# Patient Record
Sex: FEMALE | Race: OTHER | HISPANIC OR LATINO | Employment: UNEMPLOYED | URBAN - METROPOLITAN AREA
[De-identification: names, ages, dates, MRNs, and addresses within clinical notes are randomized per-mention and may not be internally consistent; named-entity substitution may affect disease eponyms.]

---

## 2019-01-02 ENCOUNTER — HOSPITAL ENCOUNTER (OUTPATIENT)
Dept: NEUROLOGY | Facility: AMBULATORY SURGERY CENTER | Age: 29
Discharge: HOME | End: 2019-01-02

## 2019-01-02 ENCOUNTER — TRANSCRIBE ORDERS (OUTPATIENT)
Dept: ADMINISTRATIVE | Facility: HOSPITAL | Age: 29
End: 2019-01-02

## 2019-01-02 DIAGNOSIS — G40.109 SPECIAL SENSORY ATTACKS (HCC): ICD-10-CM

## 2019-01-02 DIAGNOSIS — G40.109 SPECIAL SENSORY ATTACKS (HCC): Primary | ICD-10-CM

## 2019-01-03 ENCOUNTER — HOSPITAL ENCOUNTER (OUTPATIENT)
Dept: NEUROLOGY | Facility: AMBULATORY SURGERY CENTER | Age: 29
Discharge: HOME | End: 2019-01-03
Payer: COMMERCIAL

## 2019-01-03 DIAGNOSIS — G40.109 SPECIAL SENSORY ATTACKS (HCC): ICD-10-CM

## 2019-01-03 PROCEDURE — 95953 HB EEG MONITORING/COMPUTER: CPT

## 2019-01-03 PROCEDURE — 95953 PR EEG MONITORING/COMPUTER, EA 24 HOURS, UNATTENDED: CPT | Performed by: PSYCHIATRY & NEUROLOGY

## 2019-01-04 ENCOUNTER — HOSPITAL ENCOUNTER (OUTPATIENT)
Dept: NEUROLOGY | Facility: AMBULATORY SURGERY CENTER | Age: 29
Discharge: HOME | End: 2019-01-04
Payer: COMMERCIAL

## 2019-01-04 DIAGNOSIS — G40.109 SPECIAL SENSORY ATTACKS (HCC): ICD-10-CM

## 2019-01-04 PROCEDURE — 95953 PR EEG MONITORING/COMPUTER, EA 24 HOURS, UNATTENDED: CPT | Performed by: PSYCHIATRY & NEUROLOGY

## 2019-01-04 PROCEDURE — 95953 HB EEG MONITORING/COMPUTER: CPT

## 2020-01-13 LAB — EXTERNAL GROUP B STREP ANTIGEN: NEGATIVE

## 2020-02-09 ENCOUNTER — HOSPITAL ENCOUNTER (INPATIENT)
Facility: HOSPITAL | Age: 30
LOS: 2 days | Discharge: HOME | DRG: 560 | End: 2020-02-11
Attending: OBSTETRICS & GYNECOLOGY | Admitting: OBSTETRICS & GYNECOLOGY
Payer: COMMERCIAL

## 2020-02-09 ENCOUNTER — ANESTHESIA EVENT (INPATIENT)
Dept: ANESTHESIOLOGY | Facility: HOSPITAL | Age: 30
DRG: 560 | End: 2020-02-09
Payer: COMMERCIAL

## 2020-02-09 ENCOUNTER — ANESTHESIA (INPATIENT)
Dept: ANESTHESIOLOGY | Facility: HOSPITAL | Age: 30
DRG: 560 | End: 2020-02-09
Payer: COMMERCIAL

## 2020-02-09 DIAGNOSIS — Z3A.40 40 WEEKS GESTATION OF PREGNANCY: Primary | ICD-10-CM

## 2020-02-09 DIAGNOSIS — K59.00 CONSTIPATION: ICD-10-CM

## 2020-02-09 LAB
ABO GROUP BLD: NORMAL
BACTERIA UR QL AUTO: ABNORMAL /HPF
BASE EXCESS BLDCOA CALC-SCNC: -4.6 MMOL/L (ref 3–11)
BASE EXCESS BLDCOV CALC-SCNC: -3.9 MMOL/L (ref 1–9)
BASOPHILS # BLD AUTO: 0.01 THOUSANDS/ΜL (ref 0–0.1)
BASOPHILS NFR BLD AUTO: 0 % (ref 0–1)
BILIRUB UR QL STRIP: NEGATIVE
BLD GP AB SCN SERPL QL: NEGATIVE
CLARITY UR: CLEAR
COLOR UR: YELLOW
EOSINOPHIL # BLD AUTO: 0.05 THOUSAND/ΜL (ref 0–0.61)
EOSINOPHIL NFR BLD AUTO: 1 % (ref 0–6)
ERYTHROCYTE [DISTWIDTH] IN BLOOD BY AUTOMATED COUNT: 15 % (ref 11.6–15.1)
EXTERNAL HEPATITIS B SURFACE ANTIGEN: NEGATIVE
EXTERNAL HEPATITIS B SURFACE ANTIGEN: NORMAL
EXTERNAL RUBELLA IGG QUANTITATION: NORMAL
EXTERNAL RUBELLA IGG QUANTITATION: NORMAL
GLUCOSE UR STRIP-MCNC: NEGATIVE MG/DL
HBV SURFACE AG SER QL: NORMAL
HCO3 BLDCOA-SCNC: 23.2 MMOL/L (ref 17.3–27.3)
HCO3 BLDCOV-SCNC: 19.6 MMOL/L (ref 12.2–28.6)
HCT VFR BLD AUTO: 38.5 % (ref 34.8–46.1)
HGB BLD-MCNC: 12.5 G/DL (ref 11.5–15.4)
HGB UR QL STRIP.AUTO: NEGATIVE
HIV 1+2 AB+HIV1 P24 AG SERPL QL IA: NORMAL
HIV1 P24 AG SER QL: NORMAL
IMM GRANULOCYTES # BLD AUTO: 0.04 THOUSAND/UL (ref 0–0.2)
IMM GRANULOCYTES NFR BLD AUTO: 1 % (ref 0–2)
KETONES UR STRIP-MCNC: NEGATIVE MG/DL
LEUKOCYTE ESTERASE UR QL STRIP: NEGATIVE
LYMPHOCYTES # BLD AUTO: 1.94 THOUSANDS/ΜL (ref 0.6–4.47)
LYMPHOCYTES NFR BLD AUTO: 23 % (ref 14–44)
MCH RBC QN AUTO: 27.7 PG (ref 26.8–34.3)
MCHC RBC AUTO-ENTMCNC: 32.5 G/DL (ref 31.4–37.4)
MCV RBC AUTO: 85 FL (ref 82–98)
MONOCYTES # BLD AUTO: 0.53 THOUSAND/ΜL (ref 0.17–1.22)
MONOCYTES NFR BLD AUTO: 6 % (ref 4–12)
MUCOUS THREADS UR QL AUTO: ABNORMAL
NEUTROPHILS # BLD AUTO: 5.72 THOUSANDS/ΜL (ref 1.85–7.62)
NEUTS SEG NFR BLD AUTO: 69 % (ref 43–75)
NITRITE UR QL STRIP: NEGATIVE
NON-SQ EPI CELLS URNS QL MICRO: ABNORMAL /HPF
NRBC BLD AUTO-RTO: 0 /100 WBCS
O2 CT VFR BLDCOA CALC: 5 ML/DL
OXYHGB MFR BLDCOA: 21.4 %
OXYHGB MFR BLDCOV: 87 %
PCO2 BLDCOA: 53.1 MM[HG] (ref 30–60)
PCO2 BLDCOV: 32.4 MM HG (ref 27–43)
PH BLDCOA: 7.26 [PH] (ref 7.23–7.43)
PH BLDCOV: 7.4 [PH] (ref 7.19–7.49)
PH UR STRIP.AUTO: 6 [PH]
PLATELET # BLD AUTO: 217 THOUSANDS/UL (ref 149–390)
PMV BLD AUTO: 11 FL (ref 8.9–12.7)
PO2 BLDCOA: 12.8 MM HG (ref 5–25)
PO2 BLDCOV: 39.6 MM HG (ref 15–45)
PROT UR STRIP-MCNC: ABNORMAL MG/DL
RBC # BLD AUTO: 4.51 MILLION/UL (ref 3.81–5.12)
RBC #/AREA URNS AUTO: ABNORMAL /HPF
RH BLD: POSITIVE
RUBV IGG SERPL IA-ACNC: >175 IU/ML
SAO2 % BLDCOV: 22.6 ML/DL
SP GR UR STRIP.AUTO: 1.02 (ref 1–1.03)
SPECIMEN EXPIRATION DATE: NORMAL
UROBILINOGEN UR QL STRIP.AUTO: 0.2 E.U./DL
WBC # BLD AUTO: 8.29 THOUSAND/UL (ref 4.31–10.16)
WBC #/AREA URNS AUTO: ABNORMAL /HPF

## 2020-02-09 PROCEDURE — 87086 URINE CULTURE/COLONY COUNT: CPT | Performed by: STUDENT IN AN ORGANIZED HEALTH CARE EDUCATION/TRAINING PROGRAM

## 2020-02-09 PROCEDURE — 87806 HIV AG W/HIV1&2 ANTB W/OPTIC: CPT | Performed by: OBSTETRICS & GYNECOLOGY

## 2020-02-09 PROCEDURE — 82805 BLOOD GASES W/O2 SATURATION: CPT | Performed by: OBSTETRICS & GYNECOLOGY

## 2020-02-09 PROCEDURE — 80081 OBSTETRIC PANEL INC HIV TSTG: CPT | Performed by: STUDENT IN AN ORGANIZED HEALTH CARE EDUCATION/TRAINING PROGRAM

## 2020-02-09 PROCEDURE — 81001 URINALYSIS AUTO W/SCOPE: CPT | Performed by: STUDENT IN AN ORGANIZED HEALTH CARE EDUCATION/TRAINING PROGRAM

## 2020-02-09 PROCEDURE — 99204 OFFICE O/P NEW MOD 45 MIN: CPT

## 2020-02-09 RX ORDER — ONDANSETRON 2 MG/ML
4 INJECTION INTRAMUSCULAR; INTRAVENOUS EVERY 6 HOURS PRN
Status: DISCONTINUED | OUTPATIENT
Start: 2020-02-09 | End: 2020-02-09

## 2020-02-09 RX ORDER — ACETAMINOPHEN 325 MG/1
650 TABLET ORAL EVERY 6 HOURS SCHEDULED
Status: DISCONTINUED | OUTPATIENT
Start: 2020-02-09 | End: 2020-02-11 | Stop reason: HOSPADM

## 2020-02-09 RX ORDER — SODIUM CHLORIDE, SODIUM LACTATE, POTASSIUM CHLORIDE, CALCIUM CHLORIDE 600; 310; 30; 20 MG/100ML; MG/100ML; MG/100ML; MG/100ML
125 INJECTION, SOLUTION INTRAVENOUS CONTINUOUS
Status: DISCONTINUED | OUTPATIENT
Start: 2020-02-09 | End: 2020-02-09

## 2020-02-09 RX ORDER — CALCIUM CARBONATE 200(500)MG
1000 TABLET,CHEWABLE ORAL DAILY PRN
Status: DISCONTINUED | OUTPATIENT
Start: 2020-02-09 | End: 2020-02-10

## 2020-02-09 RX ORDER — IBUPROFEN 600 MG/1
600 TABLET ORAL EVERY 6 HOURS PRN
Status: DISCONTINUED | OUTPATIENT
Start: 2020-02-09 | End: 2020-02-11 | Stop reason: HOSPADM

## 2020-02-09 RX ORDER — FENTANYL CITRATE 50 UG/ML
INJECTION, SOLUTION INTRAMUSCULAR; INTRAVENOUS
Status: COMPLETED
Start: 2020-02-09 | End: 2020-02-09

## 2020-02-09 RX ORDER — FENTANYL CITRATE 50 UG/ML
INJECTION, SOLUTION INTRAMUSCULAR; INTRAVENOUS AS NEEDED
Status: DISCONTINUED | OUTPATIENT
Start: 2020-02-09 | End: 2020-02-09 | Stop reason: SURG

## 2020-02-09 RX ORDER — BUPIVACAINE HYDROCHLORIDE 2.5 MG/ML
INJECTION, SOLUTION EPIDURAL; INFILTRATION; INTRACAUDAL AS NEEDED
Status: DISCONTINUED | OUTPATIENT
Start: 2020-02-09 | End: 2020-02-09 | Stop reason: SURG

## 2020-02-09 RX ORDER — OXYTOCIN/RINGER'S LACTATE 30/500 ML
1-30 PLASTIC BAG, INJECTION (ML) INTRAVENOUS ONCE
Status: DISCONTINUED | OUTPATIENT
Start: 2020-02-09 | End: 2020-02-11 | Stop reason: HOSPADM

## 2020-02-09 RX ORDER — DOCUSATE SODIUM 100 MG/1
100 CAPSULE, LIQUID FILLED ORAL 2 TIMES DAILY
Status: DISCONTINUED | OUTPATIENT
Start: 2020-02-09 | End: 2020-02-11 | Stop reason: HOSPADM

## 2020-02-09 RX ORDER — OXYCODONE HYDROCHLORIDE 5 MG/1
5 TABLET ORAL EVERY 4 HOURS PRN
Status: DISCONTINUED | OUTPATIENT
Start: 2020-02-09 | End: 2020-02-11 | Stop reason: HOSPADM

## 2020-02-09 RX ORDER — TRISODIUM CITRATE DIHYDRATE AND CITRIC ACID MONOHYDRATE 500; 334 MG/5ML; MG/5ML
30 SOLUTION ORAL 4 TIMES DAILY PRN
Status: DISCONTINUED | OUTPATIENT
Start: 2020-02-09 | End: 2020-02-11 | Stop reason: HOSPADM

## 2020-02-09 RX ORDER — OXYTOCIN/RINGER'S LACTATE 30/500 ML
PLASTIC BAG, INJECTION (ML) INTRAVENOUS
Status: COMPLETED
Start: 2020-02-09 | End: 2020-02-10

## 2020-02-09 RX ORDER — FAMOTIDINE 10 MG
10 TABLET ORAL 2 TIMES DAILY PRN
COMMUNITY
End: 2020-12-11 | Stop reason: ALTCHOICE

## 2020-02-09 RX ADMIN — ROPIVACAINE HYDROCHLORIDE: 2 INJECTION, SOLUTION EPIDURAL; INFILTRATION at 12:15

## 2020-02-09 RX ADMIN — IBUPROFEN 600 MG: 600 TABLET ORAL at 21:51

## 2020-02-09 RX ADMIN — BUPIVACAINE HYDROCHLORIDE 1 ML: 2.5 INJECTION, SOLUTION EPIDURAL; INFILTRATION; INTRACAUDAL at 11:59

## 2020-02-09 RX ADMIN — SODIUM CITRATE AND CITRIC ACID MONOHYDRATE 30 ML: 500; 334 SOLUTION ORAL at 20:03

## 2020-02-09 RX ADMIN — Medication: at 17:16

## 2020-02-09 RX ADMIN — ACETAMINOPHEN 650 MG: 325 TABLET, FILM COATED ORAL at 23:25

## 2020-02-09 RX ADMIN — DOCUSATE SODIUM 100 MG: 100 CAPSULE, LIQUID FILLED ORAL at 17:09

## 2020-02-09 RX ADMIN — FENTANYL CITRATE 10 MCG: 50 INJECTION INTRAMUSCULAR; INTRAVENOUS at 11:59

## 2020-02-09 RX ADMIN — Medication 250 UNITS: at 14:41

## 2020-02-09 RX ADMIN — ACETAMINOPHEN 650 MG: 325 TABLET, FILM COATED ORAL at 17:09

## 2020-02-09 RX ADMIN — SODIUM CHLORIDE, SODIUM LACTATE, POTASSIUM CHLORIDE, AND CALCIUM CHLORIDE 999 ML/HR: .6; .31; .03; .02 INJECTION, SOLUTION INTRAVENOUS at 11:25

## 2020-02-09 RX ADMIN — SODIUM CHLORIDE, SODIUM LACTATE, POTASSIUM CHLORIDE, AND CALCIUM CHLORIDE 125 ML/HR: .6; .31; .03; .02 INJECTION, SOLUTION INTRAVENOUS at 12:27

## 2020-02-09 RX ADMIN — WITCH HAZEL 1 PAD: 500 SOLUTION RECTAL; TOPICAL at 17:16

## 2020-02-09 NOTE — ANESTHESIA POSTPROCEDURE EVALUATION
Post-Op Assessment Note    CV Status:  Stable  Pain Score: 0    Pain management: adequate     Mental Status:  Alert and awake   Hydration Status:  Stable and euvolemic   PONV Controlled:  Controlled   Airway Patency:  Patent and adequate   Post Op Vitals Reviewed: Yes      Staff: CRNA     Post-op block assessment: catheter intact and no complications        BP      Temp      Pulse     Resp      SpO2

## 2020-02-09 NOTE — DISCHARGE SUMMARY
Discharge Summary - OB/GYN   Sherryle Games 30 y o  female MRN: [id number]  Unit/Bed#: -01 Encounter: [id number]      Admission Date: 2020     Discharge Date: 2020    Admitting Diagnosis:   1  Pregnancy at 40w6d  2  H/o preeclampsia in previous pregnancy  3  H/o IUGR in previous pregnancy    Discharge Diagnosis:   Same, delivered    Procedures: spontaneous vaginal delivery    Admitting Attending: Dr Koffi Vyas  Delivery Attending: Dr Koffi Vyas  Discharge Attending: Dr Sal Barnes Course:     Sherryle Games is a 30 y o  [id number] at 40w6d wks who was initially admitted for labor  She delivered a viable female  on 2020 at 21   Weight 7lbs 10 1oz via spontaneous vaginal delivery  Apgars were 9 (1 min) and 9 (5 min)   was transferred to  nursery  Patient tolerated the procedure well and was transferred to recovery in stable condition  Her post-partum course was uncomplicated  Her post-partum pain was well controlled with oral analgesics  On day of discharge, she was ambulating and able to reasonably perform all ADLs  She was voiding and had appropriate bowel function  Pain was well controlled  She was discharged home on post-partum day #2 without complications  Patient was instructed to follow up with her OB as an outpatient and was given appropriate warnings to call provider if she develops signs of infection or uncontrolled pain  Complications: none apparent    Condition at discharge: good     Discharge instructions/Information to patient and family:   See after visit summary for information provided to patient and family  Provisions for Follow-Up Care:  See after visit summary for information related to follow-up care and any pertinent home health orders  Disposition: Home    Planned Readmission: No    Discharge Medications:   For a complete list of the patient's medications, please refer to her med rec

## 2020-02-09 NOTE — ANESTHESIA PROCEDURE NOTES
CSE Block    Patient location during procedure: OB  Start time: 2/9/2020 11:59 AM  Reason for block: procedure for pain and at surgeon's request  Staffing  Anesthesiologist: Jessica Dao MD  Resident/CRNA: Lionel Merchant CRNA  Performed: resident/CRNA   Preanesthetic Checklist  Completed: patient identified, surgical consent, pre-op evaluation, timeout performed, IV checked, risks and benefits discussed and monitors and equipment checked  CSE  Patient position: sitting  Prep: Betadine and site prepped and draped  Patient monitoring: heart rate, continuous pulse ox and frequent blood pressure checks  Approach: midline  Spinal Needle  Needle type: pencil-tip   Needle gauge: 27 G  Needle length: 10 cm  Epidural Needle  Injection technique: KRISTIN saline  Needle type: Tuohy   Epidural needle gauge: 17 g  Location: lumbar (1-5) (L4/5)  Catheter  Catheter type: side hole  Catheter size: 19 g springwound  Catheter at skin depth: 10 5 cm  Test dose: negative  Assessment  Injection Assessment:  negative aspiration for heme, no pain on injection, no paresthesia on injection and positive aspiration for clear CSF  Additional Notes  Pt positioned sitting, timeout, sterile prep and drape  Placement 1 attempt at L 4/5 with KRISTIN to NS  Needle through needle CSE with + CSF, easy injection, + relief  Cath threaded easily, negative aspiration  Patient laid supine with GRIFFIN, PCEA initiated

## 2020-02-09 NOTE — ANESTHESIA PREPROCEDURE EVALUATION
31 yo  in labor, requesting analgesia  Review of Systems/Medical History  Patient summary reviewed  Chart reviewed  No history of anesthetic complications (prior epidural x2, no problems)     Cardiovascular  Negative cardio ROS    Pulmonary  Negative pulmonary ROS        GI/Hepatic  Negative GI/hepatic ROS          Negative  ROS        Endo/Other  Negative endo/other ROS      GYN  Currently pregnant , Prior pregnancy/OB history : 3 Parity: 2,          Hematology  Negative hematology ROS      Musculoskeletal  Negative musculoskeletal ROS        Neurology  Seizures ,     Psychology   Negative psychology ROS              Physical Exam    Airway    Mallampati score: I  TM Distance: >3 FB  Neck ROM: full     Dental   No notable dental hx     Cardiovascular  Comment: Negative ROS, Cardiovascular exam normal    Pulmonary  Pulmonary exam normal     Other Findings      Lab Results   Component Value Date    HGB 12 5 2020     2020         Anesthesia Plan  ASA Score- 2     Anesthesia Type- epidural and spinal with ASA Monitors  Additional Monitors:   Airway Plan:         Plan Factors-    Induction-     Postoperative Plan-     Informed Consent- Anesthetic plan and risks discussed with patient and spouse  I personally reviewed this patient with the CRNA  Discussed and agreed on the Anesthesia Plan with the CRNA  Norma Wilson

## 2020-02-09 NOTE — L&D DELIVERY NOTE
Vaginal Delivery Summary - OB/GYN   Leticia Quijano 30 y o  female MRN: [id number]  Unit/Bed#: -01 Encounter: [id number]          Predelivery Diagnosis:  1  Pregnancy at 40w6d  2  History of preeclampsia in previous pregnancy  3  History of IUGR in previous pregnancy  4  GERD     Postdelivery Diagnosis:  1  Same as above  2  Delivery of term     Procedure: Spontaneous Vaginal Delivery    Attending: Luis Daniel Og MD    Assistant: Prasanna Oseguera MD    Anesthesia: Epidural    EBL: 48ZY    Complications: none apparent    Specimens: cord blood, arterial and venous cord blood gasses, placenta to storage    Findings:   1  Viable female at 1441, with APGARS of 9 and 9 at 1 and 5 minutes respectively,  2  Spontaneous delivery of intact placenta at 1453  4  Blood gases:   Arterial pH: 7 259   Arterial base excess: -4 6   Venous pH: 7 399   Venous base excess: -3 9    Disposition:  Patient tolerated the procedure well and was recovering in labor and delivery room     Brief history and labor course:  Ms Leticia Quijano is a 30 y o  Ludie Shelling at [facility]  She presented to labor and delivery for labor  Her pregnancy was uncomplicated, although she had history of previous pregnancies complicated by preeclampsia and IUGR respectively  On exam in triage she was noted to be 4/90/-2  She was admitted for labor  She progressed well on her own and her membranes were ruptured for clear amniotic fluid  She is found to be complete warm compresses were applied  Description of procedure    After pushing for 13 minutes, at 1441 patient delivered a viable female , wt pending, apgars of 9 (1 min) and 9 (5 min)  The fetal vertex delivered spontaneously in a AMANDA presentation  Baby was checked for nuchal cord and found to have one loop around the neck that could not be reduced over the head and was delivered through to be reduced around the fetal body   The left anterior shoulder delivered atraumatically with maternal expulsive forces and the assistance of downward traction  The posterior shoulder delivered with maternal expulsive forces and the assistance of upward traction  The remainder of the fetus delivered spontaneously  Upon delivery, the infant was placed on the mothers abdomen and the cord was clamped and cut  Delayed cord clamping was performed  The infant was noted to cry spontaneously and was moving all extremities appropriately  There was no evidence for injury  Awaiting nurse resuscitators evaluated the   Arterial and venous cord blood gases and cord blood was collected for analysis  These were promptly sent to the lab  In the immediate post-partum, 30 units of IV pitocin was administered, and the uterus was noted to contract down well with massage and pitocin  The placenta delivered spontaneously at [phone] and was noted to have a centrally inserted 3 vessel cord  The vagina, cervix, perineum, and rectum were inspected and there was noted to be abrasions and no lacerations requiring repair  At the conclusion of the procedure, all needle, sponge, and instrument counts were noted to be correct  Patient tolerated the procedure well and was allowed to recover in labor and delivery room with family and  before being transferred to the post-partum floor  The attending, Dr Danay Wong, was present and participated in all key portions of the case          Mary Gill MD  2020  3:18 PM

## 2020-02-09 NOTE — OB LABOR/OXYTOCIN SAFETY PROGRESS
Labor Progress Note - Elliot Moctezuma 30 y o  female MRN: [id number]    Unit/Bed#: -01 Encounter: [id number]       Contraction Frequency (minutes): 2-3  Contraction Quality: Moderate  Tachysystole: No   Dilation: 6        Effacement (%): 100  Station: -1  Baseline Rate: 135 bpm  Fetal Heart Rate: 125 BPM  FHR Category: Category I             Notes/comments:   Exam by Dr Giovanna Martines, ruptured for clear fluid  FHT category 1  Geovanna every 3 min

## 2020-02-09 NOTE — PLAN OF CARE
Problem: Knowledge Deficit  Goal: Verbalizes understanding of labor plan  Description  Assess patient/family/caregiver's baseline knowledge level and ability to understand information  Provide education via patient/family/caregiver's preferred learning method at appropriate level of understanding  1  Provide teaching at level of understanding  2  Provide teaching via preferred learning method(s)  2/9/2020 1533 by Duke Elise RN  Outcome: Completed  2/9/2020 1357 by Duke Elise RN  Outcome: Progressing     Problem: Labor & Delivery  Goal: Manages discomfort  Description  Assess and monitor for signs and symptoms of discomfort  Assess patient's pain level regularly and per hospital policy  Administer medications as ordered  Support use of nonpharmacological methods to help control pain such as distraction, imagery, relaxation, and application of heat and cold  Collaborate with interdisciplinary team and patient to determine appropriate pain management plan  1  Include patient in decisions related to comfort  2  Offer non-pharmacological pain management interventions  3  Report ineffective pain management to physician  2/9/2020 1533 by Duke Elise RN  Outcome: Completed  2/9/2020 1357 by Duke Elise RN  Outcome: Progressing  Goal: Patient vital signs are stable  Description  1  Assess vital signs - vaginal delivery    2/9/2020 1533 by Duke Elise RN  Outcome: Completed  2/9/2020 1357 by Duke Elise RN  Outcome: Progressing

## 2020-02-09 NOTE — LACTATION NOTE
This note was copied from a baby's chart  Mom states infant is feeding well so far  Reviewed expected  infant feeding patterns in the first few days and encouraged feeding on cue  Given admission breastfeeding pkat and same reviewed  Encouraged to call for additional assistance as needed

## 2020-02-09 NOTE — OB LABOR/OXYTOCIN SAFETY PROGRESS
Labor Progress Note - Ervin Pedroza 30 y o  female MRN: [id number]    Unit/Bed#: -01 Encounter: [id number]       Contraction Frequency (minutes): 2-3  Contraction Quality: Moderate  Tachysystole: No   Dilation: 9        Effacement (%): 100  Station: 0  Baseline Rate: 135 bpm  Fetal Heart Rate: 125 BPM  FHR Category: Category I             Notes/comments:   Patient feeling constant pressure  Asynclitism noted with a more presenting anterior fontanelle  Fetal presentation JULIAN  FHT category 1  Geovanna every 2 min    Plan: reassess in 1 hour or less  Exam conducted with Dr Roberta Ortiz MD 2/9/2020 1:50 PM

## 2020-02-09 NOTE — DISCHARGE INSTRUCTIONS
Vaginal Delivery   WHAT YOU NEED TO KNOW:   A vaginal delivery occurs when your baby is born through your vagina (birth canal)  DISCHARGE INSTRUCTIONS:   Seek care immediately if:   · Your leg feels warm, tender, and painful  It may look swollen and red  · You have a fever  · You are urinating very little, or not at all  · You have heavy vaginal bleeding that fills 1 or more sanitary pads in 1 hour  · You feel weak, dizzy, or faint  Contact your healthcare provider if:   · Your abdominal or perineal pain does not go away, or gets worse  · You feel depressed  · You have questions or concerns about your condition or care  Medicines:  · NSAIDs , such as ibuprofen, help decrease swelling, pain, and fever  This medicine is available with or without a doctor's order  NSAIDs can cause stomach bleeding or kidney problems in certain people  If you take blood thinner medicine, always ask your healthcare provider if NSAIDs are safe for you  Always read the medicine label and follow directions  · Stool softeners  make it easier for you to have a bowel movement  You may need this medicine to treat or prevent constipation  · Take your medicine as directed  Contact your healthcare provider if you think your medicine is not helping or if you have side effects  Tell him or her if you are allergic to any medicine  Keep a list of the medicines, vitamins, and herbs you take  Include the amounts, and when and why you take them  Bring the list or the pill bottles to follow-up visits  Carry your medicine list with you in case of an emergency  Follow up with your healthcare provider:  Most women need to return 6 weeks after a vaginal delivery  Ask your healthcare provider how to care for your wounds or stitches, if you have them  Write down your questions so you remember to ask them during your visits  Activity:  Rest as much as possible  Try to keep all activities short   You may be able to do some exercise soon after you have your baby  Talk with your healthcare provider before you start exercising  If you work outside the home, ask when you can return to your job  Kegel exercises:  Kegel exercises may help your vaginal and rectal muscles heal faster  You can do Kegel exercises by tightening and relaxing the muscles around your vagina  Kegel exercises help make the muscles stronger  Breast care:  When your milk comes in, your breasts may feel full and hard  Ask how to care for your breasts, even if you are not breastfeeding  Constipation:  You may have constipation for a period of time after you have your baby  Do not try to push the bowel movement out if it is too hard  High-fiber foods and extra liquids can help you prevent constipation  Examples of high-fiber foods are fruit and bran  Prune juice and water are good liquids to drink  You may also be told to take over-the-counter fiber and stool softener medicines  Take these items as directed  Ask how to prevent or treat hemorrhoids  Perineum care: Your perineum is the area between your vagina and anus  Keep the area clean and dry  This will help it heal and prevent infection  Wash the area gently with soap and water when you bathe or shower  Rinse your perineum with warm water after you urinate or have a bowel movement  Your healthcare provider may suggest you use a warm sitz bath to help decrease pain  To take a sitz bath, fill a bathtub with 4 to 6 inches of warm water  You may also use a sitz bath pan that fits inside the toilet  Sit in the sitz bath for 20 minutes  Do this 2 to 3 times a day, or as directed  The warm water can help decrease pain and swelling  Vaginal discharge: You will have vaginal discharge, called lochia, after your delivery  The lochia is red or dark brown with clots for 1 to 3 days after the birth  The amount will decrease and turn pale pink or brown for 3 to 10 days  It will turn white or yellow on the 10th or 14th day  Lochia is usually gone within 3 weeks  Use a sanitary pad rather than a tampon to prevent a vaginal infection  You will have lochia for up to 3 weeks after your baby is born  Monthly periods: Your period may start again within 7 to 9 weeks after your baby is born  If you are breastfeeding, it may take longer for your period to start again  You can still get pregnant again even though you do not have your monthly period  Talk with your healthcare provider about a birth control method if you do not want to get pregnant  Mood changes: Many new mothers have some kind of mood changes after delivery  Some of these changes occur because of lack of sleep, hormone changes, and caring for a new baby  Some mood changes can be more serious, such as postpartum depression  Talk with your healthcare provider if you feel unable to care for yourself or your baby  Sexual activity:  Do not have sex until your healthcare provider says it is okay  You may notice you have a decreased desire for sex, or sex may be painful  You may need to use a vaginal lubricant (gel) to help make sex more comfortable  © 2017 [facility] Information is for End User's use only and may not be sold, redistributed or otherwise used for commercial purposes  All illustrations and images included in CareNotes® are the copyrighted property of A D A M , Inc  or Tucker Raman  The above information is an  only  It is not intended as medical advice for individual conditions or treatments  Talk to your doctor, nurse or pharmacist before following any medical regimen to see if it is safe and effective for you

## 2020-02-10 LAB
BACTERIA UR CULT: NORMAL
BASOPHILS # BLD AUTO: 0.02 THOUSANDS/ΜL (ref 0–0.1)
BASOPHILS NFR BLD AUTO: 0 % (ref 0–1)
EOSINOPHIL # BLD AUTO: 0.04 THOUSAND/ΜL (ref 0–0.61)
EOSINOPHIL NFR BLD AUTO: 0 % (ref 0–6)
ERYTHROCYTE [DISTWIDTH] IN BLOOD BY AUTOMATED COUNT: 15.4 % (ref 11.6–15.1)
HCT VFR BLD AUTO: 39.5 % (ref 34.8–46.1)
HGB BLD-MCNC: 12.6 G/DL (ref 11.5–15.4)
IMM GRANULOCYTES # BLD AUTO: 0.03 THOUSAND/UL (ref 0–0.2)
IMM GRANULOCYTES NFR BLD AUTO: 0 % (ref 0–2)
LYMPHOCYTES # BLD AUTO: 1.92 THOUSANDS/ΜL (ref 0.6–4.47)
LYMPHOCYTES NFR BLD AUTO: 19 % (ref 14–44)
MCH RBC QN AUTO: 27.6 PG (ref 26.8–34.3)
MCHC RBC AUTO-ENTMCNC: 31.9 G/DL (ref 31.4–37.4)
MCV RBC AUTO: 87 FL (ref 82–98)
MONOCYTES # BLD AUTO: 0.48 THOUSAND/ΜL (ref 0.17–1.22)
MONOCYTES NFR BLD AUTO: 5 % (ref 4–12)
NEUTROPHILS # BLD AUTO: 7.43 THOUSANDS/ΜL (ref 1.85–7.62)
NEUTS SEG NFR BLD AUTO: 76 % (ref 43–75)
NRBC BLD AUTO-RTO: 0 /100 WBCS
PLATELET # BLD AUTO: 208 THOUSANDS/UL (ref 149–390)
PMV BLD AUTO: 10.9 FL (ref 8.9–12.7)
RBC # BLD AUTO: 4.56 MILLION/UL (ref 3.81–5.12)
RPR SER QL: NORMAL
WBC # BLD AUTO: 9.92 THOUSAND/UL (ref 4.31–10.16)

## 2020-02-10 PROCEDURE — 90715 TDAP VACCINE 7 YRS/> IM: CPT | Performed by: OBSTETRICS & GYNECOLOGY

## 2020-02-10 PROCEDURE — 85025 COMPLETE CBC W/AUTO DIFF WBC: CPT | Performed by: OBSTETRICS & GYNECOLOGY

## 2020-02-10 RX ORDER — CALCIUM CARBONATE 200(500)MG
500 TABLET,CHEWABLE ORAL DAILY PRN
Status: DISCONTINUED | OUTPATIENT
Start: 2020-02-10 | End: 2020-02-11 | Stop reason: HOSPADM

## 2020-02-10 RX ADMIN — ACETAMINOPHEN 650 MG: 325 TABLET, FILM COATED ORAL at 06:02

## 2020-02-10 RX ADMIN — CALCIUM CARBONATE (ANTACID) CHEW TAB 500 MG 500 MG: 500 CHEW TAB at 14:27

## 2020-02-10 RX ADMIN — DOCUSATE SODIUM 100 MG: 100 CAPSULE, LIQUID FILLED ORAL at 17:57

## 2020-02-10 RX ADMIN — IBUPROFEN 600 MG: 600 TABLET ORAL at 15:46

## 2020-02-10 RX ADMIN — DOCUSATE SODIUM 100 MG: 100 CAPSULE, LIQUID FILLED ORAL at 08:01

## 2020-02-10 RX ADMIN — ACETAMINOPHEN 650 MG: 325 TABLET, FILM COATED ORAL at 17:57

## 2020-02-10 RX ADMIN — ACETAMINOPHEN 650 MG: 325 TABLET, FILM COATED ORAL at 12:04

## 2020-02-10 RX ADMIN — CALCIUM CARBONATE (ANTACID) CHEW TAB 500 MG 1000 MG: 500 CHEW TAB at 01:52

## 2020-02-10 RX ADMIN — TETANUS TOXOID, REDUCED DIPHTHERIA TOXOID AND ACELLULAR PERTUSSIS VACCINE, ADSORBED 0.5 ML: 5; 2.5; 8; 8; 2.5 SUSPENSION INTRAMUSCULAR at 20:53

## 2020-02-10 NOTE — PLAN OF CARE
Problem: ALTERATION IN THE BREAST  Goal: Optimize infant feeding at the breast  Description  INTERVENTIONS:  - Latch, breast and nipple assessment  - Assess prior breast feeding history  - Hand expression of breast milk  - For cracked, bleeding and or sore nipples reassess latch, treat damaged nipple  -Educate mother on feeding cues  -Positioning/latch techniques  Outcome: Progressing     Problem: INADEQUATE LATCH, SUCK OR SWALLOW  Goal: Demonstrate ability to latch and sustain latch, audible swallowing and satiety  Description  INTERVENTIONS:  - Assess oral anatomy, notify Physician/AP for abnormal findings  - Establish milk expression  - Maximize feeding opportunity (skin to skin, behavioral state)  - Position/latch techniques  - Discourage use of pacifier-artificial nipple  - Mechanical pumping  - Nipple Shield  - Supplemental formula feeding (Physician/AP order)  - Alternative feeding method  Outcome: Progressing     Problem: PAIN - ADULT  Goal: Verbalizes/displays adequate comfort level or baseline comfort level  Description  Interventions:  - Encourage patient to monitor pain and request assistance  - Assess pain using appropriate pain scale  - Administer analgesics based on type and severity of pain and evaluate response  - Implement non-pharmacological measures as appropriate and evaluate response  - Consider cultural and social influences on pain and pain management  - Notify physician/advanced practitioner if interventions unsuccessful or patient reports new pain  Outcome: Progressing     Problem: INFECTION - ADULT  Goal: Absence or prevention of progression during hospitalization  Description  INTERVENTIONS:  - Assess and monitor for signs and symptoms of infection  - Monitor lab/diagnostic results  - Monitor all insertion sites, i e  indwelling lines, tubes, and drains  - Monitor endotracheal if appropriate and nasal secretions for changes in amount and color  - [place] appropriate cooling/warming therapies per order  - Administer medications as ordered  - Instruct and encourage patient and family to use good hand hygiene technique  - Identify and instruct in appropriate isolation precautions for identified infection/condition  Outcome: Progressing  Goal: Absence of fever/infection during neutropenic period  Description  INTERVENTIONS:  - Monitor WBC    Outcome: Progressing     Problem: SAFETY ADULT  Goal: Patient will remain free of falls  Description  INTERVENTIONS:  - Assess patient frequently for physical needs  -  Identify cognitive and physical deficits and behaviors that affect risk of falls    -  [place] fall precautions as indicated by assessment   - Educate patient/family on patient safety including physical limitations  - Instruct patient to call for assistance with activity based on assessment  - Modify environment to reduce risk of injury  - Consider OT/PT consult to assist with strengthening/mobility  Outcome: Progressing  Goal: Maintain or return to baseline ADL function  Description  INTERVENTIONS:  -  Assess patient's ability to carry out ADLs; assess patient's baseline for ADL function and identify physical deficits which impact ability to perform ADLs (bathing, care of mouth/teeth, toileting, grooming, dressing, etc )  - Assess/evaluate cause of self-care deficits   - Assess range of motion  - Assess patient's mobility; develop plan if impaired  - Assess patient's need for assistive devices and provide as appropriate  - Encourage maximum independence but intervene and supervise when necessary  - Involve family in performance of ADLs  - Assess for home care needs following discharge   - Consider OT consult to assist with ADL evaluation and planning for discharge  - Provide patient education as appropriate  Outcome: Progressing  Goal: Maintain or return mobility status to optimal level  Description  INTERVENTIONS:  - Assess patient's baseline mobility status (ambulation, transfers, stairs, etc )    - Identify cognitive and physical deficits and behaviors that affect mobility  - Identify mobility aids required to assist with transfers and/or ambulation (gait belt, sit-to-stand, lift, walker, cane, etc )  - [place] fall precautions as indicated by assessment  - Record patient progress and toleration of activity level on Mobility SBAR; progress patient to next Phase/Stage  - Instruct patient to call for assistance with activity based on assessment  - Consider rehabilitation consult to assist with strengthening/weightbearing, etc   Outcome: Progressing     Problem: Knowledge Deficit  Goal: Patient/family/caregiver demonstrates understanding of disease process, treatment plan, medications, and discharge instructions  Description  Complete learning assessment and assess knowledge base    Interventions:  - Provide teaching at level of understanding  - Provide teaching via preferred learning methods  Outcome: Progressing     Problem: DISCHARGE PLANNING  Goal: Discharge to home or other facility with appropriate resources  Description  INTERVENTIONS:  - Identify barriers to discharge w/patient and caregiver  - Arrange for needed discharge resources and transportation as appropriate  - Identify discharge learning needs (meds, wound care, etc )  - Arrange for interpretive services to assist at discharge as needed  - Refer to Case Management Department for coordinating discharge planning if the patient needs post-hospital services based on physician/advanced practitioner order or complex needs related to functional status, cognitive ability, or social support system  Outcome: Progressing     Problem: POSTPARTUM  Goal: Experiences normal postpartum course  Description  INTERVENTIONS:  - Monitor maternal vital signs  - Assess uterine involution and lochia  Outcome: Progressing  Goal: Appropriate maternal -  bonding  Description  INTERVENTIONS:  - Identify family support  - Assess for appropriate maternal/infant bonding   -Encourage maternal/infant bonding opportunities  - Referral to  or  as needed  Outcome: Progressing  Goal: Establishment of infant feeding pattern  Description  INTERVENTIONS:  - Assess breast/bottle feeding  - Refer to lactation as needed  Outcome: Progressing  Goal: Incision(s), wounds(s) or drain site(s) healing without S/S of infection  Description  INTERVENTIONS  - Assess and document risk factors for skin impairment   - Assess and document dressing, incision, wound bed, drain sites and surrounding tissue  - Consider nutrition services referral as needed  - Oral mucous membranes remain intact  - Provide patient/ family education  Outcome: Progressing

## 2020-02-10 NOTE — LACTATION NOTE
This note was copied from a baby's chart  CONSULT - LACTATION  Baby Girl Cande Whipple) Lata Jeffersonr 1 days female MRN: [id number]    [facility] NURSERY Room / Bed: (N)/ 303(N) Encounter: [id number]    Maternal Information     MOTHER:  Porsha Lal  Maternal Age: 30 y o    OB History: #: 1, Date: , Sex: None, Weight: None, GA: None, Delivery: Vaginal, Spontaneous, Apgar1: None, Apgar5: None, Living: Living, Birth Comments: None    #: 2, Date: , Sex: None, Weight: None, GA: None, Delivery: Vaginal, Spontaneous, Apgar1: None, Apgar5: None, Living: Living, Birth Comments: None    #: 3, Date: 20, Sex: Female, Weight: 3460 g (7 lb 10 1 oz), GA: 40w6d, Delivery: Vaginal, Spontaneous, Apgar1: 9, Apgar5: 9, Living: Living, Birth Comments: None   Previouse breast reduction surgery?  No    Lactation history:   Has patient previously breast fed: Yes   How long had patient previously breast fed: X 1 month, X 9 mos   Previous breast feeding complications: (teeth)     Past Surgical History:   Procedure Laterality Date    HERNIA REPAIR      2 years ago    WISDOM TOOTH EXTRACTION         Birth information:  YOB: 2022   Time of birth: 2:41 PM   Sex: female   Delivery type: Vaginal, Spontaneous   Birth Weight: 3460 g (7 lb 10 1 oz)   Percent of Weight Change: -1%     Gestational Age: 38w9d   [unfilled]    Assessment     Breast and nipple assessment: normal assessment     Assessment: normal assessment    Feeding assessment: feeding well  LATCH:  Latch: Grasps breast, tongue down, lips flanged, rhythmic sucking   Audible Swallowing: Spontaneous and intermittent (24 hours old)   Type of Nipple: Everted (After stimulation)   Comfort (Breast/Nipple): Soft/non-tender   Hold (Positioning): Partial assist, teach one side, mother does other, staff holds   LATCH Score: 9          Feeding recommendations:  breast feed on demand     Worked on positioning infant up at chest level and starting to feed infant with nose arriving at the nipple  Then, using areolar compression to achieve a deep latch that is comfortable and exchanges optimum amounts of milk  Discussed risks for early supplementation: over feeding, longer digestion times, engorgement for mom, lower milk supply for mom, and nipple confusion  Benefits of breast feeding for infant's intestinal tract, less engorgement for mom, protection from multiple disease processes as infant develops, avoidance of over feeding for infant, less nipple confusion, and increased health benefits for mom  Encouraged parents to call for assistance, questions, and concerns about breastfeeding  Extension provided      Neeta Moyer RN 2/10/2020 4:40 PM

## 2020-02-10 NOTE — PROGRESS NOTES
Progress Note - OB/GYN   Norva Jarrell 30 y o  female MRN: [id number]  Unit/Bed#: -01 Encounter: [id number]    Assessment:  Postpartum day1  Doing well     Plan:  Cbc if labs are reassruing d/c hep lock  ll d/c tomorrow if stable  encourage breastfeeding    Subjective/Objective   Chief Complaint: postpartum doing well    Seen evaluated sitting in chair ambulating well denies any dysuria, chest pain or SOB  , mild lochia    Vitals: Blood pressure 107/62, pulse 70, temperature 98 3 °F (36 8 °C), temperature source Temporal, resp  rate 18, height 5' 4" (1 626 m), weight 69 4 kg (153 lb), SpO2 98 %, currently breastfeeding  ,Body mass index is 26 26 kg/m²  Intake/Output Summary (Last 24 hours) at 2/10/2020 0957  Last data filed at 2/9/2020 2150  Gross per 24 hour   Intake --   Output 1055 ml   Net -1055 ml       Invasive Devices     Peripheral Intravenous Line            Peripheral IV 02/09/20 Left;Ventral (anterior) Forearm less than 1 day                Physical Exam:VS REVIEW  Lungs cta b/l  abd soft fundus frim 1 cm below umbilicus  Ext no edema no calf tenderness      Lab, Imaging and other studies: I have personally reviewed pertinent reports     PENDING

## 2020-02-10 NOTE — PLAN OF CARE
Problem: ALTERATION IN THE BREAST  Goal: Optimize infant feeding at the breast  Description  INTERVENTIONS:  - Latch, breast and nipple assessment  - Assess prior breast feeding history  - Hand expression of breast milk  - For cracked, bleeding and or sore nipples reassess latch, treat damaged nipple  -Educate mother on feeding cues  -Positioning/latch techniques  Outcome: Progressing     Problem: INADEQUATE LATCH, SUCK OR SWALLOW  Goal: Demonstrate ability to latch and sustain latch, audible swallowing and satiety  Description  INTERVENTIONS:  - Assess oral anatomy, notify Physician/AP for abnormal findings  - Establish milk expression  - Maximize feeding opportunity (skin to skin, behavioral state)  - Position/latch techniques  - Discourage use of pacifier-artificial nipple  - Mechanical pumping  - Nipple Shield  - Supplemental formula feeding (Physician/AP order)  - Alternative feeding method  Outcome: Progressing     Problem: PAIN - ADULT  Goal: Verbalizes/displays adequate comfort level or baseline comfort level  Description  Interventions:  - Encourage patient to monitor pain and request assistance  - Assess pain using appropriate pain scale  - Administer analgesics based on type and severity of pain and evaluate response  - Implement non-pharmacological measures as appropriate and evaluate response  - Consider cultural and social influences on pain and pain management  - Notify physician/advanced practitioner if interventions unsuccessful or patient reports new pain  Outcome: Progressing     Problem: INFECTION - ADULT  Goal: Absence or prevention of progression during hospitalization  Description  INTERVENTIONS:  - Assess and monitor for signs and symptoms of infection  - Monitor lab/diagnostic results  - Monitor all insertion sites, i e  indwelling lines, tubes, and drains  - Monitor endotracheal if appropriate and nasal secretions for changes in amount and color  - [place] appropriate cooling/warming therapies per order  - Administer medications as ordered  - Instruct and encourage patient and family to use good hand hygiene technique  - Identify and instruct in appropriate isolation precautions for identified infection/condition  Outcome: Progressing  Goal: Absence of fever/infection during neutropenic period  Description  INTERVENTIONS:  - Monitor WBC    Outcome: Progressing     Problem: SAFETY ADULT  Goal: Patient will remain free of falls  Description  INTERVENTIONS:  - Assess patient frequently for physical needs  -  Identify cognitive and physical deficits and behaviors that affect risk of falls    -  [place] fall precautions as indicated by assessment   - Educate patient/family on patient safety including physical limitations  - Instruct patient to call for assistance with activity based on assessment  - Modify environment to reduce risk of injury  - Consider OT/PT consult to assist with strengthening/mobility  Outcome: Progressing  Goal: Maintain or return to baseline ADL function  Description  INTERVENTIONS:  -  Assess patient's ability to carry out ADLs; assess patient's baseline for ADL function and identify physical deficits which impact ability to perform ADLs (bathing, care of mouth/teeth, toileting, grooming, dressing, etc )  - Assess/evaluate cause of self-care deficits   - Assess range of motion  - Assess patient's mobility; develop plan if impaired  - Assess patient's need for assistive devices and provide as appropriate  - Encourage maximum independence but intervene and supervise when necessary  - Involve family in performance of ADLs  - Assess for home care needs following discharge   - Consider OT consult to assist with ADL evaluation and planning for discharge  - Provide patient education as appropriate  Outcome: Progressing  Goal: Maintain or return mobility status to optimal level  Description  INTERVENTIONS:  - Assess patient's baseline mobility status (ambulation, transfers, stairs, etc )    - Identify cognitive and physical deficits and behaviors that affect mobility  - Identify mobility aids required to assist with transfers and/or ambulation (gait belt, sit-to-stand, lift, walker, cane, etc )  - [place] fall precautions as indicated by assessment  - Record patient progress and toleration of activity level on Mobility SBAR; progress patient to next Phase/Stage  - Instruct patient to call for assistance with activity based on assessment  - Consider rehabilitation consult to assist with strengthening/weightbearing, etc   Outcome: Progressing     Problem: Knowledge Deficit  Goal: Patient/family/caregiver demonstrates understanding of disease process, treatment plan, medications, and discharge instructions  Description  Complete learning assessment and assess knowledge base    Interventions:  - Provide teaching at level of understanding  - Provide teaching via preferred learning methods  Outcome: Progressing     Problem: DISCHARGE PLANNING  Goal: Discharge to home or other facility with appropriate resources  Description  INTERVENTIONS:  - Identify barriers to discharge w/patient and caregiver  - Arrange for needed discharge resources and transportation as appropriate  - Identify discharge learning needs (meds, wound care, etc )  - Arrange for interpretive services to assist at discharge as needed  - Refer to Case Management Department for coordinating discharge planning if the patient needs post-hospital services based on physician/advanced practitioner order or complex needs related to functional status, cognitive ability, or social support system  Outcome: Progressing     Problem: POSTPARTUM  Goal: Experiences normal postpartum course  Description  INTERVENTIONS:  - Monitor maternal vital signs  - Assess uterine involution and lochia  Outcome: Progressing  Goal: Appropriate maternal -  bonding  Description  INTERVENTIONS:  - Identify family support  - Assess for appropriate maternal/infant bonding   -Encourage maternal/infant bonding opportunities  - Referral to  or  as needed  Outcome: Progressing  Goal: Establishment of infant feeding pattern  Description  INTERVENTIONS:  - Assess breast/bottle feeding  - Refer to lactation as needed  Outcome: Progressing  Goal: Incision(s), wounds(s) or drain site(s) healing without S/S of infection  Description  INTERVENTIONS  - Assess and document risk factors for skin impairment   - Assess and document dressing, incision, wound bed, drain sites and surrounding tissue  - Consider nutrition services referral as needed  - Oral mucous membranes remain intact  - Provide patient/ family education  Outcome: Progressing

## 2020-02-10 NOTE — PROGRESS NOTES
Progress Note - OB/GYN   Norva Jarrell 30 y o  female MRN: [id number]  Unit/Bed#:  303-01 Encounter: [id number]    Assessment:  Post partum Day #1 s/p , stable, baby in the room    Plan:  1) H/O depression, not on medications  - Mood stable    2) Continue routine post partum care  - Encourage ambulation  - Encourage breastfeeding  - Anticipate discharge tomorrow     Subjective/Objective   Chief Complaint:     Post delivery  Patient is doing well  Lochia WNL  Pain well controlled  Subjective:     Pain: yes, cramping, improved with meds  Tolerating PO: yes  Voiding: yes  Ambulating: yes  Chest pain: no  Shortness of breath: no  Leg pain: no  Lochia: minimal    Objective:     Vitals: /57 (BP Location: Right arm)   Pulse 59   Temp 97 7 °F (36 5 °C) (Oral)   Resp 18   Ht 5' 4" (1 626 m)   Wt 69 4 kg (153 lb)   SpO2 98%   Breastfeeding Yes   BMI 26 26 kg/m²       Intake/Output Summary (Last 24 hours) at 2/10/2020 0647  Last data filed at 2020 2150  Gross per 24 hour   Intake --   Output 1055 ml   Net -1055 ml       Lab Results   Component Value Date    WBC 8 29 2020    HGB 12 5 2020    HCT 38 5 2020    MCV 85 2020     2020       Physical Exam:     Gen: AAOx3, NAD  Abd: Soft, non-tender, non-distended, no rebound or guarding  Uterine fundus firm and non-tender, 2 cm below the umbilicus     Ext: Non tender    Luis Atkins MD  2/10/2020  6:47 AM

## 2020-02-11 VITALS
DIASTOLIC BLOOD PRESSURE: 63 MMHG | RESPIRATION RATE: 18 BRPM | HEIGHT: 64 IN | SYSTOLIC BLOOD PRESSURE: 112 MMHG | OXYGEN SATURATION: 98 % | BODY MASS INDEX: 26.12 KG/M2 | HEART RATE: 72 BPM | WEIGHT: 153 LBS | TEMPERATURE: 97.8 F

## 2020-02-11 LAB — HIV 1+2 AB+HIV1 P24 AG SERPL QL IA: NORMAL

## 2020-02-11 RX ORDER — IBUPROFEN 600 MG/1
600 TABLET ORAL EVERY 6 HOURS PRN
Qty: 30 TABLET | Refills: 0
Start: 2020-02-11 | End: 2022-03-04

## 2020-02-11 RX ORDER — DOCUSATE SODIUM 100 MG/1
100 CAPSULE, LIQUID FILLED ORAL 2 TIMES DAILY
Qty: 10 CAPSULE | Refills: 0 | Status: SHIPPED | OUTPATIENT
Start: 2020-02-11 | End: 2020-12-11 | Stop reason: ALTCHOICE

## 2020-02-11 RX ORDER — ACETAMINOPHEN 325 MG/1
650 TABLET ORAL EVERY 6 HOURS SCHEDULED
Qty: 30 TABLET | Refills: 0
Start: 2020-02-11 | End: 2022-06-29

## 2020-02-11 RX ADMIN — DOCUSATE SODIUM 100 MG: 100 CAPSULE, LIQUID FILLED ORAL at 08:42

## 2020-02-11 RX ADMIN — IBUPROFEN 600 MG: 600 TABLET ORAL at 08:42

## 2020-02-11 NOTE — LACTATION NOTE
This note was copied from a baby's chart  CONSULT - LACTATION  Baby Girl Haroon Biggs 2 days female MRN: [id number]    [facility] NURSERY Room / Bed: (N)/(N) Encounter: [id number]    Maternal Information     MOTHER:  Kellee San  Maternal Age: 30 y o    OB History: #: 1, Date: , Sex: None, Weight: None, GA: None, Delivery: Vaginal, Spontaneous, Apgar1: None, Apgar5: None, Living: Living, Birth Comments: None    #: 2, Date: , Sex: None, Weight: None, GA: None, Delivery: Vaginal, Spontaneous, Apgar1: None, Apgar5: None, Living: Living, Birth Comments: None    #: 3, Date: 20, Sex: Female, Weight: 3460 g (7 lb 10 1 oz), GA: 40w6d, Delivery: Vaginal, Spontaneous, Apgar1: 9, Apgar5: 9, Living: Living, Birth Comments: None   Previouse breast reduction surgery? No    Lactation history:   Has patient previously breast fed: Yes   How long had patient previously breast fed: X 1 month, X 9 mos   Previous breast feeding complications: (teeth)     Past Surgical History:   Procedure Laterality Date    HERNIA REPAIR      2 years ago    WISDOM TOOTH EXTRACTION         Birth information:  YOB: 2022   Time of birth: 2:41 PM   Sex: female   Delivery type: Vaginal, Spontaneous   Birth Weight: 3460 g (7 lb 10 1 oz)   Percent of Weight Change: -2%     Gestational Age: 38w9d   [unfilled]    Assessment     Breast and nipple assessment: sore nipple    [place] Assessment: normal assessment    Feeding assessment: feeding well    Feeding recommendations:  breast feed on demand     Doc Linda has been breast an bottle feeding per preference  Her nipples are sore despite education on optimum positioning  She is now using lanolin and breast shells  She says she will switch over to all breast feeding now that her milk is coming in    She delayed breast feeding this morning so she could "have enough food in her to make milk for her baby" education provided  Declined assistance with scheduling at [address] at this time  History of breastfeeding for 9 months with previous child  Met with mother to go over discharge breastfeeding booklet including the feeding log  Emphasized 8 or more (12) feedings in a 24 hour period, what to expect for the number of diapers per day of life and the progression of properties of the  stooling pattern  Reviewed breastfeeding and your lifestyle, storage and preparation of breast milk, how to keep you breast pump clean, the employed breastfeeding mother and paced bottle feeding handouts  Booklet included Breastfeeding Resources for after discharge including access to the number for the [address]  Discussed s/s engorgement, blocked milk ducts, and mastitis  Discussed how to remedy at home and when to contact physician  Encouraged mom to continue taking prenatal vitamins for the whole length of time she is breastfeeding and continue for another 6 weeks once infant is weaned  Encouraged parents to call for assistance, questions, and concerns about breastfeeding  Extension provided      Joseph Scherer RN 2020 9:06 AM

## 2020-02-11 NOTE — PROGRESS NOTES
Progress Note - OB/GYN   Phong Given 30 y o  female MRN: [id number]  Unit/Bed#:  303-01 Encounter: [id number]    Assessment:  Post partum Day #2 s/p , stable, baby in the nursery     Plan:  1) History of depression -  Not on medication   Mood stable  2) Continue routine post partum care   Encourage ambulation   Encourage breastfeeding   Encourage use of stool softener   Anticipate discharge today     Subjective/Objective   Chief Complaint:     Post delivery  Patient is doing well  Lochia WNL  Pain well controlled  Subjective:     Pain: yes, cramping, improved with meds  Tolerating PO: yes  Voiding: yes  Flatus: Yes   BM: No  Ambulating: yes  Chest pain: no  Shortness of breath: no  Leg pain: no  Lochia: minimal    Objective:     Vitals: /57 (BP Location: Right arm)   Pulse 70   Temp 97 8 °F (36 6 °C) (Oral)   Resp 18   Ht 5' 4" (1 626 m)   Wt 69 4 kg (153 lb)   SpO2 98%   Breastfeeding Yes   BMI 26 26 kg/m²     No intake or output data in the 24 hours ending 20 0630    Lab Results   Component Value Date    WBC 9 92 02/10/2020    HGB 12 6 02/10/2020    HCT 39 5 02/10/2020    MCV 87 02/10/2020     02/10/2020       Physical Exam:     Gen: AAOx3, NAD  CV: RRR  Lungs: CTA b/l  Abd: Soft, non-tender, non-distended, no rebound or guarding  Uterine fundus firm and non-tender, 2 cm below the umbilicus     Ext: Non tender    Joe Olvera MD  2020  6:30 AM

## 2020-02-11 NOTE — PLAN OF CARE
Problem: ALTERATION IN THE BREAST  Goal: Optimize infant feeding at the breast  Description  INTERVENTIONS:  - Latch, breast and nipple assessment  - Assess prior breast feeding history  - Hand expression of breast milk  - For cracked, bleeding and or sore nipples reassess latch, treat damaged nipple  -Educate mother on feeding cues  -Positioning/latch techniques  Outcome: Progressing     Problem: INADEQUATE LATCH, SUCK OR SWALLOW  Goal: Demonstrate ability to latch and sustain latch, audible swallowing and satiety  Description  INTERVENTIONS:  - Assess oral anatomy, notify Physician/AP for abnormal findings  - Establish milk expression  - Maximize feeding opportunity (skin to skin, behavioral state)  - Position/latch techniques  - Discourage use of pacifier-artificial nipple  - Mechanical pumping  - Nipple Shield  - Supplemental formula feeding (Physician/AP order)  - Alternative feeding method  Outcome: Progressing     Problem: PAIN - ADULT  Goal: Verbalizes/displays adequate comfort level or baseline comfort level  Description  Interventions:  - Encourage patient to monitor pain and request assistance  - Assess pain using appropriate pain scale  - Administer analgesics based on type and severity of pain and evaluate response  - Implement non-pharmacological measures as appropriate and evaluate response  - Consider cultural and social influences on pain and pain management  - Notify physician/advanced practitioner if interventions unsuccessful or patient reports new pain  Outcome: Progressing     Problem: INFECTION - ADULT  Goal: Absence or prevention of progression during hospitalization  Description  INTERVENTIONS:  - Assess and monitor for signs and symptoms of infection  - Monitor lab/diagnostic results  - Monitor all insertion sites, i e  indwelling lines, tubes, and drains  - Monitor endotracheal if appropriate and nasal secretions for changes in amount and color  - [place] appropriate cooling/warming therapies per order  - Administer medications as ordered  - Instruct and encourage patient and family to use good hand hygiene technique  - Identify and instruct in appropriate isolation precautions for identified infection/condition  Outcome: Progressing  Goal: Absence of fever/infection during neutropenic period  Description  INTERVENTIONS:  - Monitor WBC    Outcome: Progressing     Problem: SAFETY ADULT  Goal: Patient will remain free of falls  Description  INTERVENTIONS:  - Assess patient frequently for physical needs  -  Identify cognitive and physical deficits and behaviors that affect risk of falls    -  [place] fall precautions as indicated by assessment   - Educate patient/family on patient safety including physical limitations  - Instruct patient to call for assistance with activity based on assessment  - Modify environment to reduce risk of injury  - Consider OT/PT consult to assist with strengthening/mobility  Outcome: Progressing  Goal: Maintain or return to baseline ADL function  Description  INTERVENTIONS:  -  Assess patient's ability to carry out ADLs; assess patient's baseline for ADL function and identify physical deficits which impact ability to perform ADLs (bathing, care of mouth/teeth, toileting, grooming, dressing, etc )  - Assess/evaluate cause of self-care deficits   - Assess range of motion  - Assess patient's mobility; develop plan if impaired  - Assess patient's need for assistive devices and provide as appropriate  - Encourage maximum independence but intervene and supervise when necessary  - Involve family in performance of ADLs  - Assess for home care needs following discharge   - Consider OT consult to assist with ADL evaluation and planning for discharge  - Provide patient education as appropriate  Outcome: Progressing  Goal: Maintain or return mobility status to optimal level  Description  INTERVENTIONS:  - Assess patient's baseline mobility status (ambulation, transfers, stairs, etc )    - Identify cognitive and physical deficits and behaviors that affect mobility  - Identify mobility aids required to assist with transfers and/or ambulation (gait belt, sit-to-stand, lift, walker, cane, etc )  - [place] fall precautions as indicated by assessment  - Record patient progress and toleration of activity level on Mobility SBAR; progress patient to next Phase/Stage  - Instruct patient to call for assistance with activity based on assessment  - Consider rehabilitation consult to assist with strengthening/weightbearing, etc   Outcome: Progressing     Problem: Knowledge Deficit  Goal: Patient/family/caregiver demonstrates understanding of disease process, treatment plan, medications, and discharge instructions  Description  Complete learning assessment and assess knowledge base    Interventions:  - Provide teaching at level of understanding  - Provide teaching via preferred learning methods  Outcome: Progressing     Problem: DISCHARGE PLANNING  Goal: Discharge to home or other facility with appropriate resources  Description  INTERVENTIONS:  - Identify barriers to discharge w/patient and caregiver  - Arrange for needed discharge resources and transportation as appropriate  - Identify discharge learning needs (meds, wound care, etc )  - Arrange for interpretive services to assist at discharge as needed  - Refer to Case Management Department for coordinating discharge planning if the patient needs post-hospital services based on physician/advanced practitioner order or complex needs related to functional status, cognitive ability, or social support system  Outcome: Progressing     Problem: POSTPARTUM  Goal: Experiences normal postpartum course  Description  INTERVENTIONS:  - Monitor maternal vital signs  - Assess uterine involution and lochia  Outcome: Progressing  Goal: Appropriate maternal -  bonding  Description  INTERVENTIONS:  - Identify family support  - Assess for appropriate maternal/infant bonding   -Encourage maternal/infant bonding opportunities  - Referral to  or  as needed  Outcome: Progressing  Goal: Establishment of infant feeding pattern  Description  INTERVENTIONS:  - Assess breast/bottle feeding  - Refer to lactation as needed  Outcome: Progressing  Goal: Incision(s), wounds(s) or drain site(s) healing without S/S of infection  Description  INTERVENTIONS  - Assess and document risk factors for skin impairment   - Assess and document dressing, incision, wound bed, drain sites and surrounding tissue  - Consider nutrition services referral as needed  - Oral mucous membranes remain intact  - Provide patient/ family education  Outcome: Progressing

## 2020-02-12 NOTE — UTILIZATION REVIEW
Notification of Maternity/Delivery & [place] Birth Information for Admission   Notification of Maternity/Delivery for Admission to our facility [address]  Be advised that this patient was admitted to our facility under Inpatient Status  Contact Abad Riggs at [phone] for additional admission information  [facility] DEPT DEDICATED Avelina Cortez [phone]  Mother & [place] Information   Patient Name: Liang Dunlap   YOB: 1990   Delivering clinician: Abhay Wong   OB History        3    Para   3    Term   3            AB        Living   3       SAB        TAB        Ectopic        Multiple   0    Live Births   3                Name & MRN:   Information for the patient's :  Antoni Spina Girl  Skates) [[id number]]      Delivery Information:  Sex: female  Delivered 2020 2:41 PM by Vaginal, Spontaneous; Gestational Age: 38w9d    [place] Measurements:  Weight: 7 lb 10 1 oz (3460 g); Height: 20 5"    APGAR 1 minute 5 minutes 10 minutes   Totals: 9 9      [place] Birth Information: 30 y o  female MRN: [id number] Unit/Bed#: -01 Estimated Date of Delivery: 2/3/20  Birthweight: No birth weight on file  Gestational Age: <None> Delivery Type: Vaginal, Spontaneous          APGARS  One minute Five minutes Ten minutes   Totals:                 [address]   [address]:   Symone Jimenez [address]  Tax ID: [id number]  NPI: [id number] Attending Provider/NPI: Abhay Wong Md [[id number]] [place]    National Practioner ID- [id number]  Primary Office:  [address] [place] [address], [address] [place] [address]  Phone 1: [phone]  Fax: [phone]   Place of Service Code:  24   Place of Service Name:  [address] [place] [address]   Start Date: 20 IPADMITTIME@     Discharge Date & Time: 2020  1:41 PM    Type of Admission: Inpatient Status Discharge Disposition (if discharged): Home/Self Care   Patient Diagnoses:   40 weeks gestation of pregnancy [Z3A 40]  The primary encounter diagnosis was 40 weeks gestation of pregnancy  Diagnoses of Constipation and  (spontaneous vaginal delivery) were also pertinent to this visit  1  40 weeks gestation of pregnancy    2  Constipation    3   (spontaneous vaginal delivery)       Orders: Admission Orders (From admission, onward)     Ordered        20 1050  Inpatient Admission  Once                    Assigned Utilization Review Contact: Nitza Bobby  Utilization   Network Utilization Review Department  Phone: [phone]; Fax [phone]  Email: [email or web address]

## 2020-02-16 LAB — PLACENTA IN STORAGE: NORMAL

## 2020-03-19 ENCOUNTER — OFFICE VISIT (OUTPATIENT)
Dept: OBGYN CLINIC | Facility: CLINIC | Age: 30
End: 2020-03-19
Payer: COMMERCIAL

## 2020-03-19 VITALS
SYSTOLIC BLOOD PRESSURE: 110 MMHG | BODY MASS INDEX: 23.05 KG/M2 | DIASTOLIC BLOOD PRESSURE: 80 MMHG | WEIGHT: 135 LBS | HEIGHT: 64 IN

## 2020-03-19 PROBLEM — Z3A.40 40 WEEKS GESTATION OF PREGNANCY: Status: RESOLVED | Noted: 2020-02-09 | Resolved: 2020-03-19

## 2020-03-19 PROCEDURE — 99214 OFFICE O/P EST MOD 30 MIN: CPT | Performed by: OBSTETRICS & GYNECOLOGY

## 2020-03-19 NOTE — PROGRESS NOTES
Assessment/Plan:     Diagnoses and all orders for this visit:    Normal postpartum course          Subjective:      Patient ID: Nevaeh Dominguez is a 30 y o  female  Subjective    Abiola Guzmán is a 30 y o  female who presents for a postpartum visit  She is 4 weeks postpartum following a spontaneous vaginal delivery  I have fully reviewed the prenatal and intrapartum course  The delivery was at 44 w gestational weeks  Outcome: spontaneous vaginal delivery  Anesthesia: epidural  Postpartum course has been uncomplicated  Baby's course has been stable  Baby is feeding by bottle -   Bleeding staining only  Bowel function is normal  Bladder function is normal  Patient is not  sexually active now  Contraception method is none desires Reggie IUD   Postpartum depression screening: negative  The following portions of the patient's history were reviewed and updated as appropriate: allergies, current medications, past family history, past medical history, past social history, past surgical history and problem list     Review of Systems  Pertinent items are noted in HPI       Objective    /80 (BP Location: Right arm, Patient Position: Sitting, Cuff Size: Adult)   Ht 5' 4" (1 626 m)   Wt 61 2 kg (135 lb)   BMI 23 17 kg/m²    General:  alert and oriented, in no acute distress  Lungs: clear to auscultation bilaterally  Heart:  regular rate and rhythm, S1, S2 normal, no murmur, click, rub or gallop  Abdomen: soft, non-tender; bowel sounds normal; no masses,  no organomegaly   Vulva:  normal  Vagina: normal vagina  Cervix:  no cervical motion tenderness and no lesions  Corpus: normal and normal size, contour, position, consistency, mobility, non-tender  Adnexa:  normal adnexa  Rectal Exam: Not performed  Assessment/Plan     postpartum exam   Normal PP vsist    Desires IUD slyla    1  Contraception: none  2  Desires IUD reggie  3   Follow up in: 2 weeks for IUD insertion      The following portions of the patient's history were reviewed and updated as appropriate: allergies, current medications, past family history, past medical history, past social history, past surgical history and problem list         Objective:      /80 (BP Location: Right arm, Patient Position: Sitting, Cuff Size: Adult)   Ht 5' 4" (1 626 m)   Wt 61 2 kg (135 lb)   BMI 23 17 kg/m²

## 2020-03-23 ENCOUNTER — PROCEDURE VISIT (OUTPATIENT)
Dept: OBGYN CLINIC | Facility: CLINIC | Age: 30
End: 2020-03-23
Payer: COMMERCIAL

## 2020-03-23 VITALS
BODY MASS INDEX: 23.46 KG/M2 | SYSTOLIC BLOOD PRESSURE: 108 MMHG | WEIGHT: 137.4 LBS | HEIGHT: 64 IN | DIASTOLIC BLOOD PRESSURE: 62 MMHG

## 2020-03-23 DIAGNOSIS — Z97.5 IUD CONTRACEPTION: Primary | ICD-10-CM

## 2020-03-23 LAB — SL AMB POCT URINE HCG: NEGATIVE

## 2020-03-23 PROCEDURE — 58300 INSERT INTRAUTERINE DEVICE: CPT

## 2020-03-23 PROCEDURE — 58300 INSERT INTRAUTERINE DEVICE: CPT | Performed by: OBSTETRICS & GYNECOLOGY

## 2020-03-23 PROCEDURE — 81025 URINE PREGNANCY TEST: CPT | Performed by: OBSTETRICS & GYNECOLOGY

## 2020-03-23 NOTE — PROGRESS NOTES
29 yo female here today for IUD Xiao Insertion   Prior   Iud insertions  Date/Time: 3/23/2020 3:44 PM  Performed by: Dionna Frank MD  Authorized by: Dionna Frank MD     Consent:     Consent obtained:  Written    Consent given by:  Patient    Procedure risks and benefits discussed: yes      Patient questions answered: yes      Patient agrees, verbalizes understanding, and wants to proceed: yes      Educational handouts given: yes      Instructions and paperwork completed: yes    Procedure:     Pelvic exam performed: yes      Negative GC/chlamydia test: yes      Negative urine pregnancy test: yes      Cervix cleaned and prepped: yes      Speculum placed in vagina: yes      Tenaculum applied to cervix: yes      Uterus sounded: yes      Uterus sound depth (cm):  6    IUD type:  Xiao    Strings trimmed: yes    Post-procedure:     Patient tolerated procedure well: yes      Patient will follow up after next period: yes

## 2020-04-13 ENCOUNTER — TELEMEDICINE (OUTPATIENT)
Dept: FAMILY MEDICINE CLINIC | Facility: CLINIC | Age: 30
End: 2020-04-13
Payer: COMMERCIAL

## 2020-04-13 DIAGNOSIS — Z20.822 EXPOSURE TO COVID-19 VIRUS: ICD-10-CM

## 2020-04-13 DIAGNOSIS — J01.00 ACUTE NON-RECURRENT MAXILLARY SINUSITIS: Primary | ICD-10-CM

## 2020-04-13 PROCEDURE — 99213 OFFICE O/P EST LOW 20 MIN: CPT | Performed by: FAMILY MEDICINE

## 2020-04-13 RX ORDER — AMOXICILLIN 500 MG/1
500 CAPSULE ORAL EVERY 8 HOURS SCHEDULED
Qty: 30 CAPSULE | Refills: 0 | Status: SHIPPED | OUTPATIENT
Start: 2020-04-13 | End: 2020-04-23

## 2020-04-16 DIAGNOSIS — Z20.828 EXPOSURE TO SARS-ASSOCIATED CORONAVIRUS: ICD-10-CM

## 2020-04-16 DIAGNOSIS — Z20.822 EXPOSURE TO COVID-19 VIRUS: Primary | ICD-10-CM

## 2020-04-16 PROCEDURE — 99212 OFFICE O/P EST SF 10 MIN: CPT | Performed by: FAMILY MEDICINE

## 2020-04-16 PROCEDURE — 87635 SARS-COV-2 COVID-19 AMP PRB: CPT

## 2020-04-17 ENCOUNTER — TELEPHONE (OUTPATIENT)
Dept: OTHER | Facility: OTHER | Age: 30
End: 2020-04-17

## 2020-04-17 LAB — SARS-COV-2 RNA SPEC QL NAA+PROBE: DETECTED

## 2020-04-25 ENCOUNTER — HOSPITAL ENCOUNTER (EMERGENCY)
Facility: HOSPITAL | Age: 30
Discharge: HOME | End: 2020-04-25
Attending: EMERGENCY MEDICINE | Admitting: EMERGENCY MEDICINE
Payer: COMMERCIAL

## 2020-04-25 ENCOUNTER — APPOINTMENT (EMERGENCY)
Dept: CT IMAGING | Facility: HOSPITAL | Age: 30
End: 2020-04-25
Payer: COMMERCIAL

## 2020-04-25 ENCOUNTER — OFFICE VISIT (OUTPATIENT)
Dept: URGENT CARE | Facility: CLINIC | Age: 30
End: 2020-04-25
Payer: COMMERCIAL

## 2020-04-25 ENCOUNTER — TELEPHONE (OUTPATIENT)
Dept: FAMILY MEDICINE CLINIC | Facility: CLINIC | Age: 30
End: 2020-04-25

## 2020-04-25 VITALS
RESPIRATION RATE: 20 BRPM | DIASTOLIC BLOOD PRESSURE: 62 MMHG | BODY MASS INDEX: 23.65 KG/M2 | OXYGEN SATURATION: 97 % | WEIGHT: 137.79 LBS | HEART RATE: 58 BPM | SYSTOLIC BLOOD PRESSURE: 102 MMHG | TEMPERATURE: 98.3 F

## 2020-04-25 VITALS
RESPIRATION RATE: 16 BRPM | HEART RATE: 68 BPM | BODY MASS INDEX: 23.56 KG/M2 | WEIGHT: 138 LBS | HEIGHT: 64 IN | SYSTOLIC BLOOD PRESSURE: 95 MMHG | TEMPERATURE: 99.1 F | DIASTOLIC BLOOD PRESSURE: 60 MMHG

## 2020-04-25 DIAGNOSIS — N39.498 OTHER URINARY INCONTINENCE: ICD-10-CM

## 2020-04-25 DIAGNOSIS — M79.606 PAIN OF LOWER EXTREMITY, UNSPECIFIED LATERALITY: Primary | ICD-10-CM

## 2020-04-25 DIAGNOSIS — M54.32 SCIATICA OF LEFT SIDE: Primary | ICD-10-CM

## 2020-04-25 DIAGNOSIS — M54.30 SCIATICA: Primary | ICD-10-CM

## 2020-04-25 LAB
EXT PREG TEST URINE: NEGATIVE
EXT. CONTROL ED NAV: NORMAL

## 2020-04-25 PROCEDURE — 72131 CT LUMBAR SPINE W/O DYE: CPT

## 2020-04-25 PROCEDURE — 99284 EMERGENCY DEPT VISIT MOD MDM: CPT | Performed by: EMERGENCY MEDICINE

## 2020-04-25 PROCEDURE — 99212 OFFICE O/P EST SF 10 MIN: CPT | Performed by: PHYSICIAN ASSISTANT

## 2020-04-25 PROCEDURE — 99284 EMERGENCY DEPT VISIT MOD MDM: CPT

## 2020-04-25 PROCEDURE — 81025 URINE PREGNANCY TEST: CPT | Performed by: EMERGENCY MEDICINE

## 2020-04-25 RX ORDER — NAPROXEN 375 MG/1
375 TABLET ORAL 2 TIMES DAILY WITH MEALS
Qty: 20 TABLET | Refills: 0 | Status: SHIPPED | OUTPATIENT
Start: 2020-04-25 | End: 2020-12-11 | Stop reason: DRUGHIGH

## 2020-04-25 RX ORDER — ACETAMINOPHEN 500 MG
500 TABLET ORAL EVERY 6 HOURS PRN
Qty: 30 TABLET | Refills: 0 | Status: SHIPPED | OUTPATIENT
Start: 2020-04-25 | End: 2020-06-03 | Stop reason: SDUPTHER

## 2020-04-25 RX ORDER — NAPROXEN 250 MG/1
500 TABLET ORAL ONCE
Status: COMPLETED | OUTPATIENT
Start: 2020-04-25 | End: 2020-04-25

## 2020-04-25 RX ORDER — TIZANIDINE 4 MG/1
4 TABLET ORAL EVERY 8 HOURS PRN
Qty: 20 TABLET | Refills: 0 | Status: SHIPPED | OUTPATIENT
Start: 2020-04-25 | End: 2020-06-03 | Stop reason: SDUPTHER

## 2020-04-25 RX ORDER — LIDOCAINE 50 MG/G
1 PATCH TOPICAL ONCE
Status: DISCONTINUED | OUTPATIENT
Start: 2020-04-25 | End: 2020-04-25 | Stop reason: HOSPADM

## 2020-04-25 RX ADMIN — NAPROXEN 500 MG: 250 TABLET ORAL at 18:02

## 2020-04-27 ENCOUNTER — NURSE TRIAGE (OUTPATIENT)
Dept: PHYSICAL THERAPY | Facility: OTHER | Age: 30
End: 2020-04-27

## 2020-04-27 ENCOUNTER — TELEMEDICINE (OUTPATIENT)
Dept: FAMILY MEDICINE CLINIC | Facility: CLINIC | Age: 30
End: 2020-04-27
Payer: COMMERCIAL

## 2020-04-27 DIAGNOSIS — M54.50 ACUTE EXACERBATION OF CHRONIC LOW BACK PAIN: Primary | ICD-10-CM

## 2020-04-27 DIAGNOSIS — M79.605 PAIN OF LEFT LOWER EXTREMITY: Primary | ICD-10-CM

## 2020-04-27 DIAGNOSIS — G89.29 ACUTE EXACERBATION OF CHRONIC LOW BACK PAIN: Primary | ICD-10-CM

## 2020-04-27 PROCEDURE — 99213 OFFICE O/P EST LOW 20 MIN: CPT | Performed by: FAMILY MEDICINE

## 2020-04-27 RX ORDER — NAPROXEN 500 MG/1
500 TABLET ORAL 2 TIMES DAILY WITH MEALS
Qty: 60 TABLET | Refills: 0 | Status: SHIPPED | OUTPATIENT
Start: 2020-04-27 | End: 2021-01-16 | Stop reason: SDUPTHER

## 2020-04-27 RX ORDER — CYCLOBENZAPRINE HCL 5 MG
5 TABLET ORAL 2 TIMES DAILY
Qty: 20 TABLET | Refills: 0 | Status: SHIPPED | OUTPATIENT
Start: 2020-04-27 | End: 2020-12-11 | Stop reason: ALTCHOICE

## 2020-04-29 ENCOUNTER — EVALUATION (OUTPATIENT)
Dept: PHYSICAL THERAPY | Facility: CLINIC | Age: 30
End: 2020-04-29
Payer: COMMERCIAL

## 2020-04-29 ENCOUNTER — TRANSCRIBE ORDERS (OUTPATIENT)
Dept: PHYSICAL THERAPY | Facility: CLINIC | Age: 30
End: 2020-04-29

## 2020-04-29 VITALS — HEART RATE: 78 BPM | DIASTOLIC BLOOD PRESSURE: 64 MMHG | TEMPERATURE: 98.9 F | SYSTOLIC BLOOD PRESSURE: 108 MMHG

## 2020-04-29 DIAGNOSIS — K62.89 CHRONIC IDIOPATHIC ANAL PAIN: Primary | ICD-10-CM

## 2020-04-29 DIAGNOSIS — G89.29 CHRONIC IDIOPATHIC ANAL PAIN: Primary | ICD-10-CM

## 2020-04-29 DIAGNOSIS — M54.50 ACUTE EXACERBATION OF CHRONIC LOW BACK PAIN: ICD-10-CM

## 2020-04-29 DIAGNOSIS — G89.29 ACUTE EXACERBATION OF CHRONIC LOW BACK PAIN: ICD-10-CM

## 2020-04-29 PROCEDURE — 97162 PT EVAL MOD COMPLEX 30 MIN: CPT | Performed by: PHYSICAL THERAPIST

## 2020-05-04 ENCOUNTER — OFFICE VISIT (OUTPATIENT)
Dept: PHYSICAL THERAPY | Facility: CLINIC | Age: 30
End: 2020-05-04
Payer: COMMERCIAL

## 2020-05-04 DIAGNOSIS — M54.50 ACUTE EXACERBATION OF CHRONIC LOW BACK PAIN: Primary | ICD-10-CM

## 2020-05-04 DIAGNOSIS — G89.29 ACUTE EXACERBATION OF CHRONIC LOW BACK PAIN: Primary | ICD-10-CM

## 2020-05-04 PROCEDURE — 97140 MANUAL THERAPY 1/> REGIONS: CPT | Performed by: PHYSICAL THERAPIST

## 2020-05-04 PROCEDURE — 97112 NEUROMUSCULAR REEDUCATION: CPT | Performed by: PHYSICAL THERAPIST

## 2020-05-04 PROCEDURE — 97110 THERAPEUTIC EXERCISES: CPT | Performed by: PHYSICAL THERAPIST

## 2020-05-06 ENCOUNTER — OFFICE VISIT (OUTPATIENT)
Dept: PHYSICAL THERAPY | Facility: CLINIC | Age: 30
End: 2020-05-06
Payer: COMMERCIAL

## 2020-05-06 DIAGNOSIS — G89.29 ACUTE EXACERBATION OF CHRONIC LOW BACK PAIN: Primary | ICD-10-CM

## 2020-05-06 DIAGNOSIS — M54.50 ACUTE EXACERBATION OF CHRONIC LOW BACK PAIN: Primary | ICD-10-CM

## 2020-05-06 PROCEDURE — 97140 MANUAL THERAPY 1/> REGIONS: CPT | Performed by: PHYSICAL THERAPIST

## 2020-05-06 PROCEDURE — 97112 NEUROMUSCULAR REEDUCATION: CPT | Performed by: PHYSICAL THERAPIST

## 2020-05-06 PROCEDURE — 97110 THERAPEUTIC EXERCISES: CPT | Performed by: PHYSICAL THERAPIST

## 2020-05-11 ENCOUNTER — OFFICE VISIT (OUTPATIENT)
Dept: PHYSICAL THERAPY | Facility: CLINIC | Age: 30
End: 2020-05-11
Payer: COMMERCIAL

## 2020-05-11 DIAGNOSIS — G89.29 ACUTE EXACERBATION OF CHRONIC LOW BACK PAIN: Primary | ICD-10-CM

## 2020-05-11 DIAGNOSIS — M54.50 ACUTE EXACERBATION OF CHRONIC LOW BACK PAIN: Primary | ICD-10-CM

## 2020-05-11 PROCEDURE — 97140 MANUAL THERAPY 1/> REGIONS: CPT | Performed by: PHYSICAL THERAPIST

## 2020-05-11 PROCEDURE — 97112 NEUROMUSCULAR REEDUCATION: CPT | Performed by: PHYSICAL THERAPIST

## 2020-05-11 PROCEDURE — 97110 THERAPEUTIC EXERCISES: CPT | Performed by: PHYSICAL THERAPIST

## 2020-05-12 ENCOUNTER — TELEMEDICINE (OUTPATIENT)
Dept: FAMILY MEDICINE CLINIC | Facility: CLINIC | Age: 30
End: 2020-05-12
Payer: COMMERCIAL

## 2020-05-12 VITALS — BODY MASS INDEX: 23.65 KG/M2 | HEIGHT: 64 IN

## 2020-05-12 DIAGNOSIS — M54.40 MIDLINE LOW BACK PAIN WITH SCIATICA, SCIATICA LATERALITY UNSPECIFIED, UNSPECIFIED CHRONICITY: ICD-10-CM

## 2020-05-12 DIAGNOSIS — M79.605 PAIN IN BOTH LOWER EXTREMITIES: ICD-10-CM

## 2020-05-12 DIAGNOSIS — B35.4 TINEA OF THE BODY: Primary | ICD-10-CM

## 2020-05-12 DIAGNOSIS — M79.604 PAIN IN BOTH LOWER EXTREMITIES: ICD-10-CM

## 2020-05-12 PROBLEM — M54.50 MIDLINE LOW BACK PAIN: Status: ACTIVE | Noted: 2020-05-12

## 2020-05-12 PROCEDURE — 99214 OFFICE O/P EST MOD 30 MIN: CPT | Performed by: FAMILY MEDICINE

## 2020-05-12 RX ORDER — CLOTRIMAZOLE 1 %
CREAM (GRAM) TOPICAL 2 TIMES DAILY
Qty: 30 G | Refills: 0 | Status: SHIPPED | OUTPATIENT
Start: 2020-05-12 | End: 2020-12-11 | Stop reason: ALTCHOICE

## 2020-05-13 ENCOUNTER — OFFICE VISIT (OUTPATIENT)
Dept: PHYSICAL THERAPY | Facility: CLINIC | Age: 30
End: 2020-05-13
Payer: COMMERCIAL

## 2020-05-13 DIAGNOSIS — M54.50 ACUTE EXACERBATION OF CHRONIC LOW BACK PAIN: Primary | ICD-10-CM

## 2020-05-13 DIAGNOSIS — G89.29 ACUTE EXACERBATION OF CHRONIC LOW BACK PAIN: Primary | ICD-10-CM

## 2020-05-13 PROCEDURE — 97140 MANUAL THERAPY 1/> REGIONS: CPT | Performed by: PHYSICAL THERAPIST

## 2020-05-13 PROCEDURE — 97110 THERAPEUTIC EXERCISES: CPT | Performed by: PHYSICAL THERAPIST

## 2020-05-13 PROCEDURE — 97112 NEUROMUSCULAR REEDUCATION: CPT | Performed by: PHYSICAL THERAPIST

## 2020-05-15 PROBLEM — M54.16 LEFT LUMBAR RADICULOPATHY: Status: ACTIVE | Noted: 2020-05-15

## 2020-05-18 ENCOUNTER — APPOINTMENT (OUTPATIENT)
Dept: PHYSICAL THERAPY | Facility: CLINIC | Age: 30
End: 2020-05-18
Payer: COMMERCIAL

## 2020-05-20 ENCOUNTER — OFFICE VISIT (OUTPATIENT)
Dept: PHYSICAL THERAPY | Facility: CLINIC | Age: 30
End: 2020-05-20
Payer: COMMERCIAL

## 2020-05-20 ENCOUNTER — TELEMEDICINE (OUTPATIENT)
Dept: PAIN MEDICINE | Facility: CLINIC | Age: 30
End: 2020-05-20
Payer: COMMERCIAL

## 2020-05-20 DIAGNOSIS — G89.29 ACUTE EXACERBATION OF CHRONIC LOW BACK PAIN: Primary | ICD-10-CM

## 2020-05-20 DIAGNOSIS — M62.838 MUSCLE SPASM: ICD-10-CM

## 2020-05-20 DIAGNOSIS — M54.50 ACUTE EXACERBATION OF CHRONIC LOW BACK PAIN: Primary | ICD-10-CM

## 2020-05-20 DIAGNOSIS — M54.16 LEFT LUMBAR RADICULOPATHY: ICD-10-CM

## 2020-05-20 DIAGNOSIS — M54.42 CHRONIC LEFT-SIDED LOW BACK PAIN WITH LEFT-SIDED SCIATICA: ICD-10-CM

## 2020-05-20 DIAGNOSIS — G89.29 CHRONIC LEFT-SIDED LOW BACK PAIN WITH LEFT-SIDED SCIATICA: ICD-10-CM

## 2020-05-20 PROCEDURE — 99214 OFFICE O/P EST MOD 30 MIN: CPT | Performed by: PHYSICIAN ASSISTANT

## 2020-05-20 PROCEDURE — 97112 NEUROMUSCULAR REEDUCATION: CPT | Performed by: PHYSICAL THERAPIST

## 2020-05-20 PROCEDURE — 97140 MANUAL THERAPY 1/> REGIONS: CPT | Performed by: PHYSICAL THERAPIST

## 2020-05-20 PROCEDURE — 97110 THERAPEUTIC EXERCISES: CPT | Performed by: PHYSICAL THERAPIST

## 2020-05-21 ENCOUNTER — OFFICE VISIT (OUTPATIENT)
Dept: PHYSICAL THERAPY | Facility: CLINIC | Age: 30
End: 2020-05-21
Payer: COMMERCIAL

## 2020-05-21 DIAGNOSIS — M54.50 ACUTE EXACERBATION OF CHRONIC LOW BACK PAIN: Primary | ICD-10-CM

## 2020-05-21 DIAGNOSIS — G89.29 ACUTE EXACERBATION OF CHRONIC LOW BACK PAIN: Primary | ICD-10-CM

## 2020-05-21 PROCEDURE — 97140 MANUAL THERAPY 1/> REGIONS: CPT | Performed by: PHYSICAL THERAPIST

## 2020-05-21 PROCEDURE — 97110 THERAPEUTIC EXERCISES: CPT | Performed by: PHYSICAL THERAPIST

## 2020-05-21 PROCEDURE — 97112 NEUROMUSCULAR REEDUCATION: CPT | Performed by: PHYSICAL THERAPIST

## 2020-05-26 ENCOUNTER — OFFICE VISIT (OUTPATIENT)
Dept: PHYSICAL THERAPY | Facility: CLINIC | Age: 30
End: 2020-05-26
Payer: COMMERCIAL

## 2020-05-26 DIAGNOSIS — G89.29 ACUTE EXACERBATION OF CHRONIC LOW BACK PAIN: Primary | ICD-10-CM

## 2020-05-26 DIAGNOSIS — M54.50 ACUTE EXACERBATION OF CHRONIC LOW BACK PAIN: Primary | ICD-10-CM

## 2020-05-26 PROCEDURE — 97112 NEUROMUSCULAR REEDUCATION: CPT | Performed by: PHYSICAL THERAPIST

## 2020-05-26 PROCEDURE — 97140 MANUAL THERAPY 1/> REGIONS: CPT | Performed by: PHYSICAL THERAPIST

## 2020-05-26 PROCEDURE — 97110 THERAPEUTIC EXERCISES: CPT | Performed by: PHYSICAL THERAPIST

## 2020-05-28 ENCOUNTER — OFFICE VISIT (OUTPATIENT)
Dept: PHYSICAL THERAPY | Facility: CLINIC | Age: 30
End: 2020-05-28
Payer: COMMERCIAL

## 2020-05-28 DIAGNOSIS — G89.29 ACUTE EXACERBATION OF CHRONIC LOW BACK PAIN: Primary | ICD-10-CM

## 2020-05-28 DIAGNOSIS — M54.50 ACUTE EXACERBATION OF CHRONIC LOW BACK PAIN: Primary | ICD-10-CM

## 2020-05-28 PROCEDURE — 97112 NEUROMUSCULAR REEDUCATION: CPT | Performed by: PHYSICAL THERAPIST

## 2020-05-28 PROCEDURE — 97140 MANUAL THERAPY 1/> REGIONS: CPT | Performed by: PHYSICAL THERAPIST

## 2020-05-29 ENCOUNTER — TRANSCRIBE ORDERS (OUTPATIENT)
Dept: PHYSICAL THERAPY | Facility: CLINIC | Age: 30
End: 2020-05-29

## 2020-05-29 DIAGNOSIS — G89.29 ACUTE EXACERBATION OF CHRONIC LOW BACK PAIN: Primary | ICD-10-CM

## 2020-05-29 DIAGNOSIS — M54.50 ACUTE EXACERBATION OF CHRONIC LOW BACK PAIN: Primary | ICD-10-CM

## 2020-06-01 ENCOUNTER — OFFICE VISIT (OUTPATIENT)
Dept: PHYSICAL THERAPY | Facility: CLINIC | Age: 30
End: 2020-06-01
Payer: COMMERCIAL

## 2020-06-01 DIAGNOSIS — G89.29 ACUTE EXACERBATION OF CHRONIC LOW BACK PAIN: Primary | ICD-10-CM

## 2020-06-01 DIAGNOSIS — M54.50 ACUTE EXACERBATION OF CHRONIC LOW BACK PAIN: Primary | ICD-10-CM

## 2020-06-01 PROCEDURE — 97112 NEUROMUSCULAR REEDUCATION: CPT | Performed by: PHYSICAL THERAPIST

## 2020-06-01 PROCEDURE — 97110 THERAPEUTIC EXERCISES: CPT | Performed by: PHYSICAL THERAPIST

## 2020-06-01 PROCEDURE — 97140 MANUAL THERAPY 1/> REGIONS: CPT | Performed by: PHYSICAL THERAPIST

## 2020-06-03 ENCOUNTER — OFFICE VISIT (OUTPATIENT)
Dept: PHYSICAL THERAPY | Facility: CLINIC | Age: 30
End: 2020-06-03
Payer: COMMERCIAL

## 2020-06-03 ENCOUNTER — TELEMEDICINE (OUTPATIENT)
Dept: FAMILY MEDICINE CLINIC | Facility: CLINIC | Age: 30
End: 2020-06-03
Payer: COMMERCIAL

## 2020-06-03 VITALS — BODY MASS INDEX: 23.56 KG/M2 | WEIGHT: 138 LBS | HEIGHT: 64 IN

## 2020-06-03 DIAGNOSIS — M79.604 PAIN IN BOTH LOWER EXTREMITIES: ICD-10-CM

## 2020-06-03 DIAGNOSIS — D64.9 ANEMIA, UNSPECIFIED TYPE: ICD-10-CM

## 2020-06-03 DIAGNOSIS — M79.605 PAIN OF LEFT LOWER EXTREMITY: ICD-10-CM

## 2020-06-03 DIAGNOSIS — G89.29 ACUTE EXACERBATION OF CHRONIC LOW BACK PAIN: Primary | ICD-10-CM

## 2020-06-03 DIAGNOSIS — M54.16 LEFT LUMBAR RADICULOPATHY: Primary | ICD-10-CM

## 2020-06-03 DIAGNOSIS — R53.83 OTHER FATIGUE: ICD-10-CM

## 2020-06-03 DIAGNOSIS — E03.8 TSH (THYROID-STIMULATING HORMONE DEFICIENCY): ICD-10-CM

## 2020-06-03 DIAGNOSIS — M79.605 PAIN IN BOTH LOWER EXTREMITIES: ICD-10-CM

## 2020-06-03 DIAGNOSIS — L65.9 FALLING HAIR: ICD-10-CM

## 2020-06-03 DIAGNOSIS — M54.50 ACUTE EXACERBATION OF CHRONIC LOW BACK PAIN: Primary | ICD-10-CM

## 2020-06-03 PROCEDURE — 97110 THERAPEUTIC EXERCISES: CPT | Performed by: PHYSICAL THERAPIST

## 2020-06-03 PROCEDURE — 97112 NEUROMUSCULAR REEDUCATION: CPT | Performed by: PHYSICAL THERAPIST

## 2020-06-03 PROCEDURE — 99214 OFFICE O/P EST MOD 30 MIN: CPT | Performed by: FAMILY MEDICINE

## 2020-06-03 RX ORDER — CYCLOBENZAPRINE HCL 5 MG
5 TABLET ORAL 3 TIMES DAILY PRN
Qty: 60 TABLET | Refills: 0 | Status: SHIPPED | OUTPATIENT
Start: 2020-06-03 | End: 2021-01-16 | Stop reason: SDUPTHER

## 2020-06-08 ENCOUNTER — OFFICE VISIT (OUTPATIENT)
Dept: PHYSICAL THERAPY | Facility: CLINIC | Age: 30
End: 2020-06-08
Payer: COMMERCIAL

## 2020-06-08 DIAGNOSIS — G89.29 ACUTE EXACERBATION OF CHRONIC LOW BACK PAIN: Primary | ICD-10-CM

## 2020-06-08 DIAGNOSIS — M54.50 ACUTE EXACERBATION OF CHRONIC LOW BACK PAIN: Primary | ICD-10-CM

## 2020-06-08 PROCEDURE — 97140 MANUAL THERAPY 1/> REGIONS: CPT | Performed by: PHYSICAL THERAPIST

## 2020-06-08 PROCEDURE — 97112 NEUROMUSCULAR REEDUCATION: CPT | Performed by: PHYSICAL THERAPIST

## 2020-06-08 PROCEDURE — 97110 THERAPEUTIC EXERCISES: CPT | Performed by: PHYSICAL THERAPIST

## 2020-06-10 ENCOUNTER — OFFICE VISIT (OUTPATIENT)
Dept: OBGYN CLINIC | Facility: CLINIC | Age: 30
End: 2020-06-10
Payer: COMMERCIAL

## 2020-06-10 ENCOUNTER — APPOINTMENT (OUTPATIENT)
Dept: PHYSICAL THERAPY | Facility: CLINIC | Age: 30
End: 2020-06-10
Payer: COMMERCIAL

## 2020-06-10 VITALS
HEIGHT: 64 IN | DIASTOLIC BLOOD PRESSURE: 66 MMHG | WEIGHT: 134.8 LBS | SYSTOLIC BLOOD PRESSURE: 100 MMHG | BODY MASS INDEX: 23.01 KG/M2

## 2020-06-10 DIAGNOSIS — Z30.431 IUD CHECK UP: ICD-10-CM

## 2020-06-10 DIAGNOSIS — T83.32XA INTRAUTERINE CONTRACEPTIVE DEVICE THREADS LOST, INITIAL ENCOUNTER: Primary | ICD-10-CM

## 2020-06-10 LAB
BASOPHILS # BLD AUTO: 10 CELLS/UL (ref 0–200)
BASOPHILS NFR BLD AUTO: 0.2 %
EOSINOPHIL # BLD AUTO: 162 CELLS/UL (ref 15–500)
EOSINOPHIL NFR BLD AUTO: 3.3 %
ERYTHROCYTE [DISTWIDTH] IN BLOOD BY AUTOMATED COUNT: 13.8 % (ref 11–15)
HCT VFR BLD AUTO: 34.9 % (ref 35–45)
HGB BLD-MCNC: 11.5 G/DL (ref 11.7–15.5)
LYMPHOCYTES # BLD AUTO: 1906 CELLS/UL (ref 850–3900)
LYMPHOCYTES NFR BLD AUTO: 38.9 %
MCH RBC QN AUTO: 29.3 PG (ref 27–33)
MCHC RBC AUTO-ENTMCNC: 33 G/DL (ref 32–36)
MCV RBC AUTO: 88.8 FL (ref 80–100)
MONOCYTES # BLD AUTO: 343 CELLS/UL (ref 200–950)
MONOCYTES NFR BLD AUTO: 7 %
NEUTROPHILS # BLD AUTO: 2479 CELLS/UL (ref 1500–7800)
NEUTROPHILS NFR BLD AUTO: 50.6 %
PLATELET # BLD AUTO: 237 THOUSAND/UL (ref 140–400)
PMV BLD REES-ECKER: 11.4 FL (ref 7.5–12.5)
RBC # BLD AUTO: 3.93 MILLION/UL (ref 3.8–5.1)
TSH SERPL-ACNC: 0.76 MIU/L
WBC # BLD AUTO: 4.9 THOUSAND/UL (ref 3.8–10.8)

## 2020-06-10 PROCEDURE — 99213 OFFICE O/P EST LOW 20 MIN: CPT | Performed by: OBSTETRICS & GYNECOLOGY

## 2020-06-10 PROCEDURE — 1036F TOBACCO NON-USER: CPT | Performed by: OBSTETRICS & GYNECOLOGY

## 2020-06-14 LAB
ALBUMIN SERPL-MCNC: 4.3 G/DL (ref 3.6–5.1)
ALBUMIN/GLOB SERPL: 1.5 (CALC) (ref 1–2.5)
ALP SERPL-CCNC: 75 U/L (ref 31–125)
ALT SERPL-CCNC: 22 U/L (ref 6–29)
AST SERPL-CCNC: 19 U/L (ref 10–30)
BILIRUB SERPL-MCNC: 0.4 MG/DL (ref 0.2–1.2)
BUN SERPL-MCNC: 14 MG/DL (ref 7–25)
BUN/CREAT SERPL: NORMAL (CALC) (ref 6–22)
CALCIUM SERPL-MCNC: 9.6 MG/DL (ref 8.6–10.2)
CHLORIDE SERPL-SCNC: 106 MMOL/L (ref 98–110)
CO2 SERPL-SCNC: 26 MMOL/L (ref 20–32)
CREAT SERPL-MCNC: 0.59 MG/DL (ref 0.5–1.1)
GLOBULIN SER CALC-MCNC: 2.8 G/DL (CALC) (ref 1.9–3.7)
GLUCOSE SERPL-MCNC: 82 MG/DL (ref 65–99)
POTASSIUM SERPL-SCNC: 4.3 MMOL/L (ref 3.5–5.3)
PROT SERPL-MCNC: 7.1 G/DL (ref 6.1–8.1)
SL AMB EGFR AFRICAN AMERICAN: 142 ML/MIN/1.73M2
SL AMB EGFR NON AFRICAN AMERICAN: 122 ML/MIN/1.73M2
SODIUM SERPL-SCNC: 140 MMOL/L (ref 135–146)

## 2020-06-15 ENCOUNTER — OFFICE VISIT (OUTPATIENT)
Dept: PHYSICAL THERAPY | Facility: CLINIC | Age: 30
End: 2020-06-15
Payer: COMMERCIAL

## 2020-06-15 DIAGNOSIS — G89.29 ACUTE EXACERBATION OF CHRONIC LOW BACK PAIN: Primary | ICD-10-CM

## 2020-06-15 DIAGNOSIS — M54.50 ACUTE EXACERBATION OF CHRONIC LOW BACK PAIN: Primary | ICD-10-CM

## 2020-06-15 PROCEDURE — 97110 THERAPEUTIC EXERCISES: CPT | Performed by: PHYSICAL THERAPIST

## 2020-06-15 PROCEDURE — 97140 MANUAL THERAPY 1/> REGIONS: CPT | Performed by: PHYSICAL THERAPIST

## 2020-06-15 PROCEDURE — 97112 NEUROMUSCULAR REEDUCATION: CPT | Performed by: PHYSICAL THERAPIST

## 2020-06-17 ENCOUNTER — HOSPITAL ENCOUNTER (OUTPATIENT)
Dept: ULTRASOUND IMAGING | Facility: HOSPITAL | Age: 30
Discharge: HOME | End: 2020-06-17
Attending: OBSTETRICS & GYNECOLOGY
Payer: COMMERCIAL

## 2020-06-17 ENCOUNTER — APPOINTMENT (OUTPATIENT)
Dept: PHYSICAL THERAPY | Facility: CLINIC | Age: 30
End: 2020-06-17
Payer: COMMERCIAL

## 2020-06-17 DIAGNOSIS — T83.32XA INTRAUTERINE CONTRACEPTIVE DEVICE THREADS LOST, INITIAL ENCOUNTER: ICD-10-CM

## 2020-06-17 PROCEDURE — 76830 TRANSVAGINAL US NON-OB: CPT

## 2020-06-17 PROCEDURE — 76856 US EXAM PELVIC COMPLETE: CPT

## 2020-06-22 ENCOUNTER — APPOINTMENT (OUTPATIENT)
Dept: PHYSICAL THERAPY | Facility: CLINIC | Age: 30
End: 2020-06-22
Payer: COMMERCIAL

## 2020-06-23 ENCOUNTER — OFFICE VISIT (OUTPATIENT)
Dept: FAMILY MEDICINE CLINIC | Facility: CLINIC | Age: 30
End: 2020-06-23
Payer: COMMERCIAL

## 2020-06-23 VITALS
TEMPERATURE: 97.9 F | RESPIRATION RATE: 16 BRPM | DIASTOLIC BLOOD PRESSURE: 68 MMHG | SYSTOLIC BLOOD PRESSURE: 116 MMHG | OXYGEN SATURATION: 98 % | HEIGHT: 64 IN | HEART RATE: 80 BPM | WEIGHT: 135 LBS | BODY MASS INDEX: 23.05 KG/M2

## 2020-06-23 DIAGNOSIS — D50.8 IRON DEFICIENCY ANEMIA SECONDARY TO INADEQUATE DIETARY IRON INTAKE: ICD-10-CM

## 2020-06-23 DIAGNOSIS — M54.16 LEFT LUMBAR RADICULOPATHY: Primary | ICD-10-CM

## 2020-06-23 DIAGNOSIS — L98.9 BACK SKIN LESION: ICD-10-CM

## 2020-06-23 DIAGNOSIS — R53.83 OTHER FATIGUE: ICD-10-CM

## 2020-06-23 DIAGNOSIS — L65.9 FALLING HAIR: ICD-10-CM

## 2020-06-23 PROCEDURE — 99214 OFFICE O/P EST MOD 30 MIN: CPT | Performed by: FAMILY MEDICINE

## 2020-06-23 PROCEDURE — 1036F TOBACCO NON-USER: CPT | Performed by: FAMILY MEDICINE

## 2020-06-23 RX ORDER — FERROUS SULFATE TAB EC 324 MG (65 MG FE EQUIVALENT) 324 (65 FE) MG
324 TABLET DELAYED RESPONSE ORAL
Qty: 180 TABLET | Refills: 0 | Status: SHIPPED | OUTPATIENT
Start: 2020-06-23 | End: 2020-12-11 | Stop reason: ALTCHOICE

## 2020-06-24 ENCOUNTER — OFFICE VISIT (OUTPATIENT)
Dept: PHYSICAL THERAPY | Facility: CLINIC | Age: 30
End: 2020-06-24
Payer: COMMERCIAL

## 2020-06-24 DIAGNOSIS — M54.50 ACUTE EXACERBATION OF CHRONIC LOW BACK PAIN: Primary | ICD-10-CM

## 2020-06-24 DIAGNOSIS — G89.29 ACUTE EXACERBATION OF CHRONIC LOW BACK PAIN: Primary | ICD-10-CM

## 2020-06-24 PROCEDURE — 97110 THERAPEUTIC EXERCISES: CPT | Performed by: PHYSICAL THERAPIST

## 2020-06-24 PROCEDURE — 97140 MANUAL THERAPY 1/> REGIONS: CPT | Performed by: PHYSICAL THERAPIST

## 2020-08-07 ENCOUNTER — TELEPHONE (OUTPATIENT)
Dept: DERMATOLOGY | Facility: CLINIC | Age: 30
End: 2020-08-07

## 2020-08-07 NOTE — TELEPHONE ENCOUNTER
Called pt to reschedule her appt on Monday with Dr Kathi Vital due to her no longer being in the office  Left voicemail for her to call the office back

## 2020-08-18 ENCOUNTER — TELEPHONE (OUTPATIENT)
Dept: DERMATOLOGY | Facility: CLINIC | Age: 30
End: 2020-08-18

## 2020-08-20 ENCOUNTER — OFFICE VISIT (OUTPATIENT)
Dept: DERMATOLOGY | Facility: CLINIC | Age: 30
End: 2020-08-20
Payer: COMMERCIAL

## 2020-08-20 VITALS — TEMPERATURE: 97.6 F | HEIGHT: 64 IN | BODY MASS INDEX: 23.39 KG/M2 | WEIGHT: 137 LBS

## 2020-08-20 DIAGNOSIS — L65.0 TELOGEN EFFLUVIUM: ICD-10-CM

## 2020-08-20 DIAGNOSIS — D22.9 MULTIPLE MELANOCYTIC NEVI: Primary | ICD-10-CM

## 2020-08-20 DIAGNOSIS — L91.0 KELOID SCAR: ICD-10-CM

## 2020-08-20 DIAGNOSIS — D23.9 DERMATOFIBROMA: ICD-10-CM

## 2020-08-20 PROCEDURE — 3008F BODY MASS INDEX DOCD: CPT | Performed by: STUDENT IN AN ORGANIZED HEALTH CARE EDUCATION/TRAINING PROGRAM

## 2020-08-20 PROCEDURE — 1036F TOBACCO NON-USER: CPT | Performed by: STUDENT IN AN ORGANIZED HEALTH CARE EDUCATION/TRAINING PROGRAM

## 2020-08-20 PROCEDURE — 99204 OFFICE O/P NEW MOD 45 MIN: CPT | Performed by: STUDENT IN AN ORGANIZED HEALTH CARE EDUCATION/TRAINING PROGRAM

## 2020-08-20 NOTE — PATIENT INSTRUCTIONS
Assessment and Plan:  Based on a thorough discussion of this condition and the management approach to it (including a comprehensive discussion of the known risks, side effects and potential benefits of treatment), the patient (family) agrees to implement the following specific plan:   Reassured of benign appearance, discussed ABCDEs of melanoma   Advised no need for annual exam with us but would recommend her check over her skin regularly and to see us for any concerning, changing lesions   Start Neutrogena Daily Defense SPF 50+ at least 3 times a day       Melanocytic Nevi  Melanocytic nevi ("moles") are tan or brown, raised or flat areas of the skin which have an increased number of melanocytes  Melanocytes are the cells in our body which make pigment and account for skin color  Some moles are present at birth (I e , "congenital nevi"), while others come up later in life (i e , "acquired nevi")  The sun can stimulate the body to make more moles  Sunburns are not the only thing that triggers more moles  Chronic sun exposure can do it too  Clinically distinguishing a healthy mole from melanoma may be difficult, even for experienced dermatologists  The "ABCDE's" of moles have been suggested as a means of helping to alert a person to a suspicious mole and the possible increased risk of melanoma  The suggestions for raising alert are as follows:    Asymmetry: Healthy moles tend to be symmetric, while melanomas are often asymmetric  Asymmetry means if you draw a line through the mole, the two halves do not match in color, size, shape, or surface texture  Asymmetry can be a result of rapid enlargement of a mole, the development of a raised area on a previously flat lesion, scaling, ulceration, bleeding or scabbing within the mole  Any mole that starts to demonstrate "asymmetry" should be examined promptly by a board certified dermatologist      Border: Healthy moles tend to have discrete, even borders  The border of a melanoma often blends into the normal skin and does not sharply delineate the mole from normal skin  Any mole that starts to demonstrate "uneven borders" should be examined promptly by a board certified dermatologist      Color: Healthy moles tend to be one color throughout  Melanomas tend to be made up of different colors ranging from dark black, blue, white, or red  Any mole that demonstrates a color change should be examined promptly by a board certified dermatologist      Diameter: Healthy moles tend to be smaller than 0 6 cm in size; an exception are "congenital nevi" that can be larger  Melanomas tend to grow and can often be greater than 0 6 cm (1/4 of an inch, or the size of a pencil eraser)  This is only a guideline, and many normal moles may be larger than 0 6 cm without being unhealthy  Any mole that starts to change in size (small to bigger or bigger to smaller) should be examined promptly by a board certified dermatologist      Evolving: Healthy moles tend to "stay the same "  Melanomas may often show signs of change or evolution such as a change in size, shape, color, or elevation  Any mole that starts to itch, bleed, crust, burn, hurt, or ulcerate or demonstrate a change or evolution should be examined promptly by a board certified dermatologist       Dysplastic Nevi  Dysplastic moles are moles that fit the ABCDE rules of melanoma but are not identified as melanomas when examined under the microscope  They may indicate an increased risk of melanoma in that person  If there is a family history of melanoma, most experts agree that the person may be at an increased risk for developing a melanoma  Experts still do not agree on what dysplastic moles mean in patients without a personal or family history of melanoma  Dysplastic moles are usually larger than common moles and have different colors within it with irregular borders  The appearance can be very similar to a melanoma  Biopsies of dysplastic moles may show abnormalities which are different from a regular mole  Melanoma  Malignant melanoma is a type of skin cancer that can be deadly if it spreads throughout the body  The incidence of melanoma in the [place] is growing faster than any other cancer  Melanoma usually grows near the surface of the skin for a period of time, and then begins to grow deeper into the skin  Once it grows deeper into the skin, the risk of spread to other organs greatly increases  Therefore, early detection and removal of a malignant melanoma may result in a better chance at a complete cure; removal after the tumor has spread may not be as effective, leading to worse clinical outcomes such as death  The true rate of nevus transformation into a melanoma is unknown  It has been estimated that the lifetime risk for any acquired melanocytic nevus on any 21year-old individual transforming into melanoma by age [de-identified] is 0 03% (1 in 3,164) for men and 0 009% (1 in 10,800) for women  The appearance of a "new mole" remains one of the most reliable methods for identifying a malignant melanoma  Occasionally, melanomas appear as rapidly growing, blue-black, dome-shaped bumps within a previous mole or previous area of normal skin  Other times, melanomas are suspected when a mole suddenly appears or changes  Itching, burning, or pain in a pigmented lesion should increase suspicion, but most patients with early melanoma have no skin discomfort whatsoever  Melanoma can occur anywhere on the skin, including areas that are difficult for self-examination  Many melanomas are first noticed by other family members  Suspicious-looking moles may be removed for microscopic examination  You may be able to prevent death from melanoma by doing two simple things:    1  Try to avoid unnecessary sun exposure and protect your skin when it is exposed to the sun    People who live near the equator, people who have intermittent exposures to large amounts of sun, and people who have had sunburns in childhood or adolescence have an increased risk for melanoma  Sun sense and vigilant sun protection may be keys to helping to prevent melanoma  We recommend wearing UPF-rated sun protective clothing and sunglasses whenever possible and applying a moisturizer-sunscreen combination product (SPF 50+) such as Neutrogena Daily Defense to sun exposed areas of skin at least three times a day  2  Have your moles regularly examined by a board certified dermatologist AND by yourself or a family member/friend at home  We recommend that you have your moles examined at least once a year by a board certified dermatologist   Use your birthday as an annual reminder to have your "Birthday Suit" (I e , your skin) examined; it is a nice birthday gift to yourself to know that your skin is healthy appearing! Additionally, at-home self examinations may be helpful for detecting a possible melanoma  Use the ABCDEs we discussed and check your moles once a month at home  Assessment and Plan:  Based on a thorough discussion of this condition and the management approach to it (including a comprehensive discussion of the known risks, side effects and potential benefits of treatment), the patient (family) agrees to implement the following specific plan:   Benign, no treatment necessary unless bothersome     Assessment and Plan:  A dermatofibroma is a common benign fibrous nodule that most often arises on the skin of the lower legs  A dermatofibroma is also called a "cutaneous fibrous histiocytoma "  Dermatofibromas occur at all ages and in people of every ethnicity  They are more common in women than in men  It is not clear if dermatofibroma is a reactive process or if it is a neoplasm  The lesions are made up of proliferating fibroblasts  Histiocytes may also be involved    They are sometimes attributed to an insect bite or ingrownhair or local trauma, but not consistently  They may be more numerous in patients with altered immunity  Dermatofibromas most often occur on the legs and arms, but may also arise on the trunk or any site of the body  Typical clinical features include the following:   People may have 1 or up to 15 lesions   Size varies from 0 5-1 5 cm diameter; most lesions are 7-10 mm diameter   They are firm nodules tethered to the skin surface and mobile over subcutaneous tissue   The skin "dimples" on pinching the lesion   Color may be pink to light brown in white skin, and dark brown to black in dark skin; some appear paler in the center   They do not usually cause symptoms, but they are sometimes painful or itchy   Because they are often raised lesions, they may be traumatized, for example by a razor   Occasionally dozens may erupt within a few months, usually in the setting of immunosuppression (for example autoimmune disease, cancer or certain medications)   Dermatofibroma does not give rise to cancer  However, occasionally, it may be mistaken for dermatofibrosarcoma or desmoplastic melanoma  A dermatofibroma is harmless and seldom causes any symptoms  Usually, only reassurance is needed  If it is nuisance or causing concern, the lesion can be removed surgically, resulting in a scar that is, by definition, usually longer in diameter than the widest portion of the dermatofibroma  Cryotherapy, shave biopsy and laser surgery are rarely completely successful  Skin punch biopsy or incisional biopsy may be undertaken if there is an atypical feature such as recent enlargement, ulceration, or asymmetrical structures and colours on dermatoscopy      Assessment and Plan:  Based on a thorough discussion of this condition and the management approach to it (including a comprehensive discussion of the known risks, side effects and potential benefits of treatment), the patient (family) agrees to implement the following specific plan:   Benign, no treatment necessary     A keloid scar is a firm, smooth, hard growth due to spontaneous scar formation  It can arise soon after an injury, or develop months later  Keloids may be uncomfortable or itchy and extend well beyond the original wound  They may form on any part of the body, although the upper chest and shoulders are especially prone to them  The precise reason that wound healing sometimes leads to keloid formation is under investigation but is not yet clear  While most people never form keloids, others develop them after minor injuries, burns, insect bites and acne spots  Dark skinned people form keloids more easily than Caucasians  A keloid is harmless to general health and does not change into skin cancer  The following measures are helpful in at least some patients   Emollients (creams and oils)   Polyurethane or silicone scar reduction patches   Silicone gel   Oral or topical tranilast (an inhibitor of collagen synthesis)   Pressure dressings   Surgical excision (but in keloids, excision may result in a new keloid even larger than the original one)   Intralesional corticosteroid injection, repeated every few weeks   Intralesional 5-fluorouracil   Cryotherapy   Superficial X-ray treatment soon after surgery   Pulsed dye laser    Skin needling   Subcision    Scar dressings should be worn for 12-24 hours per day, for at least 8 to 12 weeks, and perhaps for much longer  TELOGEN EFFLUVIUM     Reassured and discussed typical timeline of telogen effluvium after pregnancy (6-12 months)  Recommendation of Rogaine Foam if desired and restarting prenatal vitamins  What is telogen effluvium? Telogen effluvium is the name for temporary hair loss due to the shedding of resting or telogen hair after some shock to the system  New hair continues to grow  Telogen hair has a bulb or club-shaped tip      It should be distinguished from anagen effluvium, in which the hair shedding is due to interruption of active or anagen hair growth by drugs, toxins or inflammation (eg, alopecia areata)  Anagen hair has a pointed or tapered tip  What is the cause of telogen effluvium? In a normal healthy person's scalp about 90% of the hair follicles are actively growing hair (anagen hair) and 15% are resting hair (telogen hair)  A hair follicle usually grows anagen hair for 4 years or so, then rests for about 4 months  The resting or telogen hair has a club or bulb at the tip  A new anagen hair begins to grow under the resting telogen hair and pushes it out  Thus, it is normal to lose up to about 100 hairs a day on one's comb, brush, in the basin or on the pillow, as a result of the normal scalp hair cycle  If there is some shock to the system, as many as 70% of the anagen hairs can be precipitated into telogen, thus reversing the usual ratio  Typical "shock" triggers include:   Illness, especially if there is fever    Surgical operation    Accident    Childbirth    Nervous shock    Weight loss or unusual diet    Certain medications    Discontinuing the contraceptive pill    Overseas travel resulting in jetlag    Excessive sun exposure    At first, the resting scalp hairs, now in the form of club hairs, remain firmly attached to the hair follicles  It is only about 2-4 months after the shock that the new hairs coming up through the scalp push out the 'dead' club hairs and increased hair fall are noticed  Thus, paradoxically, with this type of hair loss, hair fall is a sign of hair regrowth  As the new hair first comes up through the scalp and pushes out the dead hair a fine fringe of new hair is often evident along the forehead hairline  At first, the fall of club hairs is profuse and a general thinning of the scalp hair may become evident but after several months a peak is reached and hair fall begins to lessen, gradually tapering back to normal over 6-9 months   As the hair fall tapers off the scalp thickens back up to normal, but recovery may be incomplete in some cases  Because nail and hair growth are under the same influences, an arrest in hair growth is often mirrored in the nails by a groove across them coinciding with the time of the shock to the system  This is called Beau's line  The time of the shock can be estimated from the fact that a fingernail takes 5 months to grow from the posterior nail fold to the free edge  So if the groove in the nail is half way down the nail then the shock must have been 2 1/2 months ago  Chronic telogen effluvium  In some patients, hair shedding continues to be intermittently or continuously greater than normal for long periods of time, sometimes for years  The hair cycle appears to be reset so that the anagen period is shortened  Chronic telogen effluvium often presents in women that actually continue to have quite thick and moderately long hair - this is because they notice the shed hair more than those with finer or shorter hair  Telogen effluvium does not cause complete baldness, although it may unmask a genetic tendency to genetic balding i e  female pattern hair loss, or in men, male pattern hair loss  What is the treatment for telogen effluvium? Telogen effluvium is self-correcting  It is really not influenced by any treatment that can be given  However, gentle handling of the hair, avoiding over-vigorous combing, brushing and any type of scalp massage are important  You should also ensure a nutritious diet, with plenty of protein, fruit and vegetables  The doctor may check your thyroid function, and levels of iron, vitamin D31 and folic acid, as any deficiency in these can slow hair growth  The psychological effects of hair loss should not be ignored

## 2020-08-20 NOTE — PROGRESS NOTES
[facility] Note     Patient Name: Anali Queen  Encounter Date: 08/20/2020     Have you been cared for by a [facility] in the last 3 years and, if so, which one? No    · Have you traveled outside of the [address] [place] [address] in the past 3 months or outside of the [facility] area in the last 2 weeks? No     May we call your Preferred Phone number to discuss your specific medical information? Yes     May we leave a detailed message that includes your specific medical information? Yes      Today's Chief Concerns:   Concern #1:  Full Body Exam     Concern #2:  Hairloss     Past Medical History:  Have you personally ever had or currently have any of the following? · Skin cancer (such as Melanoma, Basal Cell Carcinoma, Squamous Cell Carcinoma? (If Yes, please provide more detail)- No  · Eczema: No  · Psoriasis: No  · HIV/AIDS: No  · Hepatitis B or C: No  · Tuberculosis: No  · Systemic Immunosuppression such as Diabetes, Biologic or Immunotherapy, Chemotherapy, Organ Transplantation, Bone Marrow Transplantation (If YES, please provide more detail): No  · Radiation Treatment (If YES, please provide more detail): No  · Any other major medical conditions/concerns? 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 N/A     Family History:  Have any of your "first degree relatives" (parent, brother, sister, or child) had any of the following       · Skin cancer such as Melanoma or Merkel Cell Carcinoma or Pancreatic Cancer? No  · Eczema, Asthma, Hay Fever or Seasonal Allergies: No  · Psoriasis or Psoriatic Arthritis: No  · Do any other medical conditions seem to run in your family? If Yes, what condition and which relatives?   No    Current Medications:   (please update all dermatological medications before printing patient's AVS!)      Current Outpatient Medications:     acetaminophen (TYLENOL) 325 mg tablet, Take 2 tablets (650 mg total) by mouth every 6 (six) hours, Disp: 30 tablet, Rfl: 0    famotidine (PEPCID) 10 mg tablet, Take 10 mg by mouth 2 (two) times a day, Disp: , Rfl:     ferrous sulfate 324 (65 Fe) mg, Take 1 tablet (324 mg total) by mouth 2 (two) times a day before meals, Disp: 180 tablet, Rfl: 0    LORATADINE ALLERGY RELIEF PO, Take 1 tablet by mouth, Disp: , Rfl:     naproxen (NAPROSYN) 375 mg tablet, Take 1 tablet (375 mg total) by mouth 2 (two) times a day with meals, Disp: 20 tablet, Rfl: 0    clotrimazole (LOTRIMIN) 1 % cream, Apply topically 2 (two) times a day for 15 days, Disp: 30 g, Rfl: 0    cyclobenzaprine (FLEXERIL) 5 mg tablet, Take 1 tablet (5 mg total) by mouth 2 (two) times a day for 10 days, Disp: 20 tablet, Rfl: 0    cyclobenzaprine (FLEXERIL) 5 mg tablet, Take 1 tablet (5 mg total) by mouth 3 (three) times a day as needed for muscle spasms for up to 20 days, Disp: 60 tablet, Rfl: 0    docusate sodium (COLACE) 100 mg capsule, Take 1 capsule (100 mg total) by mouth 2 (two) times a day (Patient not taking: Reported on 3/23/2020), Disp: 10 capsule, Rfl: 0    ibuprofen (MOTRIN) 600 mg tablet, Take 1 tablet (600 mg total) by mouth every 6 (six) hours as needed (cramping) (Patient not taking: Reported on 8/20/2020), Disp: 30 tablet, Rfl: 0    naproxen (NAPROSYN) 500 mg tablet, Take 1 tablet (500 mg total) by mouth 2 (two) times a day with meals, Disp: 60 tablet, Rfl: 0    Nerve Stimulator (STANDARD TENS) OMER, by Does not apply route 4 (four) times a day (Patient not taking: Reported on 6/10/2020), Disp: 1 Device, Rfl: 0    Prenatal MV-Min-Fe Fum-FA-DHA (PRENATAL 1 PO), Take 1 tablet by mouth daily, Disp: , Rfl:       Review of Systems:  Have you recently had or currently have any of the following? If YES, what are you doing for the problem?     · Fever, chills or unintended weight loss: No  · Sudden loss or change in your vision: No  · Nausea, vomiting or blood in your stool: No  · Painful or swollen joints: No  · Wheezing or cough: No  · Changing mole or non-healing wound: No  · Nosebleeds: No  · Excessive sweating: No  · Easy or prolonged bleeding? No  · Over the last 2 weeks, how often have you been bothered by the following problems? · Taking little interest or pleasure in doing things: 1 - Not at All  · Feeling down, depressed, or hopeless: 1 - Not at All  · Rapid heartbeat with epinephrine:  No    · FEMALES ONLY:    · Are you pregnant or planning to become pregnant? No  · Are you currently or planning to be nursing or breast feeding? No    · Any known allergies? Allergies   Allergen Reactions    Phenazopyridine Anaphylaxis and Hives    Other      Dove Deodorant      Lavender Oil Hives   ·       Physical Exam:     Was a chaperone (Derm Clinical Assistant) present throughout the entire Physical Exam? Yes     Did the Dermatology Team specifically  the patient on the importance of a Full Skin Exam to be sure that nothing is missed clinically?  Yes}  o Did the patient ultimately request or accept a Full Skin Exam?  Yes  o Did the patient specifically refuse to have the areas "under-the-bra" examined by the Dermatologist? No  o Did the patient specifically refuse to have the areas "under-the-underwear" examined by the Dermatologist? No    CONSTITUTIONAL:   Vitals:    08/20/20 1550   Temp: 97 6 °F (36 4 °C)   TempSrc: Tympanic   Weight: 62 1 kg (137 lb)   Height: 5' 4" (1 626 m)     PSYCH: Normal mood and affect  EYES: Normal conjunctiva  ENT: Normal lips and oral mucosa  CARDIOVASCULAR: No edema  RESPIRATORY: Normal respirations  HEME/LYMPH/IMMUNO:  No regional lymphadenopathy except as noted below in "ASSESSMENT AND PLAN BY DIAGNOSIS"    SKIN:  FULL ORGAN SYSTEM EXAM  Hair, Scalp, Ears, Face Normal except as noted below in Assessment   Neck, Cervical Chain Nodes Normal except as noted below in Assessment   Right Arm/Hand/Fingers Normal except as noted below in Assessment   Left Arm/Hand/Fingers Normal except as noted below in Assessment   Chest/Breasts/Axillae Viewed areas Normal except as noted below in Assessment   Abdomen, Umbilicus Normal except as noted below in Assessment   Back/Spine Normal except as noted below in Assessment   Groin/Genitalia/Buttocks Normal except as noted below in Assessment   Right Leg, Foot, Toes Normal except as noted below in Assessment   Left Leg, Foot, Toes Normal except as noted below in Assessment        Assessment and Plan by Diagnosis:    History of Present Condition: (Hairloss)     Duration:  How long has this been an issue for you?    o  2 months    Location Affected:  Where on the body is this affecting you?    o  Scalp   Quality:  Is there any bleeding, pain, itch, burning/irritation, or redness associated with the skin lesion?    o  Denies   Severity:  Describe any bleeding, pain, itch, burning/irritation, or redness on a scale of 1 to 10 (with 10 being the worst)  o  Denies    Timing:  Does this condition seem to be there pretty constantly or do you notice it more at specific times throughout the day?    o  Constantly   Context:  Have you ever noticed that this condition seems to be associated with specific activities you do?    o  Denies   Modifying Factors:    o Anything that seems to make the condition worse?    -  Denies  o What have you tried to do to make the condition better? -  Denies   Associated Signs and Symptoms:  Does this skin lesion seem to be associated with any of the following:  o Denies     1   MELANOCYTIC NEVI ("Moles")    Physical Exam:   Anatomic Location Affected:   Mostly on sun-exposed areas of the Trunk and bilateral feet    Morphological Description:  Scattered, 1-4mm round to ovoid, symmetrical-appearing, even bordered, skin colored to dark brown macules/papules   Left lateral heel with a 0 3 cm  dark brown macule    Right mid first toe with a 2 5 mm dark brown macule, well circumscribed   Right lateral foot 0 2 cm dark brown macule well circumscribed   Pertinent Positives:   Pertinent Negatives: n/a    Assessment and Plan:  Based on a thorough discussion of this condition and the management approach to it (including a comprehensive discussion of the known risks, side effects and potential benefits of treatment), the patient (family) agrees to implement the following specific plan:   Reassured of benign appearance, discussed ABCDEs of melanoma   Advised no need for annual exam with us but would recommend her check over her skin regularly and to see us for any concerning, changing lesions   Start Neutrogena Daily Defense SPF 50+ at least 3 times a day      Melanocytic Nevi  Melanocytic nevi ("moles") are tan or brown, raised or flat areas of the skin which have an increased number of melanocytes  Melanocytes are the cells in our body which make pigment and account for skin color  Some moles are present at birth (I e , "congenital nevi"), while others come up later in life (i e , "acquired nevi")  The sun can stimulate the body to make more moles  Sunburns are not the only thing that triggers more moles  Chronic sun exposure can do it too  Clinically distinguishing a healthy mole from melanoma may be difficult, even for experienced dermatologists  The "ABCDE's" of moles have been suggested as a means of helping to alert a person to a suspicious mole and the possible increased risk of melanoma  The suggestions for raising alert are as follows:    Asymmetry: Healthy moles tend to be symmetric, while melanomas are often asymmetric  Asymmetry means if you draw a line through the mole, the two halves do not match in color, size, shape, or surface texture  Asymmetry can be a result of rapid enlargement of a mole, the development of a raised area on a previously flat lesion, scaling, ulceration, bleeding or scabbing within the mole    Any mole that starts to demonstrate "asymmetry" should be examined promptly by a board certified dermatologist      Border: Healthy moles tend to have discrete, even borders  The border of a melanoma often blends into the normal skin and does not sharply delineate the mole from normal skin  Any mole that starts to demonstrate "uneven borders" should be examined promptly by a board certified dermatologist      Color: Healthy moles tend to be one color throughout  Melanomas tend to be made up of different colors ranging from dark black, blue, white, or red  Any mole that demonstrates a color change should be examined promptly by a board certified dermatologist      Diameter: Healthy moles tend to be smaller than 0 6 cm in size; an exception are "congenital nevi" that can be larger  Melanomas tend to grow and can often be greater than 0 6 cm (1/4 of an inch, or the size of a pencil eraser)  This is only a guideline, and many normal moles may be larger than 0 6 cm without being unhealthy  Any mole that starts to change in size (small to bigger or bigger to smaller) should be examined promptly by a board certified dermatologist      Evolving: Healthy moles tend to "stay the same "  Melanomas may often show signs of change or evolution such as a change in size, shape, color, or elevation  Any mole that starts to itch, bleed, crust, burn, hurt, or ulcerate or demonstrate a change or evolution should be examined promptly by a board certified dermatologist       Dysplastic Nevi  Dysplastic moles are moles that fit the ABCDE rules of melanoma but are not identified as melanomas when examined under the microscope  They may indicate an increased risk of melanoma in that person  If there is a family history of melanoma, most experts agree that the person may be at an increased risk for developing a melanoma    Experts still do not agree on what dysplastic moles mean in patients without a personal or family history of melanoma  Dysplastic moles are usually larger than common moles and have different colors within it with irregular borders  The appearance can be very similar to a melanoma  Biopsies of dysplastic moles may show abnormalities which are different from a regular mole  Melanoma  Malignant melanoma is a type of skin cancer that can be deadly if it spreads throughout the body  The incidence of melanoma in the [place] is growing faster than any other cancer  Melanoma usually grows near the surface of the skin for a period of time, and then begins to grow deeper into the skin  Once it grows deeper into the skin, the risk of spread to other organs greatly increases  Therefore, early detection and removal of a malignant melanoma may result in a better chance at a complete cure; removal after the tumor has spread may not be as effective, leading to worse clinical outcomes such as death  The true rate of nevus transformation into a melanoma is unknown  It has been estimated that the lifetime risk for any acquired melanocytic nevus on any 21year-old individual transforming into melanoma by age [de-identified] is 0 03% (1 in 3,164) for men and 0 009% (1 in 10,800) for women  The appearance of a "new mole" remains one of the most reliable methods for identifying a malignant melanoma  Occasionally, melanomas appear as rapidly growing, blue-black, dome-shaped bumps within a previous mole or previous area of normal skin  Other times, melanomas are suspected when a mole suddenly appears or changes  Itching, burning, or pain in a pigmented lesion should increase suspicion, but most patients with early melanoma have no skin discomfort whatsoever  Melanoma can occur anywhere on the skin, including areas that are difficult for self-examination  Many melanomas are first noticed by other family members  Suspicious-looking moles may be removed for microscopic examination         You may be able to prevent death from melanoma by doing two simple things:    1  Try to avoid unnecessary sun exposure and protect your skin when it is exposed to the sun  People who live near the equator, people who have intermittent exposures to large amounts of sun, and people who have had sunburns in childhood or adolescence have an increased risk for melanoma  Sun sense and vigilant sun protection may be keys to helping to prevent melanoma  We recommend wearing UPF-rated sun protective clothing and sunglasses whenever possible and applying a moisturizer-sunscreen combination product (SPF 50+) such as Neutrogena Daily Defense to sun exposed areas of skin at least three times a day  2  Have your moles regularly examined by a board certified dermatologist AND by yourself or a family member/friend at home  We recommend that you have your moles examined at least once a year by a board certified dermatologist   Use your birthday as an annual reminder to have your "Birthday Suit" (I e , your skin) examined; it is a nice birthday gift to yourself to know that your skin is healthy appearing! Additionally, at-home self examinations may be helpful for detecting a possible melanoma  Use the ABCDEs we discussed and check your moles once a month at home  2  DERMATOFIBROMA    Physical Exam:   Anatomic Location Affected:  Left lateral thigh   Morphological Description:  Firm pinkish brown dermal nodule, invaginates with lateral pressure    Assessment and Plan:  Based on a thorough discussion of this condition and the management approach to it (including a comprehensive discussion of the known risks, side effects and potential benefits of treatment), the patient (family) agrees to implement the following specific plan:   Benign, no treatment necessary unless bothersome     Assessment and Plan:  A dermatofibroma is a common benign fibrous nodule that most often arises on the skin of the lower legs    A dermatofibroma is also called a "cutaneous fibrous histiocytoma "  Dermatofibromas occur at all ages and in people of every ethnicity  They are more common in women than in men  It is not clear if dermatofibroma is a reactive process or if it is a neoplasm  The lesions are made up of proliferating fibroblasts  Histiocytes may also be involved  They are sometimes attributed to an insect bite or ingrownhair or local trauma, but not consistently  They may be more numerous in patients with altered immunity  Dermatofibromas most often occur on the legs and arms, but may also arise on the trunk or any site of the body  Typical clinical features include the following:   People may have 1 or up to 15 lesions   Size varies from 0 5-1 5 cm diameter; most lesions are 7-10 mm diameter   They are firm nodules tethered to the skin surface and mobile over subcutaneous tissue   The skin "dimples" on pinching the lesion   Color may be pink to light brown in white skin, and dark brown to black in dark skin; some appear paler in the center   They do not usually cause symptoms, but they are sometimes painful or itchy   Because they are often raised lesions, they may be traumatized, for example by a razor   Occasionally dozens may erupt within a few months, usually in the setting of immunosuppression (for example autoimmune disease, cancer or certain medications)   Dermatofibroma does not give rise to cancer  However, occasionally, it may be mistaken for dermatofibrosarcoma or desmoplastic melanoma  A dermatofibroma is harmless and seldom causes any symptoms  Usually, only reassurance is needed  If it is nuisance or causing concern, the lesion can be removed surgically, resulting in a scar that is, by definition, usually longer in diameter than the widest portion of the dermatofibroma  Cryotherapy, shave biopsy and laser surgery are rarely completely successful    Skin punch biopsy or incisional biopsy may be undertaken if there is an atypical feature such as recent enlargement, ulceration, or asymmetrical structures and colours on dermatoscopy  3  KELOID SCAR    Physical Exam:   Anatomic Location Affected:  Right lobule of the ear   Morphological Description:  3 mm skin colored to tan firm dermal papule in location of piercing  Assessment and Plan:  Based on a thorough discussion of this condition and the management approach to it (including a comprehensive discussion of the known risks, side effects and potential benefits of treatment), the patient (family) agrees to implement the following specific plan:   Benign, no treatment necessary     A keloid scar is a firm, smooth, hard growth due to spontaneous scar formation  It can arise soon after an injury, or develop months later  Keloids may be uncomfortable or itchy and extend well beyond the original wound  They may form on any part of the body, although the upper chest and shoulders are especially prone to them  The precise reason that wound healing sometimes leads to keloid formation is under investigation but is not yet clear  While most people never form keloids, others develop them after minor injuries, burns, insect bites and acne spots  Dark skinned people form keloids more easily than Caucasians  A keloid is harmless to general health and does not change into skin cancer  The following measures are helpful in at least some patients   Emollients (creams and oils)   Polyurethane or silicone scar reduction patches   Silicone gel   Oral or topical tranilast (an inhibitor of collagen synthesis)   Pressure dressings   Surgical excision (but in keloids, excision may result in a new keloid even larger than the original one)   Intralesional corticosteroid injection, repeated every few weeks   Intralesional 5-fluorouracil   Cryotherapy   Superficial X-ray treatment soon after surgery     Pulsed dye laser    Skin needling   Subcision    Scar dressings should be worn for 12-24 hours per day, for at least 8 to 12 weeks, and perhaps for much longer  4  TELOGEN EFFLUVIUM    Physical Exam:   Anatomic Location Affected:  Scalp    Morphological Description:  Normal hair density, scalp clear   Pertinent Positives:   Pertinent Negatives: no visible alopecia patches, no scaling of scalp    Assessment and Plan:  Based on a thorough discussion of this condition and the management approach to it (including a comprehensive discussion of the known risks, side effects and potential benefits of treatment), the patient (family) agrees to implement the following specific plan:   Reassured and discussed typical timeline of telogen effluvium after pregnancy (6-12 months)  Recommendation of Rogaine Foam if desired and restarting prenatal vitamins  What is telogen effluvium? Telogen effluvium is the name for temporary hair loss due to the shedding of resting or telogen hair after some shock to the system  New hair continues to grow  Telogen hair has a bulb or club-shaped tip  It should be distinguished from anagen effluvium, in which the hair shedding is due to interruption of active or anagen hair growth by drugs, toxins or inflammation (eg, alopecia areata)  Anagen hair has a pointed or tapered tip  What is the cause of telogen effluvium? In a normal healthy person's scalp about 64% of the hair follicles are actively growing hair (anagen hair) and 15% are resting hair (telogen hair)  A hair follicle usually grows anagen hair for 4 years or so, then rests for about 4 months  The resting or telogen hair has a club or bulb at the tip  A new anagen hair begins to grow under the resting telogen hair and pushes it out  Thus, it is normal to lose up to about 100 hairs a day on one's comb, brush, in the basin or on the pillow, as a result of the normal scalp hair cycle  If there is some shock to the system, as many as 70% of the anagen hairs can be precipitated into telogen, thus reversing the usual ratio  Typical "shock" triggers include:   Illness, especially if there is fever    Surgical operation    Accident    Childbirth    Nervous shock    Weight loss or unusual diet    Certain medications    Discontinuing the contraceptive pill    Overseas travel resulting in jetlag    Excessive sun exposure    At first, the resting scalp hairs, now in the form of club hairs, remain firmly attached to the hair follicles  It is only about 2-4 months after the shock that the new hairs coming up through the scalp push out the 'dead' club hairs and increased hair fall are noticed  Thus, paradoxically, with this type of hair loss, hair fall is a sign of hair regrowth  As the new hair first comes up through the scalp and pushes out the dead hair a fine fringe of new hair is often evident along the forehead hairline  At first, the fall of club hairs is profuse and a general thinning of the scalp hair may become evident but after several months a peak is reached and hair fall begins to lessen, gradually tapering back to normal over 6-9 months  As the hair fall tapers off the scalp thickens back up to normal, but recovery may be incomplete in some cases  Because nail and hair growth are under the same influences, an arrest in hair growth is often mirrored in the nails by a groove across them coinciding with the time of the shock to the system  This is called Beau's line  The time of the shock can be estimated from the fact that a fingernail takes 5 months to grow from the posterior nail fold to the free edge  So if the groove in the nail is half way down the nail then the shock must have been 2 1/2 months ago  Chronic telogen effluvium  In some patients, hair shedding continues to be intermittently or continuously greater than normal for long periods of time, sometimes for years  The hair cycle appears to be reset so that the anagen period is shortened      Chronic telogen effluvium often presents in women that actually continue to have quite thick and moderately long hair - this is because they notice the shed hair more than those with finer or shorter hair  Telogen effluvium does not cause complete baldness, although it may unmask a genetic tendency to genetic balding i e  female pattern hair loss, or in men, male pattern hair loss  What is the treatment for telogen effluvium? Telogen effluvium is self-correcting  It is really not influenced by any treatment that can be given  However, gentle handling of the hair, avoiding over-vigorous combing, brushing and any type of scalp massage are important  You should also ensure a nutritious diet, with plenty of protein, fruit and vegetables  The doctor may check your thyroid function, and levels of iron, vitamin K42 and folic acid, as any deficiency in these can slow hair growth  The psychological effects of hair loss should not be ignored        Scribe Attestation    I,:   Leanne Wills MA am acting as a scribe while in the presence of the attending physician :        I,:   Cheryl Porras MD personally performed the services described in this documentation    as scribed in my presence :

## 2020-09-08 ENCOUNTER — TELEPHONE (OUTPATIENT)
Dept: FAMILY MEDICINE CLINIC | Facility: CLINIC | Age: 30
End: 2020-09-08

## 2020-09-08 NOTE — TELEPHONE ENCOUNTER
Please  Schedule  A  Video  Visit  To  Discuss  The  Details  She  Could  Certainly  Use  The  Inhaler not   Necessarily nebulizer  I  Will  be  Happy to talk  To  her today  To determine  If  She  Needs a  covid test

## 2020-09-08 NOTE — TELEPHONE ENCOUNTER
Patient called with compaint that is getting a bit short of [id number]  Shares does not believe it to be covid as has suffered from this in the past however will go get covid test if you feel they should, recalls discussing a nebulizer pump for this last time it occurred and is wondering if that is still a viable option

## 2020-09-14 ENCOUNTER — OFFICE VISIT (OUTPATIENT)
Dept: FAMILY MEDICINE CLINIC | Facility: CLINIC | Age: 30
End: 2020-09-14
Payer: COMMERCIAL

## 2020-09-14 VITALS
DIASTOLIC BLOOD PRESSURE: 70 MMHG | OXYGEN SATURATION: 98 % | WEIGHT: 135 LBS | RESPIRATION RATE: 16 BRPM | BODY MASS INDEX: 23.05 KG/M2 | TEMPERATURE: 97.7 F | HEART RATE: 75 BPM | HEIGHT: 64 IN | SYSTOLIC BLOOD PRESSURE: 108 MMHG

## 2020-09-14 DIAGNOSIS — H66.90 ACUTE OTITIS MEDIA, UNSPECIFIED OTITIS MEDIA TYPE: ICD-10-CM

## 2020-09-14 DIAGNOSIS — Z86.16 HISTORY OF 2019 NOVEL CORONAVIRUS DISEASE (COVID-19): ICD-10-CM

## 2020-09-14 DIAGNOSIS — J45.20 MILD INTERMITTENT ASTHMA WITHOUT COMPLICATION: Primary | ICD-10-CM

## 2020-09-14 DIAGNOSIS — J30.2 SEASONAL ALLERGIES: ICD-10-CM

## 2020-09-14 DIAGNOSIS — R06.02 SOB (SHORTNESS OF BREATH): ICD-10-CM

## 2020-09-14 DIAGNOSIS — H65.91 RIGHT NON-SUPPURATIVE OTITIS MEDIA: ICD-10-CM

## 2020-09-14 PROCEDURE — 99214 OFFICE O/P EST MOD 30 MIN: CPT | Performed by: FAMILY MEDICINE

## 2020-09-14 RX ORDER — AZITHROMYCIN 250 MG/1
TABLET, FILM COATED ORAL
Qty: 6 TABLET | Refills: 0 | Status: SHIPPED | OUTPATIENT
Start: 2020-09-14 | End: 2020-09-19

## 2020-09-14 RX ORDER — ALBUTEROL SULFATE 90 UG/1
2 AEROSOL, METERED RESPIRATORY (INHALATION) EVERY 6 HOURS PRN
Qty: 1 INHALER | Refills: 3 | Status: SHIPPED | OUTPATIENT
Start: 2020-09-14 | End: 2020-10-14

## 2020-09-14 RX ORDER — MONTELUKAST SODIUM 10 MG/1
10 TABLET ORAL
Qty: 30 TABLET | Refills: 2 | Status: SHIPPED | OUTPATIENT
Start: 2020-09-14 | End: 2020-09-18

## 2020-09-14 NOTE — PROGRESS NOTES
Assessment/Plan:  1  Sob - no  Sob  Currently   No  Cp   She  Says   She  Has been  Using  Her  Son's  inheler  Which  Helped  Her  A great  Deal   Her  Symptoms  Started   On  Friday with  Wheezing   No  Fever  Cough  Or  Any other  Symptoms   Currently   Started  On  Albuterol  inhaler     Will  F/u  With  PFT  She  Has  H/o  COVID    2  ? Asthma - will f/u  With  The  Work up       3  Rt  Earache -  On  Exam   Otitis  Media - started  z  Pack       4  Fatigue  -  Improved    5  Back pain -  Much  Stable now       Problem List Items Addressed This Visit        Respiratory    Mild intermittent asthma without complication - Primary    Relevant Medications    albuterol (Ventolin HFA) 90 mcg/act inhaler    montelukast (SINGULAIR) 10 mg tablet    Other Relevant Orders    Complete PFT with Methacholine Challenge    XR chest pa & lateral       Nervous and Auditory    Right non-suppurative otitis media       Other    SOB (shortness of breath)    Relevant Medications    albuterol (Ventolin HFA) 90 mcg/act inhaler    Other Relevant Orders    XR chest pa & lateral    Ambulatory referral to Pulmonology    Ambulatory referral to Pulmonology      Other Visit Diagnoses     History of 2019 novel coronavirus disease (COVID-19)        Relevant Orders    Ambulatory referral to Pulmonology    Ambulatory referral to Pulmonology    Seasonal allergies        Relevant Medications    montelukast (SINGULAIR) 10 mg tablet    Acute otitis media, unspecified otitis media type        Relevant Medications    azithromycin (Zithromax) 250 mg tablet            Subjective: - patient  Is  Here  To get  Checked   For  Her  Multiple   complaints     Patient ID: Mandy Zimmerman is a 26 y o  female      HPI-  She  Says  She  Has  Been  experiencing    Wheezing  And  Rt  Ear  Ache   She  Has h/o  COVID a  Few  Months  Ago      She  Says   She  Started  wheezing  3  Days  Ago  And  experienced   Sob  She   Started  Taking  Her  Son's  inhaler Which  Helped  Her  A great  Deal   She  Also  Has seasonal  Allergies   And  Sneezing  Off and on      She  Feels  Better  In  Terms of  Her  Fatigue  And  Back pains    The following portions of the patient's history were reviewed and updated as appropriate:   She has a past medical history of Abnormal Pap smear of cervix, Anxiety, Carrier of group B Streptococcus, GERD (gastroesophageal reflux disease), History of UTI, absence seizures, seasonal allergies, Papanicolaou smear for cervical cancer screening (06/2017), Postpartum depression, Psychiatric disorder, Umbilical hernia (17/6942), and Varicose veins of lower extremity  ,  does not have any pertinent problems on file  ,   has a past surgical history that includes Hernia repair; [place] tooth extraction; and Sterilization  ,  family history includes Arthritis in her mother; Asthma in her maternal aunt, mother, and sister; Bipolar disorder in her maternal aunt; Cancer in her cousin; Diabetes in her cousin, maternal grandmother, mother, and other; Heart attack in her maternal grandfather; Hypertension in her mother; Hyperthyroidism in her maternal aunt; Lymphoma in her maternal grandmother; Other in her maternal grandmother; Thyroid disease in her maternal aunt; Varicose Veins in her paternal aunt and paternal grandmother  ,   reports that she quit smoking about 5 years ago  Her smoking use included cigarettes  She quit after 15 00 years of use  She has never used smokeless tobacco  She reports previous alcohol use  She reports that she does not use drugs  ,  is allergic to phenazopyridine; other; and lavender oil     Current Outpatient Medications   Medication Sig Dispense Refill    acetaminophen (TYLENOL) 325 mg tablet Take 2 tablets (650 mg total) by mouth every 6 (six) hours 30 tablet 0    famotidine (PEPCID) 10 mg tablet Take 10 mg by mouth 2 (two) times a day as needed       ferrous sulfate 324 (65 Fe) mg Take 1 tablet (324 mg total) by mouth 2 (two) times a day before meals 180 tablet 0    LORATADINE ALLERGY RELIEF PO Take 1 tablet by mouth      naproxen (NAPROSYN) 375 mg tablet Take 1 tablet (375 mg total) by mouth 2 (two) times a day with meals 20 tablet 0    albuterol (Ventolin HFA) 90 mcg/act inhaler Inhale 2 puffs every 6 (six) hours as needed for wheezing 1 Inhaler 3    azithromycin (Zithromax) 250 mg tablet Take 2 tablets (500 mg total) by mouth daily for 1 day, THEN 1 tablet (250 mg total) daily for 4 days  6 tablet 0    clotrimazole (LOTRIMIN) 1 % cream Apply topically 2 (two) times a day for 15 days 30 g 0    cyclobenzaprine (FLEXERIL) 5 mg tablet Take 1 tablet (5 mg total) by mouth 2 (two) times a day for 10 days 20 tablet 0    cyclobenzaprine (FLEXERIL) 5 mg tablet Take 1 tablet (5 mg total) by mouth 3 (three) times a day as needed for muscle spasms for up to 20 days 60 tablet 0    docusate sodium (COLACE) 100 mg capsule Take 1 capsule (100 mg total) by mouth 2 (two) times a day (Patient not taking: Reported on 3/23/2020) 10 capsule 0    ibuprofen (MOTRIN) 600 mg tablet Take 1 tablet (600 mg total) by mouth every 6 (six) hours as needed (cramping) (Patient not taking: Reported on 8/20/2020) 30 tablet 0    montelukast (SINGULAIR) 10 mg tablet Take 1 tablet (10 mg total) by mouth daily at bedtime 30 tablet 2    naproxen (NAPROSYN) 500 mg tablet Take 1 tablet (500 mg total) by mouth 2 (two) times a day with meals 60 tablet 0    Nerve Stimulator (STANDARD TENS) OMER by Does not apply route 4 (four) times a day (Patient not taking: Reported on 6/10/2020) 1 Device 0    Prenatal MV-Min-Fe Fum-FA-DHA (PRENATAL 1 PO) Take 1 tablet by mouth daily       No current facility-administered medications for this visit  Review of Systems   Constitutional: Negative for appetite change, fatigue and fever  HENT: Positive for ear pain  Negative for congestion, ear discharge, facial swelling, hearing loss, sinus pressure, sinus pain and sore throat      Eyes: Negative for discharge and redness  Respiratory: Positive for shortness of breath and wheezing  Negative for cough and chest tightness  Cardiovascular: Negative for chest pain, palpitations and leg swelling  Gastrointestinal: Negative for abdominal pain, constipation, diarrhea, nausea and vomiting  Endocrine: Negative for heat intolerance, polydipsia and polyuria  Genitourinary: Negative for dysuria, flank pain, pelvic pain and urgency  Musculoskeletal: Negative for back pain and myalgias  Skin: Negative for rash  Positive  Skin  Lesion  / mole  On  Abdominal  wall   Neurological: Negative for dizziness, light-headedness and headaches  Hematological: Negative for adenopathy  Does not bruise/bleed easily  Psychiatric/Behavioral: Negative for behavioral problems  Objective:  Vitals:    09/14/20 1756   BP: 108/70   BP Location: Left arm   Patient Position: Sitting   Cuff Size: Standard   Pulse: 75   Resp: 16   Temp: 97 7 °F (36 5 °C)   TempSrc: Temporal   SpO2: 98%   Weight: 61 2 kg (135 lb)   Height: 5' 4" (1 626 m)     Body mass index is 23 17 kg/m²  Physical Exam  Vitals signs and nursing note reviewed  Constitutional:       General: She is not in acute distress  Appearance: She is well-developed  She is not diaphoretic  HENT:      Head: Normocephalic and atraumatic  Mouth/Throat:      Mouth: Mucous membranes are moist       Pharynx: Oropharynx is clear  No pharyngeal swelling or oropharyngeal exudate  Comments: Positive  Congestion in rt  Ear  tmmm  congested  Eyes:      General:         Right eye: No discharge  Left eye: No discharge  Pupils: Pupils are equal, round, and reactive to light  Neck:      Musculoskeletal: Normal range of motion and neck supple  Thyroid: No thyromegaly  Vascular: No JVD  Trachea: No tracheal deviation  Cardiovascular:      Rate and Rhythm: Normal rate and regular rhythm  Pulses: Normal pulses  Heart sounds: Normal heart sounds  No murmur  No friction rub  No gallop  Pulmonary:      Effort: Pulmonary effort is normal  No respiratory distress  Breath sounds: Normal breath sounds  No wheezing, rhonchi or rales  Chest:      Chest wall: No mass, deformity, tenderness, crepitus or edema  Abdominal:      General: There is no distension  Palpations: Abdomen is soft  Tenderness: There is no abdominal tenderness  There is no guarding  Musculoskeletal: Normal range of motion  Right lower leg: She exhibits no tenderness  No edema  Left lower leg: She exhibits no tenderness  No edema  Skin:     Coloration: Skin is not cyanotic  Findings: No rash  Neurological:      General: No focal deficit present  Mental Status: She is alert and oriented to person, place, and time  Cranial Nerves: No cranial nerve deficit     Psychiatric:         Behavior: Behavior normal

## 2020-09-17 ENCOUNTER — TELEPHONE (OUTPATIENT)
Dept: FAMILY MEDICINE CLINIC | Facility: CLINIC | Age: 30
End: 2020-09-17

## 2020-09-17 DIAGNOSIS — J45.21 MILD INTERMITTENT ASTHMA WITH ACUTE EXACERBATION: Primary | ICD-10-CM

## 2020-09-17 NOTE — TELEPHONE ENCOUNTER
Patient called as went to schedule pulmonary function test but you had put in 2 referalls to pulmonary instead of a ref to pulm and huber order for a PFT asking if you can put in order for PFT

## 2020-09-18 ENCOUNTER — TELEMEDICINE (OUTPATIENT)
Dept: FAMILY MEDICINE CLINIC | Facility: CLINIC | Age: 30
End: 2020-09-18
Payer: COMMERCIAL

## 2020-09-18 VITALS — WEIGHT: 135 LBS | RESPIRATION RATE: 16 BRPM | BODY MASS INDEX: 23.17 KG/M2

## 2020-09-18 DIAGNOSIS — R09.81 NASAL CONGESTION: Primary | ICD-10-CM

## 2020-09-18 PROCEDURE — 99214 OFFICE O/P EST MOD 30 MIN: CPT | Performed by: NURSE PRACTITIONER

## 2020-09-18 PROCEDURE — 1036F TOBACCO NON-USER: CPT | Performed by: NURSE PRACTITIONER

## 2020-09-18 RX ORDER — FLUTICASONE PROPIONATE 50 MCG
1 SPRAY, SUSPENSION (ML) NASAL DAILY
Qty: 18 ML | Refills: 1 | Status: SHIPPED | OUTPATIENT
Start: 2020-09-18 | End: 2022-05-11 | Stop reason: SDUPTHER

## 2020-09-18 RX ORDER — MONTELUKAST SODIUM 10 MG/1
10 TABLET ORAL
Qty: 30 TABLET | Refills: 1 | Status: SHIPPED | OUTPATIENT
Start: 2020-09-18 | End: 2020-12-16 | Stop reason: SDUPTHER

## 2020-09-18 NOTE — PROGRESS NOTES
Virtual Brief Visit    Presents via telemed for c/o congestion , sinus pressure and headaches  Negative for fevers and states feeling better but unable to shake off the congestion  Allergies are bothering her   Denies any fevers   No nausea or vomiting and no cough     Assessment/Plan:    Problem List Items Addressed This Visit     None      Visit Diagnoses     Nasal congestion    -  Primary    Relevant Medications    fluticasone (FLONASE) 50 mcg/act nasal spray    montelukast (SINGULAIR) 10 mg tablet                Reason for visit is   Chief Complaint   Patient presents with    Nasal Congestion    Earache    Virtual Brief Visit        Encounter provider Shraddha Bliss    Provider located at [address] [place] [address] [address] [facility] [address] [phone]    Recent Visits  Date Type Provider Dept   09/18/20 Telemedicine KOURTNEY Bliss [facility]   09/17/20 Telephone Latrell Beard MD [facility]   09/14/20 Office Visit Latrell Beard MD [facility]   Showing recent visits within past 7 days and meeting all other requirements     Future Appointments  No visits were found meeting these conditions  Showing future appointments within next 150 days and meeting all other requirements        After connecting through telephone, the patient was identified by name and date of birth  Linda Velasquez was informed that this is a telemedicine visit and that the visit is being conducted through telephone  My office door was closed  No one else was in the room  She acknowledged consent and understanding of privacy and security of the platform  The patient has agreed to participate and understands she can discontinue the visit at any time  Patient is aware this is a billable service       Karie Huff Ana Maria Mosquera is a 26 y o  female   Presents via telemed for c/o congestion , sinus pressure and headaches  Negative for fevers and states feeling better but unable to shake off the congestion   Allergies are bothering her   Denies any fevers   No nausea or vomiting and no cough          Past Medical History:   Diagnosis Date    Abnormal Pap smear of cervix     had cryosurgery 2015    Anxiety     Carrier of group B Streptococcus     GERD (gastroesophageal reflux disease)     History of UTI     Hx of absence seizures     once(per sanjiv)    Hx of seasonal allergies     Papanicolaou smear for cervical cancer screening 06/2017    Pap neg 5/2016, pap- neg/GC/CT NEG    Postpartum depression     Psychiatric disorder     Umbilical hernia 94/3230    Varicose veins of lower extremity        Past Surgical History:   Procedure Laterality Date    HERNIA REPAIR      2 years ago    STERILIZATION      WISDOM TOOTH EXTRACTION         Current Outpatient Medications   Medication Sig Dispense Refill    acetaminophen (TYLENOL) 325 mg tablet Take 2 tablets (650 mg total) by mouth every 6 (six) hours 30 tablet 0    albuterol (Ventolin HFA) 90 mcg/act inhaler Inhale 2 puffs every 6 (six) hours as needed for wheezing 1 Inhaler 3    clotrimazole (LOTRIMIN) 1 % cream Apply topically 2 (two) times a day for 15 days 30 g 0    cyclobenzaprine (FLEXERIL) 5 mg tablet Take 1 tablet (5 mg total) by mouth 2 (two) times a day for 10 days 20 tablet 0    cyclobenzaprine (FLEXERIL) 5 mg tablet Take 1 tablet (5 mg total) by mouth 3 (three) times a day as needed for muscle spasms for up to 20 days 60 tablet 0    docusate sodium (COLACE) 100 mg capsule Take 1 capsule (100 mg total) by mouth 2 (two) times a day (Patient not taking: Reported on 3/23/2020) 10 capsule 0    famotidine (PEPCID) 10 mg tablet Take 10 mg by mouth 2 (two) times a day as needed       ferrous sulfate 324 (65 Fe) mg Take 1 tablet (324 mg total) by mouth 2 (two) times a day before meals 180 tablet 0    fluticasone (FLONASE) 50 mcg/act nasal spray 1 spray into each nostril daily for 10 days 18 mL 1    ibuprofen (MOTRIN) 600 mg tablet Take 1 tablet (600 mg total) by mouth every 6 (six) hours as needed (cramping) (Patient not taking: Reported on 8/20/2020) 30 tablet 0    LORATADINE ALLERGY RELIEF PO Take 1 tablet by mouth      montelukast (SINGULAIR) 10 mg tablet Take 1 tablet (10 mg total) by mouth daily at bedtime 30 tablet 1    naproxen (NAPROSYN) 375 mg tablet Take 1 tablet (375 mg total) by mouth 2 (two) times a day with meals 20 tablet 0    naproxen (NAPROSYN) 500 mg tablet Take 1 tablet (500 mg total) by mouth 2 (two) times a day with meals 60 tablet 0    Nerve Stimulator (STANDARD TENS) OMER by Does not apply route 4 (four) times a day (Patient not taking: Reported on 6/10/2020) 1 Device 0    Prenatal MV-Min-Fe Fum-FA-DHA (PRENATAL 1 PO) Take 1 tablet by mouth daily       No current facility-administered medications for this visit  Allergies   Allergen Reactions    Phenazopyridine Anaphylaxis and Hives    Other      Dove Deodorant      Lavender Oil Hives       Review of Systems   Constitutional: Negative for chills, fatigue and fever  HENT: Positive for congestion, postnasal drip and sinus pressure  Negative for sinus pain and sore throat  Eyes: Negative  Respiratory: Negative for cough and shortness of breath  Cardiovascular: Negative for chest pain, palpitations and leg swelling  Gastrointestinal: Negative for abdominal distention, abdominal pain and nausea  Genitourinary: Negative for difficulty urinating, dysuria and flank pain  Skin: Negative  Allergic/Immunologic: Positive for environmental allergies  Neurological: Negative for dizziness and headaches  Hematological: Negative for adenopathy         Vitals:    09/18/20 1551   Resp: 16   Weight: 61 2 kg (135 lb)         I spent 15 minutes directly with the patient during this visit    [address] [place] [address] Margaret Garciany acknowledges that she has consented to an online visit or consultation  She understands that the online visit is based solely on information provided by her, and that, in the absence of a face-to-face physical evaluation by the physician, the diagnosis she receives is both limited and provisional in terms of accuracy and completeness  This is not intended to replace a full medical face-to-face evaluation by the physician  Linda Velasquez understands and accepts these terms

## 2020-09-21 NOTE — PATIENT INSTRUCTIONS
Cold Symptoms   WHAT YOU NEED TO KNOW:   A cold is an infection caused by a virus  The infection causes your upper respiratory system to become inflamed  Common symptoms of a cold include sneezing, dry throat, a stuffy nose, headache, watery eyes, and a cough  Your cough may be dry, or you may cough up mucus  You may also have muscle aches, joint pain, and tiredness  Rarely, you may have a fever  Most colds go away without treatment  DISCHARGE INSTRUCTIONS:   Return to the emergency department if:   · You have increased tiredness and weakness  · You are unable to eat  · Your heart is beating much faster than usual for you  · You see white spots in the back of your throat and your neck is swollen and sore to the touch  · You see pinpoint or larger reddish-purple dots on your skin  Contact your healthcare provider if:   · You have a fever higher than 102°F (38 9°C)  · You have new or worsening shortness of breath  · You have thick nasal drainage for more than 2 days  · Your symptoms do not improve or get worse within 5 days  · You have questions or concerns about your condition or care  Medicines: The following medicines may be suggested by your healthcare provider to decrease your cold symptoms  These medicines are available without a doctor's order  Ask which medicines to take and when to take them  Follow directions  · NSAIDs or acetaminophen  help to bring down a fever or decrease pain  · Decongestants  help decrease nasal stuffiness  · Antihistamines  help decrease sneezing and a runny nose  · Cough suppressants  help decrease how much you cough  · Expectorants  help loosen mucus so you can cough it up  · Take your medicine as directed  Contact your healthcare provider if you think your medicine is not helping or if you have side effects  Tell him of her if you are allergic to any medicine  Keep a list of the medicines, vitamins, and herbs you take   Include the amounts, and when and why you take them  Bring the list or the pill bottles to follow-up visits  Carry your medicine list with you in case of an emergency  Symptom relief: The following may help relieve cold symptoms, such as a dry throat and congestion:  · Gargle with mouthwash or warm salt water as directed  · Suck on throat lozenges or hard candy  · Use a cold or warm vaporizer or humidifier to ease your breathing  · Rest for at least 2 days and then as needed to decrease tiredness and weakness  · Use petroleum based jelly around your nostrils to decrease irritation from blowing your nose  Drink liquids:  Liquids will help thin and loosen thick mucus so you can cough it up  Liquids will also keep you hydrated  Ask your healthcare provider which liquids are best for you and how much to drink each day  Prevent the spread of germs: You can spread your cold germs to others for at least 3 days after your symptoms start  Wash your hands often  Do not share items, such as eating utensils  Cover your nose and mouth when you cough or sneeze using the crook of your elbow instead of your hands  Throw used tissues in the garbage  Do not smoke:  Smoking may worsen your symptoms and increase the length of time you feel sick  Talk with your healthcare provider if you need help to stop smoking  Follow up with your healthcare provider as directed:  Write down your questions so you remember to ask them during your visits  © 2017 [employer] Information is for End User's use only and may not be sold, redistributed or otherwise used for commercial purposes  All illustrations and images included in CareNotes® are the copyrighted property of A D [employer] M [employer] Inc  or [employer]  The above information is an  only  It is not intended as medical advice for individual conditions or treatments   Talk to your doctor, nurse or pharmacist before following any medical regimen to see if it is safe and effective for you

## 2020-10-09 ENCOUNTER — HOSPITAL ENCOUNTER (OUTPATIENT)
Dept: RADIOLOGY | Facility: HOSPITAL | Age: 30
Discharge: HOME | End: 2020-10-09
Attending: FAMILY MEDICINE
Payer: COMMERCIAL

## 2020-10-09 ENCOUNTER — TRANSCRIBE ORDERS (OUTPATIENT)
Dept: RADIOLOGY | Facility: HOSPITAL | Age: 30
End: 2020-10-09

## 2020-10-09 ENCOUNTER — HOSPITAL ENCOUNTER (OUTPATIENT)
Dept: PULMONOLOGY | Facility: HOSPITAL | Age: 30
Discharge: HOME | End: 2020-10-09
Payer: COMMERCIAL

## 2020-10-09 DIAGNOSIS — J45.20 MILD INTERMITTENT ASTHMA WITHOUT COMPLICATION: ICD-10-CM

## 2020-10-09 DIAGNOSIS — R06.02 SOB (SHORTNESS OF BREATH): ICD-10-CM

## 2020-10-09 DIAGNOSIS — J45.21 MILD INTERMITTENT ASTHMA WITH ACUTE EXACERBATION: ICD-10-CM

## 2020-10-09 PROCEDURE — 94010 BREATHING CAPACITY TEST: CPT | Performed by: INTERNAL MEDICINE

## 2020-10-09 PROCEDURE — 94729 DIFFUSING CAPACITY: CPT

## 2020-10-09 PROCEDURE — 71046 X-RAY EXAM CHEST 2 VIEWS: CPT

## 2020-10-09 PROCEDURE — 94726 PLETHYSMOGRAPHY LUNG VOLUMES: CPT | Performed by: INTERNAL MEDICINE

## 2020-10-09 PROCEDURE — 94010 BREATHING CAPACITY TEST: CPT

## 2020-10-09 PROCEDURE — 94729 DIFFUSING CAPACITY: CPT | Performed by: INTERNAL MEDICINE

## 2020-10-09 PROCEDURE — 94760 N-INVAS EAR/PLS OXIMETRY 1: CPT

## 2020-10-09 PROCEDURE — 94726 PLETHYSMOGRAPHY LUNG VOLUMES: CPT

## 2020-10-21 DIAGNOSIS — M54.16 LEFT LUMBAR RADICULOPATHY: ICD-10-CM

## 2020-10-23 RX ORDER — CYCLOBENZAPRINE HCL 5 MG
5 TABLET ORAL 3 TIMES DAILY PRN
Qty: 60 TABLET | Refills: 0 | OUTPATIENT
Start: 2020-10-23 | End: 2020-11-12

## 2020-11-23 ENCOUNTER — TELEPHONE (OUTPATIENT)
Dept: PULMONOLOGY | Facility: CLINIC | Age: 30
End: 2020-11-23

## 2020-12-11 ENCOUNTER — CONSULT (OUTPATIENT)
Dept: PULMONOLOGY | Facility: CLINIC | Age: 30
End: 2020-12-11
Payer: COMMERCIAL

## 2020-12-11 VITALS
SYSTOLIC BLOOD PRESSURE: 114 MMHG | BODY MASS INDEX: 23.43 KG/M2 | HEART RATE: 84 BPM | DIASTOLIC BLOOD PRESSURE: 74 MMHG | HEIGHT: 64 IN | WEIGHT: 137.25 LBS | TEMPERATURE: 98.6 F | OXYGEN SATURATION: 99 %

## 2020-12-11 DIAGNOSIS — Z23 INFLUENZA VACCINE NEEDED: ICD-10-CM

## 2020-12-11 DIAGNOSIS — J30.89 NON-SEASONAL ALLERGIC RHINITIS DUE TO FUNGAL SPORES: ICD-10-CM

## 2020-12-11 DIAGNOSIS — J45.40 MODERATE PERSISTENT ASTHMA WITHOUT COMPLICATION: Primary | ICD-10-CM

## 2020-12-11 PROBLEM — R06.02 SOB (SHORTNESS OF BREATH): Status: RESOLVED | Noted: 2020-09-14 | Resolved: 2020-12-11

## 2020-12-11 PROCEDURE — 99244 OFF/OP CNSLTJ NEW/EST MOD 40: CPT | Performed by: INTERNAL MEDICINE

## 2020-12-11 PROCEDURE — 1036F TOBACCO NON-USER: CPT | Performed by: INTERNAL MEDICINE

## 2020-12-11 PROCEDURE — 90686 IIV4 VACC NO PRSV 0.5 ML IM: CPT

## 2020-12-11 PROCEDURE — 90471 IMMUNIZATION ADMIN: CPT

## 2020-12-11 PROCEDURE — 3008F BODY MASS INDEX DOCD: CPT | Performed by: INTERNAL MEDICINE

## 2020-12-11 RX ORDER — FLUTICASONE FUROATE AND VILANTEROL 200; 25 UG/1; UG/1
1 POWDER RESPIRATORY (INHALATION) DAILY
Qty: 1 INHALER | Refills: 1 | Status: SHIPPED | OUTPATIENT
Start: 2020-12-11 | End: 2020-12-14

## 2020-12-11 RX ORDER — ALBUTEROL SULFATE 90 UG/1
AEROSOL, METERED RESPIRATORY (INHALATION)
COMMUNITY
Start: 2020-11-22 | End: 2021-11-10 | Stop reason: SDUPTHER

## 2020-12-12 ENCOUNTER — TELEPHONE (OUTPATIENT)
Dept: OTHER | Facility: OTHER | Age: 30
End: 2020-12-12

## 2020-12-12 DIAGNOSIS — J45.40 MODERATE PERSISTENT ASTHMA WITHOUT COMPLICATION: Primary | ICD-10-CM

## 2020-12-14 ENCOUNTER — TELEPHONE (OUTPATIENT)
Dept: PULMONOLOGY | Facility: CLINIC | Age: 30
End: 2020-12-14

## 2020-12-14 DIAGNOSIS — J45.40 MODERATE PERSISTENT ASTHMA WITHOUT COMPLICATION: ICD-10-CM

## 2020-12-16 DIAGNOSIS — R09.81 NASAL CONGESTION: ICD-10-CM

## 2020-12-16 RX ORDER — MONTELUKAST SODIUM 10 MG/1
10 TABLET ORAL
Qty: 30 TABLET | Refills: 1 | Status: SHIPPED | OUTPATIENT
Start: 2020-12-16 | End: 2021-07-14 | Stop reason: SDUPTHER

## 2021-01-16 DIAGNOSIS — M79.605 PAIN OF LEFT LOWER EXTREMITY: ICD-10-CM

## 2021-01-16 DIAGNOSIS — M54.16 LEFT LUMBAR RADICULOPATHY: ICD-10-CM

## 2021-01-17 ENCOUNTER — NURSE TRIAGE (OUTPATIENT)
Dept: OTHER | Facility: OTHER | Age: 31
End: 2021-01-17

## 2021-01-17 NOTE — TELEPHONE ENCOUNTER
Regarding: Sciatic Nerve Inflammation  ----- Message from Maura Palmer sent at 1/17/2021  8:36 AM EST -----  "My sciatica is inflamed, and I feel like it's getting worse  I have medications that Dr Esteban Dakin prescribed, but I only have 2 tablets left and I'm afraid the medication may interfere w/ my asthma medications   So, I don't know what to take for the pain "

## 2021-01-17 NOTE — TELEPHONE ENCOUNTER
TC to on-call provider, "Pt calling in with recurrent Sciatica pain in R hip radiating down leg to toes  Pt is taking Flexeril 5mg with minimal relief and has 2 pills remaining and is out the the Naproxen 500mg which were prescribed by Dr Bryson for the Sciatica pain during its previous occurence  She also took 1000mg of Tylenol and 600mg of Ibuprofen without relief  She also tried excercises previously taught by PT  Patient is asking what she can safely take because she is in severe pain  She also noted her Pulmonolgist stated that due to her asthma and medications she should not be taking Ibuprofen but she states shes "desperate for relief the pain is so severe and I have 3 small children I need to be able to move" How should I advise?"     Per on-call provider, okay to take 2 Aleve every 12 hours for pain  Pt will follow up with Dr Bryson tomorrow  Reason for Disposition   [1] SEVERE pain (e g , excruciating, unable to do any normal activities) AND [2] not improved after 2 hours of pain medicine    Answer Assessment - Initial Assessment Questions  1  LOCATION and RADIATION: "Where is the pain located?"       Hip radiating down to leg  2  QUALITY: "What does the pain feel like?"  (e g , sharp, dull, aching, burning)      Sharp  3  SEVERITY: "How bad is the pain?" "What does it keep you from doing?"   (Scale 1-10; or mild, moderate, severe)    -  MILD (1-3): doesn't interfere with normal activities     -  MODERATE (4-7): interferes with normal activities (e g , work or school) or awakens from sleep, limping     -  SEVERE (8-10): excruciating pain, unable to do any normal activities, unable to walk      Severe  4  ONSET: "When did the pain start?" "Does it come and go, or is it there all the time?"      2 days ago  5  WORK OR EXERCISE: "Has there been any recent work or exercise that involved this part of the body?"       Denies  6  CAUSE: "What do you think is causing the hip pain?"       Sciatica pain  7  AGGRAVATING FACTORS: "What makes the hip pain worse?" (e g , walking, climbing stairs, running)      Any moving/walking  8   OTHER SYMPTOMS: "Do you have any other symptoms?" (e g , back pain, pain shooting down leg,  fever, rash)      Pain shooting down leg    Protocols used: HIP PAIN-ADULT-AH

## 2021-01-18 ENCOUNTER — TELEMEDICINE (OUTPATIENT)
Dept: FAMILY MEDICINE CLINIC | Facility: CLINIC | Age: 31
End: 2021-01-18
Payer: COMMERCIAL

## 2021-01-18 VITALS — BODY MASS INDEX: 23.39 KG/M2 | WEIGHT: 137 LBS | HEIGHT: 64 IN

## 2021-01-18 DIAGNOSIS — M79.10 MYALGIA: ICD-10-CM

## 2021-01-18 DIAGNOSIS — M54.31 SCIATICA OF RIGHT SIDE: Primary | ICD-10-CM

## 2021-01-18 PROCEDURE — 99213 OFFICE O/P EST LOW 20 MIN: CPT | Performed by: FAMILY MEDICINE

## 2021-01-18 PROCEDURE — 3008F BODY MASS INDEX DOCD: CPT | Performed by: FAMILY MEDICINE

## 2021-01-18 RX ORDER — PREDNISONE 20 MG/1
20 TABLET ORAL DAILY
Qty: 5 TABLET | Refills: 0 | Status: SHIPPED | OUTPATIENT
Start: 2021-01-18 | End: 2021-01-23

## 2021-01-18 RX ORDER — NAPROXEN 500 MG/1
500 TABLET ORAL 2 TIMES DAILY WITH MEALS
Qty: 60 TABLET | Refills: 0 | Status: SHIPPED | OUTPATIENT
Start: 2021-01-18 | End: 2021-09-17

## 2021-01-18 RX ORDER — CYCLOBENZAPRINE HCL 5 MG
5 TABLET ORAL 3 TIMES DAILY PRN
Qty: 60 TABLET | Refills: 0 | Status: SHIPPED | OUTPATIENT
Start: 2021-01-18 | End: 2021-09-17 | Stop reason: SDUPTHER

## 2021-01-18 NOTE — PROGRESS NOTES
Virtual Regular Visit      Assessment/Plan:1   Sciatica  Rt  Side -   Discussed  Precautions  And  Care      Started  naproxen    Flexeril  And   Prednisone   Return  Parameters   Discussed   Will  Follow up  In  1  Week    2  Low back pain  Myalgia -   Discussed   Cont   PT  Exercise    Will follow up    Problem List Items Addressed This Visit        Nervous and Auditory    Sciatica of right side - Primary       Other    Myalgia               Reason for visit is   Chief Complaint   Patient presents with    Sciatica    Medication Refill     Cyclobenzaprine    Virtual Regular Visit        Encounter provider Kiley Puentes MD    Provider located at [address] [place] [id number]  [phone]      Recent Visits  No visits were found meeting these conditions  Showing recent visits within past 7 days and meeting all other requirements     Today's Visits  Date Type Provider Dept   01/18/21 Telemedicine Kiley Puentes MD  [facility] [place]   Showing today's visits and meeting all other requirements     Future Appointments  No visits were found meeting these conditions  Showing future appointments within next 150 days and meeting all other requirements        The patient was identified by name and date of birth  Jerome Contreras was informed that this is a telemedicine visit and that the visit is being conducted through [address] [place] [address] and patient was informed that this is not a secure, HIPAA-compliant platform  She agrees to proceed     My office door was closed  No one else was in the room  She acknowledged consent and understanding of privacy and security of the video platform  The patient has agreed to participate and understands they can discontinue the visit at any time  Patient is aware this is a billable service       Subjective  Katherine Albarran Chacha iBggs is a 26 y o  female I had  The  Video  Visit  With  The  Patient today  For  Her  Acute  Complaint of  sciatica   HPI she  States  That  She  Is  Been  Getting  Sciatica  Of  Rt side  Pain  Starting  From  Rt hip  To  Her  Knee  As  Before  She  Is  practicing  PT  Exercise as  taught to  Her   During  Her  PT  Last time   She  Las  Muscle  Pain in low  Back  No  Bladder or  Bowel  symptoms      Past Medical History:   Diagnosis Date    Abnormal Pap smear of cervix     had cryosurgery 2015    Anxiety     Carrier of group B Streptococcus     GERD (gastroesophageal reflux disease)     High blood pressure     during pregnancy     History of UTI     Hx of absence seizures     once(per sanjiv)    Hx of seasonal allergies     Migraine headache     Muscle spasm of back     Papanicolaou smear for cervical cancer screening 06/2017    Pap neg 5/2016, pap- neg/GC/CT NEG    Postpartum depression     Preeclampsia     Psychiatric disorder     Umbilical hernia 16/9956    Varicose veins of lower extremity        Past Surgical History:   Procedure Laterality Date    HERNIA REPAIR      2 years ago    OTHER SURGICAL HISTORY  11/18/2015    cryosurgery     STERILIZATION      WISDOM TOOTH EXTRACTION         Current Outpatient Medications   Medication Sig Dispense Refill    acetaminophen (TYLENOL) 325 mg tablet Take 2 tablets (650 mg total) by mouth every 6 (six) hours (Patient taking differently: Take 650 mg by mouth every 6 (six) hours As needed ) 30 tablet 0    albuterol (PROVENTIL HFA,VENTOLIN HFA) 90 mcg/act inhaler       fluticasone (FLONASE) 50 mcg/act nasal spray 1 spray into each nostril daily for 10 days 18 mL 1    fluticasone-salmeterol (Wixela Inhub) 250-50 mcg/dose inhaler Inhale 1 puff 2 (two) times a day Rinse mouth after use   Please supplement if needed to Advair or Symbicort 1 Inhaler 1    montelukast (SINGULAIR) 10 mg tablet Take 1 tablet (10 mg total) by mouth daily at bedtime 30 tablet 1    cyclobenzaprine (FLEXERIL) 5 mg tablet Take 1 tablet (5 mg total) by mouth 3 (three) times a day as needed for muscle spasms for up to 20 days 60 tablet 0    ibuprofen (MOTRIN) 600 mg tablet Take 1 tablet (600 mg total) by mouth every 6 (six) hours as needed (cramping) (Patient not taking: Reported on 1/18/2021) 30 tablet 0    LORATADINE ALLERGY RELIEF PO Take 1 tablet by mouth      naproxen (NAPROSYN) 500 mg tablet Take 1 tablet (500 mg total) by mouth 2 (two) times a day with meals 60 tablet 0    Nerve Stimulator (STANDARD TENS) OMER by Does not apply route 4 (four) times a day (Patient not taking: Reported on 6/10/2020) 1 Device 0     No current facility-administered medications for this visit  Allergies   Allergen Reactions    Phenazopyridine Anaphylaxis and Hives    Other      Dove Deodorant      Lavender Oil Hives       Review of Systems   Constitutional: Negative for appetite change, fatigue and fever  HENT: Negative for congestion and sore throat  Eyes: Negative for pain, discharge, redness and itching  Cardiovascular: Negative for chest pain, palpitations and leg swelling  Gastrointestinal: Negative for abdominal distention and abdominal pain  Endocrine: Negative for cold intolerance, heat intolerance and polydipsia  Genitourinary: Negative for dysuria and pelvic pain  Musculoskeletal: Positive for back pain  Negative for arthralgias  Positive  Sciatica    Skin: Negative for rash  Neurological: Negative for numbness and headaches  Psychiatric/Behavioral: The patient is not nervous/anxious  Video Exam    Vitals:    01/18/21 1813   Weight: 62 1 kg (137 lb)   Height: 5' 4" (1 626 m)       Physical Exam  Constitutional:       General: She is not in acute distress  Appearance: Normal appearance  She is not ill-appearing, toxic-appearing or diaphoretic  HENT:      Head: Normocephalic  Nose: Nose normal  No congestion  Neck:      Musculoskeletal: Normal range of motion     Pulmonary:      Effort: Pulmonary effort is normal    Musculoskeletal: Normal range of motion  General: No swelling  Right lower leg: No edema  Left lower leg: No edema  Skin:     Findings: No erythema or rash  Neurological:      General: No focal deficit present  Mental Status: She is alert and oriented to person, place, and time  Psychiatric:         Mood and Affect: Mood normal          Behavior: Behavior normal           I spent 25 minutes directly with the patient during this visit      [address] [place] [address] acknowledges that she has consented to an online visit or consultation  She understands that the online visit is based solely on information provided by her, and that, in the absence of a face-to-face physical evaluation by the physician, the diagnosis she receives is both limited and provisional in terms of accuracy and completeness  This is not intended to replace a full medical face-to-face evaluation by the physician  Ronni Dandy understands and accepts these terms

## 2021-01-27 ENCOUNTER — TELEMEDICINE (OUTPATIENT)
Dept: FAMILY MEDICINE CLINIC | Facility: CLINIC | Age: 31
End: 2021-01-27
Payer: COMMERCIAL

## 2021-01-27 DIAGNOSIS — M54.16 LEFT LUMBAR RADICULOPATHY: Primary | ICD-10-CM

## 2021-01-27 DIAGNOSIS — J45.40 MODERATE PERSISTENT ASTHMA WITHOUT COMPLICATION: ICD-10-CM

## 2021-01-27 DIAGNOSIS — M54.31 SCIATICA OF RIGHT SIDE: ICD-10-CM

## 2021-01-27 DIAGNOSIS — J30.89 NON-SEASONAL ALLERGIC RHINITIS DUE TO FUNGAL SPORES: ICD-10-CM

## 2021-01-27 PROCEDURE — 1036F TOBACCO NON-USER: CPT | Performed by: FAMILY MEDICINE

## 2021-01-27 PROCEDURE — 99214 OFFICE O/P EST MOD 30 MIN: CPT | Performed by: FAMILY MEDICINE

## 2021-01-27 NOTE — PROGRESS NOTES
Virtual Regular Visit      Assessment/Plan:1  Sciatica  And low  Back pain-  Much  Stable  Now  She  Says   She  Feels   So  Much  Better now     No  Bladder  Symptoms   discussed  Precautions  And  Care     2  Asthma -  Doing  Much  Better  Asked  Her to  Discuss  About  The steroid  Taper  With  Her  Pulmonologist   She  has  Appointment  In  Couple of  Weeks     Discussed   About med   Compliance  3  Allergic  Rhinitis -stable on  Loratadine  4   Fatigue -  Much  Improved    Discussed  Life  Style  modifications         Problem List Items Addressed This Visit        Respiratory    Moderate persistent asthma without complication    Non-seasonal allergic rhinitis due to fungal spores       Nervous and Auditory    Left lumbar radiculopathy - Primary    Sciatica of right side               Reason for visit is   Chief Complaint   Patient presents with    Virtual Regular Visit        Encounter provider Dario Tapia MD    Provider located at [address] [place] [address] [address] [facility] [address] [phone]      Recent Visits  No visits were found meeting these conditions  Showing recent visits within past 7 days and meeting all other requirements     Future Appointments  No visits were found meeting these conditions  Showing future appointments within next 150 days and meeting all other requirements        The patient was identified by name and date of birth  Meri Borges was informed that this is a telemedicine visit and that the visit is being conducted through [employer] and patient was informed that this is not a secure, HIPAA-compliant platform  She agrees to proceed     My office door was closed  No one else was in the room  She acknowledged consent and understanding of privacy and security of the video platform  The patient has agreed to participate and understands they can discontinue the visit at any time      Patient is aware this is a billable service  Subjective  Romeo Hairston is a 26 y o  female I  HAD THE  VIDEO  VISIT  WITH  THE  PATIENT  Willem Phoenixher       HPI -  Patient  Had  The visit  With  Me  Today  To   Follow up  On  Her  Sciatica  And  Low  Back pains she  States  That her  Sciatica  Is  Much  Better  Now     she  States  That  Her  asthma  Is  Much  Stable  And  Is  Stable  On  Steroids   Now    proscribed  By  Her  pulmonologist     Her seasonal  Allergies   Much  Stable      Past Medical History:   Diagnosis Date    Abnormal Pap smear of cervix     had cryosurgery 2015    Anxiety     Carrier of group B Streptococcus     GERD (gastroesophageal reflux disease)     High blood pressure     during pregnancy     History of UTI     Hx of absence seizures     once(per sanjiv)    Hx of seasonal allergies     Migraine headache     Muscle spasm of back     Papanicolaou smear for cervical cancer screening 06/2017    Pap neg 5/2016, pap- neg/GC/CT NEG    Postpartum depression     Preeclampsia     Psychiatric disorder     Umbilical hernia 99/7333    Varicose veins of lower extremity        Past Surgical History:   Procedure Laterality Date    HERNIA REPAIR      2 years ago    OTHER SURGICAL HISTORY  11/18/2015    cryosurgery     STERILIZATION      WISDOM TOOTH EXTRACTION         Current Outpatient Medications   Medication Sig Dispense Refill    acetaminophen (TYLENOL) 325 mg tablet Take 2 tablets (650 mg total) by mouth every 6 (six) hours (Patient taking differently: Take 650 mg by mouth every 6 (six) hours As needed ) 30 tablet 0    albuterol (PROVENTIL HFA,VENTOLIN HFA) 90 mcg/act inhaler       cyclobenzaprine (FLEXERIL) 5 mg tablet Take 1 tablet (5 mg total) by mouth 3 (three) times a day as needed for muscle spasms for up to 20 days 60 tablet 0    fluticasone (FLONASE) 50 mcg/act nasal spray 1 spray into each nostril daily for 10 days 18 mL 1    fluticasone-salmeterol (Wixela Inhub) 250-50 mcg/dose inhaler Inhale 1 puff 2 (two) times a day Rinse mouth after use  Please supplement if needed to Advair or Symbicort 1 Inhaler 1    ibuprofen (MOTRIN) 600 mg tablet Take 1 tablet (600 mg total) by mouth every 6 (six) hours as needed (cramping) (Patient not taking: Reported on 1/18/2021) 30 tablet 0    LORATADINE ALLERGY RELIEF PO Take 1 tablet by mouth      montelukast (SINGULAIR) 10 mg tablet Take 1 tablet (10 mg total) by mouth daily at bedtime 30 tablet 1    naproxen (NAPROSYN) 500 mg tablet Take 1 tablet (500 mg total) by mouth 2 (two) times a day with meals 60 tablet 0    Nerve Stimulator (STANDARD TENS) OMER by Does not apply route 4 (four) times a day (Patient not taking: Reported on 6/10/2020) 1 Device 0     No current facility-administered medications for this visit  Allergies   Allergen Reactions    Phenazopyridine Anaphylaxis and Hives    Other      Dove Deodorant      Lavender Oil Hives       Review of Systems   Constitutional: Positive for fatigue  Negative for chills  HENT: Negative for congestion and sore throat  Eyes: Negative for pain, discharge, redness and itching  Respiratory: Negative for cough, chest tightness, shortness of breath and wheezing  Positive  Asthma  And  seasonal  Allergies    Cardiovascular: Negative for chest pain, palpitations and leg swelling  Gastrointestinal: Negative for abdominal distention, constipation and diarrhea  Endocrine: Negative for heat intolerance, polydipsia and polyphagia  Genitourinary: Negative for dysuria, flank pain and frequency  Musculoskeletal: Negative for arthralgias, back pain and myalgias  Allergic/Immunologic: Positive for environmental allergies  Neurological: Negative for numbness and headaches  Psychiatric/Behavioral: The patient is not nervous/anxious  Video Exam    There were no vitals filed for this visit  Physical Exam  Constitutional:       Appearance: Normal appearance     HENT: Head: Normocephalic and atraumatic  Nose: Nose normal  No congestion or rhinorrhea  Neck:      Musculoskeletal: Normal range of motion  Pulmonary:      Effort: Pulmonary effort is normal    Musculoskeletal: Normal range of motion  Skin:     Findings: No erythema or rash  Neurological:      General: No focal deficit present  Mental Status: She is alert and oriented to person, place, and time  Psychiatric:         Mood and Affect: Mood normal          Behavior: Behavior normal           I spent 30 minutes directly with the patient during this visit      [address] [facility] Sarah Davila acknowledges that she has consented to an online visit or consultation  She understands that the online visit is based solely on information provided by her, and that, in the absence of a face-to-face physical evaluation by the physician, the diagnosis she receives is both limited and provisional in terms of accuracy and completeness  This is not intended to replace a full medical face-to-face evaluation by the physician  Demetris Truong understands and accepts these terms

## 2021-02-19 ENCOUNTER — TELEMEDICINE (OUTPATIENT)
Dept: PULMONOLOGY | Facility: CLINIC | Age: 31
End: 2021-02-19
Payer: COMMERCIAL

## 2021-02-19 DIAGNOSIS — J30.89 NON-SEASONAL ALLERGIC RHINITIS DUE TO FUNGAL SPORES: ICD-10-CM

## 2021-02-19 DIAGNOSIS — J45.40 MODERATE PERSISTENT ASTHMA WITHOUT COMPLICATION: Primary | ICD-10-CM

## 2021-02-19 PROCEDURE — 99214 OFFICE O/P EST MOD 30 MIN: CPT | Performed by: INTERNAL MEDICINE

## 2021-02-19 NOTE — PROGRESS NOTES
Breathing the theAssessment/Plan: Moderate persistent asthma without complication  Mrs Arsh Hidalgo has moderate persistent asthma likely worsened by her COVID infection  Her recent PFT was unremarkable except for borderline FEV1  The flow volume loop showed a curvature to the expiratory loop  Her chest x-ray showed hyperinflation but otherwise unremarkable  She did not have any eosinophilia on her previous CBC  Currently she has recurrence of shortness of breath on exertion and wheezing after she discontinued Wixella  I have advised her to restart Jacob Binning and I have sent a prescription  I have advised her to continue to use albuterol as needed  I had a long discussion with her and answered all her questions        Non-seasonal allergic rhinitis due to fungal spores  He has history of allergic rhinitis  He has been on treatment with Singulair  She takes Flonase and loratadine as needed  Currently her symptoms are not bothersome   Diagnoses and all orders for this visit:    Moderate persistent asthma without complication  -     fluticasone-salmeterol (Wixela Inhub) 250-50 mcg/dose inhaler; Inhale 1 puff 2 (two) times a day Rinse mouth after use  Please supplement if needed to Advair or Symbicort    Non-seasonal allergic rhinitis due to fungal spores          Subjective:      Patient ID: Judson Camarillo is a 26 y o  female  Ms Porsha Ramirez has moderate persistent bronchial asthma and has been on treatment with Wixella 250  She also has Haldol if needed which she is restless  She ran out of her Tesaris and is currently having symptoms  He has shortness of breath on exertion and her exercise tolerance is a flight of stairs  She has cough occasionally which is not significant  She has occasional wheezing  She has noted occasional chest pain  Denies any swelling of feet  No headache or dizziness  She has no hoarseness of voice or dysphagia    She has history of allergies and uses Flonase and loratadine as needed  She has got family history of asthma in her siblings as well as her mother  She denies any abdominal pain nausea or vomiting  She has chronic back pain and has and nerve stimulator  I conducted a telehealth video visit for follow-up of for asthma and allergic rhinitis  The patient was at her home  The visit was conducted from my office  Asthma  She complains of shortness of breath  There is no cough or wheezing  Associated symptoms include chest pain  Pertinent negatives include no fever, headaches, postnasal drip, rhinorrhea, sneezing, sore throat or trouble swallowing  Her past medical history is significant for asthma  The following portions of the patient's history were reviewed and updated as appropriate: allergies, current medications, past family history, past medical history, past social history, past surgical history and problem list     Review of Systems   Constitutional: Negative for chills and fever  HENT: Positive for nosebleeds  Negative for hearing loss, postnasal drip, rhinorrhea, sneezing, sore throat, trouble swallowing and voice change  Eyes: Negative for visual disturbance  Respiratory: Positive for shortness of breath  Negative for cough, chest tightness and wheezing  Cardiovascular: Positive for chest pain  Gastrointestinal: Negative for abdominal pain, constipation, diarrhea, nausea and vomiting  Endocrine: Negative for polyuria  Genitourinary: Negative for dysuria, frequency and urgency  Musculoskeletal: Positive for back pain  Negative for arthralgias and joint swelling  Skin: Positive for rash (eczema)  Allergic/Immunologic: Positive for environmental allergies  Neurological: Negative for dizziness, light-headedness and headaches  Psychiatric/Behavioral: Negative for sleep disturbance  The patient is not nervous/anxious  Objective: There were no vitals taken for this visit           Physical Exam  Vitals reviewed: Video visit  Limited clinical examination  Constitutional:       General: She is not in acute distress  Appearance: Normal appearance  She is not ill-appearing, toxic-appearing or diaphoretic  HENT:      Head: Normocephalic  Pulmonary:      Effort: No respiratory distress  Skin:     Coloration: Skin is not pale  Neurological:      Mental Status: She is alert and oriented to person, place, and time  Psychiatric:         Mood and Affect: Mood normal          Behavior: Behavior normal          Thought Content:  Thought content normal          Judgment: Judgment normal

## 2021-02-19 NOTE — ASSESSMENT & PLAN NOTE
Mrs Raz Calvo has moderate persistent asthma likely worsened by her COVID infection  Her recent PFT was unremarkable except for borderline FEV1  The flow volume loop showed a curvature to the expiratory loop  Her chest x-ray showed hyperinflation but otherwise unremarkable  She did not have any eosinophilia on her previous CBC  Currently she has recurrence of shortness of breath on exertion and wheezing after she discontinued Wixella  I have advised her to restart Janna Owen and I have sent a prescription  I have advised her to continue to use albuterol as needed     I had a long discussion with her and answered all her questions

## 2021-02-19 NOTE — ASSESSMENT & PLAN NOTE
He has history of allergic rhinitis  He has been on treatment with Singulair  She takes Flonase and loratadine as needed  Currently her symptoms are not bothersome

## 2021-02-19 NOTE — LETTER
February 24, 2021     Ariadna Butler MD  [address]  [facility] [address]    Patient: Sharyn Jhaveri   YOB: 1990   Date of Visit: 2/19/2021       Dear Dr Katie Rosas: Thank you for referring Sharyn Jhaveri to me for evaluation  Below are my notes for this consultation  If you have questions, please do not hesitate to call me  I look forward to following your patient along with you  Sincerely,        Omer Paz MD        CC: No Recipients  Omer Paz MD  2/23/2021  1:50 PM  Signed  Breathing the theAssessment/Plan: Moderate persistent asthma without complication  Mrs Florentino Mustafa has moderate persistent asthma likely worsened by her COVID infection  Her recent PFT was unremarkable except for borderline FEV1  The flow volume loop showed a curvature to the expiratory loop  Her chest x-ray showed hyperinflation but otherwise unremarkable  She did not have any eosinophilia on her previous CBC  Currently she has recurrence of shortness of breath on exertion and wheezing after she discontinued Wixella  I have advised her to restart Latasha Camacho and I have sent a prescription  I have advised her to continue to use albuterol as needed  I had a long discussion with her and answered all her questions        Non-seasonal allergic rhinitis due to fungal spores  He has history of allergic rhinitis  He has been on treatment with Singulair  She takes Flonase and loratadine as needed  Currently her symptoms are not bothersome   Diagnoses and all orders for this visit:    Moderate persistent asthma without complication  -     fluticasone-salmeterol (Wixela Inhub) 250-50 mcg/dose inhaler; Inhale 1 puff 2 (two) times a day Rinse mouth after use  Please supplement if needed to Advair or Symbicort    Non-seasonal allergic rhinitis due to fungal spores          Subjective:      Patient ID: Sharyn Jhaveri is a 26 y o  female      Ms Story Betina Johnson has moderate persistent bronchial asthma and has been on treatment with Wixella 250  She also has Haldol if needed which she is restless  She ran out of her [employer] and is currently having symptoms  He has shortness of breath on exertion and her exercise tolerance is a flight of stairs  She has cough occasionally which is not significant  She has occasional wheezing  She has noted occasional chest pain  Denies any swelling of feet  No headache or dizziness  She has no hoarseness of voice or dysphagia  She has history of allergies and uses Flonase and loratadine as needed  She has got family history of asthma in her siblings as well as her mother  She denies any abdominal pain nausea or vomiting  She has chronic back pain and has and nerve stimulator  Asthma  She complains of shortness of breath  There is no cough or wheezing  Associated symptoms include chest pain  Pertinent negatives include no fever, headaches, postnasal drip, rhinorrhea, sneezing, sore throat or trouble swallowing  Her past medical history is significant for asthma  The following portions of the patient's history were reviewed and updated as appropriate: allergies, current medications, past family history, past medical history, past social history, past surgical history and problem list     Review of Systems   Constitutional: Negative for chills and fever  HENT: Positive for nosebleeds  Negative for hearing loss, postnasal drip, rhinorrhea, sneezing, sore throat, trouble swallowing and voice change  Eyes: Negative for visual disturbance  Respiratory: Positive for shortness of breath  Negative for cough, chest tightness and wheezing  Cardiovascular: Positive for chest pain  Gastrointestinal: Negative for abdominal pain, constipation, diarrhea, nausea and vomiting  Endocrine: Negative for polyuria  Genitourinary: Negative for dysuria, frequency and urgency  Musculoskeletal: Positive for back pain  Negative for arthralgias and joint swelling  Skin: Positive for rash (eczema)  Allergic/Immunologic: Positive for environmental allergies  Neurological: Negative for dizziness, light-headedness and headaches  Psychiatric/Behavioral: Negative for sleep disturbance  The patient is not nervous/anxious  Objective: There were no vitals taken for this visit  Physical Exam  Vitals reviewed: Video visit  Limited clinical examination  Constitutional:       General: She is not in acute distress  Appearance: Normal appearance  She is not ill-appearing, toxic-appearing or diaphoretic  HENT:      Head: Normocephalic  Pulmonary:      Effort: No respiratory distress  Skin:     Coloration: Skin is not pale  Neurological:      Mental Status: She is alert and oriented to person, place, and time  Psychiatric:         Mood and Affect: Mood normal          Behavior: Behavior normal          Thought Content:  Thought content normal          Judgment: Judgment normal        no has is smoker of often do get the chest pain

## 2021-02-24 NOTE — PROGRESS NOTES
Virtual Regular Visit      Assessment/Plan:    Problem List Items Addressed This Visit        Respiratory    Moderate persistent asthma without complication - Primary     Mrs Jenna Sanches has moderate persistent asthma likely worsened by her COVID infection  Her recent PFT was unremarkable except for borderline FEV1  The flow volume loop showed a curvature to the expiratory loop  Her chest x-ray showed hyperinflation but otherwise unremarkable  She did not have any eosinophilia on her previous CBC  Currently she has recurrence of shortness of breath on exertion and wheezing after she discontinued Wixella  I have advised her to restart Dallie Cheikh and I have sent a prescription  I have advised her to continue to use albuterol as needed  I had a long discussion with her and answered all her questions             Relevant Medications    fluticasone-salmeterol (Nilay Ran) 250-50 mcg/dose inhaler    Non-seasonal allergic rhinitis due to fungal spores     He has history of allergic rhinitis  He has been on treatment with Singulair  She takes Flonase and loratadine as needed  Currently her symptoms are not bothersome   Reason for visit is   Chief Complaint   Patient presents with    Follow-up    Asthma     still gets chest pain and shortness of breath    Virtual Regular Visit    Virtual Regular Visit     asthma        Encounter provider Zeke Pemberton MD    Provider located at [address] [place] [id number] [phone]      Recent Visits  Date Type Provider Dept   02/19/21 [facility] MD [facility] Brayan Chino recent visits within past 7 days and meeting all other requirements     Future Appointments  No visits were found meeting these conditions     Showing future appointments within next 150 days and meeting all other requirements The patient was identified by name and date of birth  Tony Reyes was informed that this is a telemedicine visit and that the visit is being conducted through   She acknowledged consent and understanding of privacy and security of the video platform  The patient has agreed to participate and understands they can discontinue the visit at any time  Patient is aware this is a billable service  Subjective  Tyrone Prakash is a 26 y o  female          HPI     Past Medical History:   Diagnosis Date    Abnormal Pap smear of cervix     had cryosurgery 2015    Anxiety     Carrier of group B Streptococcus     GERD (gastroesophageal reflux disease)     High blood pressure     during pregnancy     History of UTI     Hx of absence seizures     once(per sanjiv)    Hx of seasonal allergies     Migraine headache     Muscle spasm of back     Papanicolaou smear for cervical cancer screening 06/2017    Pap neg 5/2016, pap- neg/GC/CT NEG    Postpartum depression     Preeclampsia     Psychiatric disorder     Umbilical hernia [id number]    Varicose veins of lower extremity        Past Surgical History:   Procedure Laterality Date    HERNIA REPAIR      2 years ago    OTHER SURGICAL HISTORY  11/18/2015    cryosurgery     STERILIZATION      WISDOM TOOTH EXTRACTION         Current Outpatient Medications   Medication Sig Dispense Refill    acetaminophen (TYLENOL) 325 mg tablet Take 2 tablets (650 mg total) by mouth every 6 (six) hours (Patient taking differently: Take 650 mg by mouth every 6 (six) hours As needed ) 30 tablet 0    albuterol (PROVENTIL HFA,VENTOLIN HFA) 90 mcg/act inhaler       ibuprofen (MOTRIN) 600 mg tablet Take 1 tablet (600 mg total) by mouth every 6 (six) hours as needed (cramping) 30 tablet 0    LORATADINE ALLERGY RELIEF PO Take 1 tablet by mouth      montelukast (SINGULAIR) 10 mg tablet Take 1 tablet (10 mg total) by mouth daily at bedtime 30 tablet 1    cyclobenzaprine (FLEXERIL) 5 mg tablet Take 1 tablet (5 mg total) by mouth 3 (three) times a day as needed for muscle spasms for up to 20 days 60 tablet 0    fluticasone (FLONASE) 50 mcg/act nasal spray 1 spray into each nostril daily for 10 days 18 mL 1    fluticasone-salmeterol (Wixela Inhub) 250-50 mcg/dose inhaler Inhale 1 puff 2 (two) times a day Rinse mouth after use  Please supplement if needed to Advair or Symbicort 1 Inhaler 3    naproxen (NAPROSYN) 500 mg tablet Take 1 tablet (500 mg total) by mouth 2 (two) times a day with meals 60 tablet 0    Nerve Stimulator (STANDARD TENS) OMER by Does not apply route 4 (four) times a day (Patient not taking: Reported on 6/10/2020) 1 Device 0     No current facility-administered medications for this visit  Allergies   Allergen Reactions    Phenazopyridine Anaphylaxis and Hives    Other      Dove Deodorant      Lavender Oil Hives       Review of Systems    Video Exam    There were no vitals filed for this visit  Physical Exam           VIRTUAL VISIT DISCLAIMER    Maximiliannirali Phyllis Tafoya acknowledges that she has consented to an online visit or consultation  She understands that the online visit is based solely on information provided by her, and that, in the absence of a face-to-face physical evaluation by the physician, the diagnosis she receives is both limited and provisional in terms of accuracy and completeness  This is not intended to replace a full medical face-to-face evaluation by the physician  Alysa Fernandez understands and accepts these terms

## 2021-03-05 ENCOUNTER — OFFICE VISIT (OUTPATIENT)
Dept: FAMILY MEDICINE CLINIC | Facility: CLINIC | Age: 31
End: 2021-03-05
Payer: COMMERCIAL

## 2021-03-05 ENCOUNTER — TELEPHONE (OUTPATIENT)
Dept: FAMILY MEDICINE CLINIC | Facility: CLINIC | Age: 31
End: 2021-03-05

## 2021-03-05 VITALS
HEART RATE: 85 BPM | HEIGHT: 64 IN | DIASTOLIC BLOOD PRESSURE: 68 MMHG | WEIGHT: 142.2 LBS | RESPIRATION RATE: 16 BRPM | TEMPERATURE: 97.3 F | SYSTOLIC BLOOD PRESSURE: 110 MMHG | OXYGEN SATURATION: 98 % | BODY MASS INDEX: 24.28 KG/M2

## 2021-03-05 DIAGNOSIS — M54.50 SPINE PAIN, LUMBAR: Primary | ICD-10-CM

## 2021-03-05 DIAGNOSIS — N39.3 STRESS INCONTINENCE: ICD-10-CM

## 2021-03-05 DIAGNOSIS — M54.32 BILATERAL SCIATICA: ICD-10-CM

## 2021-03-05 DIAGNOSIS — M54.31 BILATERAL SCIATICA: ICD-10-CM

## 2021-03-05 DIAGNOSIS — R29.898 WEAKNESS OF BOTH LOWER EXTREMITIES: ICD-10-CM

## 2021-03-05 LAB
SL AMB  POCT GLUCOSE, UA: NEGATIVE
SL AMB LEUKOCYTE ESTERASE,UA: NORMAL
SL AMB POCT BILIRUBIN,UA: POSITIVE
SL AMB POCT BLOOD,UA: NEGATIVE
SL AMB POCT CLARITY,UA: CLEAR
SL AMB POCT COLOR,UA: NORMAL
SL AMB POCT KETONES,UA: NORMAL
SL AMB POCT NITRITE,UA: NEGATIVE
SL AMB POCT PH,UA: 5
SL AMB POCT SPECIFIC GRAVITY,UA: 1.02
SL AMB POCT URINE PROTEIN: NORMAL
SL AMB POCT UROBILINOGEN: 0.02

## 2021-03-05 PROCEDURE — 81002 URINALYSIS NONAUTO W/O SCOPE: CPT | Performed by: NURSE PRACTITIONER

## 2021-03-05 PROCEDURE — 87086 URINE CULTURE/COLONY COUNT: CPT | Performed by: NURSE PRACTITIONER

## 2021-03-05 PROCEDURE — 3008F BODY MASS INDEX DOCD: CPT | Performed by: FAMILY MEDICINE

## 2021-03-05 PROCEDURE — 87077 CULTURE AEROBIC IDENTIFY: CPT | Performed by: NURSE PRACTITIONER

## 2021-03-05 PROCEDURE — 87186 SC STD MICRODIL/AGAR DIL: CPT | Performed by: NURSE PRACTITIONER

## 2021-03-05 PROCEDURE — 99214 OFFICE O/P EST MOD 30 MIN: CPT | Performed by: NURSE PRACTITIONER

## 2021-03-05 RX ORDER — METHYLPREDNISOLONE 4 MG/1
TABLET ORAL
Qty: 21 EACH | Refills: 0 | Status: SHIPPED | OUTPATIENT
Start: 2021-03-05 | End: 2021-07-14

## 2021-03-05 RX ORDER — NITROFURANTOIN 25; 75 MG/1; MG/1
100 CAPSULE ORAL 2 TIMES DAILY
Qty: 14 CAPSULE | Refills: 0 | Status: SHIPPED | OUTPATIENT
Start: 2021-03-05 | End: 2021-03-12

## 2021-03-05 NOTE — TELEPHONE ENCOUNTER
Pt called - she is having a severe sciatic flare up and the medications she is taking are not helping at all - she was wondering if she could get a Zpak to help with the swelling?

## 2021-03-05 NOTE — PROGRESS NOTES
Normal BMI       Depression Screening and Follow-up Plan: Clincally patient does not have depression  No treatment is required  Assessment/Plan:  Presents in office for c/o low back pain   Sciatica flair has radiating pain down both thighs  She has had recurrent issues with her lower back   States that at times she has stress incontinence  Denies any bowel incontinence  Has a hard time ambulating  She has had issues in the past - has done PT and was seen by spine specialist but no further follow up     Urinalysis was done in office and showed UTI --> we will treat UTI also and follow up in few weeks     Discussed that steroidal treatments will not be a long term option and she will need to follow up with [facility]TH[facility] spine  Follow up to pain management - provided     Discussed supportive measures and when to seek urgent care  Problem List Items Addressed This Visit     None      Visit Diagnoses     Spine pain, lumbar    -  Primary    Relevant Medications    methylPREDNISolone 4 MG tablet therapy pack    Other Relevant Orders    POCT urine dip    Ambulatory referral to Pain Management    Bilateral sciatica        Relevant Medications    methylPREDNISolone 4 MG tablet therapy pack    Other Relevant Orders    POCT urine dip    Ambulatory referral to Pain Management    Weakness of both lower extremities        Relevant Medications    methylPREDNISolone 4 MG tablet therapy pack    Other Relevant Orders    POCT urine dip    Ambulatory referral to Pain Management    Stress incontinence        Relevant Medications    methylPREDNISolone 4 MG tablet therapy pack    Other Relevant Orders    POCT urine dip    Ambulatory referral to Pain Management            Subjective:      Patient ID: Darrel Pickett is a 26 y o  female  Presents in office for c/o low back pain   Sciatica flair has radiating pain down both thighs     She has had recurrent issues with her lower back   States that at times she has stress incontinence  Denies any bowel incontinence  Has a hard time ambulating  She has had issues in the past - has done PT and was seen by spine specialist but no further follow up         The following portions of the patient's history were reviewed and updated as appropriate:   Past Medical History:  She has a past medical history of Abnormal Pap smear of cervix, Anxiety, Carrier of group B Streptococcus, GERD (gastroesophageal reflux disease), High blood pressure, History of UTI, absence seizures, seasonal allergies, Migraine headache, Muscle spasm of back, Papanicolaou smear for cervical cancer screening (06/2017), Postpartum depression, Preeclampsia, Psychiatric disorder, Umbilical hernia (42/1953), and Varicose veins of lower extremity  ,  _______________________________________________________________________  Medical Problems:  does not have any pertinent problems on file ,  _______________________________________________________________________  Past Surgical History:   has a past surgical history that includes Hernia repair; [place] tooth extraction; Sterilization; and Other surgical history (11/18/2015)  ,  _______________________________________________________________________  Family History:  family history includes Arthritis in her family and mother; Asthma in her maternal aunt, mother, sister, and son; Bipolar disorder in her maternal aunt; COPD in her maternal grandmother; Cancer in her cousin; Diabetes in her cousin, maternal grandmother, mother, and other; Fibromyalgia in her family; GI problems in her family; Heart attack in her maternal grandfather; Hypertension in her mother; Hyperthyroidism in her maternal aunt; Lymphoma in her maternal grandmother; Other in her maternal grandmother; Sleep apnea in her mother;  Thyroid disease in her maternal aunt; Varicose Veins in her paternal aunt and paternal grandmother ,  _______________________________________________________________________  Social History:   reports that she quit smoking about 6 years ago  Her smoking use included cigarettes  She has a 15 00 pack-year smoking history  She has never used smokeless tobacco  She reports previous alcohol use  She reports that she does not use drugs  ,  _______________________________________________________________________  Allergies:  is allergic to phenazopyridine; other; and lavender oil     _______________________________________________________________________  Current Outpatient Medications   Medication Sig Dispense Refill    acetaminophen (TYLENOL) 325 mg tablet Take 2 tablets (650 mg total) by mouth every 6 (six) hours (Patient taking differently: Take 650 mg by mouth every 6 (six) hours As needed ) 30 tablet 0    albuterol (PROVENTIL HFA,VENTOLIN HFA) 90 mcg/act inhaler       cyclobenzaprine (FLEXERIL) 5 mg tablet Take 1 tablet (5 mg total) by mouth 3 (three) times a day as needed for muscle spasms for up to 20 days 60 tablet 0    fluticasone (FLONASE) 50 mcg/act nasal spray 1 spray into each nostril daily for 10 days 18 mL 1    fluticasone-salmeterol (Wixela Inhub) 250-50 mcg/dose inhaler Inhale 1 puff 2 (two) times a day Rinse mouth after use  Please supplement if needed to Advair or Symbicort 1 Inhaler 3    ibuprofen (MOTRIN) 600 mg tablet Take 1 tablet (600 mg total) by mouth every 6 (six) hours as needed (cramping) 30 tablet 0    montelukast (SINGULAIR) 10 mg tablet Take 1 tablet (10 mg total) by mouth daily at bedtime 30 tablet 1    naproxen (NAPROSYN) 500 mg tablet Take 1 tablet (500 mg total) by mouth 2 (two) times a day with meals 60 tablet 0    Nerve Stimulator (STANDARD TENS) OMER by Does not apply route 4 (four) times a day 1 Device 0    LORATADINE ALLERGY RELIEF PO Take 1 tablet by mouth      methylPREDNISolone 4 MG tablet therapy pack Use as directed on package 21 each 0     No current facility-administered medications for this visit  _______________________________________________________________________  Review of Systems   Constitutional: Negative for chills and fatigue  HENT: Negative for congestion, nosebleeds, rhinorrhea and sinus pressure  Eyes: Negative  Respiratory: Negative for cough and shortness of breath  Cardiovascular: Negative for chest pain and palpitations  Gastrointestinal: Negative for abdominal distention and abdominal pain  Musculoskeletal: Positive for back pain and gait problem  Sciatica    Skin: Negative  Psychiatric/Behavioral: Negative for sleep disturbance and suicidal ideas  The patient is not nervous/anxious  Objective:  Vitals:    03/05/21 1647   BP: 110/68   BP Location: Left arm   Patient Position: Sitting   Cuff Size: Standard   Pulse: 85   Resp: 16   Temp: (!) 97 3 °F (36 3 °C)   TempSrc: Temporal   SpO2: 98%   Weight: 64 5 kg (142 lb 3 2 oz)   Height: 5' 4" (1 626 m)     Body mass index is 24 41 kg/m²  Physical Exam  Vitals signs and nursing note reviewed  Constitutional:       Appearance: Normal appearance  HENT:      Head: Atraumatic  Eyes:      Extraocular Movements: Extraocular movements intact  Cardiovascular:      Rate and Rhythm: Normal rate and regular rhythm  Pulmonary:      Effort: Pulmonary effort is normal       Breath sounds: Normal breath sounds  Skin:     General: Skin is warm  Neurological:      Mental Status: She is alert and oriented to person, place, and time     Psychiatric:         Mood and Affect: Mood normal          Behavior: Behavior normal

## 2021-03-07 LAB — BACTERIA UR CULT: ABNORMAL

## 2021-03-08 NOTE — PATIENT INSTRUCTIONS
Sciatica   WHAT YOU NEED TO KNOW:   Sciatica is a condition that causes pain along your sciatic nerve  The sciatic nerve runs from your spine through both sides of your buttocks  It then runs down the back of your thigh, into your lower leg and foot  Your sciatic nerve may be compressed, inflamed, irritated, or stretched  DISCHARGE INSTRUCTIONS:   Medicines:   · NSAIDs:  These medicines decrease swelling and pain  NSAIDs are available without a doctor's order  Ask your healthcare provider which medicine is right for you  Ask how much to take and when to take it  Take as directed  NSAIDs can cause stomach bleeding or kidney problems if not taken correctly  · Acetaminophen: This medicine decreases pain  Acetaminophen is available without a doctor's order  Ask how much to take and when to take it  Follow directions  Acetaminophen can cause liver damage if not taken correctly  · Muscle relaxers  help decrease pain and muscle spasms  · Take your medicine as directed  Contact your healthcare provider if you think your medicine is not helping or if you have side effects  Tell him of her if you are allergic to any medicine  Keep a list of the medicines, vitamins, and herbs you take  Include the amounts, and when and why you take them  Bring the list or the pill bottles to follow-up visits  Carry your medicine list with you in case of an emergency  Follow up with your healthcare provider as directed:  Write down your questions so you remember to ask them during your visits  Manage your symptoms:   · Activity:  Decrease your activity  Do not lift heavy objects or twist your back for at least 6 weeks  Slowly return to your usual activity  · Ice:  Ice helps decrease swelling and pain  Ice may also help prevent tissue damage  Use an ice pack, or put crushed ice in a plastic bag  Cover it with a towel and place it on your low back or leg for 15 to 20 minutes every hour or as directed      · Heat:  Heat helps decrease pain and muscle spasms  Apply heat on the area for 20 to 30 minutes every 2 hours for as many days as directed  · Physical therapy:  You may need to see physical therapist to teach you exercises to help improve movement and strength, and to decrease pain  An occupational therapist teaches you skills to help with your daily activities  · Use assistive devices if directed: You may need to wear back support, such as a back brace  You may need crutches, a cane, or a walker to decrease stress on your lower back and leg muscles  Ask your healthcare provider for more information about assistive devices and how to use them correctly  Self-care:   · Avoid pressure on your back and legs:  Do not  lift heavy objects, or stand or sit for long periods of time  · Lift objects safely:  Keep your back straight and bend your knees when you  an object  Do not bend or twist your back when you lift  · Maintain a healthy weight:  Ask your healthcare provider how much you should weigh  Ask him to help you create a weight loss plan if you are overweight  · Exercise:  Ask your healthcare provider about the best stretching, warmup, and exercise plan for you  Contact your healthcare provider if:   · You have pain in your lower back at night or when resting  · You have pain in your lower back with numbness below the knee  · You have weakness in one leg only  · You have questions or concerns about your condition or care  Return to the emergency department if:   · You have trouble holding back your urine or bowel movements  · You have weakness in both legs  · You have numbness in your groin or buttocks  © Copyright [address] Information is for End User's use only and may not be sold, redistributed or otherwise used for commercial purposes   All illustrations and images included in CareNotes® are the copyrighted property of A D A M , Inc  or Rubina Valdez  The above information is an  only  It is not intended as medical advice for individual conditions or treatments  Talk to your doctor, nurse or pharmacist before following any medical regimen to see if it is safe and effective for you

## 2021-03-10 ENCOUNTER — TELEPHONE (OUTPATIENT)
Dept: PAIN MEDICINE | Facility: CLINIC | Age: 31
End: 2021-03-10

## 2021-03-10 NOTE — TELEPHONE ENCOUNTER
LM on VM for patient to call us back to confirm what appointment with Dr Kaitlin Ivy she will be coming to  Looks like 2 were made  Once we get confirmation, Iwill mail NP PPW out to the patient   Provided call back number

## 2021-03-13 ENCOUNTER — APPOINTMENT (OUTPATIENT)
Dept: LAB | Facility: CLINIC | Age: 31
End: 2021-03-13
Payer: COMMERCIAL

## 2021-03-13 DIAGNOSIS — M54.50 SPINE PAIN, LUMBAR: ICD-10-CM

## 2021-03-13 DIAGNOSIS — M54.32 BILATERAL SCIATICA: ICD-10-CM

## 2021-03-13 DIAGNOSIS — M54.31 BILATERAL SCIATICA: ICD-10-CM

## 2021-03-13 DIAGNOSIS — R29.898 WEAKNESS OF BOTH LOWER EXTREMITIES: ICD-10-CM

## 2021-03-13 DIAGNOSIS — N39.3 STRESS INCONTINENCE: ICD-10-CM

## 2021-03-13 LAB
ALBUMIN SERPL BCP-MCNC: 3.5 G/DL (ref 3.5–5)
ALP SERPL-CCNC: 97 U/L (ref 46–116)
ALT SERPL W P-5'-P-CCNC: 50 U/L (ref 12–78)
ANION GAP SERPL CALCULATED.3IONS-SCNC: 8 MMOL/L (ref 4–13)
AST SERPL W P-5'-P-CCNC: 41 U/L (ref 5–45)
BASOPHILS # BLD AUTO: 0.01 THOUSANDS/ΜL (ref 0–0.1)
BASOPHILS NFR BLD AUTO: 0 % (ref 0–1)
BILIRUB SERPL-MCNC: 0.34 MG/DL (ref 0.2–1)
BUN SERPL-MCNC: 18 MG/DL (ref 5–25)
CALCIUM SERPL-MCNC: 8.6 MG/DL (ref 8.3–10.1)
CHLORIDE SERPL-SCNC: 105 MMOL/L (ref 100–108)
CO2 SERPL-SCNC: 28 MMOL/L (ref 21–32)
CREAT SERPL-MCNC: 0.7 MG/DL (ref 0.6–1.3)
EOSINOPHIL # BLD AUTO: 0.14 THOUSAND/ΜL (ref 0–0.61)
EOSINOPHIL NFR BLD AUTO: 2 % (ref 0–6)
ERYTHROCYTE [DISTWIDTH] IN BLOOD BY AUTOMATED COUNT: 14.3 % (ref 11.6–15.1)
GFR SERPL CREATININE-BSD FRML MDRD: 115 ML/MIN/1.73SQ M
GLUCOSE P FAST SERPL-MCNC: 86 MG/DL (ref 65–99)
HCT VFR BLD AUTO: 41.4 % (ref 34.8–46.1)
HGB BLD-MCNC: 12.6 G/DL (ref 11.5–15.4)
IMM GRANULOCYTES # BLD AUTO: 0.03 THOUSAND/UL (ref 0–0.2)
IMM GRANULOCYTES NFR BLD AUTO: 0 % (ref 0–2)
LYMPHOCYTES # BLD AUTO: 2.85 THOUSANDS/ΜL (ref 0.6–4.47)
LYMPHOCYTES NFR BLD AUTO: 38 % (ref 14–44)
MCH RBC QN AUTO: 27.9 PG (ref 26.8–34.3)
MCHC RBC AUTO-ENTMCNC: 30.4 G/DL (ref 31.4–37.4)
MCV RBC AUTO: 92 FL (ref 82–98)
MONOCYTES # BLD AUTO: 0.48 THOUSAND/ΜL (ref 0.17–1.22)
MONOCYTES NFR BLD AUTO: 6 % (ref 4–12)
NEUTROPHILS # BLD AUTO: 4.09 THOUSANDS/ΜL (ref 1.85–7.62)
NEUTS SEG NFR BLD AUTO: 54 % (ref 43–75)
NRBC BLD AUTO-RTO: 0 /100 WBCS
PLATELET # BLD AUTO: 296 THOUSANDS/UL (ref 149–390)
PMV BLD AUTO: 10.3 FL (ref 8.9–12.7)
POTASSIUM SERPL-SCNC: 4 MMOL/L (ref 3.5–5.3)
PROT SERPL-MCNC: 6.7 G/DL (ref 6.4–8.2)
RBC # BLD AUTO: 4.51 MILLION/UL (ref 3.81–5.12)
SODIUM SERPL-SCNC: 141 MMOL/L (ref 136–145)
TSH SERPL DL<=0.05 MIU/L-ACNC: 1.11 UIU/ML (ref 0.36–3.74)
WBC # BLD AUTO: 7.6 THOUSAND/UL (ref 4.31–10.16)

## 2021-03-13 PROCEDURE — 36415 COLL VENOUS BLD VENIPUNCTURE: CPT

## 2021-03-13 PROCEDURE — 85025 COMPLETE CBC W/AUTO DIFF WBC: CPT

## 2021-03-13 PROCEDURE — 84443 ASSAY THYROID STIM HORMONE: CPT

## 2021-03-13 PROCEDURE — 80053 COMPREHEN METABOLIC PANEL: CPT

## 2021-03-20 ENCOUNTER — NURSE TRIAGE (OUTPATIENT)
Dept: OTHER | Facility: OTHER | Age: 31
End: 2021-03-20

## 2021-03-20 NOTE — TELEPHONE ENCOUNTER
Reason for Disposition   Dizziness caused by poor fluid intake    Answer Assessment - Initial Assessment Questions  1  DESCRIPTION: "Describe your dizziness "      States it does not feel like the room is spinning around her but feels more lightheaded  2  LIGHTHEADED: "Do you feel lightheaded?" (e g , somewhat faint, woozy, weak upon standing)      Yes    3  VERTIGO: "Do you feel like either you or the room is spinning or tilting?" (i e  vertigo)      Denies  4  SEVERITY: "How bad is it?"  "Do you feel like you are going to faint?" "Can you stand and walk?"    - MILD - walking normally    - MODERATE - interferes with normal activities (e g , work, school)     - SEVERE - unable to stand, requires support to walk, feels like passing out now  Mild- still able to walk without difficulty  5  ONSET:  "When did the dizziness begin?"      Started yesterday evening  6  AGGRAVATING FACTORS: "Does anything make it worse?" (e g , standing, change in head position)      Walking for too long  7  HEART RATE: "Can you tell me your heart rate?" "How many beats in 15 seconds?"  (Note: not all patients can do this)        Normal    8  CAUSE: "What do you think is causing the dizziness?"      Uncertain  9  RECURRENT SYMPTOM: "Have you had dizziness before?" If so, ask: "When was the last time?" "What happened that time?"      First time  10  OTHER SYMPTOMS: "Do you have any other symptoms?" (e g , fever, chest pain, vomiting, diarrhea, bleeding)        No other symptoms  11  PREGNANCY: "Is there any chance you are pregnant?" "When was your last menstrual period?"        More no then yes      Protocols used: [facility]

## 2021-03-20 NOTE — TELEPHONE ENCOUNTER
Regarding: hot flashes, dizzy and nausea    ----- Message from [facility] sent at 3/20/2021  1:27 PM EDT -----  "I am experiencing hot flashes, dizzy and nausea "

## 2021-03-22 ENCOUNTER — TELEMEDICINE (OUTPATIENT)
Dept: FAMILY MEDICINE CLINIC | Facility: CLINIC | Age: 31
End: 2021-03-22
Payer: COMMERCIAL

## 2021-03-22 DIAGNOSIS — D50.8 IRON DEFICIENCY ANEMIA SECONDARY TO INADEQUATE DIETARY IRON INTAKE: ICD-10-CM

## 2021-03-22 DIAGNOSIS — M54.40 CHRONIC MIDLINE LOW BACK PAIN WITH SCIATICA, SCIATICA LATERALITY UNSPECIFIED: ICD-10-CM

## 2021-03-22 DIAGNOSIS — J30.2 SEASONAL ALLERGIES: ICD-10-CM

## 2021-03-22 DIAGNOSIS — R42 DIZZINESS: Primary | ICD-10-CM

## 2021-03-22 DIAGNOSIS — G89.29 CHRONIC MIDLINE LOW BACK PAIN WITH SCIATICA, SCIATICA LATERALITY UNSPECIFIED: ICD-10-CM

## 2021-03-22 PROCEDURE — 99214 OFFICE O/P EST MOD 30 MIN: CPT | Performed by: FAMILY MEDICINE

## 2021-03-22 PROCEDURE — 1036F TOBACCO NON-USER: CPT | Performed by: FAMILY MEDICINE

## 2021-03-22 NOTE — PROGRESS NOTES
Virtual Regular Visit      Assessment/Plan:low  Back pain  -  Chronic  Recurrent  CT  Spine   Reviewed   Done  In  April 2020   Normal     Will  Get  The  X ray  Spine    To  Follow  Up    She  Says   Her pain is  Improved   After  Taking   Muscle  Relaxants  And  Steroids    She  Has no  Bladder  Or  Bowel   Symptoms  No  Focal  Weakness    She  Is  Been  Referred   To  Pain  Management  And     Spine  Specialist    2   Dizziness  -  Much  Improved   Last  2  Days   She  Is  Hydrating  Well  May  Be  That   Could  Be   Due  To  Flexeril    No  Headaches  Or  Any  Focal  Weakness     Return  Parameters   Discussed     3  Anemia -  She has h/o  Anemia   Will  Follow up  With  Labs     4  Allergic  Rhinitis  -  Much   Stable   On  Flonase     Discussed  Precautions      Problem List Items Addressed This Visit        Other    Iron deficiency anemia secondary to inadequate dietary iron intake    Dizziness - Primary    Seasonal allergies               Reason for visit is   Chief Complaint   Patient presents with    Dizziness     Feeling better now, possible dehydration   Follow-up     Lab results   Virtual Regular Visit        Encounter provider Sivakumar Carter MD    Provider located at [address] [place] [address] [id number] [phone]      Recent Visits  No visits were found meeting these conditions  Showing recent visits within past 7 days and meeting all other requirements     Today's Visits  Date Type Provider Dept   03/22/21 Telemedicine Sivakumar Carter MD  [facility] [place]   Showing today's visits and meeting all other requirements     Future Appointments  No visits were found meeting these conditions  Showing future appointments within next 150 days and meeting all other requirements        The patient was identified by name and date of birth   Vasile Dennismadison was informed that this is a telemedicine visit and that the visit is being conducted through [employer] and patient was informed that this is not a secure, HIPAA-compliant platform  She agrees to proceed     My office door was closed  No one else was in the room  She acknowledged consent and understanding of privacy and security of the video platform  The patient has agreed to participate and understands they can discontinue the visit at any time  Patient is aware this is a billable service  Subjective  [facility]  AND [facility] Dylan Dominguez is a 26 y o  female I had  The  Video  Visit  With  The  patient   HPI -  patient  Had  A  Follow  Up  Video  Visit   Today  To    Follow  Up  On  Her  Back pain   Which  Is  Improved  Now  But  Has been  Recurrent  No  Bladder  Symptoms        She   Has no  Headaches   But  experienced    Some   Dizziness  Last  Week    Last  2  Days  No  dizzines    Return  Parameters   Discussed     Will  F/u  With labs   For  Her  Anemia     Past Medical History:   Diagnosis Date    Abnormal Pap smear of cervix     had cryosurgery 2015    Anxiety     Carrier of group B Streptococcus     GERD (gastroesophageal reflux disease)     High blood pressure     during pregnancy     History of UTI     Hx of absence seizures     once(per sanjiv)    Hx of seasonal allergies     Migraine headache     Muscle spasm of back     Papanicolaou smear for cervical cancer screening 06/2017    Pap neg 5/2016, pap- neg/GC/CT NEG    Postpartum depression     Preeclampsia     Psychiatric disorder     Umbilical hernia 43/1904    Varicose veins of lower extremity        Past Surgical History:   Procedure Laterality Date    HERNIA REPAIR      2 years ago    OTHER SURGICAL HISTORY  11/18/2015    cryosurgery     STERILIZATION      WISDOM TOOTH EXTRACTION         Current Outpatient Medications   Medication Sig Dispense Refill    acetaminophen (TYLENOL) 325 mg tablet Take 2 tablets (650 mg total) by mouth every 6 (six) hours (Patient taking differently: Take 650 mg by mouth every 6 (six) hours As needed ) 30 tablet 0    albuterol (PROVENTIL HFA,VENTOLIN HFA) 90 mcg/act inhaler       cyclobenzaprine (FLEXERIL) 5 mg tablet Take 1 tablet (5 mg total) by mouth 3 (three) times a day as needed for muscle spasms for up to 20 days 60 tablet 0    fluticasone (FLONASE) 50 mcg/act nasal spray 1 spray into each nostril daily for 10 days 18 mL 1    fluticasone-salmeterol (Wixela Inhub) 250-50 mcg/dose inhaler Inhale 1 puff 2 (two) times a day Rinse mouth after use  Please supplement if needed to Advair or Symbicort 1 Inhaler 3    ibuprofen (MOTRIN) 600 mg tablet Take 1 tablet (600 mg total) by mouth every 6 (six) hours as needed (cramping) 30 tablet 0    LORATADINE ALLERGY RELIEF PO Take 1 tablet by mouth      methylPREDNISolone 4 MG tablet therapy pack Use as directed on package 21 each 0    montelukast (SINGULAIR) 10 mg tablet Take 1 tablet (10 mg total) by mouth daily at bedtime 30 tablet 1    naproxen (NAPROSYN) 500 mg tablet Take 1 tablet (500 mg total) by mouth 2 (two) times a day with meals 60 tablet 0    Nerve Stimulator (STANDARD TENS) OMER by Does not apply route 4 (four) times a day 1 Device 0     No current facility-administered medications for this visit  Allergies   Allergen Reactions    Phenazopyridine Anaphylaxis and Hives    Other      Dove Deodorant      Lavender Oil Hives       Review of Systems   Constitutional: Negative for appetite change, fatigue and fever  HENT: Negative for congestion, sinus pressure and sore throat  Eyes: Negative for pain and discharge  Respiratory: Negative for cough and shortness of breath  Cardiovascular: Negative for chest pain, palpitations and leg swelling  Gastrointestinal: Negative for abdominal distention, abdominal pain and constipation  Endocrine: Negative for cold intolerance and heat intolerance  Genitourinary: Negative for dysuria, frequency, urgency and vaginal discharge  Musculoskeletal: Positive for back pain  Negative for arthralgias, joint swelling and neck pain  Skin: Negative for rash  Neurological: Positive for dizziness  Negative for speech difficulty, weakness, numbness and headaches  Psychiatric/Behavioral: The patient is not nervous/anxious  Video Exam    There were no vitals filed for this visit  Physical Exam  Constitutional:       General: She is not in acute distress  Appearance: Normal appearance  She is normal weight  She is not ill-appearing, toxic-appearing or diaphoretic  HENT:      Head: Normocephalic and atraumatic  Neck:      Musculoskeletal: Normal range of motion  Pulmonary:      Effort: Pulmonary effort is normal    Musculoskeletal: Normal range of motion  Neurological:      General: No focal deficit present  Mental Status: She is alert and oriented to person, place, and time  Psychiatric:         Mood and Affect: Mood normal          Behavior: Behavior normal           I spent 30 minutes directly with the patient during this visit      [address] [place] [address] Felicity Prakash acknowledges that she has consented to an online visit or consultation  She understands that the online visit is based solely on information provided by her, and that, in the absence of a face-to-face physical evaluation by the physician, the diagnosis she receives is both limited and provisional in terms of accuracy and completeness  This is not intended to replace a full medical face-to-face evaluation by the physician  Tony Reyes understands and accepts these terms

## 2021-04-02 ENCOUNTER — HOSPITAL ENCOUNTER (OUTPATIENT)
Dept: RADIOLOGY | Facility: HOSPITAL | Age: 31
Discharge: HOME | End: 2021-04-02
Attending: FAMILY MEDICINE
Payer: COMMERCIAL

## 2021-04-02 DIAGNOSIS — G89.29 CHRONIC MIDLINE LOW BACK PAIN WITH SCIATICA, SCIATICA LATERALITY UNSPECIFIED: ICD-10-CM

## 2021-04-02 DIAGNOSIS — M54.40 CHRONIC MIDLINE LOW BACK PAIN WITH SCIATICA, SCIATICA LATERALITY UNSPECIFIED: ICD-10-CM

## 2021-04-02 PROCEDURE — 72110 X-RAY EXAM L-2 SPINE 4/>VWS: CPT

## 2021-04-05 ENCOUNTER — OFFICE VISIT (OUTPATIENT)
Dept: OBGYN CLINIC | Facility: CLINIC | Age: 31
End: 2021-04-05
Payer: COMMERCIAL

## 2021-04-05 VITALS
SYSTOLIC BLOOD PRESSURE: 114 MMHG | WEIGHT: 143.2 LBS | BODY MASS INDEX: 24.45 KG/M2 | DIASTOLIC BLOOD PRESSURE: 76 MMHG | HEIGHT: 64 IN

## 2021-04-05 DIAGNOSIS — E04.9 ENLARGED THYROID: ICD-10-CM

## 2021-04-05 DIAGNOSIS — Z12.4 SCREENING FOR MALIGNANT NEOPLASM OF THE CERVIX: ICD-10-CM

## 2021-04-05 DIAGNOSIS — Z01.419 ROUTINE GYNECOLOGICAL EXAMINATION: Primary | ICD-10-CM

## 2021-04-05 PROCEDURE — 99385 PREV VISIT NEW AGE 18-39: CPT | Performed by: OBSTETRICS & GYNECOLOGY

## 2021-04-05 PROCEDURE — 87624 HPV HI-RISK TYP POOLED RSLT: CPT | Performed by: OBSTETRICS & GYNECOLOGY

## 2021-04-05 PROCEDURE — 3008F BODY MASS INDEX DOCD: CPT | Performed by: OBSTETRICS & GYNECOLOGY

## 2021-04-05 PROCEDURE — 1036F TOBACCO NON-USER: CPT | Performed by: OBSTETRICS & GYNECOLOGY

## 2021-04-05 PROCEDURE — G0145 SCR C/V CYTO,THINLAYER,RESCR: HCPCS | Performed by: OBSTETRICS & GYNECOLOGY

## 2021-04-05 NOTE — PROGRESS NOTES
Subjective      Fatimahrosiovaleria Acekrman is a 26 y o  female who presents for annual well woman exam  Periods are irregular, lasting a few days  No intermenstrual bleeding, spotting, or discharge  Current contraception: IUD  History of abnormal Pap smear: no  Family history of uterine or ovarian cancer: no  Regular self breast exam: yes    OB History        3    Para   3    Term   3            AB        Living   3       SAB        TAB        Ectopic        Multiple        Live Births   3           Obstetric Comments   Menarche: 13  First child: 23              Patient's last menstrual period was 03/10/2021  The following portions of the patient's history were reviewed and updated as appropriate: allergies, current medications, past family history, past medical history, past social history, past surgical history and problem list     Review of Systems  Review of Systems   Constitutional: Negative for activity change, appetite change, chills, fatigue and fever  Respiratory: Negative for cough and shortness of breath  Cardiovascular: Negative for chest pain, palpitations and leg swelling  Gastrointestinal: Negative for abdominal pain, constipation, diarrhea, nausea and vomiting  Genitourinary: Negative for difficulty urinating, dysuria, flank pain, frequency, hematuria, urgency and vaginal discharge  Neurological: Negative for dizziness and headaches  Psychiatric/Behavioral: Negative for confusion            Objective      /76 (BP Location: Left arm, Patient Position: Sitting, Cuff Size: Standard)   Ht 5' 4" (1 626 m)   Wt 65 kg (143 lb 3 2 oz)   LMP 03/10/2021   BMI 24 58 kg/m²     Physical Exam  OBGyn Exam     General:   alert and oriented, in no acute distress, alert, appears stated age and cooperative   Heart: regular rate and rhythm, S1, S2 normal, no murmur, click, rub or gallop   Lungs: clear to auscultation bilaterally   Abdomen: soft, non-tender, without masses or organomegaly   Vulva: normal   Vagina: normal mucosa, normal discharge   Cervix: no cervical motion tenderness and no lesions IUD string not seen   Uterus: normal size   Adnexa:  Breast Exam:  normal adnexa  breasts appear normal, no suspicious masses, no skin or nipple changes or axillary nodes  enlarged thyroid noted on exam          Assessment      @well woman@   66-year-old female  Annual exam   Prior 3 vaginal delivery   IUD contraception string not seen ultrasound was done last year confirm correct  location  Plan    Pap/HPV  Diet /exercise  Calcium / vitamin-D   Return to office for annual exam   ultrasound thyroid   All questions answered  There are no Patient Instructions on file for this visit

## 2021-04-06 LAB
HPV HR 12 DNA CVX QL NAA+PROBE: NEGATIVE
HPV16 DNA CVX QL NAA+PROBE: NEGATIVE
HPV18 DNA CVX QL NAA+PROBE: NEGATIVE

## 2021-04-07 ENCOUNTER — TELEPHONE (OUTPATIENT)
Dept: FAMILY MEDICINE CLINIC | Facility: CLINIC | Age: 31
End: 2021-04-07

## 2021-04-07 NOTE — TELEPHONE ENCOUNTER
Left message for patient to call the office back in regards to her XR results, patient can also view results through [address]

## 2021-04-08 NOTE — TELEPHONE ENCOUNTER
Patient called back and given normal XR results  Patient still complains of back pain, and now is having muscle spasms in her buttocks  Started three days ago  She is wondering if this could be due to a deficiency?  Also wondering what her next steps are now that XR is normal

## 2021-04-09 ENCOUNTER — TELEPHONE (OUTPATIENT)
Dept: FAMILY MEDICINE CLINIC | Facility: CLINIC | Age: 31
End: 2021-04-09

## 2021-04-09 DIAGNOSIS — M54.41 ACUTE MIDLINE LOW BACK PAIN WITH BILATERAL SCIATICA: Primary | ICD-10-CM

## 2021-04-09 DIAGNOSIS — M54.42 ACUTE MIDLINE LOW BACK PAIN WITH BILATERAL SCIATICA: Primary | ICD-10-CM

## 2021-04-09 NOTE — TELEPHONE ENCOUNTER
I did text her a copy of all the offices in the area she is to call and schedule eval   Follow up with us in 4-6 weeks and follow up with Spine   Referrals will be sent

## 2021-04-09 NOTE — TELEPHONE ENCOUNTER
Continues to have back issues   X ray is normal     Study Result    LUMBAR SPINE     INDICATION:   M[age]: Lumbago with sciatica, unspecified side  G89 27:  Other chronic pain      COMPARISON:  CT 4/25/2020     VIEWS:  XR SPINE LUMBAR MINIMUM 4 VIEWS NON INJURY        FINDINGS:     There are 5 non rib bearing lumbar vertebral bodies       There is no evidence of acute fracture or destructive osseous lesion      Alignment is unremarkable       No significant lumbar degenerative change noted      The pedicles appear intact      Soft tissues are unremarkable      IMPRESSION:     Normal examination

## 2021-04-11 LAB
LAB AP GYN PRIMARY INTERPRETATION: NORMAL
Lab: NORMAL

## 2021-07-14 ENCOUNTER — OFFICE VISIT (OUTPATIENT)
Dept: FAMILY MEDICINE CLINIC | Facility: CLINIC | Age: 31
End: 2021-07-14
Payer: COMMERCIAL

## 2021-07-14 VITALS
RESPIRATION RATE: 16 BRPM | OXYGEN SATURATION: 98 % | HEART RATE: 61 BPM | WEIGHT: 145.6 LBS | HEIGHT: 64 IN | SYSTOLIC BLOOD PRESSURE: 118 MMHG | DIASTOLIC BLOOD PRESSURE: 64 MMHG | BODY MASS INDEX: 24.86 KG/M2 | TEMPERATURE: 98.1 F

## 2021-07-14 DIAGNOSIS — J45.40 MODERATE PERSISTENT ASTHMA WITHOUT COMPLICATION: ICD-10-CM

## 2021-07-14 DIAGNOSIS — R14.0 ABDOMINAL BLOATING: ICD-10-CM

## 2021-07-14 DIAGNOSIS — R10.2 PELVIC PAIN: Primary | ICD-10-CM

## 2021-07-14 DIAGNOSIS — R09.81 NASAL CONGESTION: ICD-10-CM

## 2021-07-14 PROBLEM — R79.9 ABNORMAL BLOOD CHEMISTRY: Status: ACTIVE | Noted: 2021-07-14

## 2021-07-14 LAB
SL AMB  POCT GLUCOSE, UA: NEGATIVE
SL AMB LEUKOCYTE ESTERASE,UA: NORMAL
SL AMB POCT BILIRUBIN,UA: POSITIVE
SL AMB POCT BLOOD,UA: NEGATIVE
SL AMB POCT CLARITY,UA: CLEAR
SL AMB POCT COLOR,UA: YELLOW
SL AMB POCT KETONES,UA: NEGATIVE
SL AMB POCT NITRITE,UA: NEGATIVE
SL AMB POCT PH,UA: 7
SL AMB POCT SPECIFIC GRAVITY,UA: 1.01
SL AMB POCT URINE PROTEIN: NORMAL
SL AMB POCT UROBILINOGEN: 0.02

## 2021-07-14 PROCEDURE — 81002 URINALYSIS NONAUTO W/O SCOPE: CPT | Performed by: NURSE PRACTITIONER

## 2021-07-14 PROCEDURE — 99214 OFFICE O/P EST MOD 30 MIN: CPT | Performed by: NURSE PRACTITIONER

## 2021-07-14 PROCEDURE — 1036F TOBACCO NON-USER: CPT | Performed by: NURSE PRACTITIONER

## 2021-07-14 RX ORDER — FLUCONAZOLE 150 MG/1
150 TABLET ORAL ONCE
Qty: 1 TABLET | Refills: 0 | Status: SHIPPED | OUTPATIENT
Start: 2021-07-14 | End: 2021-07-14

## 2021-07-14 RX ORDER — NITROFURANTOIN 25; 75 MG/1; MG/1
CAPSULE ORAL
COMMUNITY
Start: 2021-06-28 | End: 2021-07-14

## 2021-07-14 RX ORDER — MONTELUKAST SODIUM 10 MG/1
10 TABLET ORAL
Qty: 30 TABLET | Refills: 1 | Status: SHIPPED | OUTPATIENT
Start: 2021-07-14 | End: 2021-08-18 | Stop reason: SDUPTHER

## 2021-07-14 RX ORDER — NITROFURANTOIN 25; 75 MG/1; MG/1
100 CAPSULE ORAL 2 TIMES DAILY
Qty: 14 CAPSULE | Refills: 0 | Status: SHIPPED | OUTPATIENT
Start: 2021-07-14 | End: 2021-07-21

## 2021-07-14 RX ORDER — CETIRIZINE HYDROCHLORIDE 1 MG/ML
SOLUTION ORAL
COMMUNITY
Start: 2021-04-28 | End: 2022-03-04

## 2021-07-14 RX ORDER — AMOXICILLIN 400 MG/5ML
POWDER, FOR SUSPENSION ORAL
COMMUNITY
Start: 2021-04-29 | End: 2021-09-17

## 2021-07-14 NOTE — PROGRESS NOTES
Assessment/Plan:    Presents in office for follow up  Pelvic pain and discomfort  She has had some wheezing and intermittent cough   Is in refills of the inhalers and allergy medications    Denies any fevers chills or shortness of breath   Denies any blood in urine     Urinalysis done and positive for moderate amounts of leuks   I have initiated Macrobid  Encouraged hydration and follow up in few weeks if not better   Discussed reportable s/s and when to seek urgent care        1 Result Note     1 Patient Communication  Component 7/14/21  3:24 PM   LEUKOCYTE ESTERASE,UA moderate    NITRITE,UA negative    SL AMB POCT UROBILINOGEN 0 02    POCT URINE PROTEIN trace     PH,UA 7    BLOOD,UA negative    SPECIFIC GRAVITY,UA 1 015    KETONES,UA negative    BILIRUBIN,UA positive    GLUCOSE, UA negative     COLOR,UA yellow    CLARITY,UA clear          Specimen Collected: 07/14/21  3:24 PM   Last Resulted: 07/14/21  3:24 PM             Problem List Items Addressed This Visit        Respiratory    Moderate persistent asthma without complication    Relevant Medications    montelukast (SINGULAIR) 10 mg tablet    fluticasone-salmeterol (Wixela Inhub) 250-50 mcg/dose inhaler    Other Relevant Orders    CBC and differential    TSH, 3rd generation    Comprehensive metabolic panel    Vitamin D 25 hydroxy      Other Visit Diagnoses     Pelvic pain    -  Primary    Relevant Orders    POCT urine dip (Completed)    CBC and differential    TSH, 3rd generation    Comprehensive metabolic panel    Vitamin D 25 hydroxy    Nasal congestion        Relevant Medications    montelukast (SINGULAIR) 10 mg tablet    Other Relevant Orders    CBC and differential    TSH, 3rd generation    Comprehensive metabolic panel    Vitamin D 25 hydroxy    Abdominal bloating        Relevant Orders    CBC and differential    TSH, 3rd generation    Comprehensive metabolic panel    Vitamin D 25 hydroxy            Subjective:      Patient ID: Piper Shaheen is a 26 y o  female  Presents in office for follow up  Pelvic pain and discomfort  She has had some wheezing and intermittent cough   Is in refills of the inhalers and allergy medications  Denies any fevers chills or shortness of breath   Denies any blood in urine     Urinalysis done and positive for moderate amounts of leuks   I have initiated Macrobid  Encouraged hydration and follow up in few weeks if not better   Discussed reportable s/s and when to seek urgent care            The following portions of the patient's history were reviewed and updated as appropriate:   Past Medical History:  She has a past medical history of Abnormal Pap smear of cervix, Anxiety, Carrier of group B Streptococcus, GERD (gastroesophageal reflux disease), High blood pressure, History of UTI, absence seizures, seasonal allergies, Migraine headache, Muscle spasm of back, Papanicolaou smear for cervical cancer screening (06/2017), Postpartum depression, Preeclampsia, Psychiatric disorder, Umbilical hernia (78/7566), and Varicose veins of lower extremity  ,  _______________________________________________________________________  Medical Problems:  does not have any pertinent problems on file ,  _______________________________________________________________________  Past Surgical History:   has a past surgical history that includes Hernia repair; Taylor tooth extraction; Other surgical history (11/18/2015); and INSERTION OF INTRAUTERINE DEVICE (IUD)  ,  _______________________________________________________________________  Family History:  family history includes Arthritis in her family and mother; Asthma in her maternal aunt, mother, sister, and son; Bipolar disorder in her maternal aunt; COPD in her maternal grandmother; Cancer in her cousin; Diabetes in her cousin, maternal grandmother, mother, and other; Fibromyalgia in her family; GI problems in her family;  Heart attack in her maternal grandfather; Hypertension in her mother; Hyperthyroidism in her maternal aunt; Lymphoma in her maternal grandmother; Other in her maternal grandmother; Sleep apnea in her mother; Thyroid disease in her maternal aunt; Varicose Veins in her paternal aunt and paternal grandmother ,  _______________________________________________________________________  Social History:   reports that she quit smoking about 6 years ago  Her smoking use included cigarettes  She has a 15 00 pack-year smoking history  She has never used smokeless tobacco  She reports previous alcohol use  She reports that she does not use drugs  ,  _______________________________________________________________________  Allergies:  is allergic to phenazopyridine, other, and lavender oil     _______________________________________________________________________  Current Outpatient Medications   Medication Sig Dispense Refill    acetaminophen (TYLENOL) 325 mg tablet Take 2 tablets (650 mg total) by mouth every 6 (six) hours (Patient taking differently: Take 650 mg by mouth as needed ) 30 tablet 0    albuterol (PROVENTIL HFA,VENTOLIN HFA) 90 mcg/act inhaler       cetirizine (ZyrTEC) oral solution TAKE 2 5 ML (2 5 MG TOTAL) BY MOUTH DAILY      cyclobenzaprine (FLEXERIL) 5 mg tablet Take 1 tablet (5 mg total) by mouth 3 (three) times a day as needed for muscle spasms for up to 20 days 60 tablet 0    fluticasone (FLONASE) 50 mcg/act nasal spray 1 spray into each nostril daily for 10 days 18 mL 1    fluticasone-salmeterol (Wixela Inhub) 250-50 mcg/dose inhaler Inhale 1 puff 2 (two) times a day Rinse mouth after use   Please supplement if needed to Advair or Symbicort 60 blister 0    ibuprofen (MOTRIN) 600 mg tablet Take 1 tablet (600 mg total) by mouth every 6 (six) hours as needed (cramping) 30 tablet 0    montelukast (SINGULAIR) 10 mg tablet Take 1 tablet (10 mg total) by mouth daily at bedtime 30 tablet 1    naproxen (NAPROSYN) 500 mg tablet Take 1 tablet (500 mg total) by mouth 2 (two) times a day with meals 60 tablet 0    Nerve Stimulator (STANDARD TENS) OMER by Does not apply route 4 (four) times a day 1 Device 0    amoxicillin (AMOXIL) 400 MG/5ML suspension TAKE 5ML BY MOUTH TWICE A DAY FOR 10 DAYS (Patient not taking: Reported on 7/14/2021)      LORATADINE ALLERGY RELIEF PO Take 1 tablet by mouth (Patient not taking: Reported on 7/14/2021)       No current facility-administered medications for this visit      _______________________________________________________________________  Review of Systems   Constitutional: Positive for fatigue  Negative for chills and fever  HENT: Positive for congestion  Negative for nosebleeds, sinus pressure, sneezing and sore throat  Eyes: Negative  Respiratory: Positive for cough (intermittent - has underlying asthma needs refills )  Negative for shortness of breath  Cardiovascular: Negative for chest pain and palpitations  Gastrointestinal: Positive for abdominal pain (pelvic discomfort )  Negative for abdominal distention  Endocrine: Negative  Genitourinary: Positive for pelvic pain and urgency  Negative for difficulty urinating  Musculoskeletal: Negative  Skin: Negative for rash  Allergic/Immunologic: Positive for environmental allergies  Neurological: Negative for headaches  Hematological: Negative for adenopathy  Psychiatric/Behavioral: Negative for sleep disturbance and suicidal ideas  The patient is not nervous/anxious  Objective:  Vitals:    07/14/21 1500   BP: 118/64   BP Location: Left arm   Patient Position: Sitting   Cuff Size: Standard   Pulse: 61   Resp: 16   Temp: 98 1 °F (36 7 °C)   TempSrc: Temporal   SpO2: 98%   Weight: 66 kg (145 lb 9 6 oz)   Height: 5' 4" (1 626 m)     Body mass index is 24 99 kg/m²  Physical Exam  Vitals and nursing note reviewed  Constitutional:       Appearance: Normal appearance  Comments: BMI 24 99   HENT:      Head: Normocephalic and atraumatic        Right Ear: Tympanic membrane normal       Left Ear: Tympanic membrane normal    Eyes:      Pupils: Pupils are equal, round, and reactive to light  Cardiovascular:      Rate and Rhythm: Normal rate  Pulses: Normal pulses  Pulmonary:      Effort: Pulmonary effort is normal       Comments: Decreased   Abdominal:      Palpations: Abdomen is soft  Comments: POSITIVE FOR UTI    Musculoskeletal:      Cervical back: Normal range of motion  Skin:     General: Skin is warm  Capillary Refill: Capillary refill takes less than 2 seconds  Neurological:      Mental Status: She is alert and oriented to person, place, and time     Psychiatric:         Mood and Affect: Mood normal          Behavior: Behavior normal

## 2021-07-15 ENCOUNTER — TELEPHONE (OUTPATIENT)
Dept: FAMILY MEDICINE CLINIC | Facility: CLINIC | Age: 31
End: 2021-07-15

## 2021-07-16 ENCOUNTER — HOSPITAL ENCOUNTER (OUTPATIENT)
Dept: ULTRASOUND IMAGING | Facility: HOSPITAL | Age: 31
Discharge: HOME | End: 2021-07-16
Attending: OBSTETRICS & GYNECOLOGY
Payer: COMMERCIAL

## 2021-07-16 DIAGNOSIS — E04.9 ENLARGED THYROID: ICD-10-CM

## 2021-07-16 PROCEDURE — 76536 US EXAM OF HEAD AND NECK: CPT

## 2021-07-27 ENCOUNTER — TELEPHONE (OUTPATIENT)
Dept: FAMILY MEDICINE CLINIC | Facility: CLINIC | Age: 31
End: 2021-07-27

## 2021-07-27 RX ORDER — FLUTICASONE FUROATE AND VILANTEROL TRIFENATATE 100; 25 UG/1; UG/1
1 POWDER RESPIRATORY (INHALATION) DAILY
Qty: 60 BLISTER | Refills: 0 | Status: SHIPPED | OUTPATIENT
Start: 2021-07-27 | End: 2021-08-09 | Stop reason: CLARIF

## 2021-07-27 NOTE — PATIENT INSTRUCTIONS

## 2021-07-27 NOTE — TELEPHONE ENCOUNTER
Left a message informing the patient a new inhaler was sent in for her and to follow up with the pharmacy

## 2021-07-27 NOTE — PROGRESS NOTES
Spoke with patient and urinary symptoms have diminished however she is currently having mild shortness of breath  She did state she has asthma and has not yet received the inhaler we prescribed for her  She did receive letter of denial from insurance so I explained to her that we initiated a prior authorization on 7/20/21 for the inhaler and that we are just waiting to get authorization  Told patient we would let provider know of current symptoms and see if there is anything we can do in the mean time to help  Please advise

## 2021-07-27 NOTE — TELEPHONE ENCOUNTER
----- Message from Americo Miguel, [address]  sent at 7/27/2021 12:49 PM EDT -----  Please follow up and see how she is doing please

## 2021-08-09 DIAGNOSIS — J45.40 MODERATE PERSISTENT ASTHMA WITHOUT COMPLICATION: Primary | ICD-10-CM

## 2021-08-09 NOTE — TELEPHONE ENCOUNTER
Alternative was requested for Breo Ellipta INH  New order placed for Fluticasone-Salmeterol 250-50 dose inhaler since this is stated that it is covered under formulary  (See media for letter from insurance)   Order is pending approval

## 2021-08-11 ENCOUNTER — TELEPHONE (OUTPATIENT)
Dept: PULMONOLOGY | Facility: CLINIC | Age: 31
End: 2021-08-11

## 2021-08-11 NOTE — TELEPHONE ENCOUNTER
8/11 - Pt said she has refills of albuterol inhaler, but she was also on a steroid that helped her while on the albuterol  Pt said the albuterol alone is not helping  Pt wants to know if Dr Brody Lund can prescribe any other medication (with or w/out the steroid) for her other than the albuterol  Pt also wants to know if she should be getting a flu shot  Please review and advise the pt

## 2021-08-12 NOTE — TELEPHONE ENCOUNTER
Spoke to patient  Feels that Advair is not working  She needs an appointment preferably next week    Advised to call our office tomorrow

## 2021-08-18 ENCOUNTER — OFFICE VISIT (OUTPATIENT)
Dept: PULMONOLOGY | Facility: CLINIC | Age: 31
End: 2021-08-18
Payer: COMMERCIAL

## 2021-08-18 VITALS
SYSTOLIC BLOOD PRESSURE: 118 MMHG | DIASTOLIC BLOOD PRESSURE: 72 MMHG | WEIGHT: 142 LBS | OXYGEN SATURATION: 98 % | HEIGHT: 64 IN | TEMPERATURE: 98.6 F | BODY MASS INDEX: 24.24 KG/M2 | HEART RATE: 77 BPM

## 2021-08-18 DIAGNOSIS — R09.81 NASAL CONGESTION: ICD-10-CM

## 2021-08-18 DIAGNOSIS — J30.2 SEASONAL ALLERGIES: ICD-10-CM

## 2021-08-18 DIAGNOSIS — J45.40 MODERATE PERSISTENT ASTHMA WITHOUT COMPLICATION: Primary | ICD-10-CM

## 2021-08-18 PROCEDURE — 1036F TOBACCO NON-USER: CPT | Performed by: INTERNAL MEDICINE

## 2021-08-18 PROCEDURE — 3008F BODY MASS INDEX DOCD: CPT | Performed by: INTERNAL MEDICINE

## 2021-08-18 PROCEDURE — 99214 OFFICE O/P EST MOD 30 MIN: CPT | Performed by: INTERNAL MEDICINE

## 2021-08-18 RX ORDER — MONTELUKAST SODIUM 10 MG/1
10 TABLET ORAL
Qty: 30 TABLET | Refills: 1 | Status: SHIPPED | OUTPATIENT
Start: 2021-08-18 | End: 2022-05-11 | Stop reason: SDUPTHER

## 2021-08-18 NOTE — ASSESSMENT & PLAN NOTE
She has allergic rhinitis and is currently on treatment with Flonase and loratadine as needed  I had started her on montelukast and this has helped her  She has requested a refill

## 2021-08-18 NOTE — ASSESSMENT & PLAN NOTE
Mrs Loni Crabtree moderate persistent asthma  She had COVID infection before  Newton Rebolledo recent PFT was unremarkable except for borderline FEV1   The flow volume loop showed a curvature to the expiratory loop   Her chest x-ray showed hyperinflation but otherwise unremarkable   She did not have any eosinophilia on her previous CBC  Currently she is on Advair and albuterol    She has not been using the inhalers properly and regularly and I have counseled her in this regard   Jenn Jensen had a long discussion with her and answered all her questions

## 2021-08-18 NOTE — PROGRESS NOTES
Assessment/Plan: Moderate persistent asthma without complication  Mrs Porsha Lal has moderate persistent asthma  She had COVID infection before  [facility] recent PFT was unremarkable except for borderline FEV1   The flow volume loop showed a curvature to the expiratory loop   Her chest x-ray showed hyperinflation but otherwise unremarkable   She did not have any eosinophilia on her previous CBC  Currently she is on Advair and albuterol  She has not been using the inhalers properly and regularly and I have counseled her in this regard   Carlos A Holm had a long discussion with her and answered all her questions        Non-seasonal allergic rhinitis due to fungal spores  She has allergic rhinitis and is currently on treatment with Flonase and loratadine as needed  I had started her on montelukast and this has helped her  She has requested a refill  Diagnoses and all orders for this visit:    Moderate persistent asthma without complication    Seasonal allergies    Nasal congestion  -     montelukast (SINGULAIR) 10 mg tablet; Take 1 tablet (10 mg total) by mouth daily at bedtime for 60 doses          Subjective:      Patient ID: Arnulfo Lanes is a 33 y o  female  Daljit Barajas has come for follow-up for her of asthma  Currently her exercise tolerance is more than a block  She has occasional wheezing  No significant chest pain cough or phlegm  No palpitation  No hoarseness of voice or dysphagia  She gargles throat after using Advair 90275  She does not use it regularly  She also has rescue albuterol inhaler  She is also on treatment with montelukast   She has allergic sinusitis and her symptoms are currently better  She denies any fever or chills  She is currently is not COVID vaccinated  I have counseled her in this regard  She denies any joint pain or swelling or arthritis I have counseled her to use her inhalers regularly        The following portions of the patient's history were reviewed and updated as appropriate: allergies, current medications, past family history, past medical history, past social history, past surgical history and problem list     Review of Systems   Constitutional: Negative for activity change, appetite change, chills, fatigue and fever  HENT: Negative for hearing loss, rhinorrhea, sinus pressure, sneezing, sore throat, trouble swallowing and voice change  Eyes: Negative for visual disturbance  Respiratory: Positive for shortness of breath and wheezing  Negative for cough and chest tightness  Cardiovascular: Positive for chest pain (occasional)  Negative for palpitations  Gastrointestinal: Negative for abdominal pain, constipation, diarrhea, nausea and vomiting  Endocrine: Negative for polyuria  Genitourinary: Negative for dysuria, frequency and urgency  Musculoskeletal: Negative for arthralgias, gait problem and joint swelling  Skin: Negative for rash  Allergic/Immunologic: Positive for environmental allergies  Neurological: Negative for dizziness, seizures, syncope, light-headedness and headaches  Psychiatric/Behavioral: Negative for agitation and sleep disturbance  The patient is nervous/anxious  Objective:      /72 (BP Location: Left arm, Patient Position: Sitting, Cuff Size: Adult)   Pulse 77   Temp 98 6 °F (37 °C) (Tympanic)   Ht 5' 4" (1 626 m)   Wt 64 4 kg (142 lb)   SpO2 98%   BMI 24 37 kg/m²          Physical Exam  Vitals reviewed  Constitutional:       General: She is not in acute distress  Appearance: She is not ill-appearing or toxic-appearing  HENT:      Head: Normocephalic  Eyes:      General: No scleral icterus  Conjunctiva/sclera: Conjunctivae normal    Cardiovascular:      Rate and Rhythm: Normal rate and regular rhythm  Heart sounds: Normal heart sounds  No murmur heard  Pulmonary:      Effort: Pulmonary effort is normal  No respiratory distress        Breath sounds: Normal breath sounds  No stridor  No wheezing, rhonchi or rales  Abdominal:      General: Bowel sounds are normal       Palpations: Abdomen is soft  Tenderness: There is no abdominal tenderness  There is no guarding  Musculoskeletal:      Cervical back: No rigidity  Right lower leg: No edema  Left lower leg: No edema  Lymphadenopathy:      Cervical: No cervical adenopathy  Skin:     Coloration: Skin is not jaundiced or pale  Neurological:      Mental Status: She is alert and oriented to person, place, and time  Gait: Gait normal    Psychiatric:         Mood and Affect: Mood normal          Behavior: Behavior normal          Thought Content:  Thought content normal

## 2021-09-17 ENCOUNTER — OFFICE VISIT (OUTPATIENT)
Dept: FAMILY MEDICINE CLINIC | Facility: CLINIC | Age: 31
End: 2021-09-17
Payer: COMMERCIAL

## 2021-09-17 VITALS
OXYGEN SATURATION: 97 % | DIASTOLIC BLOOD PRESSURE: 70 MMHG | RESPIRATION RATE: 17 BRPM | WEIGHT: 143.8 LBS | BODY MASS INDEX: 24.55 KG/M2 | SYSTOLIC BLOOD PRESSURE: 112 MMHG | HEART RATE: 86 BPM | TEMPERATURE: 98.1 F | HEIGHT: 64 IN

## 2021-09-17 DIAGNOSIS — R10.9 FLANK PAIN: ICD-10-CM

## 2021-09-17 DIAGNOSIS — Z87.440 HISTORY OF UTI: ICD-10-CM

## 2021-09-17 DIAGNOSIS — M54.50 ACUTE MIDLINE LOW BACK PAIN WITHOUT SCIATICA: Primary | ICD-10-CM

## 2021-09-17 DIAGNOSIS — M54.16 LEFT LUMBAR RADICULOPATHY: ICD-10-CM

## 2021-09-17 LAB
SL AMB  POCT GLUCOSE, UA: NEGATIVE
SL AMB LEUKOCYTE ESTERASE,UA: NEGATIVE
SL AMB POCT BILIRUBIN,UA: NEGATIVE
SL AMB POCT BLOOD,UA: NEGATIVE
SL AMB POCT CLARITY,UA: CLEAR
SL AMB POCT COLOR,UA: YELLOW
SL AMB POCT KETONES,UA: NEGATIVE
SL AMB POCT NITRITE,UA: NEGATIVE
SL AMB POCT PH,UA: 5
SL AMB POCT SPECIFIC GRAVITY,UA: 1.02
SL AMB POCT URINE PROTEIN: NEGATIVE
SL AMB POCT UROBILINOGEN: NEGATIVE

## 2021-09-17 PROCEDURE — 3008F BODY MASS INDEX DOCD: CPT | Performed by: INTERNAL MEDICINE

## 2021-09-17 PROCEDURE — 81002 URINALYSIS NONAUTO W/O SCOPE: CPT | Performed by: NURSE PRACTITIONER

## 2021-09-17 PROCEDURE — 99214 OFFICE O/P EST MOD 30 MIN: CPT | Performed by: NURSE PRACTITIONER

## 2021-09-17 RX ORDER — CYCLOBENZAPRINE HCL 5 MG
5 TABLET ORAL 2 TIMES DAILY PRN
Qty: 40 TABLET | Refills: 0 | Status: SHIPPED | OUTPATIENT
Start: 2021-09-17 | End: 2022-06-29

## 2021-11-05 ENCOUNTER — CONSULT (OUTPATIENT)
Dept: OBGYN CLINIC | Facility: CLINIC | Age: 31
End: 2021-11-05
Payer: COMMERCIAL

## 2021-11-05 VITALS
BODY MASS INDEX: 25.03 KG/M2 | SYSTOLIC BLOOD PRESSURE: 114 MMHG | DIASTOLIC BLOOD PRESSURE: 72 MMHG | WEIGHT: 146.6 LBS | HEIGHT: 64 IN

## 2021-11-05 DIAGNOSIS — F52.0 LACK OF LIBIDO: Primary | ICD-10-CM

## 2021-11-05 DIAGNOSIS — Z30.09 FAMILY PLANNING COUNSELING: ICD-10-CM

## 2021-11-05 PROBLEM — H65.91 RIGHT NON-SUPPURATIVE OTITIS MEDIA: Status: RESOLVED | Noted: 2020-09-14 | Resolved: 2021-11-05

## 2021-11-05 PROBLEM — J01.00 ACUTE NON-RECURRENT MAXILLARY SINUSITIS: Status: RESOLVED | Noted: 2020-04-13 | Resolved: 2021-11-05

## 2021-11-05 PROCEDURE — 99213 OFFICE O/P EST LOW 20 MIN: CPT | Performed by: PHYSICIAN ASSISTANT

## 2021-11-10 ENCOUNTER — NURSE TRIAGE (OUTPATIENT)
Dept: OTHER | Facility: OTHER | Age: 31
End: 2021-11-10

## 2021-11-10 ENCOUNTER — HOSPITAL ENCOUNTER (EMERGENCY)
Facility: HOSPITAL | Age: 31
Discharge: HOME | End: 2021-11-10
Attending: EMERGENCY MEDICINE | Admitting: EMERGENCY MEDICINE
Payer: COMMERCIAL

## 2021-11-10 ENCOUNTER — APPOINTMENT (EMERGENCY)
Dept: RADIOLOGY | Facility: HOSPITAL | Age: 31
End: 2021-11-10
Payer: COMMERCIAL

## 2021-11-10 VITALS
HEART RATE: 81 BPM | OXYGEN SATURATION: 99 % | WEIGHT: 135 LBS | SYSTOLIC BLOOD PRESSURE: 109 MMHG | RESPIRATION RATE: 18 BRPM | DIASTOLIC BLOOD PRESSURE: 71 MMHG | TEMPERATURE: 97.8 F | HEIGHT: 64 IN | BODY MASS INDEX: 23.05 KG/M2

## 2021-11-10 DIAGNOSIS — R07.89 SENSATION OF CHEST PRESSURE: ICD-10-CM

## 2021-11-10 DIAGNOSIS — U07.1 COVID-19: Primary | ICD-10-CM

## 2021-11-10 LAB
ALBUMIN SERPL BCP-MCNC: 4.1 G/DL (ref 3.4–4.8)
ALP SERPL-CCNC: 82.1 U/L (ref 35–140)
ALT SERPL W P-5'-P-CCNC: 50 U/L (ref 5–54)
ANION GAP SERPL CALCULATED.3IONS-SCNC: 7 MMOL/L (ref 4–13)
AST SERPL W P-5'-P-CCNC: 46 U/L (ref 15–41)
BASOPHILS # BLD AUTO: 0.16 THOUSANDS/ΜL (ref 0–0.1)
BASOPHILS NFR BLD AUTO: 2 % (ref 0–1)
BILIRUB SERPL-MCNC: 0.26 MG/DL (ref 0.3–1.2)
BILIRUB UR QL STRIP: NEGATIVE
BUN SERPL-MCNC: 13 MG/DL (ref 6–20)
CALCIUM SERPL-MCNC: 9.3 MG/DL (ref 8.4–10.2)
CARDIAC TROPONIN I PNL SERPL HS: 3 NG/L
CHLORIDE SERPL-SCNC: 105 MMOL/L (ref 96–108)
CLARITY UR: CLEAR
CO2 SERPL-SCNC: 29 MMOL/L (ref 22–33)
COLOR UR: YELLOW
CREAT SERPL-MCNC: 0.77 MG/DL (ref 0.4–1.1)
EOSINOPHIL # BLD AUTO: 0.3 THOUSAND/ΜL (ref 0–0.61)
EOSINOPHIL NFR BLD AUTO: 4 % (ref 0–6)
ERYTHROCYTE [DISTWIDTH] IN BLOOD BY AUTOMATED COUNT: 13.8 % (ref 11.6–15.1)
EXT PREG TEST URINE: NEGATIVE
EXT. CONTROL ED NAV: NORMAL
FLUAV RNA RESP QL NAA+PROBE: NEGATIVE
FLUBV RNA RESP QL NAA+PROBE: NEGATIVE
GFR SERPL CREATININE-BSD FRML MDRD: 102 ML/MIN/1.73SQ M
GLUCOSE SERPL-MCNC: 86 MG/DL (ref 65–140)
GLUCOSE UR STRIP-MCNC: NEGATIVE MG/DL
HCT VFR BLD AUTO: 36.1 % (ref 34.8–46.1)
HGB BLD-MCNC: 11.5 G/DL (ref 11.5–15.4)
HGB UR QL STRIP.AUTO: NEGATIVE
IMM GRANULOCYTES # BLD AUTO: 0 THOUSAND/UL (ref 0–0.2)
IMM GRANULOCYTES NFR BLD AUTO: 0 % (ref 0–2)
KETONES UR STRIP-MCNC: ABNORMAL MG/DL
LEUKOCYTE ESTERASE UR QL STRIP: NEGATIVE
LYMPHOCYTES # BLD AUTO: 2.01 THOUSANDS/ΜL (ref 0.6–4.47)
LYMPHOCYTES NFR BLD AUTO: 30 % (ref 14–44)
MCH RBC QN AUTO: 28.2 PG (ref 26.8–34.3)
MCHC RBC AUTO-ENTMCNC: 31.9 G/DL (ref 31.4–37.4)
MCV RBC AUTO: 89 FL (ref 82–98)
MONOCYTES # BLD AUTO: 0.58 THOUSAND/ΜL (ref 0.17–1.22)
MONOCYTES NFR BLD AUTO: 9 % (ref 4–12)
NEUTROPHILS # BLD AUTO: 3.74 THOUSANDS/ΜL (ref 1.85–7.62)
NEUTS SEG NFR BLD AUTO: 55 % (ref 43–75)
NITRITE UR QL STRIP: NEGATIVE
PH UR STRIP.AUTO: 7 [PH]
PLATELET # BLD AUTO: 270 THOUSANDS/UL (ref 149–390)
PMV BLD AUTO: 10.4 FL (ref 8.9–12.7)
POTASSIUM SERPL-SCNC: 3.9 MMOL/L (ref 3.5–5)
PROT SERPL-MCNC: 7.1 G/DL (ref 6.4–8.3)
PROT UR STRIP-MCNC: NEGATIVE MG/DL
RBC # BLD AUTO: 4.08 MILLION/UL (ref 3.81–5.12)
RSV RNA RESP QL NAA+PROBE: NEGATIVE
SARS-COV-2 RNA RESP QL NAA+PROBE: POSITIVE
SODIUM SERPL-SCNC: 141 MMOL/L (ref 133–145)
SP GR UR STRIP.AUTO: 1.02 (ref 1–1.03)
UROBILINOGEN UR QL STRIP.AUTO: 1 E.U./DL
WBC # BLD AUTO: 6.8 THOUSAND/UL (ref 4.31–10.16)

## 2021-11-10 PROCEDURE — 71045 X-RAY EXAM CHEST 1 VIEW: CPT

## 2021-11-10 PROCEDURE — 80053 COMPREHEN METABOLIC PANEL: CPT | Performed by: STUDENT IN AN ORGANIZED HEALTH CARE EDUCATION/TRAINING PROGRAM

## 2021-11-10 PROCEDURE — 81025 URINE PREGNANCY TEST: CPT | Performed by: EMERGENCY MEDICINE

## 2021-11-10 PROCEDURE — 85025 COMPLETE CBC W/AUTO DIFF WBC: CPT | Performed by: STUDENT IN AN ORGANIZED HEALTH CARE EDUCATION/TRAINING PROGRAM

## 2021-11-10 PROCEDURE — 36415 COLL VENOUS BLD VENIPUNCTURE: CPT | Performed by: STUDENT IN AN ORGANIZED HEALTH CARE EDUCATION/TRAINING PROGRAM

## 2021-11-10 PROCEDURE — NC001 PR NO CHARGE: Performed by: EMERGENCY MEDICINE

## 2021-11-10 PROCEDURE — 99284 EMERGENCY DEPT VISIT MOD MDM: CPT | Performed by: STUDENT IN AN ORGANIZED HEALTH CARE EDUCATION/TRAINING PROGRAM

## 2021-11-10 PROCEDURE — 99285 EMERGENCY DEPT VISIT HI MDM: CPT

## 2021-11-10 PROCEDURE — 81003 URINALYSIS AUTO W/O SCOPE: CPT | Performed by: EMERGENCY MEDICINE

## 2021-11-10 PROCEDURE — 0241U HB NFCT DS VIR RESP RNA 4 TRGT: CPT | Performed by: EMERGENCY MEDICINE

## 2021-11-10 PROCEDURE — 84484 ASSAY OF TROPONIN QUANT: CPT | Performed by: STUDENT IN AN ORGANIZED HEALTH CARE EDUCATION/TRAINING PROGRAM

## 2021-11-10 PROCEDURE — 94640 AIRWAY INHALATION TREATMENT: CPT

## 2021-11-10 RX ORDER — ALBUTEROL SULFATE 90 UG/1
1 AEROSOL, METERED RESPIRATORY (INHALATION) EVERY 6 HOURS PRN
Qty: 8 G | Refills: 0 | Status: SHIPPED | OUTPATIENT
Start: 2021-11-10 | End: 2022-06-29 | Stop reason: SDUPTHER

## 2021-11-10 RX ORDER — IPRATROPIUM BROMIDE AND ALBUTEROL SULFATE 2.5; .5 MG/3ML; MG/3ML
3 SOLUTION RESPIRATORY (INHALATION)
Status: DISCONTINUED | OUTPATIENT
Start: 2021-11-10 | End: 2021-11-11 | Stop reason: HOSPADM

## 2021-11-10 RX ORDER — IPRATROPIUM BROMIDE AND ALBUTEROL SULFATE 2.5; .5 MG/3ML; MG/3ML
3 SOLUTION RESPIRATORY (INHALATION) EVERY 6 HOURS PRN
Qty: 3 ML | Refills: 0 | Status: SHIPPED | OUTPATIENT
Start: 2021-11-10 | End: 2022-06-29

## 2021-11-10 RX ADMIN — IPRATROPIUM BROMIDE AND ALBUTEROL SULFATE 3 ML: 2.5; .5 SOLUTION RESPIRATORY (INHALATION) at 21:24

## 2021-11-24 ENCOUNTER — TELEMEDICINE (OUTPATIENT)
Dept: PULMONOLOGY | Facility: CLINIC | Age: 31
End: 2021-11-24
Payer: COMMERCIAL

## 2021-11-24 VITALS — HEIGHT: 64 IN | BODY MASS INDEX: 24.75 KG/M2 | WEIGHT: 145 LBS

## 2021-11-24 DIAGNOSIS — J30.89 NON-SEASONAL ALLERGIC RHINITIS DUE TO FUNGAL SPORES: ICD-10-CM

## 2021-11-24 DIAGNOSIS — J45.20 MILD INTERMITTENT ASTHMA WITHOUT COMPLICATION: ICD-10-CM

## 2021-11-24 DIAGNOSIS — U07.1 COVID-19: ICD-10-CM

## 2021-11-24 PROCEDURE — 99213 OFFICE O/P EST LOW 20 MIN: CPT | Performed by: INTERNAL MEDICINE

## 2021-11-24 PROCEDURE — 1036F TOBACCO NON-USER: CPT | Performed by: INTERNAL MEDICINE

## 2021-11-24 PROCEDURE — 3008F BODY MASS INDEX DOCD: CPT | Performed by: INTERNAL MEDICINE

## 2021-12-29 ENCOUNTER — TELEPHONE (OUTPATIENT)
Dept: DERMATOLOGY | Facility: CLINIC | Age: 31
End: 2021-12-29

## 2022-01-04 ENCOUNTER — TELEPHONE (OUTPATIENT)
Dept: DERMATOLOGY | Facility: CLINIC | Age: 32
End: 2022-01-04

## 2022-01-13 ENCOUNTER — OFFICE VISIT (OUTPATIENT)
Dept: DERMATOLOGY | Facility: CLINIC | Age: 32
End: 2022-01-13
Payer: MEDICARE

## 2022-01-13 VITALS — WEIGHT: 144.2 LBS | BODY MASS INDEX: 24.62 KG/M2 | TEMPERATURE: 97.7 F | HEIGHT: 64 IN

## 2022-01-13 DIAGNOSIS — L81.8 POST INFLAMMATORY HYPOPIGMENTATION: Primary | ICD-10-CM

## 2022-01-13 DIAGNOSIS — L30.8 OTHER ECZEMA: ICD-10-CM

## 2022-01-13 PROCEDURE — 99214 OFFICE O/P EST MOD 30 MIN: CPT | Performed by: DERMATOLOGY

## 2022-01-13 NOTE — PROGRESS NOTES
[facility] Follow Up Note    Patient Name: Jhony Frank  Encounter Date: 1/13/2022    Today's Chief Concerns:  Earna Radha Concern #1: Left forearm spots    Concern #2:  Rash on right hand    Current Medications:    Current Outpatient Medications:     acetaminophen (TYLENOL) 325 mg tablet, Take 2 tablets (650 mg total) by mouth every 6 (six) hours (Patient not taking: Reported on 11/10/2021 ), Disp: 30 tablet, Rfl: 0    albuterol (PROVENTIL HFA,VENTOLIN HFA) 90 mcg/act inhaler, Inhale 1 puff every 6 (six) hours as needed for wheezing or shortness of breath, Disp: 8 g, Rfl: 0    cetirizine (ZyrTEC) oral solution, TAKE 2 5 ML (2 5 MG TOTAL) BY MOUTH DAILY (Patient not taking: Reported on 11/10/2021), Disp: , Rfl:     cyclobenzaprine (FLEXERIL) 5 mg tablet, Take 1 tablet (5 mg total) by mouth 2 (two) times a day as needed for muscle spasms for up to 20 days, Disp: 40 tablet, Rfl: 0    fluticasone (FLONASE) 50 mcg/act nasal spray, 1 spray into each nostril daily for 10 days, Disp: 18 mL, Rfl: 1    fluticasone-salmeterol (Advair) 250-50 mcg/dose inhaler, Inhale 1 puff 2 (two) times a day Rinse mouth after use   (Patient not taking: Reported on 11/10/2021 ), Disp: 60 blister, Rfl: 1    ibuprofen (MOTRIN) 600 mg tablet, Take 1 tablet (600 mg total) by mouth every 6 (six) hours as needed (cramping) (Patient not taking: Reported on 11/10/2021 ), Disp: 30 tablet, Rfl: 0    ipratropium-albuterol (DUO-NEB) 0 5-2 5 mg/3 mL nebulizer solution, Take 3 mL by nebulization every 6 (six) hours as needed for wheezing or shortness of breath for up to 10 doses, Disp: 3 mL, Rfl: 0    montelukast (SINGULAIR) 10 mg tablet, Take 1 tablet (10 mg total) by mouth daily at bedtime for 60 doses, Disp: 30 tablet, Rfl: 1    NAPROXEN PO, Take by mouth, Disp: , Rfl:     Nerve Stimulator (STANDARD TENS) OMER, by Does not apply route 4 (four) times a day, Disp: 1 Device, Rfl: 0    CONSTITUTIONAL:   Vitals:    01/13/22 1025   Temp: 97 7 °F (36 5 °C)   TempSrc: Temporal   Weight: 65 4 kg (144 lb 3 2 oz)   Height: 5' 4" (1 626 m)       Specific Alerts:    Have you been seen by a [facility] Dermatologist in the last 3 years? YES, Dr Aranv Marques    Are you pregnant or planning to become pregnant? No    Are you currently or planning to be nursing or breast feeding? No    Allergies   Allergen Reactions    Phenazopyridine Anaphylaxis and Hives    Other      Dove Deodorant      Lavender Oil Hives       May we call your Preferred Phone number to discuss your specific medical information? YES    May we leave a detailed message that includes your specific medical information? YES    Have you traveled outside of the [facility] in the past 3 months? No    Do you currently have a pacemaker or defibrillator? No    Do you have any artificial heart valves, joints, plates, screws, rods, stents, pins, etc? No   - If Yes, were any placed within the last 2 years? Do you require any medications prior to a surgical procedure? No      Are you taking any medications that cause you to bleed more easily ("blood thinners") No    Have you ever experienced a rapid heartbeat with epinephrine? No      Review of Systems:  Have you recently had or currently have any of the following?     · Fever or chills: No  · Night Sweats: No  · Headaches: No  · Weight Gain: No  · Weight Loss: No  · Blurry Vision: No  · Nausea: No  · Vomiting: No  · Diarrhea: No  · Blood in Stool: No  · Abdominal Pain: No  · Itchy Skin: YES, Hands   · Painful Joints: No  · Swollen Joints: No  · Muscle Pain: No  · Irregular Mole: No  · Sun Burn: No  · Dry Skin: YES  · Skin Color Changes: No  · Scar or Keloid: No  · Cold Sores/Fever Blisters: No  · Bacterial Infections/MRSA: No  · Anxiety: No  · Depression: No  · Suicidal or Homicidal Thoughts: No      PSYCH: Normal mood and affect  EYES: Normal conjunctiva  ENT: Normal lips and oral mucosa  CARDIOVASCULAR: No edema  RESPIRATORY: Normal respirations    FOCUSED ORGAN SYSTEM SKIN EXAM (SKIN)   Right Arm/Hand/Fingers Normal except as noted below in Assessment   Left Arm/Hand/Fingers Normal except as noted below in Assessment     POST INFLAMMATORY HYPOPIGMENTATION    Physical Exam:   Anatomic Location Affected:  Left forearm    Morphological Description:  4 hypopigmented circular macules with central normal appearing skin, in a linear distribution    Additional History of Present Condition:  Patient states that she just woke up last month with these white spots on her skin  Has an aunt with vitiligo so was worried  Tried putting antifungal cream on it but it has not made them go away  No itchiness, no pain, no redness  They have not been changing in anyway since they showed up  Assessment and Plan:  Based on a thorough discussion of this condition and the management approach to it (including a comprehensive discussion of the known risks, side effects and potential benefits of treatment), the patient (family) agrees to implement the following specific plan:   Monitor the area  If it gets worse or more spot appear let us know   We will take a picture to monitor the area   Make sure to protect the area from sun  Use sunscreen on that area daily   Did not fluoresce bone-white under wood's lamp   Follow up if worsening      HAND ECZEMA     Physical Exam:   Anatomic Location Affected:  Right hand    Morphological Description:  Xerosis, scaly red patches, fissuring    Additional History of Present Condition:  Presents for couple months  She has been using over the counter hydrocortisone cream once or twice a day as needed  She feels is helping very minimally; is a stay at home mom      Assessment and Plan:  Based on a thorough discussion of this condition and the management approach to it (including a comprehensive discussion of the known risks, side effects and potential benefits of treatment), the patient (family) agrees to implement the following specific plan:  Start Triamcinolone 0 1% ointment twice a day for up to 14 days  Avoid face, under armpits and groin areas  It is important to take at least 1 week break between uses  Use Gloves when washing the dishes  Start using Dove moisturizer bar soap to wash hands    Use moisturizer like Eucerin,Cerave or Aveeno Cream 3 times a day for the dry skin         Advise gentle skin care as follows:   Use lukewarm water less than 10 minutes   Immediately moisturize with heavy emollient   BEST - OINTMENTS, such as Vaseline, Aquaphor, Cerave healing ointment      BETTER - CREAMS, such as Cerave, Cetaphil, VaniCREAM, Aveeno, Eucerin  AVOID LOTIONS, too thin, most things in pump  Moisturize twice a day  Scribe Attestation    I,:  Cherie Case am acting as a scribe while in the presence of the attending physician :       I,:  Kirsty Valencia MD personally performed the services described in this documentation    as scribed in my presence :         The patient was seen and discussed with Dr Daily Cabrera       RTC: As needed if white spots are getting worse or for the hand eczema     Kirsty Valencia  Dermatology PGY-3 Resident Physician

## 2022-01-13 NOTE — PATIENT INSTRUCTIONS
POST INFLAMMATORY HYPOPIGMENTATION              Assessment and Plan:  Based on a thorough discussion of this condition and the management approach to it (including a comprehensive discussion of the known risks, side effects and potential benefits of treatment), the patient (family) agrees to implement the following specific plan:   Monitor the area  If it gets worse or more spot appear let us know   We will take a picture to monitor the area   Make sure to protect the area from sun  Use sunscreen on that area daily   Did not fluoresce bone-white under wood's lamp   Follow up if worsening      HAND ECZEMA     Assessment and Plan:  Based on a thorough discussion of this condition and the management approach to it (including a comprehensive discussion of the known risks, side effects and potential benefits of treatment), the patient (family) agrees to implement the following specific plan:  Start Triamcinolone 0 1% ointment twice a day for up to 14 days  Avoid face, under armpits and groin areas  It is important to take at least 1 week break between uses  Use Gloves when washing the dishes  Start using Dove moisturizer bar soap to wash hands    Use moisturizer like Eucerin,Cerave or Aveeno Cream 3 times a day for the dry skin         Advise gentle skin care as follows:   Use lukewarm water less than 10 minutes   Immediately moisturize with heavy emollient   BEST - OINTMENTS, such as Vaseline, Aquaphor, Cerave healing ointment      BETTER - CREAMS, such as Cerave, Cetaphil, VaniCREAM, Aveeno, Eucerin  AVOID LOTIONS, too thin, most things in pump  Moisturize twice a day

## 2022-03-03 ENCOUNTER — APPOINTMENT (EMERGENCY)
Dept: CT IMAGING | Facility: HOSPITAL | Age: 32
End: 2022-03-03
Payer: MEDICARE

## 2022-03-03 ENCOUNTER — HOSPITAL ENCOUNTER (EMERGENCY)
Facility: HOSPITAL | Age: 32
Discharge: HOME | End: 2022-03-03
Attending: EMERGENCY MEDICINE
Payer: MEDICARE

## 2022-03-03 VITALS
RESPIRATION RATE: 16 BRPM | HEART RATE: 77 BPM | HEIGHT: 64 IN | TEMPERATURE: 97.4 F | DIASTOLIC BLOOD PRESSURE: 81 MMHG | OXYGEN SATURATION: 98 % | WEIGHT: 135 LBS | SYSTOLIC BLOOD PRESSURE: 115 MMHG | BODY MASS INDEX: 23.05 KG/M2

## 2022-03-03 DIAGNOSIS — R20.2 NUMBNESS AND TINGLING: Primary | ICD-10-CM

## 2022-03-03 DIAGNOSIS — R20.0 NUMBNESS AND TINGLING: Primary | ICD-10-CM

## 2022-03-03 DIAGNOSIS — R79.89 DECREASED THYROID STIMULATING HORMONE (TSH) LEVEL: ICD-10-CM

## 2022-03-03 DIAGNOSIS — R79.89 LOW SERUM THYROID STIMULATING HORMONE (TSH): ICD-10-CM

## 2022-03-03 LAB
ALBUMIN SERPL BCP-MCNC: 4.1 G/DL (ref 3.5–5)
ALP SERPL-CCNC: 68 U/L (ref 34–104)
ALT SERPL W P-5'-P-CCNC: 17 U/L (ref 7–52)
ANION GAP SERPL CALCULATED.3IONS-SCNC: 5 MMOL/L (ref 4–13)
AST SERPL W P-5'-P-CCNC: 18 U/L (ref 13–39)
BASOPHILS # BLD AUTO: 0.01 THOUSANDS/ΜL (ref 0–0.1)
BASOPHILS NFR BLD AUTO: 0 % (ref 0–1)
BILIRUB SERPL-MCNC: 0.28 MG/DL (ref 0.2–1)
BUN SERPL-MCNC: 9 MG/DL (ref 5–25)
CALCIUM SERPL-MCNC: 9.5 MG/DL (ref 8.4–10.2)
CHLORIDE SERPL-SCNC: 102 MMOL/L (ref 96–108)
CO2 SERPL-SCNC: 30 MMOL/L (ref 21–32)
CREAT SERPL-MCNC: 0.6 MG/DL (ref 0.6–1.3)
EOSINOPHIL # BLD AUTO: 0.14 THOUSAND/ΜL (ref 0–0.61)
EOSINOPHIL NFR BLD AUTO: 2 % (ref 0–6)
ERYTHROCYTE [DISTWIDTH] IN BLOOD BY AUTOMATED COUNT: 13.4 % (ref 11.6–15.1)
GFR SERPL CREATININE-BSD FRML MDRD: 120 ML/MIN/1.73SQ M
GLUCOSE SERPL-MCNC: 114 MG/DL (ref 65–140)
HCT VFR BLD AUTO: 37.1 % (ref 34.8–46.1)
HGB BLD-MCNC: 12.4 G/DL (ref 11.5–15.4)
IMM GRANULOCYTES # BLD AUTO: 0.01 THOUSAND/UL (ref 0–0.2)
IMM GRANULOCYTES NFR BLD AUTO: 0 % (ref 0–2)
LYMPHOCYTES # BLD AUTO: 2.35 THOUSANDS/ΜL (ref 0.6–4.47)
LYMPHOCYTES NFR BLD AUTO: 34 % (ref 14–44)
MCH RBC QN AUTO: 28.4 PG (ref 26.8–34.3)
MCHC RBC AUTO-ENTMCNC: 33.4 G/DL (ref 31.4–37.4)
MCV RBC AUTO: 85 FL (ref 82–98)
MONOCYTES # BLD AUTO: 0.29 THOUSAND/ΜL (ref 0.17–1.22)
MONOCYTES NFR BLD AUTO: 4 % (ref 4–12)
NEUTROPHILS # BLD AUTO: 4.15 THOUSANDS/ΜL (ref 1.85–7.62)
NEUTS SEG NFR BLD AUTO: 60 % (ref 43–75)
NRBC BLD AUTO-RTO: 0 /100 WBCS
PLATELET # BLD AUTO: 294 THOUSANDS/UL (ref 149–390)
PMV BLD AUTO: 10.2 FL (ref 8.9–12.7)
POTASSIUM SERPL-SCNC: 3.4 MMOL/L (ref 3.5–5.3)
PROT SERPL-MCNC: 6.8 G/DL (ref 6.4–8.4)
RBC # BLD AUTO: 4.37 MILLION/UL (ref 3.81–5.12)
SODIUM SERPL-SCNC: 137 MMOL/L (ref 135–147)
TSH SERPL DL<=0.05 MIU/L-ACNC: 0.42 UIU/ML (ref 0.45–5.33)
WBC # BLD AUTO: 6.95 THOUSAND/UL (ref 4.31–10.16)

## 2022-03-03 PROCEDURE — 70450 CT HEAD/BRAIN W/O DYE: CPT

## 2022-03-03 PROCEDURE — 99284 EMERGENCY DEPT VISIT MOD MDM: CPT | Performed by: PHYSICIAN ASSISTANT

## 2022-03-03 PROCEDURE — G1004 CDSM NDSC: HCPCS

## 2022-03-03 PROCEDURE — 84439 ASSAY OF FREE THYROXINE: CPT | Performed by: PHYSICIAN ASSISTANT

## 2022-03-03 PROCEDURE — 36415 COLL VENOUS BLD VENIPUNCTURE: CPT | Performed by: PHYSICIAN ASSISTANT

## 2022-03-03 PROCEDURE — 85025 COMPLETE CBC W/AUTO DIFF WBC: CPT | Performed by: PHYSICIAN ASSISTANT

## 2022-03-03 PROCEDURE — 80053 COMPREHEN METABOLIC PANEL: CPT | Performed by: PHYSICIAN ASSISTANT

## 2022-03-03 PROCEDURE — 83735 ASSAY OF MAGNESIUM: CPT

## 2022-03-03 PROCEDURE — 99284 EMERGENCY DEPT VISIT MOD MDM: CPT

## 2022-03-03 PROCEDURE — 84443 ASSAY THYROID STIM HORMONE: CPT | Performed by: PHYSICIAN ASSISTANT

## 2022-03-03 NOTE — ED PROVIDER NOTES
History  Chief Complaint   Patient presents with    Numbness     L arm and L leg numbness x2-3 days  Facial tingling all over starting today  HX of TIA "a few years ago" symptoms now are not similar to previous TIA  also reports generalized weakness and "brain fog"/ feeling of being unfocused  Pt with pertinent Past Medical History: Special sensory attacks, Chronic low back pain, Left lumbar radiculopathy, Iron deficiency anemia secondary to inadequate dietary iron intake, Mild intermittent asthma, Anxiety, GERD, HTN, absence seizures, Migraine headache, Postpartum depression,   Past Surgical History: HERNIA REPAIR, IUD, WISDOM TOOTH EXTRACTION    11/10/2021 COVID +  Presents to ED c/o few day h/o intermittent numbness/tingling to whole face, that was worse today and intermittently to  B/L legs, "feeling off", fatigued,  hasn't been sleeping well, no weakness, no rash, no fever, no cp, no sob, no abd pain, no NVD  Pt reports remote right sided headache none now/today  Prior to Admission Medications   Prescriptions Last Dose Informant Patient Reported? Taking?    NAPROXEN PO  Self Yes No   Sig: Take by mouth   Nerve Stimulator (STANDARD TENS) OMER   No No   Sig: by Does not apply route 4 (four) times a day   acetaminophen (TYLENOL) 325 mg tablet  Self No No   Sig: Take 2 tablets (650 mg total) by mouth every 6 (six) hours   Patient not taking: Reported on 11/10/2021    albuterol (PROVENTIL HFA,VENTOLIN HFA) 90 mcg/act inhaler 3/2/2022 at Unknown time  No Yes   Sig: Inhale 1 puff every 6 (six) hours as needed for wheezing or shortness of breath   cetirizine (ZyrTEC) oral solution   Yes No   Sig: TAKE 2 5 ML (2 5 MG TOTAL) BY MOUTH DAILY   Patient not taking: Reported on 11/10/2021   cyclobenzaprine (FLEXERIL) 5 mg tablet   No No   Sig: Take 1 tablet (5 mg total) by mouth 2 (two) times a day as needed for muscle spasms for up to 20 days   fluticasone (FLONASE) 50 mcg/act nasal spray   No No   Si spray into each nostril daily for 10 days   fluticasone-salmeterol (Advair) 250-50 mcg/dose inhaler   No No   Sig: Inhale 1 puff 2 (two) times a day Rinse mouth after use  Patient not taking: Reported on 11/10/2021    ibuprofen (MOTRIN) 600 mg tablet  Self No No   Sig: Take 1 tablet (600 mg total) by mouth every 6 (six) hours as needed (cramping)   Patient not taking: Reported on 11/10/2021    ipratropium-albuterol (DUO-NEB) 0 5-2 5 mg/3 mL nebulizer solution 3/2/2022 at Unknown time  No Yes   Sig: Take 3 mL by nebulization every 6 (six) hours as needed for wheezing or shortness of breath for up to 10 doses   montelukast (SINGULAIR) 10 mg tablet   No No   Sig: Take 1 tablet (10 mg total) by mouth daily at bedtime for 60 doses   triamcinolone (KENALOG) 0 1 % ointment   No No   Sig: Apply topically 2 (two) times a day for up to 14 days to right hand  Do NOT use on face, armpits, or groin  It is important to take at least 1 week break between 2 week uses        Facility-Administered Medications: None       Past Medical History:   Diagnosis Date    Abnormal Pap smear of cervix     had cryosurgery 2015    Anxiety     Carrier of group B Streptococcus     GERD (gastroesophageal reflux disease)     High blood pressure     during pregnancy     History of UTI     Hx of absence seizures     once(per sanjiv)    Hx of seasonal allergies     Migraine headache     Muscle spasm of back     Papanicolaou smear for cervical cancer screening 06/2017    Pap neg 5/2016, pap- neg/GC/CT NEG    Postpartum depression     Preeclampsia     Psychiatric disorder     Umbilical hernia 71/3285    Varicose veins of lower extremity        Past Surgical History:   Procedure Laterality Date    HERNIA REPAIR      2 years ago    INSERTION OF INTRAUTERINE DEVICE (IUD)      OTHER SURGICAL HISTORY  11/18/2015    cryosurgery     WISDOM TOOTH EXTRACTION         Family History   Problem Relation Age of Onset    Hypertension Mother    Narciso phoebe Arthritis Mother     Asthma Mother     Diabetes Mother         Prediabetic     Sleep apnea Mother     Lymphoma Maternal Grandmother     Other Maternal Grandmother         Cardiomegaly    Diabetes Maternal Grandmother     COPD Maternal Grandmother     Heart attack Maternal Grandfather          61    Varicose Veins Paternal Grandmother     Hyperthyroidism Maternal Aunt     Asthma Maternal Aunt     Bipolar disorder Maternal Aunt     Thyroid disease Maternal Aunt     Varicose Veins Paternal Aunt     Cancer Cousin     Diabetes Cousin     Diabetes Other     Asthma Sister     Fibromyalgia Family     Arthritis Family     GI problems Family     Asthma Son      I have reviewed and agree with the history as documented  E-Cigarette/Vaping    E-Cigarette Use Never User      E-Cigarette/Vaping Substances    Nicotine No     THC No     CBD No     Flavoring No     Other No     Unknown No      Social History     Tobacco Use    Smoking status: Former Smoker     Packs/day: 1 00     Years: 15      Pack years: 15      Types: Cigarettes     Quit date:      Years since quittin     Smokeless tobacco: Never Used    Tobacco comment: Has smoked since age:   13   Vaping Use    Vaping Use: Never used   Substance Use Topics    Alcohol use: Not Currently     Comment: stopped drinking in     Drug use: Never     Comment: per Gyn notes, Marijuana HX age 11  Review of Systems   Constitutional: Negative for chills and fever  HENT: Negative for hearing loss and sore throat  Eyes: Negative for visual disturbance  Respiratory: Negative for cough and shortness of breath  Cardiovascular: Negative for chest pain and leg swelling  Gastrointestinal: Negative for diarrhea and vomiting  Genitourinary: Negative for dysuria and frequency  Musculoskeletal: Negative for arthralgias and myalgias  Skin: Negative for pallor and rash     Neurological: Positive for weakness, numbness and headaches  Negative for dizziness  Psychiatric/Behavioral: Negative for behavioral problems  All other systems reviewed and are negative  Physical Exam  Physical Exam  Vitals and nursing note reviewed  Constitutional:       General: She is not in acute distress  Appearance: Normal appearance  She is well-developed  HENT:      Head: Normocephalic and atraumatic  Right Ear: External ear normal       Left Ear: External ear normal       Nose: Nose normal       Mouth/Throat:      Mouth: Mucous membranes are moist       Pharynx: Oropharynx is clear  Eyes:      Conjunctiva/sclera: Conjunctivae normal    Cardiovascular:      Rate and Rhythm: Normal rate and regular rhythm  Pulmonary:      Effort: Pulmonary effort is normal       Breath sounds: Normal breath sounds  Abdominal:      General: Bowel sounds are normal       Palpations: Abdomen is soft  Tenderness: There is no abdominal tenderness  Musculoskeletal:         General: No tenderness or signs of injury  Normal range of motion  Cervical back: Normal range of motion  Right lower leg: No edema  Left lower leg: No edema  Skin:     General: Skin is warm and dry  Capillary Refill: Capillary refill takes less than 2 seconds  Neurological:      General: No focal deficit present  Mental Status: She is alert and oriented to person, place, and time  Cranial Nerves: No cranial nerve deficit  Motor: No weakness        Gait: Gait normal    Psychiatric:         Behavior: Behavior normal          Vital Signs  ED Triage Vitals [03/03/22 1644]   Temperature Pulse Respirations Blood Pressure SpO2   (!) 97 4 °F (36 3 °C) 77 16 115/81 98 %      Temp Source Heart Rate Source Patient Position - Orthostatic VS BP Location FiO2 (%)   Oral Monitor Sitting Left arm --      Pain Score       --           Vitals:    03/03/22 1644   BP: 115/81   Pulse: 77   Patient Position - Orthostatic VS: Sitting         Visual Acuity      ED Medications  Medications - No data to display    Diagnostic Studies  Results Reviewed     Procedure Component Value Units Date/Time    T4, free [[id number]]     Lab Status: No result Specimen: Blood     TSH [[id number]]  (Abnormal) Collected: 03/03/22 1718    Lab Status: Final result Specimen: Blood from Arm, Right Updated: 03/03/22 1756     TSH 3RD GENERATON 0 423 uIU/mL     Narrative:      Patients undergoing fluorescein dye angiography may retain small amounts of fluorescein in the body for 48-72 hours post procedure  Samples containing fluorescein can produce falsely depressed TSH values  If the patient had this procedure,a specimen should be resubmitted post fluorescein clearance        Comprehensive metabolic panel [[id number]]  (Abnormal) Collected: 03/03/22 1718    Lab Status: Final result Specimen: Blood from Arm, Right Updated: 03/03/22 1741     Sodium 137 mmol/L      Potassium 3 4 mmol/L      Chloride 102 mmol/L      CO2 30 mmol/L      ANION GAP 5 mmol/L      BUN 9 mg/dL      Creatinine 0 60 mg/dL      Glucose 114 mg/dL      Calcium 9 5 mg/dL      AST 18 U/L      ALT 17 U/L      Alkaline Phosphatase 68 U/L      Total Protein 6 8 g/dL      Albumin 4 1 g/dL      Total Bilirubin 0 28 mg/dL      eGFR 120 ml/min/1 73sq m     Narrative:      [place] guidelines for Chronic Kidney Disease (CKD):     Stage 1 with normal or high GFR (GFR > 90 mL/min/1 73 square meters)    Stage 2 Mild CKD (GFR = 60-89 mL/min/1 73 square meters)    Stage 3A Moderate CKD (GFR = 45-59 mL/min/1 73 square meters)    Stage 3B Moderate CKD (GFR = 30-44 mL/min/1 73 square meters)    Stage 4 Severe CKD (GFR = 15-29 mL/min/1 73 square meters)    Stage 5 End Stage CKD (GFR <15 mL/min/1 73 square meters)  Note: GFR calculation is accurate only with a steady state creatinine    CBC and differential [[id number]] Collected: 03/03/22 1718    Lab Status: Final result Specimen: Blood from Arm, Right Updated: 03/03/22 1724     WBC 6 95 Thousand/uL      RBC 4 37 Million/uL      Hemoglobin 12 4 g/dL      Hematocrit 37 1 %      MCV 85 fL      MCH 28 4 pg      MCHC 33 4 g/dL      RDW 13 4 %      MPV 10 2 fL      Platelets 120 Thousands/uL      nRBC 0 /100 WBCs      Neutrophils Relative 60 %      Immat GRANS % 0 %      Lymphocytes Relative 34 %      Monocytes Relative 4 %      Eosinophils Relative 2 %      Basophils Relative 0 %      Neutrophils Absolute 4 15 Thousands/µL      Immature Grans Absolute 0 01 Thousand/uL      Lymphocytes Absolute 2 35 Thousands/µL      Monocytes Absolute 0 29 Thousand/µL      Eosinophils Absolute 0 14 Thousand/µL      Basophils Absolute 0 01 Thousands/µL                  CT head without contrast   Final Result by Nikia Michaels MD (03/03 1752)      No acute intracranial abnormality                    Workstation performed: [id number]                    Procedures  Procedures         ED Course                  Stroke Assessment     Row Name 03/03/22 1713             NIH Stroke Scale    Interval Baseline      Level of Consciousness (1a ) 0      LOC Questions (1b ) 0      LOC Commands (1c ) 0      Best Gaze (2 ) 0      Visual (3 ) 0      Facial Palsy (4 ) 0      Motor Arm, Left (5a ) 0      Motor Arm, Right (5b ) 0      Motor Leg, Left (6a ) 0      Motor Leg, Right (6b ) 0      Limb Ataxia (7 ) 0      Sensory (8 ) 0      Best Language (9 ) 0      Dysarthria (10 ) 0      Extinction and Inattention (11 ) (Formerly Neglect) 0      Total 0                                          MDM  Number of Diagnoses or Management Options  Low serum thyroid stimulating hormone (TSH)  Numbness and tingling  Diagnosis management comments: Results reviewed with patient, need for FU       Amount and/or Complexity of Data Reviewed  Clinical lab tests: ordered and reviewed  Tests in the radiology section of CPT®: ordered and reviewed  Review and summarize past medical records: yes        Disposition  Final diagnoses:   Numbness and tingling   Low serum thyroid stimulating hormone (TSH)     Time reflects when diagnosis was documented in both MDM as applicable and the Disposition within this note     Time User Action Codes Description Comment    3/3/2022  6:12 PM Darius Ranks Add [R20 0,  R20 2] Numbness and tingling     3/3/2022  6:12 PM Darius Ranks Add [R79 89] Low serum thyroid stimulating hormone [facility]       ED Disposition     ED Disposition Condition Date/Time Comment    Discharge Stable Thu Mar 3, 2022  6:23 PM Koko Quintero discharge to home/self care  Follow-up Information     Follow up With Specialties Details Why Neela [id number], MD Family Medicine   [place] [address] [place] [address]   [phone]            Patient's Medications   Discharge Prescriptions    No medications on file       No discharge procedures on file      PDMP Review     None          ED Provider  Electronically Signed by           Evans Mendoza PA-C  03/03/22 8584

## 2022-03-03 NOTE — DISCHARGE INSTRUCTIONS
The exact cause of your numbness was not determined but the evaluation in the emergency department was normal   You need to follow-up with your doctor for further evaluation  Also your TSH levels were low, this could indicate an overactive thyroid or hyperthyroidism  This also needs further evaluation with your doctor

## 2022-03-04 ENCOUNTER — OFFICE VISIT (OUTPATIENT)
Dept: FAMILY MEDICINE CLINIC | Facility: CLINIC | Age: 32
End: 2022-03-04
Payer: MEDICARE

## 2022-03-04 VITALS
SYSTOLIC BLOOD PRESSURE: 116 MMHG | HEIGHT: 64 IN | OXYGEN SATURATION: 97 % | RESPIRATION RATE: 18 BRPM | WEIGHT: 147.2 LBS | BODY MASS INDEX: 25.13 KG/M2 | DIASTOLIC BLOOD PRESSURE: 78 MMHG | TEMPERATURE: 97.7 F | HEART RATE: 71 BPM

## 2022-03-04 DIAGNOSIS — R20.0 NUMBNESS AND TINGLING: Primary | ICD-10-CM

## 2022-03-04 DIAGNOSIS — R21 RASH: ICD-10-CM

## 2022-03-04 DIAGNOSIS — Z11.59 NEED FOR HEPATITIS C SCREENING TEST: ICD-10-CM

## 2022-03-04 DIAGNOSIS — L81.9 DISCOLORED SKIN: ICD-10-CM

## 2022-03-04 DIAGNOSIS — E04.9 ENLARGED THYROID: ICD-10-CM

## 2022-03-04 DIAGNOSIS — M79.10 MYALGIA: ICD-10-CM

## 2022-03-04 DIAGNOSIS — R20.2 NUMBNESS AND TINGLING: Primary | ICD-10-CM

## 2022-03-04 DIAGNOSIS — R79.89 DECREASED THYROID STIMULATING HORMONE (TSH) LEVEL: ICD-10-CM

## 2022-03-04 PROBLEM — U07.1 COVID-19: Status: RESOLVED | Noted: 2021-11-24 | Resolved: 2022-03-04

## 2022-03-04 LAB — MAGNESIUM SERPL-MCNC: 1.9 MG/DL (ref 1.9–2.7)

## 2022-03-04 PROCEDURE — 99214 OFFICE O/P EST MOD 30 MIN: CPT | Performed by: NURSE PRACTITIONER

## 2022-03-04 NOTE — PATIENT INSTRUCTIONS
Paresthesia   WHAT YOU NEED TO KNOW:   Paresthesia is numbness, tingling, or burning  It can happen in any part of your body, but usually occurs in your legs, feet, arms, or hands  DISCHARGE INSTRUCTIONS:   Return to the emergency department if:   · You have severe pain along with numbness and tingling  · Your legs suddenly become cold  You have trouble moving your legs, and they ache  · You have increased weakness in a part of your body  · You have uncontrolled movements  Contact your healthcare provider or neurologist if:   · Your symptoms do not improve  · You have symptoms in more than one part of your body  · You have questions or concerns about your condition or care  Manage paresthesia:   · Protect the area from injury  You may injure or burn yourself if you lose feeling in the area  Be careful when you touch anything that could be hot  Wear sturdy shoes to protect your feet  Ask about other ways to protect yourself  · Go to physical or occupational therapy if directed  Your provider may recommend therapy if you have a condition such as carpal tunnel syndrome  A physical therapist can teach you exercises to help strengthen the area or increase your ability to move it  An occupational therapist can help you find new ways to do your daily activities  · Manage health conditions that can cause paresthesia  Work with your diabetes specialist if you have uncontrolled diabetes  A dietitian or your healthcare provider can help you create a meal plan if you have low vitamin B levels  Your provider can help you manage your health if you have multiple sclerosis or you had a stroke  It is important to manage health conditions to stop paresthesia or prevent it from getting worse  Follow up with your healthcare provider or neurologist as directed: Your healthcare provider may refer you to a specialist  Write down your questions so you remember to ask them during your visits    © Copyright [employer] 2022 Information is for [employer] use only and may not be sold, redistributed or otherwise used for commercial purposes  All illustrations and images included in CareNotes® are the copyrighted property of [employer]  or Rubina Valdez  The above information is an  only  It is not intended as medical advice for individual conditions or treatments  Talk to your doctor, nurse or pharmacist before following any medical regimen to see if it is safe and effective for you  Fatigue   WHAT YOU NEED TO KNOW:   Fatigue is mental and physical exhaustion that does not get better with rest  Fatigue may make daily activities difficult or cause extreme sleepiness  It is normal to feel tired sometimes, but long-term fatigue may be a sign of serious illness  DISCHARGE INSTRUCTIONS:   Return to the emergency department if:   · You have chest pain  · You have difficulty breathing  Contact your healthcare provider if:   · You have a cough that gets worse, or does not go away  · You see blood in your urine or bowel movement  · You have numbness or tingling around your mouth or in an arm or leg  · You faint, feel dizzy, or have vision changes  · You have swelling in your lymph nodes  · You are a woman and have vaginal bleeding that is not normal for you, or is not expected  · You lose weight without trying, or you have trouble eating  · You feel weak or have muscle pain  · You have pain or swelling in your joints  · You have questions or concerns about your condition or care  Follow up with your healthcare provider as directed: You may need more tests  Your healthcare provider may also refer you to a specialist  Write down your questions so you remember to ask them during your visits  Manage fatigue:   · Keep a fatigue diary  Include anything that makes you feel more tired or less tired   Bring the diary with you to follow-up visits with your provider  · Exercise as directed  Exercise can help you feel more alert  Exercise can also help you manage stress or relieve depression  Try to get at least 30 minutes of exercise most days of the week  · Keep a regular sleep schedule  Go to bed and wake up at the same times every day  Limit naps to 1 hour each day  A nap can improve fatigue, but a long nap may make it harder to go to sleep at night  · Plan and limit your activities  Limit the number of activities such as shopping and cleaning you do each day  If possible, try to spread out your trips throughout the week  Plan ahead so you are not rushing to get something done  Only do activities that you have the energy to complete  Take breaks between activities  Ask for help if you need it  Another person may be able to drive you or help with daily activities  · Eat a variety of healthy foods  Healthy foods include fruits, vegetables, whole-grain breads, low-fat dairy products, beans, lean meats, and fish  Good nutrition can help manage fatigue  · Limit caffeine and alcohol  These can make it difficult to fall or stay asleep  Women should limit alcohol to 1 drink a day  Men should limit alcohol to 2 drinks a day  A drink of alcohol is 12 ounces of beer, 5 ounces of wine, or 1½ ounces of liquor  Ask our healthcare provider how much caffeine is safe for you  · Do not smoke  Nicotine and other chemicals in cigarettes and cigars can cause lung damage and increase fatigue  Ask your healthcare provider for information if you currently smoke and need help to quit  E-cigarettes or smokeless tobacco still contain nicotine  Talk to your healthcare provider before you use these products  © Copyright [employer] 2022 Information is for End User's use only and may not be sold, redistributed or otherwise used for commercial purposes   All illustrations and images included in CareNotes® are the copyrighted property of [employer]  or [address]  The above information is an  only  It is not intended as medical advice for individual conditions or treatments  Talk to your doctor, nurse or pharmacist before following any medical regimen to see if it is safe and effective for you

## 2022-03-04 NOTE — PROGRESS NOTES
BMI Counseling: Body mass index is 25 27 kg/m²  The BMI is above normal  Nutrition recommendations include decreasing portion sizes, encouraging healthy choices of fruits and vegetables, decreasing fast food intake, consuming healthier snacks, limiting drinks that contain sugar, moderation in carbohydrate intake, increasing intake of lean protein, reducing intake of saturated and trans fat and reducing intake of cholesterol  Exercise recommendations include vigorous physical activity 75 minutes/week, exercising 3-5 times per week and strength training exercises  No pharmacotherapy was ordered  Patient referred to PCP  Rationale for BMI follow-up plan is due to patient being overweight or obese  Depression Screening and Follow-up Plan: Patient was screened for depression during today's encounter  They screened negative with a PHQ-2 score of 2      Assessment/Plan:    Pt presents in office for ER follow up   33 yr old female with generalized fatigue   Numbness and tingling to lower extremities   Recurrent rashes on her neck   Some b/l forearm hypopigmentation skin discoloration   She was seen in the ER for these issues especially for the numbness and tingling as she has had history of TIA in the past   Noted decreased TSH level     Discussed further work up at this point   does have visible rash on her neck   Does have enlarged thyroid on exam - I have ordered US of thyroid to be done   Repeat labs and additional labs in 4 weeks   Will call her with results          Problem List Items Addressed This Visit        Other    Myalgia    Relevant Orders    Systemic Lupus Profile A    RF Screen w/ Reflex to Titer      Other Visit Diagnoses     Numbness and tingling    -  Primary    Relevant Orders    TSH, 3rd generation with Free T4 reflex    HATTIE Screen w/ Reflex to Titer/Pattern    Anti-microsomal antibody    Thyroid Peroxidase and Thyroglobulin Antibodies    Vitamin D 25 hydroxy    Vitamin B12/Folate, Serum Panel Magnesium    Decreased thyroid stimulating hormone (TSH) level        Relevant Orders    TSH, 3rd generation with Free T4 reflex    HATTIE Screen w/ Reflex to Titer/Pattern    Anti-microsomal antibody    Thyroid Peroxidase and Thyroglobulin Antibodies    Vitamin D 25 hydroxy    Vitamin B12/Folate, Serum Panel    Magnesium    Discolored skin        Rash        Enlarged thyroid        Relevant Orders    US head neck soft tissue    Need for hepatitis C screening test        Relevant Orders    Hepatitis C antibody            Subjective:      Patient ID: Heena Martin is a 33 y o  female  HPI    The following portions of the patient's history were reviewed and updated as appropriate:   Past Medical History:  She has a past medical history of Abnormal Pap smear of cervix, Anxiety, Carrier of group B Streptococcus, GERD (gastroesophageal reflux disease), High blood pressure, History of UTI, absence seizures, seasonal allergies, Migraine headache, Muscle spasm of back, Papanicolaou smear for cervical cancer screening (06/2017), Postpartum depression, Preeclampsia, Psychiatric disorder, Umbilical hernia (97/9501), and Varicose veins of lower extremity  ,  _______________________________________________________________________  Medical Problems:  does not have any pertinent problems on file ,  _______________________________________________________________________  Past Surgical History:   has a past surgical history that includes Hernia repair; [place] tooth extraction; Other surgical history (11/18/2015); and INSERTION OF INTRAUTERINE DEVICE (IUD)  ,  _______________________________________________________________________  Family History:  family history includes Arthritis in her family and mother; Asthma in her maternal aunt, mother, sister, and son; Bipolar disorder in her maternal aunt; COPD in her maternal grandmother; Cancer in her cousin; Diabetes in her cousin, maternal grandmother, mother, and other; Fibromyalgia in her family; GI problems in her family; Heart attack in her maternal grandfather; Hypertension in her mother; Hyperthyroidism in her maternal aunt; Lymphoma in her maternal grandmother; Other in her maternal grandmother; Sleep apnea in her mother; Thyroid disease in her maternal aunt; Varicose Veins in her paternal aunt and paternal grandmother ,  _______________________________________________________________________  Social History:   reports that she quit smoking about 7 years ago  Her smoking use included cigarettes  She has a 15 00 pack-year smoking history  She has never used smokeless tobacco  She reports previous alcohol use  She reports that she does not use drugs  ,  _______________________________________________________________________  Allergies:  is allergic to phenazopyridine, other, and lavender oil     _______________________________________________________________________  Current Outpatient Medications   Medication Sig Dispense Refill    acetaminophen (TYLENOL) 325 mg tablet Take 2 tablets (650 mg total) by mouth every 6 (six) hours (Patient not taking: Reported on 11/10/2021 ) 30 tablet 0    albuterol (PROVENTIL HFA,VENTOLIN HFA) 90 mcg/act inhaler Inhale 1 puff every 6 (six) hours as needed for wheezing or shortness of breath 8 g 0    cyclobenzaprine (FLEXERIL) 5 mg tablet Take 1 tablet (5 mg total) by mouth 2 (two) times a day as needed for muscle spasms for up to 20 days 40 tablet 0    fluticasone (FLONASE) 50 mcg/act nasal spray 1 spray into each nostril daily for 10 days 18 mL 1    ipratropium-albuterol (DUO-NEB) 0 5-2 5 mg/3 mL nebulizer solution Take 3 mL by nebulization every 6 (six) hours as needed for wheezing or shortness of breath for up to 10 doses 3 mL 0    montelukast (SINGULAIR) 10 mg tablet Take 1 tablet (10 mg total) by mouth daily at bedtime for 60 doses 30 tablet 1    NAPROXEN PO Take by mouth      Nerve Stimulator (STANDARD TENS) OMER by Does not apply route 4 (four) times a day 1 Device 0    triamcinolone (KENALOG) 0 1 % ointment Apply topically 2 (two) times a day for up to 14 days to right hand  Do NOT use on face, armpits, or groin  It is important to take at least 1 week break between 2 week uses  80 g 0     No current facility-administered medications for this visit      _______________________________________________________________________  Review of Systems   Constitutional: Positive for fatigue  Negative for chills and fever  HENT: Negative for congestion, rhinorrhea, sore throat and trouble swallowing  Eyes: Negative  Respiratory: Negative for cough and shortness of breath  Cardiovascular: Negative for chest pain, palpitations and leg swelling  Gastrointestinal: Positive for abdominal distention  Negative for abdominal pain, blood in stool, constipation, diarrhea, nausea and vomiting  Endocrine:         TSH noted in ER   Issues with hair   Nails and skin    Genitourinary: Negative for difficulty urinating and flank pain  Musculoskeletal: Positive for arthralgias and myalgias  Skin: Positive for rash (neck and skin discoloration on both arms )  Allergic/Immunologic: Negative for environmental allergies  Neurological: Positive for numbness (numbness and tingling to both legs and feet )  Negative for weakness and light-headedness  Hematological: Negative for adenopathy  Psychiatric/Behavioral: Positive for sleep disturbance  Negative for suicidal ideas  The patient is nervous/anxious  Objective:  Vitals:    22 1155   BP: 116/78   Pulse: 71   Resp: 18   Temp: 97 7 °F (36 5 °C)   SpO2: 97%   Weight: 66 8 kg (147 lb 3 2 oz)   Height: 5' 4" (1 626 m)     Body mass index is 25 27 kg/m²  Physical Exam  Vitals and nursing note reviewed  Constitutional:       Comments: BMI 25 27      HENT:      Head: Atraumatic  Eyes:      Extraocular Movements: Extraocular movements intact     Neck:      Comments: Enlarged thyroid   Cardiovascular:      Rate and Rhythm: Normal rate and regular rhythm  Pulses: Normal pulses  Heart sounds: Normal heart sounds  Pulmonary:      Effort: Pulmonary effort is normal       Breath sounds: Normal breath sounds  Abdominal:      Palpations: Abdomen is soft  Musculoskeletal:      Cervical back: Normal range of motion  Skin:     Findings: Rash (neck ) present  Neurological:      Mental Status: She is alert and oriented to person, place, and time  Psychiatric:         Mood and Affect: Mood normal          Behavior: Behavior normal            CT head without contrast    Status: Final result       PACS Images     Show images for CT head without contrast    Study Result    Narrative & Impression   CT BRAIN - WITHOUT CONTRAST     INDICATION:   transient numbness to face, B/L legs, no weakness      COMPARISON:  CT scan 10/24/2018     TECHNIQUE:  CT examination of the brain was performed  In addition to axial images, sagittal and coronal 2D reformatted images were created and submitted for interpretation      Radiation dose length product (DLP) for this visit:  701 mGy-cm   This examination, like all CT scans performed in the [facility], was performed utilizing techniques to minimize radiation dose exposure, including the use of iterative   reconstruction and automated exposure control        IMAGE QUALITY:  Diagnostic      FINDINGS:     PARENCHYMA:  No intracranial mass, mass effect or midline shift  No CT signs of acute infarction    No acute parenchymal hemorrhage      VENTRICLES AND EXTRA-AXIAL SPACES:  Normal for the patient's age      VISUALIZED ORBITS AND PARANASAL SINUSES:  Unremarkable      CALVARIUM AND EXTRACRANIAL SOFT TISSUES:  Normal      IMPRESSION:     No acute intracranial abnormality

## 2022-03-05 LAB — T4 FREE SERPL-MCNC: 1.15 NG/DL (ref 0.76–1.46)

## 2022-03-14 ENCOUNTER — HOSPITAL ENCOUNTER (OUTPATIENT)
Dept: ULTRASOUND IMAGING | Facility: HOSPITAL | Age: 32
Discharge: HOME | End: 2022-03-14
Payer: MEDICARE

## 2022-03-14 DIAGNOSIS — E04.9 ENLARGED THYROID: ICD-10-CM

## 2022-03-14 PROCEDURE — 76536 US EXAM OF HEAD AND NECK: CPT

## 2022-03-18 ENCOUNTER — OFFICE VISIT (OUTPATIENT)
Dept: OBGYN CLINIC | Facility: CLINIC | Age: 32
End: 2022-03-18
Payer: MEDICARE

## 2022-03-18 VITALS
WEIGHT: 143 LBS | BODY MASS INDEX: 24.41 KG/M2 | HEIGHT: 64 IN | SYSTOLIC BLOOD PRESSURE: 122 MMHG | DIASTOLIC BLOOD PRESSURE: 72 MMHG

## 2022-03-18 DIAGNOSIS — R10.2 PELVIC PAIN: Primary | ICD-10-CM

## 2022-03-18 DIAGNOSIS — R14.0 BLOATING: ICD-10-CM

## 2022-03-18 DIAGNOSIS — T83.32XA INTRAUTERINE CONTRACEPTIVE DEVICE THREADS LOST, INITIAL ENCOUNTER: ICD-10-CM

## 2022-03-18 PROCEDURE — 87086 URINE CULTURE/COLONY COUNT: CPT | Performed by: PHYSICIAN ASSISTANT

## 2022-03-18 PROCEDURE — 87147 CULTURE TYPE IMMUNOLOGIC: CPT | Performed by: PHYSICIAN ASSISTANT

## 2022-03-18 PROCEDURE — 99213 OFFICE O/P EST LOW 20 MIN: CPT | Performed by: PHYSICIAN ASSISTANT

## 2022-03-18 RX ORDER — NAPROXEN 500 MG/1
500 TABLET ORAL 2 TIMES DAILY PRN
Qty: 30 TABLET | Refills: 1 | Status: SHIPPED | OUTPATIENT
Start: 2022-03-18

## 2022-03-18 NOTE — PROGRESS NOTES
Assessment/Plan:       Diagnoses and all orders for this visit:    Pelvic pain  -     US pelvis complete w transvaginal; Future  -     naproxen (Naprosyn) 500 mg tablet; Take 1 tablet (500 mg total) by mouth 2 (two) times a day as needed (palvic pain/cramping ) Take with food  -     Urine culture    Bloating  -     US pelvis complete w transvaginal; Future  -     naproxen (Naprosyn) 500 mg tablet; Take 1 tablet (500 mg total) by mouth 2 (two) times a day as needed (palvic pain/cramping ) Take with food  -     Urine culture    Intrauterine contraceptive device threads lost, initial encounter  -     US pelvis complete w transvaginal; Future      patient is a very pleasant 77-year-old female presenting today for concern of pelvic pain/cramping as well as discussion for possible IUD removal as described in HPI  Patient does have some mild tenderness to palpation on bimanual exam primarily on the right side  Her abdomen does appear generally mildly distended on visualization when patient is lying down on the exam table, she does feel slightly bloated  However the abdomen does not palpate firm and I cannot palpate any distinct masses  Her last ultrasound was in 2020 due to inability to find the IUD strings and was confirmed appropriate placement  Patient wishes to have the IUD removed  Due to her pelvic discomfort, strings unable to be visualized today and the appearance of some abdominal fullness will check pelvic ultrasound to further evaluate  Also rule out any uterine fibroids/masses or any ovarian mass or cyst and visualize IUD placement  Also though highly unlikely will be able to visualize for ectopic pregnancy as patient did not get her usual period this month  However I did have a discussion with patient that it is common to not have a period with an IUD  Patient is agreeable to this        I doubt patient has specific UTI however she is concerned about this so will have patient provide a clean-catch urine sample before leaving the office today and we will send this out for urine culture  For the discomfort naproxen 500 mg b i d  p r n  pelvic pain and cramping prescribed to be taken with food  Also encouraged use of heating pad  Patient to monitor symptoms in call with any concerns  Otherwise we will contact her once we receive pelvic ultrasound results to schedule for follow-up and IUD removal     Chief Complaint   Patient presents with    Pelvic Pain     Patient has reggie IUD x 2 years pelvic pain ,no period       Subjective:      Patient ID: Minh Tavares is a 33 y o  female  30y/o female here today for concern of pelvic cramping     states she has had cramping for years intermittent, usually monthly with her period  However this  month she states the cramping is different in that is has been more severe the past week  States she usually gets a light period/spotting lasting 7 days every month  This month did not get any bleeding, but getting cramping and some breast tenderness, like she gets with period  LMP was approx 2/9/2022  Denies any hematuria, dysuria, but has difficulty holding her urine at times  No fever or chills  denies bowel changes  Denies vaginal pain, itching or discharge  She would like to consider removing the IUD  She states she is very busy and is afraid the IUD may move also concerned it may be causing her urinary infection  States she thinks she gets frequent UTI's but cannot tell how often and does not seem to have been seen for these/or + urine cultures that confirm UTI's  Denies flank pain  Denies abd pain, N/V  Last pelvic US 2020 as could not find strings - IUD was intact then  Pt nervous because she cannot feel strings           The following portions of the patient's history were reviewed and updated as appropriate: allergies, current medications, past family history, past medical history, past social history, past surgical history and problem list     Review of Systems   Constitutional: Negative  Respiratory: Negative  Cardiovascular: Negative  Gastrointestinal: Negative  Genitourinary:        As in HPI         Objective:      /72 (BP Location: Right arm, Patient Position: Sitting)   Ht 5' 4" (1 626 m)   Wt 64 9 kg (143 lb)   BMI 24 55 kg/m²          Physical Exam  Vitals reviewed  Exam conducted with a chaperone present Preethi Sousa MA)  Constitutional:       General: She is not in acute distress  Appearance: Normal appearance  She is not ill-appearing or toxic-appearing  Cardiovascular:      Rate and Rhythm: Normal rate and regular rhythm  Heart sounds: Normal heart sounds  Pulmonary:      Effort: Pulmonary effort is normal       Breath sounds: Normal breath sounds  Abdominal:      General: Bowel sounds are normal  There is distension (slight)  Palpations: Abdomen is soft  There is no mass  Tenderness: There is abdominal tenderness (mild, pelvic)  Genitourinary:     Labia:         Right: No rash, tenderness or lesion  Left: No rash, tenderness or lesion  Vagina: Normal  No vaginal discharge, erythema, tenderness or bleeding  Cervix: No cervical motion tenderness, discharge, lesion or cervical bleeding  Uterus: Tender (slightly tender on right on bimanual exam)  Not enlarged  Adnexa:         Right: Tenderness (mild) present  No mass  Left: No mass or tenderness  Comments: IUD strings not identified  Lymphadenopathy:      Lower Body: No right inguinal adenopathy  No left inguinal adenopathy  Neurological:      Mental Status: She is alert and oriented to person, place, and time  Psychiatric:         Mood and Affect: Mood normal          Behavior: Behavior normal  Behavior is cooperative

## 2022-03-19 LAB
BACTERIA UR CULT: ABNORMAL
BACTERIA UR CULT: ABNORMAL

## 2022-03-22 ENCOUNTER — TELEPHONE (OUTPATIENT)
Dept: OBGYN CLINIC | Facility: CLINIC | Age: 32
End: 2022-03-22

## 2022-03-22 NOTE — TELEPHONE ENCOUNTER
I called pt personally  Would like to discuss results with Dr Ilia Childers first, as well as plan to remove IUD, even if strings not visualized, scheduled soon with dr Palacios [place]  I told pt once I discuss her case with attending, will call her back by end of the day today with a plan  Pt expresses understanding and agreeable

## 2022-04-04 ENCOUNTER — APPOINTMENT (OUTPATIENT)
Dept: LAB | Facility: HOSPITAL | Age: 32
End: 2022-04-04
Payer: MEDICARE

## 2022-04-04 DIAGNOSIS — Z11.59 NEED FOR HEPATITIS C SCREENING TEST: ICD-10-CM

## 2022-04-04 DIAGNOSIS — R20.0 NUMBNESS AND TINGLING: ICD-10-CM

## 2022-04-04 DIAGNOSIS — R20.2 NUMBNESS AND TINGLING: ICD-10-CM

## 2022-04-04 DIAGNOSIS — M79.10 MYALGIA: ICD-10-CM

## 2022-04-04 DIAGNOSIS — R79.89 DECREASED THYROID STIMULATING HORMONE (TSH) LEVEL: ICD-10-CM

## 2022-04-04 LAB
BILIRUB UR QL STRIP: NEGATIVE
CLARITY UR: CLEAR
COLOR UR: YELLOW
GLUCOSE UR STRIP-MCNC: NEGATIVE MG/DL
HGB UR QL STRIP.AUTO: NEGATIVE
KETONES UR STRIP-MCNC: NEGATIVE MG/DL
LEUKOCYTE ESTERASE UR QL STRIP: NEGATIVE
NITRITE UR QL STRIP: NEGATIVE
PH UR STRIP.AUTO: 7 [PH]
PROT UR STRIP-MCNC: NEGATIVE MG/DL
SP GR UR STRIP.AUTO: 1.02 (ref 1–1.03)
TSH SERPL DL<=0.05 MIU/L-ACNC: 0.55 UIU/ML (ref 0.45–4.5)
UROBILINOGEN UR QL STRIP.AUTO: 1 E.U./DL

## 2022-04-04 PROCEDURE — 86038 ANTINUCLEAR ANTIBODIES: CPT

## 2022-04-04 PROCEDURE — 82746 ASSAY OF FOLIC ACID SERUM: CPT

## 2022-04-04 PROCEDURE — 81003 URINALYSIS AUTO W/O SCOPE: CPT | Performed by: NURSE PRACTITIONER

## 2022-04-04 PROCEDURE — 86431 RHEUMATOID FACTOR QUANT: CPT

## 2022-04-04 PROCEDURE — 82607 VITAMIN B-12: CPT

## 2022-04-04 PROCEDURE — 84432 ASSAY OF THYROGLOBULIN: CPT

## 2022-04-04 PROCEDURE — 86376 MICROSOMAL ANTIBODY EACH: CPT

## 2022-04-04 PROCEDURE — 86800 THYROGLOBULIN ANTIBODY: CPT

## 2022-04-04 PROCEDURE — 36415 COLL VENOUS BLD VENIPUNCTURE: CPT

## 2022-04-04 PROCEDURE — 86803 HEPATITIS C AB TEST: CPT

## 2022-04-04 PROCEDURE — 86235 NUCLEAR ANTIGEN ANTIBODY: CPT

## 2022-04-04 PROCEDURE — 82306 VITAMIN D 25 HYDROXY: CPT

## 2022-04-04 PROCEDURE — 86430 RHEUMATOID FACTOR TEST QUAL: CPT

## 2022-04-04 PROCEDURE — 86225 DNA ANTIBODY NATIVE: CPT

## 2022-04-04 PROCEDURE — 84443 ASSAY THYROID STIM HORMONE: CPT

## 2022-04-05 LAB
25(OH)D3 SERPL-MCNC: 23.6 NG/ML (ref 30–100)
FOLATE SERPL-MCNC: 15.1 NG/ML (ref 3.1–17.5)
HCV AB SER QL: NORMAL
RHEUMATOID FACT SER QL LA: NEGATIVE
THYROGLOB AB SERPL-ACNC: <1 IU/ML (ref 0–0.9)
THYROGLOB SERPL-MCNC: 19.9 NG/ML (ref 1.5–38.5)
THYROPEROXIDASE AB SERPL-ACNC: <8 IU/ML (ref 0–34)
VIT B12 SERPL-MCNC: 582 PG/ML (ref 100–900)

## 2022-04-06 LAB
MISCELLANEOUS LAB TEST RESULT: NORMAL
RYE IGE QN: NEGATIVE

## 2022-04-08 ENCOUNTER — OFFICE VISIT (OUTPATIENT)
Dept: FAMILY MEDICINE CLINIC | Facility: CLINIC | Age: 32
End: 2022-04-08
Payer: MEDICARE

## 2022-04-08 VITALS
SYSTOLIC BLOOD PRESSURE: 118 MMHG | HEART RATE: 92 BPM | RESPIRATION RATE: 17 BRPM | OXYGEN SATURATION: 98 % | TEMPERATURE: 97.7 F | BODY MASS INDEX: 23.87 KG/M2 | HEIGHT: 64 IN | DIASTOLIC BLOOD PRESSURE: 64 MMHG | WEIGHT: 139.8 LBS

## 2022-04-08 DIAGNOSIS — R79.89 ABNORMAL TSH: ICD-10-CM

## 2022-04-08 DIAGNOSIS — Z23 NEED FOR PNEUMOCOCCAL VACCINATION: Primary | ICD-10-CM

## 2022-04-08 DIAGNOSIS — Z00.00 ANNUAL PHYSICAL EXAM: ICD-10-CM

## 2022-04-08 DIAGNOSIS — E55.9 VITAMIN D DEFICIENCY: ICD-10-CM

## 2022-04-08 DIAGNOSIS — L65.9 HAIR LOSS: ICD-10-CM

## 2022-04-08 PROCEDURE — 99395 PREV VISIT EST AGE 18-39: CPT

## 2022-04-08 PROCEDURE — 90677 PCV20 VACCINE IM: CPT

## 2022-04-08 PROCEDURE — 90471 IMMUNIZATION ADMIN: CPT

## 2022-04-08 RX ORDER — MULTIVIT-MIN/IRON/FOLIC ACID/K 18-600-40
1 CAPSULE ORAL DAILY
Qty: 90 CAPSULE | Refills: 1 | Status: SHIPPED | OUTPATIENT
Start: 2022-04-08 | End: 2022-07-07

## 2022-04-08 NOTE — PROGRESS NOTES
Assessment/Plan:    Pt presents in office for physical exam   C/o of some numbness and tingling sensation and hair loss lately   Here to get evaluated   Discussed preventative medicine   Discussed healthy diet and exercise and adequate hydration   I have ordered labs and I will call her with results        Problem List Items Addressed This Visit     None      Visit Diagnoses     Annual physical exam    -  Primary    Relevant Orders    Ambulatory Referral to Dermatology    Hair loss        Relevant Orders    Ambulatory Referral to Dermatology    Abnormal TSH        Relevant Orders    TSH, 3rd generation    Vitamin D deficiency        Relevant Medications    Cholecalciferol (Vitamin D) 50 MCG (2000 UT) CAPS            Subjective:      Patient ID: Judson Camarillo is a 33 y o  female  HPI    The following portions of the patient's history were reviewed and updated as appropriate:   Past Medical History:  She has a past medical history of Abnormal Pap smear of cervix, Anxiety, Carrier of group B Streptococcus, GERD (gastroesophageal reflux disease), High blood pressure, History of UTI, absence seizures, seasonal allergies, Migraine headache, Muscle spasm of back, Papanicolaou smear for cervical cancer screening (06/2017), Postpartum depression, Preeclampsia, Psychiatric disorder, Umbilical hernia (64/9160), and Varicose veins of lower extremity  ,  _______________________________________________________________________  Medical Problems:  does not have any pertinent problems on file ,  _______________________________________________________________________  Past Surgical History:   has a past surgical history that includes Hernia repair; [place] tooth extraction; Other surgical history (11/18/2015); INSERTION OF INTRAUTERINE DEVICE (IUD); and Gynecologic cryosurgery  ,  _______________________________________________________________________  Family History:  family history includes Arthritis in her family and mother; Asthma in her maternal aunt, mother, sister, and son; Bipolar disorder in her maternal aunt; COPD in her maternal grandmother; Cancer in her cousin; Diabetes in her cousin, maternal grandmother, mother, and other; Fibromyalgia in her family; GI problems in her family; Heart attack in her maternal grandfather; Hypertension in her mother; Hyperthyroidism in her maternal aunt; Lymphoma in her maternal grandmother; Other in her maternal grandmother; Sleep apnea in her mother; Thyroid disease in her maternal aunt; Varicose Veins in her paternal aunt and paternal grandmother ,  _______________________________________________________________________  Social History:   reports that she quit smoking about 7 years ago  Her smoking use included cigarettes  She has a 15 00 pack-year smoking history  She has never used smokeless tobacco  She reports previous alcohol use  She reports that she does not use drugs  ,  _______________________________________________________________________  Allergies:  is allergic to phenazopyridine, other, and lavender oil     _______________________________________________________________________  Current Outpatient Medications   Medication Sig Dispense Refill    acetaminophen (TYLENOL) 325 mg tablet Take 2 tablets (650 mg total) by mouth every 6 (six) hours (Patient not taking: Reported on 11/10/2021 ) 30 tablet 0    albuterol (PROVENTIL HFA,VENTOLIN HFA) 90 mcg/act inhaler Inhale 1 puff every 6 (six) hours as needed for wheezing or shortness of breath (Patient not taking: Reported on 3/18/2022 ) 8 g 0    Cholecalciferol (Vitamin D) 50 MCG (2000 UT) CAPS Take 1 capsule (2,000 Units total) by mouth in the morning 90 capsule 1    cyclobenzaprine (FLEXERIL) 5 mg tablet Take 1 tablet (5 mg total) by mouth 2 (two) times a day as needed for muscle spasms for up to 20 days 40 tablet 0    fluticasone (FLONASE) 50 mcg/act nasal spray 1 spray into each nostril daily for 10 days 18 mL 1    ipratropium-albuterol (DUO-NEB) 0 5-2 5 mg/3 mL nebulizer solution Take 3 mL by nebulization every 6 (six) hours as needed for wheezing or shortness of breath for up to 10 doses (Patient not taking: Reported on 3/18/2022 ) 3 mL 0    montelukast (SINGULAIR) 10 mg tablet Take 1 tablet (10 mg total) by mouth daily at bedtime for 60 doses 30 tablet 1    naproxen (Naprosyn) 500 mg tablet Take 1 tablet (500 mg total) by mouth 2 (two) times a day as needed (palvic pain/cramping ) Take with food 30 tablet 1    Nerve Stimulator (STANDARD TENS) OMER by Does not apply route 4 (four) times a day 1 Device 0     No current facility-administered medications for this visit      _______________________________________________________________________  Review of Systems   Constitutional: Positive for fatigue and unexpected weight change (difficulty with weight loss )  Negative for chills, diaphoresis and fever  HENT: Negative for congestion, sinus pain and sore throat  Eyes: Negative  Respiratory: Negative for cough and shortness of breath  Cardiovascular: Negative for chest pain and palpitations  Gastrointestinal: Negative for abdominal distention, abdominal pain, nausea and vomiting  Endocrine: Negative  Hair loss    Genitourinary: Negative for difficulty urinating and flank pain  Musculoskeletal:        Numbness and tingling sensation    Skin: Negative for rash  Neurological: Negative for headaches  Hematological: Negative for adenopathy  Psychiatric/Behavioral: Negative for sleep disturbance and suicidal ideas  The patient is not nervous/anxious  Objective:  Vitals:    04/08/22 1331   BP: 118/64   BP Location: Left arm   Patient Position: Sitting   Cuff Size: Standard   Pulse: 92   Resp: 17   Temp: 97 7 °F (36 5 °C)   TempSrc: Temporal   SpO2: 98%   Weight: 63 4 kg (139 lb 12 8 oz)   Height: 5' 4" (1 626 m)     Body mass index is 24 kg/m²       Physical Exam  Vitals and nursing note reviewed  Constitutional:       Comments: BMI 24 20   HENT:      Head: Normocephalic and atraumatic  Eyes:      Extraocular Movements: Extraocular movements intact  Cardiovascular:      Rate and Rhythm: Normal rate and regular rhythm  Heart sounds: Normal heart sounds  Pulmonary:      Effort: Pulmonary effort is normal       Breath sounds: Normal breath sounds  Abdominal:      Palpations: Abdomen is soft  Musculoskeletal:         General: Normal range of motion  Cervical back: Normal range of motion  Skin:     General: Skin is warm  Capillary Refill: Capillary refill takes less than 2 seconds  Neurological:      Mental Status: She is alert and oriented to person, place, and time     Psychiatric:         Mood and Affect: Mood normal          Behavior: Behavior normal

## 2022-04-15 ENCOUNTER — PROCEDURE VISIT (OUTPATIENT)
Dept: OBGYN CLINIC | Facility: CLINIC | Age: 32
End: 2022-04-15
Payer: MEDICARE

## 2022-04-15 VITALS
HEIGHT: 64 IN | BODY MASS INDEX: 24.07 KG/M2 | WEIGHT: 141 LBS | SYSTOLIC BLOOD PRESSURE: 118 MMHG | DIASTOLIC BLOOD PRESSURE: 74 MMHG

## 2022-04-15 DIAGNOSIS — Z30.432 ENCOUNTER FOR IUD REMOVAL: Primary | ICD-10-CM

## 2022-04-15 DIAGNOSIS — Z30.013 ENCOUNTER FOR INITIAL PRESCRIPTION OF INJECTABLE CONTRACEPTIVE: ICD-10-CM

## 2022-04-15 PROCEDURE — 58301 REMOVE INTRAUTERINE DEVICE: CPT | Performed by: OBSTETRICS & GYNECOLOGY

## 2022-04-15 PROCEDURE — 99213 OFFICE O/P EST LOW 20 MIN: CPT | Performed by: OBSTETRICS & GYNECOLOGY

## 2022-04-15 RX ORDER — MEDROXYPROGESTERONE ACETATE 150 MG/ML
150 INJECTION, SUSPENSION INTRAMUSCULAR
Qty: 1 ML | Refills: 1 | Status: SHIPPED | OUTPATIENT
Start: 2022-04-15

## 2022-04-15 NOTE — PROGRESS NOTES
Assessment/Plan:     Diagnoses and all orders for this visit:    Encounter for IUD removal    Encounter for initial prescription of injectable contraceptive  -     medroxyPROGESTERone (DEPO-PROVERA) 150 mg/mL injection; Inject 1 mL (150 mg total) into a muscle every 3 (three) months        78-year-old female  Prior 3 vaginal delivery   IUD desire removal   Desired Depo-Provera for contraception  Plan   IUD removed   Depo-Provera for contraception with her next menses  Calcium and vitamin-D   Return to office for annual exam  Subjective:      Patient ID: Nevaeh Dominguez is a 33 y o  female  HPI  78-year-old female presents to the office today desired IUD removal patient desired Depo-Provera for contraception risk benefit side effect explained and discussed with patient all patient questions answered and patient was satisfied        The following portions of the patient's history were reviewed and updated as appropriate: allergies, current medications, past family history, past medical history, past social history, past surgical history and problem list     Review of Systems      Objective:      /74 (BP Location: Left arm, Patient Position: Sitting, Cuff Size: Standard)   Ht 5' 4" (1 626 m)   Wt 64 kg (141 lb)   BMI 24 20 kg/m²          Physical Exam    Iud removal    Date/Time: 4/15/2022 4:25 PM  Performed by: Glen Townsend MD  Authorized by: Glen Townsend MD   Universal Protocol:  Consent: Verbal consent obtained  Risks and benefits: risks, benefits and alternatives were discussed  Consent given by: patient  Time out: Immediately prior to procedure a "time out" was called to verify the correct patient, procedure, equipment, support staff and site/side marked as required    Patient understanding: patient states understanding of the procedure being performed  Patient consent: the patient's understanding of the procedure matches consent given  Procedure consent: procedure consent matches procedure scheduled  Patient identity confirmed: verbally with patient      Procedure:     Removed with no complications: yes    Comments:      Speculum apply cervix noted to be normal no lesion or other abnormality noted IUD string was not seen using Елена clamp IUD string was grasped removed and shown to the patient patient tolerated procedure well

## 2022-04-17 NOTE — PATIENT INSTRUCTIONS
Heart Healthy Diet   WHAT YOU NEED TO KNOW:   A heart healthy diet is an eating plan low in unhealthy fats and sodium (salt)  The plan is high in healthy fats and fiber  A heart healthy diet helps improve your cholesterol levels and lowers your risk for heart disease and stroke  A dietitian will teach you how to read and understand food labels  DISCHARGE INSTRUCTIONS:   Heart healthy diet guidelines to follow:   · Choose foods that contain healthy fats  ? Unsaturated fats  include monounsaturated and polyunsaturated fats  Unsaturated fat is found in foods such as soybean, canola, olive, corn, and safflower oils  It is also found in soft tub margarine that is made with liquid vegetable oil  ? Omega-3 fat  is found in certain fish, such as salmon, tuna, and trout, and in walnuts and flaxseed  Eat fish high in omega-3 fats at least 2 times a week  · Get 20 to 30 grams of fiber each day  Fruits, vegetables, whole-grain foods, and legumes (cooked beans) are good sources of fiber  · Limit or do not have unhealthy fats  ? Cholesterol  is found in animal foods, such as eggs and lobster, and in dairy products made from whole milk  Limit cholesterol to less than 200 mg each day  ? Saturated fat  is found in meats, such as myles and hamburger  It is also found in chicken or turkey skin, whole milk, and butter  Limit saturated fat to less than 7% of your total daily calories  ? Trans fat  is found in packaged foods, such as potato chips and cookies  It is also in hard margarine, some fried foods, and shortening  Do not eat foods that contain trans fats  · Limit sodium as directed  You may be told to limit sodium to 2,000 to 2,300 mg each day  Choose low-sodium or no-salt-added foods  Add little or no salt to food you prepare  Use herbs and spices in place of salt         Include the following in your heart healthy plan:  Ask your dietitian or healthcare provider how many servings to have from each of the following food groups:  · Grains:      ? Whole-wheat breads, cereals, and pastas, and brown rice    ? Low-fat, low-sodium crackers and chips    · Vegetables:      ? Broccoli, green beans, green peas, and spinach    ? Collards, kale, and lima beans    ? Carrots, sweet potatoes, tomatoes, and peppers    ? Canned vegetables with no salt added    · Fruits:      ? Bananas, peaches, pears, and pineapple    ? Grapes, raisins, and dates    ? Oranges, tangerines, grapefruit, orange juice, and grapefruit juice    ? Apricots, mangoes, melons, and papaya    ? Raspberries and strawberries    ? Canned fruit with no added sugar    · Low-fat dairy:      ? Nonfat (skim) milk, 1% milk, and low-fat almond, cashew, or soy milks fortified with calcium    ? Low-fat cheese, regular or frozen yogurt, and cottage cheese    · Meats and proteins:      ? Lean cuts of beef and pork (loin, leg, round), skinless chicken and turkey    ? Legumes, soy products, egg whites, or nuts    Limit or do not include the following in your heart healthy plan:   · Unhealthy fats and oils:      ? Whole or 2% milk, cream cheese, sour cream, or cheese    ? High-fat cuts of beef (T-bone steaks, ribs), chicken or turkey with skin, and organ meats such as liver    ? Butter, stick margarine, shortening, and cooking oils such as coconut or palm oil    · Foods and liquids high in sodium:      ? Packaged foods, such as frozen dinners, cookies, macaroni and cheese, and cereals with more than 300 mg of sodium per serving    ? Vegetables with added sodium, such as instant potatoes, vegetables with added sauces, or regular canned vegetables    ? Cured or smoked meats, such as hot dogs, myles, and sausage    ? High-sodium ketchup, barbecue sauce, salad dressing, pickles, olives, soy sauce, or miso    · Foods and liquids high in sugar:      ? Candy, cake, cookies, pies, or doughnuts    ? Soft drinks (soda), sports drinks, or sweetened tea    ?  Canned or dry mixes for cakes, soups, sauces, or gravies    Other healthy heart guidelines:   · Do not smoke  Nicotine and other chemicals in cigarettes and cigars can cause lung and heart damage  Ask your healthcare provider for information if you currently smoke and need help to quit  E-cigarettes or smokeless tobacco still contain nicotine  Talk to your healthcare provider before you use these products  · Limit or do not drink alcohol as directed  Alcohol can damage your heart and raise your blood pressure  Your healthcare provider may give you specific daily and weekly limits  The general recommended limit is 1 drink a day for women 21 or older and for men 72 or older  Do not have more than 3 drinks in a day or 7 in a week  The recommended limit is 2 drinks a day for men 24to 59years of age  Do not have more than 4 drinks in a day or 14 in a week  A drink of alcohol is 12 ounces of beer, 5 ounces of wine, or 1½ ounces of liquor  · Exercise regularly  Exercise can help you maintain a healthy weight and improve your blood pressure and cholesterol levels  Regular exercise can also decrease your risk for heart problems  Ask your healthcare provider about the best exercise plan for you  Do not start an exercise program without asking your healthcare provider  Follow up with your doctor or cardiologist as directed:  Write down your questions so you remember to ask them during your visits  © Copyright [employer] 2022 Information is for End User's use only and may not be sold, redistributed or otherwise used for commercial purposes  All illustrations and images included in CareNotes® are the copyrighted property of [employer]  or [facility] Genaro Kirkpatrick   The above information is an  only  It is not intended as medical advice for individual conditions or treatments  Talk to your doctor, nurse or pharmacist before following any medical regimen to see if it is safe and effective for you

## 2022-04-18 ENCOUNTER — TELEPHONE (OUTPATIENT)
Dept: OBGYN CLINIC | Facility: CLINIC | Age: 32
End: 2022-04-18

## 2022-04-18 DIAGNOSIS — Z30.013 ENCOUNTER FOR INITIAL PRESCRIPTION OF INJECTABLE CONTRACEPTIVE: Primary | ICD-10-CM

## 2022-04-18 RX ORDER — MEDROXYPROGESTERONE ACETATE 150 MG/ML
150 INJECTION, SUSPENSION INTRAMUSCULAR
Qty: 1 ML | Refills: 1 | Status: SHIPPED | OUTPATIENT
Start: 2022-04-18

## 2022-04-18 RX ORDER — MEDROXYPROGESTERONE ACETATE 150 MG/ML
150 INJECTION, SUSPENSION INTRAMUSCULAR ONCE
Status: COMPLETED | OUTPATIENT
Start: 2022-04-18 | End: 2022-04-22

## 2022-04-18 NOTE — TELEPHONE ENCOUNTER
Patient called ,she had her IUD removed on Friday, and was advised to call when she starts her menses , she started this yesterday and needs her Depo Provera injection called in so she can make an appointments

## 2022-04-22 ENCOUNTER — CLINICAL SUPPORT (OUTPATIENT)
Dept: OBGYN CLINIC | Facility: CLINIC | Age: 32
End: 2022-04-22
Payer: MEDICARE

## 2022-04-22 VITALS
BODY MASS INDEX: 24.24 KG/M2 | DIASTOLIC BLOOD PRESSURE: 62 MMHG | HEIGHT: 64 IN | SYSTOLIC BLOOD PRESSURE: 100 MMHG | WEIGHT: 142 LBS

## 2022-04-22 DIAGNOSIS — Z30.013 ENCOUNTER FOR INITIAL PRESCRIPTION OF INJECTABLE CONTRACEPTIVE: Primary | ICD-10-CM

## 2022-04-22 DIAGNOSIS — Z30.42 ENCOUNTER FOR SURVEILLANCE OF INJECTABLE CONTRACEPTIVE: ICD-10-CM

## 2022-04-22 LAB — SL AMB POCT URINE HCG: NEGATIVE

## 2022-04-22 PROCEDURE — 81025 URINE PREGNANCY TEST: CPT | Performed by: PHYSICIAN ASSISTANT

## 2022-04-22 PROCEDURE — 96372 THER/PROPH/DIAG INJ SC/IM: CPT | Performed by: OBSTETRICS & GYNECOLOGY

## 2022-04-22 RX ADMIN — MEDROXYPROGESTERONE ACETATE 150 MG: 150 INJECTION, SUSPENSION INTRAMUSCULAR at 11:22

## 2022-05-11 DIAGNOSIS — R09.81 NASAL CONGESTION: ICD-10-CM

## 2022-05-11 RX ORDER — FLUTICASONE PROPIONATE 50 MCG
1 SPRAY, SUSPENSION (ML) NASAL DAILY
Qty: 18 ML | Refills: 0 | Status: SHIPPED | OUTPATIENT
Start: 2022-05-11 | End: 2022-06-17 | Stop reason: SDUPTHER

## 2022-05-18 RX ORDER — MONTELUKAST SODIUM 10 MG/1
10 TABLET ORAL
Qty: 30 TABLET | Refills: 2 | Status: SHIPPED | OUTPATIENT
Start: 2022-05-18 | End: 2022-06-29 | Stop reason: SDUPTHER

## 2022-06-17 ENCOUNTER — HOSPITAL ENCOUNTER (EMERGENCY)
Facility: HOSPITAL | Age: 32
Discharge: HOME | End: 2022-06-17
Attending: EMERGENCY MEDICINE
Payer: MEDICARE

## 2022-06-17 ENCOUNTER — APPOINTMENT (EMERGENCY)
Dept: RADIOLOGY | Facility: HOSPITAL | Age: 32
End: 2022-06-17
Payer: MEDICARE

## 2022-06-17 VITALS
WEIGHT: 135 LBS | RESPIRATION RATE: 16 BRPM | TEMPERATURE: 98.1 F | DIASTOLIC BLOOD PRESSURE: 61 MMHG | OXYGEN SATURATION: 98 % | SYSTOLIC BLOOD PRESSURE: 107 MMHG | BODY MASS INDEX: 23.05 KG/M2 | HEIGHT: 64 IN | HEART RATE: 65 BPM

## 2022-06-17 DIAGNOSIS — R09.81 NASAL CONGESTION: ICD-10-CM

## 2022-06-17 DIAGNOSIS — J06.9 UPPER RESPIRATORY INFECTION, ACUTE: Primary | ICD-10-CM

## 2022-06-17 LAB
ALBUMIN SERPL BCP-MCNC: 4.4 G/DL (ref 3.5–5)
ALP SERPL-CCNC: 63 U/L (ref 34–104)
ALT SERPL W P-5'-P-CCNC: 22 U/L (ref 7–52)
ANION GAP SERPL CALCULATED.3IONS-SCNC: 9 MMOL/L (ref 4–13)
AST SERPL W P-5'-P-CCNC: 18 U/L (ref 13–39)
BACTERIA UR QL AUTO: ABNORMAL /HPF
BASOPHILS # BLD AUTO: 0.01 THOUSANDS/ΜL (ref 0–0.1)
BASOPHILS NFR BLD AUTO: 0 % (ref 0–1)
BILIRUB SERPL-MCNC: 0.52 MG/DL (ref 0.2–1)
BILIRUB UR QL STRIP: NEGATIVE
BUN SERPL-MCNC: 11 MG/DL (ref 5–25)
CALCIUM SERPL-MCNC: 9.3 MG/DL (ref 8.4–10.2)
CARDIAC TROPONIN I PNL SERPL HS: 4 NG/L
CHLORIDE SERPL-SCNC: 106 MMOL/L (ref 96–108)
CLARITY UR: CLEAR
CO2 SERPL-SCNC: 24 MMOL/L (ref 21–32)
COLOR UR: YELLOW
CREAT SERPL-MCNC: 0.56 MG/DL (ref 0.6–1.3)
D DIMER PPP FEU-MCNC: 0.39 UG/ML FEU
EOSINOPHIL # BLD AUTO: 0.06 THOUSAND/ΜL (ref 0–0.61)
EOSINOPHIL NFR BLD AUTO: 1 % (ref 0–6)
ERYTHROCYTE [DISTWIDTH] IN BLOOD BY AUTOMATED COUNT: 13.6 % (ref 11.6–15.1)
EXT PREG TEST URINE: NORMAL
EXT. CONTROL ED NAV: NORMAL
FLUAV RNA RESP QL NAA+PROBE: NEGATIVE
FLUBV RNA RESP QL NAA+PROBE: NEGATIVE
GFR SERPL CREATININE-BSD FRML MDRD: 122 ML/MIN/1.73SQ M
GLUCOSE SERPL-MCNC: 100 MG/DL (ref 65–140)
GLUCOSE UR STRIP-MCNC: NEGATIVE MG/DL
HCT VFR BLD AUTO: 38.1 % (ref 34.8–46.1)
HGB BLD-MCNC: 12.8 G/DL (ref 11.5–15.4)
HGB UR QL STRIP.AUTO: NEGATIVE
IMM GRANULOCYTES # BLD AUTO: 0.01 THOUSAND/UL (ref 0–0.2)
IMM GRANULOCYTES NFR BLD AUTO: 0 % (ref 0–2)
KETONES UR STRIP-MCNC: NEGATIVE MG/DL
LEUKOCYTE ESTERASE UR QL STRIP: ABNORMAL
LYMPHOCYTES # BLD AUTO: 1.81 THOUSANDS/ΜL (ref 0.6–4.47)
LYMPHOCYTES NFR BLD AUTO: 27 % (ref 14–44)
MCH RBC QN AUTO: 29 PG (ref 26.8–34.3)
MCHC RBC AUTO-ENTMCNC: 33.6 G/DL (ref 31.4–37.4)
MCV RBC AUTO: 86 FL (ref 82–98)
MONOCYTES # BLD AUTO: 0.35 THOUSAND/ΜL (ref 0.17–1.22)
MONOCYTES NFR BLD AUTO: 5 % (ref 4–12)
NEUTROPHILS # BLD AUTO: 4.57 THOUSANDS/ΜL (ref 1.85–7.62)
NEUTS SEG NFR BLD AUTO: 67 % (ref 43–75)
NITRITE UR QL STRIP: NEGATIVE
NON-SQ EPI CELLS URNS QL MICRO: ABNORMAL /HPF
NRBC BLD AUTO-RTO: 0 /100 WBCS
PH UR STRIP.AUTO: 6 [PH]
PLATELET # BLD AUTO: 246 THOUSANDS/UL (ref 149–390)
PMV BLD AUTO: 10.7 FL (ref 8.9–12.7)
POTASSIUM SERPL-SCNC: 3.5 MMOL/L (ref 3.5–5.3)
PROT SERPL-MCNC: 7.3 G/DL (ref 6.4–8.4)
PROT UR STRIP-MCNC: NEGATIVE MG/DL
RBC # BLD AUTO: 4.41 MILLION/UL (ref 3.81–5.12)
RBC #/AREA URNS AUTO: ABNORMAL /HPF
RSV RNA RESP QL NAA+PROBE: NEGATIVE
SARS-COV-2 RNA RESP QL NAA+PROBE: NEGATIVE
SODIUM SERPL-SCNC: 139 MMOL/L (ref 135–147)
SP GR UR STRIP.AUTO: >=1.03 (ref 1–1.03)
UROBILINOGEN UR QL STRIP.AUTO: 0.2 E.U./DL
WBC # BLD AUTO: 6.81 THOUSAND/UL (ref 4.31–10.16)
WBC #/AREA URNS AUTO: ABNORMAL /HPF

## 2022-06-17 PROCEDURE — 71045 X-RAY EXAM CHEST 1 VIEW: CPT

## 2022-06-17 PROCEDURE — 99284 EMERGENCY DEPT VISIT MOD MDM: CPT | Performed by: EMERGENCY MEDICINE

## 2022-06-17 PROCEDURE — 80053 COMPREHEN METABOLIC PANEL: CPT | Performed by: EMERGENCY MEDICINE

## 2022-06-17 PROCEDURE — 84484 ASSAY OF TROPONIN QUANT: CPT | Performed by: EMERGENCY MEDICINE

## 2022-06-17 PROCEDURE — 81001 URINALYSIS AUTO W/SCOPE: CPT | Performed by: EMERGENCY MEDICINE

## 2022-06-17 PROCEDURE — 93005 ELECTROCARDIOGRAM TRACING: CPT

## 2022-06-17 PROCEDURE — 81025 URINE PREGNANCY TEST: CPT | Performed by: EMERGENCY MEDICINE

## 2022-06-17 PROCEDURE — 0241U HB NFCT DS VIR RESP RNA 4 TRGT: CPT | Performed by: EMERGENCY MEDICINE

## 2022-06-17 PROCEDURE — 85379 FIBRIN DEGRADATION QUANT: CPT | Performed by: EMERGENCY MEDICINE

## 2022-06-17 PROCEDURE — 36415 COLL VENOUS BLD VENIPUNCTURE: CPT | Performed by: EMERGENCY MEDICINE

## 2022-06-17 PROCEDURE — 99285 EMERGENCY DEPT VISIT HI MDM: CPT

## 2022-06-17 PROCEDURE — 85025 COMPLETE CBC W/AUTO DIFF WBC: CPT | Performed by: EMERGENCY MEDICINE

## 2022-06-17 RX ORDER — FLUTICASONE PROPIONATE 50 MCG
1 SPRAY, SUSPENSION (ML) NASAL DAILY
Qty: 18 ML | Refills: 1 | Status: SHIPPED | OUTPATIENT
Start: 2022-06-17 | End: 2022-06-29 | Stop reason: SDUPTHER

## 2022-06-17 RX ORDER — FLUTICASONE PROPIONATE 50 MCG
1 SPRAY, SUSPENSION (ML) NASAL DAILY
Qty: 18 ML | Refills: 0 | Status: SHIPPED | OUTPATIENT
Start: 2022-06-17 | End: 2022-06-17 | Stop reason: SDUPTHER

## 2022-06-17 NOTE — ED PROVIDER NOTES
History  Chief Complaint   Patient presents with    Chest Pain     Pt" I have had like CP for the past five days  I have it on the right side  It kim comes and goes  My  made me a cup of tea this morning and it helped for a little bit" PT denies SOB, n/v, or sick contacts     To er with concern for uris x and some cp  She reported about 4 days of hoarser voice, fatigue, run down sensation, bl ear popping and discomfort  Denies sore throat, trouble swallowing or breathing  No fever, chills, cough  Cp is reported, sharp and occasion to the mid chest  No leg swelling, known heart disease, dvt /pe hx on her or family  She denies exertional aspects  Denies abd pain, gi sx, preg, uti sx  Prior to Admission Medications   Prescriptions Last Dose Informant Patient Reported? Taking?    Cholecalciferol (Vitamin D) 50 MCG (2000 UT) CAPS   No Yes   Sig: Take 1 capsule (2,000 Units total) by mouth in the morning   Nerve Stimulator (STANDARD TENS) OMER   No Yes   Sig: by Does not apply route 4 (four) times a day   acetaminophen (TYLENOL) 325 mg tablet  Self No No   Sig: Take 2 tablets (650 mg total) by mouth every 6 (six) hours   Patient not taking: Reported on 11/10/2021    albuterol (PROVENTIL HFA,VENTOLIN HFA) 90 mcg/act inhaler   No Yes   Sig: Inhale 1 puff every 6 (six) hours as needed for wheezing or shortness of breath   cyclobenzaprine (FLEXERIL) 5 mg tablet   No No   Sig: Take 1 tablet (5 mg total) by mouth 2 (two) times a day as needed for muscle spasms for up to 20 days   ipratropium-albuterol (DUO-NEB) 0 5-2 5 mg/3 mL nebulizer solution Not Taking at Unknown time  No No   Sig: Take 3 mL by nebulization every 6 (six) hours as needed for wheezing or shortness of breath for up to 10 doses   Patient not taking: No sig reported   medroxyPROGESTERone (DEPO-PROVERA) 150 mg/mL injection   No Yes   Sig: Inject 1 mL (150 mg total) into a muscle every 3 (three) months   medroxyPROGESTERone (DEPO-PROVERA) 150 mg/mL injection   No No   Sig: Inject 1 mL (150 mg total) into a muscle every 3 (three) months   montelukast (SINGULAIR) 10 mg tablet Past Week at Unknown time  No Yes   Sig: Take 1 tablet (10 mg total) by mouth daily at bedtime for 60 doses   naproxen (Naprosyn) 500 mg tablet   No Yes   Sig: Take 1 tablet (500 mg total) by mouth 2 (two) times a day as needed (palvic pain/cramping ) Take with food      Facility-Administered Medications: None       Past Medical History:   Diagnosis Date    Abnormal Pap smear of cervix     had cryosurgery     Anxiety     Carrier of group B Streptococcus     GERD (gastroesophageal reflux disease)     High blood pressure     during pregnancy     History of UTI     Hx of absence seizures     once(per sanjiv)    Hx of seasonal allergies     Migraine headache     Muscle spasm of back     Papanicolaou smear for cervical cancer screening 2017    Pap neg 2016, pap- neg/GC/CT NEG    Postpartum depression     Preeclampsia     Psychiatric disorder     Umbilical hernia     Varicose veins of lower extremity        Past Surgical History:   Procedure Laterality Date    GYNECOLOGIC CRYOSURGERY      HERNIA REPAIR      2 years ago    INSERTION OF INTRAUTERINE DEVICE (IUD)      OTHER SURGICAL HISTORY  2015    cryosurgery     WISDOM TOOTH EXTRACTION         Family History   Problem Relation Age of Onset    Hypertension Mother    [facility] Arthritis Mother     Asthma Mother     Diabetes Mother         Prediabetic     Sleep apnea Mother     Lymphoma Maternal Grandmother     Other Maternal Grandmother         Cardiomegaly    Diabetes Maternal Grandmother     COPD Maternal Grandmother     Heart attack Maternal Grandfather          61    Varicose Veins Paternal Grandmother     Hyperthyroidism Maternal Aunt     Asthma Maternal Aunt     Bipolar disorder Maternal Aunt     Thyroid disease Maternal Aunt     Varicose Veins Paternal Aunt     Cancer Cousin     Diabetes Cousin     Diabetes Other     Asthma Sister     Fibromyalgia Family     Arthritis Family     GI problems Family     Asthma Son      I have reviewed and agree with the history as documented  E-Cigarette/Vaping    E-Cigarette Use Never User      E-Cigarette/Vaping Substances    Nicotine No     THC No     CBD No     Flavoring No     Other No     Unknown No      Social History     Tobacco Use    Smoking status: Former Smoker     Packs/day: 1 00     Years: 15 00     Pack years: 15 00     Types: Cigarettes     Quit date:      Years since quittin 4    Smokeless tobacco: Never Used    Tobacco comment: Has smoked since age:   13   Vaping Use    Vaping Use: Never used   Substance Use Topics    Alcohol use: Not Currently     Comment: stopped drinking in     Drug use: Never     Comment: per Gyn notes, Marijuana HX age 11  Review of Systems   All other systems reviewed and are negative  Physical Exam  Physical Exam  Vitals and nursing note reviewed  Constitutional:       General: She is not in acute distress  Appearance: Normal appearance  She is well-developed  She is not ill-appearing, toxic-appearing or diaphoretic  HENT:      Head: Normocephalic and atraumatic  Right Ear: External ear normal       Left Ear: External ear normal       Nose: Nose normal       Mouth/Throat:      Mouth: Mucous membranes are moist    Eyes:      General:         Right eye: No discharge  Left eye: No discharge  Conjunctiva/sclera: Conjunctivae normal       Pupils: Pupils are equal, round, and reactive to light  Neck:      Vascular: No JVD  Cardiovascular:      Rate and Rhythm: Normal rate and regular rhythm  Pulses: Normal pulses  Heart sounds: Normal heart sounds  No murmur heard  No friction rub  No gallop  Pulmonary:      Effort: Pulmonary effort is normal  No respiratory distress  Breath sounds: Normal breath sounds  No stridor   No wheezing, rhonchi or rales  Chest:      Chest wall: No tenderness  Abdominal:      General: Abdomen is flat  Bowel sounds are normal  There is no distension  Palpations: Abdomen is soft  There is no mass  Tenderness: There is no abdominal tenderness  There is no right CVA tenderness, left CVA tenderness, guarding or rebound  Hernia: No hernia is present  Musculoskeletal:         General: No swelling, tenderness, deformity or signs of injury  Normal range of motion  Cervical back: Normal range of motion and neck supple  Right lower leg: No edema  Left lower leg: No edema  Skin:     General: Skin is warm and dry  Capillary Refill: Capillary refill takes less than 2 seconds  Coloration: Skin is not jaundiced or pale  Findings: No bruising, erythema, lesion or rash  Neurological:      General: No focal deficit present  Mental Status: She is alert and oriented to person, place, and time  Cranial Nerves: No cranial nerve deficit  Sensory: No sensory deficit  Motor: No weakness or abnormal muscle tone        Coordination: Coordination normal       Gait: Gait normal       Deep Tendon Reflexes: Reflexes normal          Vital Signs  ED Triage Vitals [06/17/22 1326]   Temperature Pulse Respirations Blood Pressure SpO2   98 1 °F (36 7 °C) 82 18 111/74 96 %      Temp Source Heart Rate Source Patient Position - Orthostatic VS BP Location FiO2 (%)   Oral Monitor Sitting Left arm --      Pain Score       4           Vitals:    06/17/22 1326 06/17/22 1452   BP: 111/74 107/61   Pulse: 82 65   Patient Position - Orthostatic VS: Sitting Lying         Visual Acuity      ED Medications  Medications - No data to display    Diagnostic Studies  Results Reviewed     Procedure Component Value Units Date/Time    COVID/FLU/RSV - 2 hour TAT [[id number]]  (Normal) Collected: 06/17/22 1352    Lab Status: Final result Specimen: Nares from Nose Updated: 06/17/22 1442     SARS-CoV-2 Negative     INFLUENZA A PCR Negative     INFLUENZA B PCR Negative     RSV PCR Negative    Narrative:      FOR PEDIATRIC PATIENTS - copy/paste COVID Guidelines URL to browser: [email or web address]    SARS-CoV-2 assay is a Nucleic Acid Amplification assay intended for the  qualitative detection of nucleic acid from SARS-CoV-2 in nasopharyngeal  swabs  Results are for the presumptive identification of SARS-CoV-2 RNA  Positive results are indicative of infection with SARS-CoV-2, the virus  causing COVID-19, but do not rule out bacterial infection or co-infection  with other viruses  Laboratories within the [place] and its  territories are required to report all positive results to the appropriate  public health authorities  Negative results do not preclude SARS-CoV-2  infection and should not be used as the sole basis for treatment or other  patient management decisions  Negative results must be combined with  clinical observations, patient history, and epidemiological information  This test has not been FDA cleared or approved  This test has been authorized by FDA under an Emergency Use Authorization  (EUA)  This test is only authorized for the duration of time the  declaration that circumstances exist justifying the authorization of the  emergency use of an in vitro diagnostic tests for detection of SARS-CoV-2  virus and/or diagnosis of COVID-19 infection under section 564(b)(1) of  the Act, 21 U  S C  650SMF-2(U)(4), unless the authorization is terminated  or revoked sooner  The test has been validated but independent review by FDA  and CLIA is pending  Test performed using [employer] Gene[employer]pert: This RT-PCR assay targets N2,  a region unique to SARS-CoV-2  A conserved region in the E-gene was chosen  for pan-Sarbecovirus detection which includes SARS-CoV-2      Urine Microscopic [[id number]]  (Abnormal) Collected: 06/17/22 5252    Lab Status: Final result Specimen: Urine, Clean Catch Updated: 06/17/22 1433     RBC, UA None Seen /hpf      WBC, UA 0-5 /hpf      Epithelial Cells Moderate /hpf      Bacteria, UA Occasional /hpf     HS Troponin 0hr (reflex protocol) [[id number]]  (Normal) Collected: 06/17/22 1347    Lab Status: Final result Specimen: Blood from Arm, Left Updated: 06/17/22 1414     hs TnI 0hr 4 ng/L     Comprehensive metabolic panel [[id number]]  (Abnormal) Collected: 06/17/22 1347    Lab Status: Final result Specimen: Blood from Arm, Left Updated: 06/17/22 1407     Sodium 139 mmol/L      Potassium 3 5 mmol/L      Chloride 106 mmol/L      CO2 24 mmol/L      ANION GAP 9 mmol/L      BUN 11 mg/dL      Creatinine 0 56 mg/dL      Glucose 100 mg/dL      Calcium 9 3 mg/dL      AST 18 U/L      ALT 22 U/L      Alkaline Phosphatase 63 U/L      Total Protein 7 3 g/dL      Albumin 4 4 g/dL      Total Bilirubin 0 52 mg/dL      eGFR 122 ml/min/1 73sq m     Narrative:      [place] guidelines for Chronic Kidney Disease (CKD):     Stage 1 with normal or high GFR (GFR > 90 mL/min/1 73 square meters)    Stage 2 Mild CKD (GFR = 60-89 mL/min/1 73 square meters)    Stage 3A Moderate CKD (GFR = 45-59 mL/min/1 73 square meters)    Stage 3B Moderate CKD (GFR = 30-44 mL/min/1 73 square meters)    Stage 4 Severe CKD (GFR = 15-29 mL/min/1 73 square meters)    Stage 5 End Stage CKD (GFR <15 mL/min/1 73 square meters)  Note: GFR calculation is accurate only with a steady state creatinine    UA w Reflex to Microscopic w Reflex to Culture [[id number]]  (Abnormal) Collected: 06/17/22 1351    Lab Status: Final result Specimen: Urine, Clean Catch Updated: 06/17/22 1402     Color, UA Yellow     Clarity, UA Clear     Specific Gravity, UA >=1 030     pH, UA 6 0     Leukocytes, UA Trace     Nitrite, UA Negative     Protein, UA Negative mg/dl      Glucose, UA Negative mg/dl      Ketones, UA Negative mg/dl      Urobilinogen, UA 0 2 E U /dl      Bilirubin, UA Negative     Blood, UA Negative    D-Dimer [[id number]]  (Normal) Collected: 06/17/22 1347    Lab Status: Final result Specimen: Blood from Arm, Left Updated: 06/17/22 1402     D-Dimer, Quant 0 39 ug/ml FEU     POCT pregnancy, urine [[id number]]  (Normal) Resulted: 06/17/22 1359    Lab Status: Final result Updated: 06/17/22 1359     EXT PREG TEST UR (Ref: Negative) neg     Control valid    CBC and differential [[id number]] Collected: 06/17/22 1347    Lab Status: Final result Specimen: Blood from Arm, Left Updated: 06/17/22 1352     WBC 6 81 Thousand/uL      RBC 4 41 Million/uL      Hemoglobin 12 8 g/dL      Hematocrit 38 1 %      MCV 86 fL      MCH 29 0 pg      MCHC 33 6 g/dL      RDW 13 6 %      MPV 10 7 fL      Platelets 345 Thousands/uL      nRBC 0 /100 WBCs      Neutrophils Relative 67 %      Immat GRANS % 0 %      Lymphocytes Relative 27 %      Monocytes Relative 5 %      Eosinophils Relative 1 %      Basophils Relative 0 %      Neutrophils Absolute 4 57 Thousands/µL      Immature Grans Absolute 0 01 Thousand/uL      Lymphocytes Absolute 1 81 Thousands/µL      Monocytes Absolute 0 35 Thousand/µL      Eosinophils Absolute 0 06 Thousand/µL      Basophils Absolute 0 01 Thousands/µL                  XR chest 1 view portable   Final Result by Carol Cali MD (06/17 1423)      No acute cardiopulmonary disease  Workstation performed: [id number]                    Procedures  Procedures         ED Course             HEART Risk Score    Flowsheet Row Most Recent Value   Heart Score Risk Calculator    History 0 Filed at: 06/17/2022 1444   ECG 0 Filed at: 06/17/2022 1444   Age 0 Filed at: 06/17/2022 1444   Risk Factors 1 Filed at: 06/17/2022 1444   Troponin 0 Filed at: 06/17/2022 1444   HEART Score 1 Filed at: 06/17/2022 1444                                      MDM  Number of Diagnoses or Management Options  Diagnosis management comments: To er with several days of marilyn guerin sx   She reported horse voice wo sore throat, mild congestion and mary carmen ear popping  She feels fatigued  She denies fever chills  On exam she has patent airway wo exudates, masses, swellings, clean tm bl, clear lungs  She did report some cp  This was sharp and noted earlier in the day  Cp free in er  ekg reassuring  cxr clear  Denies red flags sx for cp  Her sharp cp is possible pleurisy related in the face of her uri sx  She does denies cough, sob  She on exam does not appear fluid overloaded  Trop is 4  D dimer negative  ekg and cxr reassuring  Disposition  Final diagnoses:   Upper respiratory infection, acute     Time reflects when diagnosis was documented in both MDM as applicable and the Disposition within this note     Time User Action Codes Description Comment    6/17/2022  2:45 PM Shemar Gross Add [J06 9] Upper respiratory infection, acute       ED Disposition     ED Disposition   Discharge    Condition   Stable    Date/Time   Fri Jun 17, 2022  2:44 PM    Comment   Kathrin Blunt discharge to home/self care                 Follow-up Information     Follow up With Specialties Details Why [address] [facility], [address] [place] [address] Nurse Practitioner, Family Medicine Schedule an appointment as soon as possible for a visit in 3 days  [address]  [facility] [address] [facility]   [phone]            Discharge Medication List as of 6/17/2022  2:46 PM      START taking these medications    Details   fluticasone (FLONASE) 50 mcg/act nasal spray 1 spray into each nostril daily for 10 days, Starting Fri 6/17/2022, Until Mon 6/27/2022, Normal         CONTINUE these medications which have NOT CHANGED    Details   acetaminophen (TYLENOL) 325 mg tablet Take 2 tablets (650 mg total) by mouth every 6 (six) hours, Starting Tue 2/11/2020, No Print      albuterol (PROVENTIL HFA,VENTOLIN HFA) 90 mcg/act inhaler Inhale 1 puff every 6 (six) hours as needed for wheezing or shortness of breath, Starting Wed 11/10/2021, Normal Cholecalciferol (Vitamin D) 50 MCG (2000 UT) CAPS Take 1 capsule (2,000 Units total) by mouth in the morning, Starting Fri 4/8/2022, Until Thu 7/7/2022, Normal      cyclobenzaprine (FLEXERIL) 5 mg tablet Take 1 tablet (5 mg total) by mouth 2 (two) times a day as needed for muscle spasms for up to 20 days, Starting Fri 9/17/2021, Until Thu 10/7/2021 at 2359, Normal      ipratropium-albuterol (DUO-NEB) 0 5-2 5 mg/3 mL nebulizer solution Take 3 mL by nebulization every 6 (six) hours as needed for wheezing or shortness of breath for up to 10 doses, Starting Wed 11/10/2021, Normal      !! medroxyPROGESTERone (DEPO-PROVERA) 150 mg/mL injection Inject 1 mL (150 mg total) into a muscle every 3 (three) months, Starting Fri 4/15/2022, Normal      !! medroxyPROGESTERone (DEPO-PROVERA) 150 mg/mL injection Inject 1 mL (150 mg total) into a muscle every 3 (three) months, Starting Mon 4/18/2022, Normal      montelukast (SINGULAIR) 10 mg tablet Take 1 tablet (10 mg total) by mouth daily at bedtime for 60 doses, Starting Wed 5/18/2022, Until Sun 7/17/2022, Normal      naproxen (Naprosyn) 500 mg tablet Take 1 tablet (500 mg total) by mouth 2 (two) times a day as needed (palvic pain/cramping ) Take with food, Starting Fri 3/18/2022, Normal      Nerve Stimulator (STANDARD TENS) OMER by Does not apply route 4 (four) times a day, Starting Wed 5/20/2020, Normal       !! - Potential duplicate medications found  Please discuss with provider  No discharge procedures on file      PDMP Review     None          ED Provider  Electronically Signed by           Elijah Huizar MD  06/18/22 3731

## 2022-06-20 LAB
ATRIAL RATE: 67 BPM
P AXIS: 69 DEGREES
PR INTERVAL: 148 MS
QRS AXIS: 85 DEGREES
QRSD INTERVAL: 82 MS
QT INTERVAL: 412 MS
QTC INTERVAL: 435 MS
T WAVE AXIS: 66 DEGREES
VENTRICULAR RATE: 67 BPM

## 2022-06-20 PROCEDURE — 93010 ELECTROCARDIOGRAM REPORT: CPT | Performed by: INTERNAL MEDICINE

## 2022-06-29 ENCOUNTER — TELEMEDICINE (OUTPATIENT)
Dept: FAMILY MEDICINE CLINIC | Facility: CLINIC | Age: 32
End: 2022-06-29
Payer: MEDICARE

## 2022-06-29 DIAGNOSIS — J32.1 CHRONIC FRONTAL SINUSITIS: Primary | ICD-10-CM

## 2022-06-29 DIAGNOSIS — R07.89 SENSATION OF CHEST PRESSURE: ICD-10-CM

## 2022-06-29 DIAGNOSIS — R05.9 COUGH: ICD-10-CM

## 2022-06-29 DIAGNOSIS — R09.81 NASAL CONGESTION: ICD-10-CM

## 2022-06-29 DIAGNOSIS — U07.1 COVID-19: ICD-10-CM

## 2022-06-29 PROCEDURE — 99214 OFFICE O/P EST MOD 30 MIN: CPT | Performed by: NURSE PRACTITIONER

## 2022-06-29 RX ORDER — AMOXICILLIN 875 MG/1
875 TABLET, COATED ORAL 2 TIMES DAILY
Qty: 14 TABLET | Refills: 0 | Status: SHIPPED | OUTPATIENT
Start: 2022-06-29 | End: 2022-07-06

## 2022-06-29 RX ORDER — FLUTICASONE PROPIONATE 50 MCG
1 SPRAY, SUSPENSION (ML) NASAL DAILY
Qty: 18 ML | Refills: 1 | Status: SHIPPED | OUTPATIENT
Start: 2022-06-29 | End: 2022-07-09

## 2022-06-29 RX ORDER — ALBUTEROL SULFATE 90 UG/1
1 AEROSOL, METERED RESPIRATORY (INHALATION) EVERY 6 HOURS PRN
Qty: 8 G | Refills: 0 | Status: SHIPPED | OUTPATIENT
Start: 2022-06-29 | End: 2022-07-26

## 2022-06-29 RX ORDER — MONTELUKAST SODIUM 10 MG/1
10 TABLET ORAL
Qty: 30 TABLET | Refills: 2 | Status: SHIPPED | OUTPATIENT
Start: 2022-06-29 | End: 2022-08-28

## 2022-06-29 NOTE — PROGRESS NOTES
Virtual Regular Visit    Verification of patient location:    Patient is located in the following state in which I hold an active license [place]      Assessment/Plan:  Pt is a 33 yr old female  Presents in office for ER follow up   She has had this upper respiratory infection for about 2 weeks   Was seen in the ED 6/17 with cough shortness of breath   Negative for COVID   XR done negative   Lab work reviewed stable   Still not better continues to have sinus pressure and cough   Has been using her inhaler - continues Singulair   Will add amoxicillin at this point   Follow up in 2 weeks       Problem List Items Addressed This Visit    None     Visit Diagnoses     Chronic frontal sinusitis    -  Primary    Relevant Medications    amoxicillin (AMOXIL) 875 mg tablet    Cough        Relevant Medications    amoxicillin (AMOXIL) 875 mg tablet    Nasal congestion        Relevant Medications    montelukast (SINGULAIR) 10 mg tablet    fluticasone (FLONASE) 50 mcg/act nasal spray    COVID-19        Relevant Medications    albuterol (PROVENTIL HFA,VENTOLIN HFA) 90 mcg/act inhaler    Sensation of chest pressure        Relevant Medications    albuterol (PROVENTIL HFA,VENTOLIN HFA) 90 mcg/act inhaler            Depression Screening and Follow-up Plan: Clincally patient does not have depression  No treatment is required  Reason for visit is   Chief Complaint   Patient presents with    Virtual Regular Visit        Encounter provider Griselda Albe, CRNP    Provider located at [address] [place] [address]  [phone]      Recent Visits  No visits were found meeting these conditions    Showing recent visits within past 7 days and meeting all other requirements  Today's Visits  Date Type Provider Dept   06/29/22 Telemedicine Griselda Albe, [address] [facility] today's visits and meeting all other requirements  Future Appointments  No visits were found meeting these conditions  Showing future appointments within next 150 days and meeting all other requirements       The patient was identified by name and date of birth  Angelita Delarosa was informed that this is a telemedicine visit and that the visit is being conducted through [address] and patient was informed this is a secure, HIPAA-complaint platform  She agrees to proceed     My office door was closed  No one else was in the room  She acknowledged consent and understanding of privacy and security of the video platform  The patient has agreed to participate and understands they can discontinue the visit at any time  Patient is aware this is a billable service  Subjective  Vasqueznia Yessenia Garrett is a 33 y o  female          Presents in office via virtual visit for ER follow up   Continues to have sinus pressure and congestion        Past Medical History:   Diagnosis Date    Abnormal Pap smear of cervix     had cryosurgery 2015    Anxiety     Carrier of group B Streptococcus     GERD (gastroesophageal reflux disease)     High blood pressure     during pregnancy     History of UTI     Hx of absence seizures     once(per sanjiv)    Hx of seasonal allergies     Migraine headache     Muscle spasm of back     Papanicolaou smear for cervical cancer screening 06/2017    Pap neg 5/2016, pap- neg/GC/CT NEG    Postpartum depression     Preeclampsia     Psychiatric disorder     Umbilical hernia 43/3241    Varicose veins of lower extremity        Past Surgical History:   Procedure Laterality Date    GYNECOLOGIC CRYOSURGERY      HERNIA REPAIR      2 years ago    INSERTION OF INTRAUTERINE DEVICE (IUD)      OTHER SURGICAL HISTORY  11/18/2015    cryosurgery     WISDOM TOOTH EXTRACTION         Current Outpatient Medications   Medication Sig Dispense Refill    albuterol (PROVENTIL HFA,VENTOLIN HFA) 90 mcg/act inhaler Inhale 1 puff every 6 (six) hours as needed for wheezing or shortness of breath 8 g 0    amoxicillin (AMOXIL) 875 mg tablet Take 1 tablet (875 mg total) by mouth 2 (two) times a day for 7 days 14 tablet 0    fluticasone (FLONASE) 50 mcg/act nasal spray 1 spray into each nostril daily for 10 days 18 mL 1    montelukast (SINGULAIR) 10 mg tablet Take 1 tablet (10 mg total) by mouth daily at bedtime for 60 doses 30 tablet 2    Cholecalciferol (Vitamin D) 50 MCG (2000 UT) CAPS Take 1 capsule (2,000 Units total) by mouth in the morning 90 capsule 1    medroxyPROGESTERone (DEPO-PROVERA) 150 mg/mL injection Inject 1 mL (150 mg total) into a muscle every 3 (three) months 1 mL 1    medroxyPROGESTERone (DEPO-PROVERA) 150 mg/mL injection Inject 1 mL (150 mg total) into a muscle every 3 (three) months 1 mL 1    naproxen (Naprosyn) 500 mg tablet Take 1 tablet (500 mg total) by mouth 2 (two) times a day as needed (palvic pain/cramping ) Take with food 30 tablet 1    Nerve Stimulator (STANDARD TENS) OMER by Does not apply route 4 (four) times a day 1 Device 0     No current facility-administered medications for this visit  Allergies   Allergen Reactions    Phenazopyridine Anaphylaxis and Hives    Other      Dove Deodorant      Lavender Oil Hives       Review of Systems   Constitutional: Positive for fatigue  Negative for fever and unexpected weight change  HENT: Positive for congestion, postnasal drip and sinus pressure  Eyes: Negative  Respiratory: Positive for cough  Negative for shortness of breath and wheezing  Cardiovascular: Negative for chest pain and palpitations  Gastrointestinal: Negative for abdominal distention, abdominal pain and nausea  Endocrine: Negative  Genitourinary: Negative for difficulty urinating and flank pain  Musculoskeletal: Negative for myalgias  Skin: Negative for rash  Allergic/Immunologic: Positive for environmental allergies  Hematological: Negative for adenopathy     Psychiatric/Behavioral: Negative for sleep disturbance and suicidal ideas  The patient is not nervous/anxious  Video Exam    There were no vitals filed for this visit  Physical Exam  Vitals and nursing note reviewed  HENT:      Nose: Nose normal    Eyes:      Extraocular Movements: Extraocular movements intact  Pulmonary:      Breath sounds: Normal breath sounds  Musculoskeletal:         General: Normal range of motion  Cervical back: Normal range of motion  Skin:     Capillary Refill: Capillary refill takes less than 2 seconds  Neurological:      Mental Status: She is alert  Psychiatric:         Mood and Affect: Mood normal          Behavior: Behavior normal           I spent 20 minutes directly with the patient during this visit    [facility] verbally agrees to participate in [employer]  Pt is aware that [employer] could be limited without vital signs or the ability to perform a full hands-on physical exam  Porsha Castanon Challenger understands she or the provider may request at any time to terminate the video visit and request the patient to seek care or treatment in person

## 2022-07-08 ENCOUNTER — CLINICAL SUPPORT (OUTPATIENT)
Dept: OBGYN CLINIC | Facility: CLINIC | Age: 32
End: 2022-07-08
Payer: MEDICARE

## 2022-07-08 VITALS
SYSTOLIC BLOOD PRESSURE: 116 MMHG | HEIGHT: 64 IN | DIASTOLIC BLOOD PRESSURE: 82 MMHG | WEIGHT: 137 LBS | BODY MASS INDEX: 23.39 KG/M2

## 2022-07-08 DIAGNOSIS — Z30.42 ENCOUNTER FOR SURVEILLANCE OF INJECTABLE CONTRACEPTIVE: Primary | ICD-10-CM

## 2022-07-08 PROCEDURE — 96372 THER/PROPH/DIAG INJ SC/IM: CPT | Performed by: OBSTETRICS & GYNECOLOGY

## 2022-07-08 RX ORDER — MEDROXYPROGESTERONE ACETATE 150 MG/ML
150 INJECTION, SUSPENSION INTRAMUSCULAR ONCE
Status: COMPLETED | OUTPATIENT
Start: 2022-07-08 | End: 2022-07-08

## 2022-07-08 RX ADMIN — MEDROXYPROGESTERONE ACETATE 150 MG: 150 INJECTION, SUSPENSION INTRAMUSCULAR at 14:38

## 2022-07-26 DIAGNOSIS — U07.1 COVID-19: ICD-10-CM

## 2022-07-26 DIAGNOSIS — R07.89 SENSATION OF CHEST PRESSURE: ICD-10-CM

## 2022-07-26 RX ORDER — ALBUTEROL SULFATE 90 UG/1
AEROSOL, METERED RESPIRATORY (INHALATION)
Qty: 18 G | Refills: 1 | Status: SHIPPED | OUTPATIENT
Start: 2022-07-26

## 2022-08-08 ENCOUNTER — TELEPHONE (OUTPATIENT)
Dept: PULMONOLOGY | Facility: CLINIC | Age: 32
End: 2022-08-08

## 2022-08-08 NOTE — TELEPHONE ENCOUNTER
8/8/22 - Pt said her asthma is coming back more strongly and pt said she had been on Advair (pt also said poss steroid), but hasn't been on it, but wants to go back on it due to her current condition  Said she couldn't find the correct medication in [address]  Please review and advise pt if/when script can be sent

## 2022-08-09 NOTE — TELEPHONE ENCOUNTER
8/9/22 - Dr Margarito Yeager pt scheduled an appt w/Dr Piyush Winston this time on 9/8/22 due to Dr Fawn Stephens schedule  Needs end of day appt due to home schooling

## 2022-08-31 DIAGNOSIS — R09.81 NASAL CONGESTION: ICD-10-CM

## 2022-08-31 RX ORDER — MONTELUKAST SODIUM 10 MG/1
10 TABLET ORAL
Qty: 30 TABLET | Refills: 2 | Status: SHIPPED | OUTPATIENT
Start: 2022-08-31 | End: 2022-10-07 | Stop reason: SDUPTHER

## 2022-09-08 ENCOUNTER — OFFICE VISIT (OUTPATIENT)
Dept: PULMONOLOGY | Facility: CLINIC | Age: 32
End: 2022-09-08
Payer: MEDICARE

## 2022-09-08 VITALS
BODY MASS INDEX: 23.56 KG/M2 | WEIGHT: 138 LBS | SYSTOLIC BLOOD PRESSURE: 108 MMHG | HEIGHT: 64 IN | TEMPERATURE: 99.4 F | HEART RATE: 68 BPM | DIASTOLIC BLOOD PRESSURE: 70 MMHG | OXYGEN SATURATION: 99 %

## 2022-09-08 DIAGNOSIS — J45.20 MILD INTERMITTENT ASTHMA WITHOUT COMPLICATION: Primary | ICD-10-CM

## 2022-09-08 PROCEDURE — 99213 OFFICE O/P EST LOW 20 MIN: CPT | Performed by: INTERNAL MEDICINE

## 2022-09-08 NOTE — ASSESSMENT & PLAN NOTE
Patient senses that her allergies are becoming active this fall and that her asthma is giving her more trouble with more frequent use of albuterol  We decided after discussion to add Breo at the low dose once daily  If in fact she improves with this drug we may consider dropping the montelukast as it is a fairly weak anti allergy medicine

## 2022-09-08 NOTE — PROGRESS NOTES
Answers for HPI/ROS submitted by the patient on 9/7/2022  Do you have chest tightness?: Yes  Chronicity: recurrent  When did you first notice your symptoms?: 1 to 4 weeks ago  How often do your symptoms occur?: daily  Since you first noticed this problem, how has it changed?: unchanged  Do you have shortness of breath that occurs with effort or exertion?: Yes  Do you have ear congestion?: No  Do you have heartburn?: No  Do you have fatigue?: Yes  Do you have nasal congestion?: No  Do you have shortness of breath when lying flat?: No  Do you have shortness of breath when you wake up?: No  Do you have sweats?: No  Have you experienced weight loss?: No  Which of the following makes your symptoms worse?: animal exposure, any activity, change in weather, climbing stairs, emotional stress, exercise, exposure to smoke, pollen, URI  Which of the following makes your symptoms better?: OTC inhaler, rest  Risk factors for lung disease: animal exposure    Assessment/Plan:    Mild intermittent asthma without complication  Patient senses that her allergies are becoming active this fall and that her asthma is giving her more trouble with more frequent use of albuterol  We decided after discussion to add Breo at the low dose once daily  If in fact she improves with this drug we may consider dropping the montelukast as it is a fairly weak anti allergy medicine  Diagnoses and all orders for this visit:    Mild intermittent asthma without complication  -     fluticasone-vilanterol (Breo Ellipta) 100-25 mcg/inh inhaler; Inhale 1 puff daily Rinse mouth after use  Subjective:      Patient ID: Brandon Munoz is a 27 y o  female  This is the 1st time that I meaning this patient who previously met with Dr Spike Forbes  She stated that she developed asthma related symptoms after she had COVID-19 in 2020  Apparently had allergy issues before then but no active asthma    Has been maintained on albuterol montelukast  She senses that her symptoms are getting worse with more frequent use of albuterol this fall with her allergies becoming active  She has symptoms of allergic conjunctivitis and rhinitis  Does home schooling  Not working outside her home  Last smoked 6 years ago  Mother has asthma  Never formally tested for allergy  Last CBC in June did not have eosinophilia  primary symptoms  Associated symptoms include chest pain, congestion, coughing and headaches  Pertinent negatives include no fever, myalgias or sore throat  The following portions of the patient's history were reviewed and updated as appropriate: allergies, current medications, past family history, past medical history, past social history, past surgical history and problem list     Review of Systems   Constitutional: Positive for appetite change  Negative for activity change, fever and unexpected weight change  HENT: Positive for congestion, ear pain and sneezing  Negative for postnasal drip, rhinorrhea, sore throat and trouble swallowing  Eyes: Positive for redness and itching  Respiratory: Positive for cough, shortness of breath and wheezing  Cardiovascular: Positive for chest pain  Negative for palpitations and leg swelling  Musculoskeletal: Negative for myalgias  Allergic/Immunologic: Positive for environmental allergies  Neurological: Positive for headaches  Objective:      /70 (BP Location: Right arm, Patient Position: Sitting, Cuff Size: Adult)   Pulse 68   Temp 99 4 °F (37 4 °C) (Tympanic)   Ht 5' 4" (1 626 m)   Wt 62 6 kg (138 lb)   SpO2 99%   BMI 23 69 kg/m²          Physical Exam  Vitals reviewed  Constitutional:       Appearance: She is normal weight  She is not ill-appearing  Eyes:      General: No scleral icterus  Conjunctiva/sclera: Conjunctivae normal    Cardiovascular:      Rate and Rhythm: Normal rate and regular rhythm        Pulses:           Radial pulses are 3+ on the right side and 3+ on the left side  Heart sounds: Normal heart sounds  Pulmonary:      Effort: Pulmonary effort is normal       Breath sounds: Normal breath sounds  No wheezing or rhonchi  Musculoskeletal:         General: No swelling  Cervical back: No rigidity or tenderness  Right lower leg: No edema  Left lower leg: No edema  Lymphadenopathy:      Cervical: No cervical adenopathy  Skin:     General: Skin is warm and dry  Findings: No rash  Neurological:      Mental Status: She is alert and oriented to person, place, and time     Psychiatric:         Mood and Affect: Mood normal          Behavior: Behavior normal

## 2022-09-14 ENCOUNTER — TELEPHONE (OUTPATIENT)
Dept: PULMONOLOGY | Facility: CLINIC | Age: 32
End: 2022-09-14

## 2022-09-14 NOTE — TELEPHONE ENCOUNTER
Spoke with pharmacy and they did not have an alternative medication but she gave me a number for insurance, [phone]

## 2022-09-14 NOTE — TELEPHONE ENCOUNTER
Pt called in stating that the fluticasone-vilanterol (Breo Ellipta) 100-25 mcg/inh inhaler needs a prior auth, please advise

## 2022-09-15 ENCOUNTER — TELEPHONE (OUTPATIENT)
Dept: OBGYN CLINIC | Facility: CLINIC | Age: 32
End: 2022-09-15

## 2022-09-15 DIAGNOSIS — Z30.013 ENCOUNTER FOR INITIAL PRESCRIPTION OF INJECTABLE CONTRACEPTIVE: ICD-10-CM

## 2022-09-15 RX ORDER — MEDROXYPROGESTERONE ACETATE 150 MG/ML
150 INJECTION, SUSPENSION INTRAMUSCULAR
Qty: 1 ML | Refills: 1 | Status: SHIPPED | OUTPATIENT
Start: 2022-09-15 | End: 2022-10-28 | Stop reason: SDUPTHER

## 2022-09-15 NOTE — TELEPHONE ENCOUNTER
Patient called stating she has an appt tomorrow for her depo injection , she needs a refill sent to the pharmacy , she needs her annual but has her 3 kids tomorrow and can not get the annual but needs the depo   Can you refill her Rx ?

## 2022-09-22 ENCOUNTER — CLINICAL SUPPORT (OUTPATIENT)
Dept: OBGYN CLINIC | Facility: CLINIC | Age: 32
End: 2022-09-22
Payer: MEDICARE

## 2022-09-22 VITALS
BODY MASS INDEX: 23.9 KG/M2 | SYSTOLIC BLOOD PRESSURE: 118 MMHG | HEIGHT: 64 IN | WEIGHT: 140 LBS | DIASTOLIC BLOOD PRESSURE: 72 MMHG

## 2022-09-22 DIAGNOSIS — Z30.42 ENCOUNTER FOR SURVEILLANCE OF INJECTABLE CONTRACEPTIVE: Primary | ICD-10-CM

## 2022-09-22 PROCEDURE — 96372 THER/PROPH/DIAG INJ SC/IM: CPT

## 2022-09-22 RX ORDER — MEDROXYPROGESTERONE ACETATE 150 MG/ML
150 INJECTION, SUSPENSION INTRAMUSCULAR ONCE
Status: COMPLETED | OUTPATIENT
Start: 2022-09-22 | End: 2022-09-23

## 2022-09-23 RX ADMIN — MEDROXYPROGESTERONE ACETATE 150 MG: 150 INJECTION, SUSPENSION INTRAMUSCULAR at 10:33

## 2022-09-28 DIAGNOSIS — J45.20 MILD INTERMITTENT ASTHMA WITHOUT COMPLICATION: ICD-10-CM

## 2022-09-28 DIAGNOSIS — J45.20 MILD INTERMITTENT ASTHMA WITHOUT COMPLICATION: Primary | ICD-10-CM

## 2022-09-28 RX ORDER — MOMETASONE FUROATE AND FORMOTEROL FUMARATE DIHYDRATE 200; 5 UG/1; UG/1
2 AEROSOL RESPIRATORY (INHALATION) 2 TIMES DAILY
Qty: 13 G | Refills: 5 | Status: SHIPPED | OUTPATIENT
Start: 2022-09-28 | End: 2022-09-29

## 2022-09-29 NOTE — TELEPHONE ENCOUNTER
Message from the pharmacy yesterday that Mary Snyder was not covered  I prescribed Dulera  I spoke to the pharmacist today and now he says Mary Snyder is covered  I am re prescribing the Breo and cancelling the prescription for [facility]

## 2022-10-07 DIAGNOSIS — R09.81 NASAL CONGESTION: ICD-10-CM

## 2022-10-07 RX ORDER — MONTELUKAST SODIUM 10 MG/1
10 TABLET ORAL
Qty: 30 TABLET | Refills: 2 | Status: SHIPPED | OUTPATIENT
Start: 2022-10-07 | End: 2022-12-06

## 2022-10-28 ENCOUNTER — ANNUAL EXAM (OUTPATIENT)
Dept: OBGYN CLINIC | Facility: CLINIC | Age: 32
End: 2022-10-28

## 2022-10-28 VITALS
BODY MASS INDEX: 23.87 KG/M2 | WEIGHT: 139.8 LBS | SYSTOLIC BLOOD PRESSURE: 118 MMHG | DIASTOLIC BLOOD PRESSURE: 80 MMHG | HEIGHT: 64 IN

## 2022-10-28 DIAGNOSIS — Z30.013 ENCOUNTER FOR INITIAL PRESCRIPTION OF INJECTABLE CONTRACEPTIVE: ICD-10-CM

## 2022-10-28 DIAGNOSIS — Z01.419 WOMEN'S ANNUAL ROUTINE GYNECOLOGICAL EXAMINATION: Primary | ICD-10-CM

## 2022-10-28 RX ORDER — MEDROXYPROGESTERONE ACETATE 150 MG/ML
150 INJECTION, SUSPENSION INTRAMUSCULAR
Qty: 1 ML | Refills: 3 | Status: SHIPPED | OUTPATIENT
Start: 2022-10-28

## 2022-10-28 NOTE — PROGRESS NOTES
Karie Doss is a 27 y o  female who presents for annual well woman exam  Periods are irregular, lasting 4 days  No intermenstrual bleeding, spotting, or discharge  Current contraception: Depo-Provera injections  History of abnormal Pap smear: yes - cryo  Family history of uterine or ovarian cancer: no    Family history of breast cancer: yes - G maternal aunt    Menstrual History:  OB History        3    Para   3    Term   3            AB        Living   3       SAB        IAB        Ectopic        Multiple        Live Births   3           Obstetric Comments   Menarche: 13  First child: 23                 No LMP recorded (lmp unknown)  Patient has had an injection  The following portions of the patient's history were reviewed and updated as appropriate: allergies, current medications, past family history, past medical history, past social history, past surgical history and problem list     Review of Systems  Review of Systems   Constitutional: Negative for activity change, appetite change, chills, fatigue and fever  Respiratory: Negative for cough and shortness of breath  Cardiovascular: Negative for chest pain, palpitations and leg swelling  Gastrointestinal: Negative for abdominal pain, constipation, diarrhea, nausea and vomiting  Genitourinary: Negative for difficulty urinating, dysuria, flank pain, frequency, hematuria, urgency and vaginal discharge  Neurological: Negative for dizziness and headaches  Psychiatric/Behavioral: Negative for confusion            Objective      /80 (BP Location: Left arm, Patient Position: Sitting, Cuff Size: Standard)   Ht 5' 4" (1 626 m)   Wt 63 4 kg (139 lb 12 8 oz)   LMP  (LMP Unknown)   BMI 24 00 kg/m²     Physical Exam  OBGyn Exam     General:   alert and oriented, in no acute distress, alert, appears stated age and cooperative   Heart: regular rate and rhythm, S1, S2 normal, no murmur, click, rub or gallop   Lungs: clear to auscultation bilaterally   Abdomen: soft, non-tender, without masses or organomegaly   Vulva: normal   Vagina: normal mucosa, normal discharge   Cervix: no cervical motion tenderness and no lesions   Uterus: normal size   Adnexa:  Breast Exam:  normal adnexa  breasts appear normal, no suspicious masses, no skin or nipple changes or axillary nodes  Assessment      @well woman@   20-year-old female  Annual exam  Prior 3 vaginal delivery    Depo-Provera for contraception desire to continue  Plan   Pap/HPV -20 21neg  Diet/exercise  Calcium/vitamin-D  Continue Depo-Provera   Return to office for annual exam   All questions answered  There are no Patient Instructions on file for this visit

## 2022-12-01 DIAGNOSIS — E55.9 VITAMIN D DEFICIENCY: ICD-10-CM

## 2022-12-01 DIAGNOSIS — Z30.013 ENCOUNTER FOR INITIAL PRESCRIPTION OF INJECTABLE CONTRACEPTIVE: ICD-10-CM

## 2022-12-01 RX ORDER — MULTIVIT-MIN/IRON/FOLIC ACID/K 18-600-40
1 CAPSULE ORAL DAILY
Qty: 90 CAPSULE | Refills: 0 | Status: SHIPPED | OUTPATIENT
Start: 2022-12-01 | End: 2023-03-01

## 2022-12-02 RX ORDER — MEDROXYPROGESTERONE ACETATE 150 MG/ML
150 INJECTION, SUSPENSION INTRAMUSCULAR
Qty: 1 ML | Refills: 0 | Status: SHIPPED | OUTPATIENT
Start: 2022-12-02 | End: 2022-12-05 | Stop reason: SDUPTHER

## 2022-12-04 DIAGNOSIS — U07.1 COVID-19: ICD-10-CM

## 2022-12-04 DIAGNOSIS — R07.89 SENSATION OF CHEST PRESSURE: ICD-10-CM

## 2022-12-04 RX ORDER — ALBUTEROL SULFATE 90 UG/1
1 AEROSOL, METERED RESPIRATORY (INHALATION) EVERY 6 HOURS PRN
Qty: 18 G | Refills: 1 | Status: SHIPPED | OUTPATIENT
Start: 2022-12-04 | End: 2023-01-03

## 2022-12-05 DIAGNOSIS — Z30.013 ENCOUNTER FOR INITIAL PRESCRIPTION OF INJECTABLE CONTRACEPTIVE: ICD-10-CM

## 2022-12-05 RX ORDER — MEDROXYPROGESTERONE ACETATE 150 MG/ML
150 INJECTION, SUSPENSION INTRAMUSCULAR
Qty: 1 ML | Refills: 0 | Status: SHIPPED | OUTPATIENT
Start: 2022-12-05

## 2022-12-09 ENCOUNTER — CLINICAL SUPPORT (OUTPATIENT)
Dept: OBGYN CLINIC | Facility: CLINIC | Age: 32
End: 2022-12-09

## 2022-12-09 VITALS
SYSTOLIC BLOOD PRESSURE: 124 MMHG | WEIGHT: 140 LBS | BODY MASS INDEX: 23.9 KG/M2 | HEIGHT: 64 IN | DIASTOLIC BLOOD PRESSURE: 62 MMHG

## 2022-12-09 DIAGNOSIS — Z30.42 ENCOUNTER FOR SURVEILLANCE OF INJECTABLE CONTRACEPTIVE: ICD-10-CM

## 2022-12-09 RX ORDER — MEDROXYPROGESTERONE ACETATE 150 MG/ML
150 INJECTION, SUSPENSION INTRAMUSCULAR ONCE
Status: COMPLETED | OUTPATIENT
Start: 2022-12-09 | End: 2022-12-09

## 2022-12-09 RX ADMIN — MEDROXYPROGESTERONE ACETATE 150 MG: 150 INJECTION, SUSPENSION INTRAMUSCULAR at 16:19

## 2022-12-12 ENCOUNTER — OFFICE VISIT (OUTPATIENT)
Dept: FAMILY MEDICINE CLINIC | Facility: CLINIC | Age: 32
End: 2022-12-12

## 2022-12-12 VITALS
RESPIRATION RATE: 18 BRPM | TEMPERATURE: 97.8 F | OXYGEN SATURATION: 98 % | BODY MASS INDEX: 23.9 KG/M2 | WEIGHT: 140 LBS | DIASTOLIC BLOOD PRESSURE: 80 MMHG | SYSTOLIC BLOOD PRESSURE: 110 MMHG | HEART RATE: 69 BPM | HEIGHT: 64 IN

## 2022-12-12 DIAGNOSIS — G89.29 CHRONIC MIDLINE LOW BACK PAIN WITH SCIATICA, SCIATICA LATERALITY UNSPECIFIED: ICD-10-CM

## 2022-12-12 DIAGNOSIS — J45.20 MILD INTERMITTENT ASTHMA WITHOUT COMPLICATION: ICD-10-CM

## 2022-12-12 DIAGNOSIS — R14.0 ABDOMINAL BLOATING: Primary | ICD-10-CM

## 2022-12-12 DIAGNOSIS — D50.8 IRON DEFICIENCY ANEMIA SECONDARY TO INADEQUATE DIETARY IRON INTAKE: ICD-10-CM

## 2022-12-12 DIAGNOSIS — M54.40 CHRONIC MIDLINE LOW BACK PAIN WITH SCIATICA, SCIATICA LATERALITY UNSPECIFIED: ICD-10-CM

## 2022-12-12 DIAGNOSIS — R10.13 DYSPEPSIA: ICD-10-CM

## 2022-12-12 DIAGNOSIS — J30.89 NON-SEASONAL ALLERGIC RHINITIS DUE TO FUNGAL SPORES: ICD-10-CM

## 2022-12-12 PROBLEM — M54.31 SCIATICA OF RIGHT SIDE: Status: RESOLVED | Noted: 2021-01-18 | Resolved: 2022-12-12

## 2022-12-12 PROBLEM — R42 DIZZINESS: Status: RESOLVED | Noted: 2021-03-22 | Resolved: 2022-12-12

## 2022-12-12 PROBLEM — M79.604 PAIN IN BOTH LOWER EXTREMITIES: Status: RESOLVED | Noted: 2020-04-27 | Resolved: 2022-12-12

## 2022-12-12 PROBLEM — M79.605 PAIN IN BOTH LOWER EXTREMITIES: Status: RESOLVED | Noted: 2020-04-27 | Resolved: 2022-12-12

## 2022-12-12 NOTE — ASSESSMENT & PLAN NOTE
She  Is  Been  Experiencing  Abdominal  Bloating  Last  Several  Months   Diet  control  Is  Not  Helping  No pain     No pain  Vomiting or  Diarrhea   Will  Get  The  Labs and US  Return  Parameters  discussed

## 2022-12-13 NOTE — PROGRESS NOTES
Assessment/Plan:as  Below          Problem List Items Addressed This Visit        Respiratory    Mild intermittent asthma without complication     Discussed  Precautions and  Care   Much  stable         Non-seasonal allergic rhinitis due to fungal spores     Much  Stable  Now    Discussed precautions             Other    Chronic midline low back pain     Much   Improve d now  Discussed  precautions         Iron deficiency anemia secondary to inadequate dietary iron intake     Will  F/u  With  Labs  She  Says  She  Has  Been  Better  Discussed   diet         Abdominal bloating - Primary     She  Is  Been  Experiencing  Abdominal  Bloating  Last  Several  Months   Diet  control  Is  Not  Helping  No pain     No pain  Vomiting or  Diarrhea   Will  Get  The  Labs and US  Return  Parameters  discussed         Relevant Orders    CBC and differential    Comprehensive metabolic panel    US abdomen complete         Subjective:      Patient ID: Natasha East is a 27 y o  female      HPI- came  In  To  Get  Checked    On  Multiple  Health  Conditions she  Is  C/o abdominal  Bloating and  Dyspepsia    Very often    Will  Get  The  Work up  No pain    Her  Back pain  seasonal  Allergies  And  Asthma  Is  Stable  She  Still feels fatigued  At  Times   She  Ha s h/o  Anemia  Will  F/u  With  Labs     The following portions of the patient's history were reviewed and updated as appropriate:   Past Medical History:  She has a past medical history of Abnormal Pap smear of cervix, Anxiety, Asthma, Carrier of group B Streptococcus, GERD (gastroesophageal reflux disease), High blood pressure, History of UTI, absence seizures, seasonal allergies, Migraine headache, Muscle spasm of back, Papanicolaou smear for cervical cancer screening (06/2017), Postpartum depression, Preeclampsia, Psychiatric disorder, Umbilical hernia (39/2222), and Varicose veins of lower extremity  ,  _______________________________________________________________________  Medical Problems:  does not have any pertinent problems on file ,  _______________________________________________________________________  Past Surgical History:   has a past surgical history that includes Hernia repair; [place] tooth extraction; Other surgical history (11/18/2015); INSERTION OF INTRAUTERINE DEVICE (IUD); and Gynecologic cryosurgery  ,  _______________________________________________________________________  Family History:  family history includes Arthritis in her family and mother; Asthma in her maternal aunt, mother, sister, and son; Bipolar disorder in her maternal aunt; COPD in her maternal grandmother; Cancer in her cousin; Diabetes in her cousin, maternal grandmother, mother, and other; Fibromyalgia in her family; GI problems in her family; Heart attack in her maternal grandfather; Hypertension in her mother; Hyperthyroidism in her maternal aunt; Lymphoma in her maternal grandmother; Other in her maternal grandmother; Sleep apnea in her mother; Thyroid disease in her maternal aunt; Varicose Veins in her paternal aunt and paternal grandmother ,  _______________________________________________________________________  Social History:   reports that she quit smoking about 7 years ago  Her smoking use included cigarettes  She has a 15 00 pack-year smoking history  She has never used smokeless tobacco  She reports that she does not currently use alcohol  She reports that she does not use drugs  ,  _______________________________________________________________________  Allergies:  is allergic to phenazopyridine, other, and lavender oil     _______________________________________________________________________  Current Outpatient Medications   Medication Sig Dispense Refill   • albuterol (PROVENTIL HFA,VENTOLIN HFA) 90 mcg/act inhaler Inhale 1 puff every 6 (six) hours as needed for wheezing 18 g 1   • Cholecalciferol (Vitamin D) 50 MCG (2000 UT) CAPS Take 1 capsule (2,000 Units total) by mouth in the morning 90 capsule 0   • fluticasone (FLONASE) 50 mcg/act nasal spray 1 spray into each nostril daily for 10 days (Patient taking differently: 1 spray into each nostril daily prn) 18 mL 1   • fluticasone-vilanterol (Breo Ellipta) 200-25 MCG/INH inhaler Inhale 1 puff daily 60 blister 5   • medroxyPROGESTERone (DEPO-PROVERA) 150 mg/mL injection Inject 1 mL (150 mg total) into a muscle every 3 (three) months 1 mL 3   • medroxyPROGESTERone (DEPO-PROVERA) 150 mg/mL injection Inject 1 mL (150 mg total) into a muscle every 3 (three) months (Patient not taking: Reported on 12/12/2022) 1 mL 0   • montelukast (SINGULAIR) 10 mg tablet Take 1 tablet (10 mg total) by mouth daily at bedtime for 60 doses (Patient not taking: Reported on 10/28/2022) 30 tablet 2   • naproxen (Naprosyn) 500 mg tablet Take 1 tablet (500 mg total) by mouth 2 (two) times a day as needed (palvic pain/cramping ) Take with food (Patient not taking: Reported on 10/28/2022) 30 tablet 1   • Nerve Stimulator (STANDARD TENS) OMER by Does not apply route 4 (four) times a day (Patient not taking: Reported on 12/12/2022) 1 Device 0     No current facility-administered medications for this visit      _______________________________________________________________________  Review of Systems   Constitutional: Negative for fatigue and fever  HENT: Negative for congestion, postnasal drip and sinus pain  Eyes: Negative for pain, discharge and itching  Respiratory: Negative for cough and shortness of breath  Asthma   Cardiovascular: Negative for chest pain, palpitations and leg swelling  Gastrointestinal: Negative for abdominal distention, abdominal pain, constipation, nausea and vomiting  Positive  Abdominal bloating  And  dyspesia   Endocrine: Negative for cold intolerance, heat intolerance and polydipsia     Genitourinary: Negative for dysuria, flank pain and pelvic pain  Musculoskeletal: Negative for arthralgias and back pain  Skin: Negative for rash  Neurological: Negative for dizziness and headaches  Hematological:        Anemia   Psychiatric/Behavioral: Negative for sleep disturbance and suicidal ideas  The patient is not nervous/anxious  Objective:  Vitals:    12/12/22 1847   BP: 110/80   BP Location: Right arm   Patient Position: Sitting   Cuff Size: Adult   Pulse: 69   Temp: 97 8 °F (36 6 °C)   TempSrc: Temporal   SpO2: 98%   Height: 5' 4" (1 626 m)     Body mass index is 24 03 kg/m²  Physical Exam  Vitals and nursing note reviewed  Constitutional:       General: She is not in acute distress  Appearance: Normal appearance  She is not ill-appearing, toxic-appearing or diaphoretic  HENT:      Head: Normocephalic and atraumatic  Nose: Nose normal  No congestion  Mouth/Throat:      Mouth: Mucous membranes are moist       Pharynx: No oropharyngeal exudate or posterior oropharyngeal erythema  Eyes:      Extraocular Movements: Extraocular movements intact  Conjunctiva/sclera: Conjunctivae normal       Pupils: Pupils are equal, round, and reactive to light  Cardiovascular:      Rate and Rhythm: Normal rate and regular rhythm  Pulses: Normal pulses  Heart sounds: Normal heart sounds  No murmur heard  No gallop  Pulmonary:      Effort: Pulmonary effort is normal       Breath sounds: Normal breath sounds  No wheezing, rhonchi or rales  Abdominal:      General: There is no distension  Palpations: Abdomen is soft  There is no mass  Tenderness: There is no abdominal tenderness  There is no guarding or rebound  Musculoskeletal:      Right lower leg: No edema  Left lower leg: No edema  Skin:     Findings: No erythema or rash  Neurological:      General: No focal deficit present  Mental Status: She is alert and oriented to person, place, and time     Psychiatric:         Mood and Affect: Mood normal          Behavior: Behavior normal

## 2022-12-23 ENCOUNTER — APPOINTMENT (OUTPATIENT)
Dept: LAB | Facility: HOSPITAL | Age: 32
End: 2022-12-23

## 2022-12-23 ENCOUNTER — HOSPITAL ENCOUNTER (OUTPATIENT)
Dept: ULTRASOUND IMAGING | Facility: HOSPITAL | Age: 32
End: 2022-12-23

## 2022-12-23 DIAGNOSIS — R79.89 ABNORMAL TSH: ICD-10-CM

## 2022-12-23 DIAGNOSIS — R14.0 ABDOMINAL BLOATING: ICD-10-CM

## 2022-12-23 LAB
ALBUMIN SERPL BCP-MCNC: 4.4 G/DL (ref 3.5–5)
ALP SERPL-CCNC: 66 U/L (ref 34–104)
ALT SERPL W P-5'-P-CCNC: 26 U/L (ref 7–52)
ANION GAP SERPL CALCULATED.3IONS-SCNC: 8 MMOL/L (ref 4–13)
AST SERPL W P-5'-P-CCNC: 21 U/L (ref 13–39)
BASOPHILS # BLD AUTO: 0.02 THOUSANDS/ÂΜL (ref 0–0.1)
BASOPHILS NFR BLD AUTO: 0 % (ref 0–1)
BILIRUB SERPL-MCNC: 0.53 MG/DL (ref 0.2–1)
BUN SERPL-MCNC: 11 MG/DL (ref 5–25)
CALCIUM SERPL-MCNC: 9.6 MG/DL (ref 8.4–10.2)
CHLORIDE SERPL-SCNC: 106 MMOL/L (ref 96–108)
CO2 SERPL-SCNC: 25 MMOL/L (ref 21–32)
CREAT SERPL-MCNC: 0.64 MG/DL (ref 0.6–1.3)
EOSINOPHIL # BLD AUTO: 0.08 THOUSAND/ÂΜL (ref 0–0.61)
EOSINOPHIL NFR BLD AUTO: 1 % (ref 0–6)
ERYTHROCYTE [DISTWIDTH] IN BLOOD BY AUTOMATED COUNT: 13.5 % (ref 11.6–15.1)
GFR SERPL CREATININE-BSD FRML MDRD: 116 ML/MIN/1.73SQ M
GLUCOSE P FAST SERPL-MCNC: 85 MG/DL (ref 65–99)
HCT VFR BLD AUTO: 39.6 % (ref 34.8–46.1)
HGB BLD-MCNC: 12.6 G/DL (ref 11.5–15.4)
IMM GRANULOCYTES # BLD AUTO: 0.01 THOUSAND/UL (ref 0–0.2)
IMM GRANULOCYTES NFR BLD AUTO: 0 % (ref 0–2)
LYMPHOCYTES # BLD AUTO: 2.17 THOUSANDS/ÂΜL (ref 0.6–4.47)
LYMPHOCYTES NFR BLD AUTO: 36 % (ref 14–44)
MCH RBC QN AUTO: 28.6 PG (ref 26.8–34.3)
MCHC RBC AUTO-ENTMCNC: 31.8 G/DL (ref 31.4–37.4)
MCV RBC AUTO: 90 FL (ref 82–98)
MONOCYTES # BLD AUTO: 0.33 THOUSAND/ÂΜL (ref 0.17–1.22)
MONOCYTES NFR BLD AUTO: 6 % (ref 4–12)
NEUTROPHILS # BLD AUTO: 3.42 THOUSANDS/ÂΜL (ref 1.85–7.62)
NEUTS SEG NFR BLD AUTO: 57 % (ref 43–75)
NRBC BLD AUTO-RTO: 0 /100 WBCS
PLATELET # BLD AUTO: 267 THOUSANDS/UL (ref 149–390)
PMV BLD AUTO: 10.6 FL (ref 8.9–12.7)
POTASSIUM SERPL-SCNC: 3.8 MMOL/L (ref 3.5–5.3)
PROT SERPL-MCNC: 7.4 G/DL (ref 6.4–8.4)
RBC # BLD AUTO: 4.4 MILLION/UL (ref 3.81–5.12)
SODIUM SERPL-SCNC: 139 MMOL/L (ref 135–147)
TSH SERPL DL<=0.05 MIU/L-ACNC: 1.04 UIU/ML (ref 0.45–4.5)
WBC # BLD AUTO: 6.03 THOUSAND/UL (ref 4.31–10.16)

## 2022-12-27 ENCOUNTER — TELEPHONE (OUTPATIENT)
Dept: FAMILY MEDICINE CLINIC | Facility: CLINIC | Age: 32
End: 2022-12-27

## 2022-12-27 NOTE — TELEPHONE ENCOUNTER
----- Message from Kp Hernandez MD sent at 12/24/2022  9:52 AM EST -----  Normal  labs  let  her know  ----- Message -----  From: Lab, Background User  Sent: 12/23/2022   9:56 AM EST  To: Kp Hernandez MD      Called Pt and informed that results of labs were normal as per voicemail

## 2022-12-28 ENCOUNTER — TELEPHONE (OUTPATIENT)
Dept: FAMILY MEDICINE CLINIC | Facility: CLINIC | Age: 32
End: 2022-12-28

## 2022-12-28 ENCOUNTER — TELEMEDICINE (OUTPATIENT)
Dept: FAMILY MEDICINE CLINIC | Facility: CLINIC | Age: 32
End: 2022-12-28

## 2022-12-28 DIAGNOSIS — R14.0 ABDOMINAL BLOATING: ICD-10-CM

## 2022-12-28 DIAGNOSIS — R10.9 ABDOMINAL DISCOMFORT: ICD-10-CM

## 2022-12-28 DIAGNOSIS — K80.20 GALL BLADDER STONES: Primary | ICD-10-CM

## 2022-12-28 DIAGNOSIS — K21.9 GASTROESOPHAGEAL REFLUX DISEASE WITHOUT ESOPHAGITIS: ICD-10-CM

## 2022-12-28 NOTE — TELEPHONE ENCOUNTER
----- Message from Aquiles Weller MD sent at 12/27/2022  7:23 PM EST -----  Please let her know  she  has  gall bladder  stones   I  would  like  to  discuss  the  details  with her  can  she  be  scheduled  for  video  visit tomorrow ?  ----- Message -----  From: Interface, Radiology Results In  Sent: 12/27/2022   2:53 PM EST  To: Aquiles Weller MD

## 2022-12-28 NOTE — ASSESSMENT & PLAN NOTE
She  Has been  Getting  gerd  Off and on   Last several  Weeks  May  be sec  To  Gall  Stones  reff to  gi

## 2022-12-28 NOTE — ASSESSMENT & PLAN NOTE
She  States  That last  Night  She  Had  Rt  Upper  quadrant  Discomfort/ pain 5/10  No  Nausea  Or  Vomiting  No pain  Currently   Asked  Her to  Go  To  The  ER  If  Pain  Recurs   She  Verbalized  The  Understanding   reff to  gen  Surgery / GI

## 2022-12-28 NOTE — ASSESSMENT & PLAN NOTE
Reviewed  Her  US  With her  Showing   4cm gall  Stone    Discussed  Diet  And  Life  Style  Modifications

## 2022-12-28 NOTE — TELEPHONE ENCOUNTER
Called Pt to inform her she has gall bladder stones, and that Dr Latoya Jaramillo would like to schedule an appt for 12/28/2022  Pt verbally understood and acknowledged all previous information and agreed to meet for a virtual 5:15 pm appt with Dr Latoya Jaramillo

## 2022-12-28 NOTE — PROGRESS NOTES
Virtual Regular Visit    Verification of patient location:    Patient is located in the following state in which I hold an active license Other; [place]             Assessment/Plan:as  below    Problem List Items Addressed This Visit        Digestive    Gall bladder stones - Primary     Reviewed  Her  US  With her  Showing   4cm gall  Stone    Discussed  Diet  And  Life  Style  Modifications          Relevant Orders    Ambulatory Referral to Gastroenterology    Ambulatory Referral to General Surgery    Gastroesophageal reflux disease without esophagitis     She  Has been  Getting  gerd  Off and on   Last several  Weeks  May  be sec  To  Gall  Stones  reff to  gi         Relevant Orders    Ambulatory Referral to Gastroenterology    Ambulatory Referral to General Surgery       Other    Abdominal bloating     Dyspepsia    She   Has  Gall  Stones reff to  GI  And  gen  Surgery  As  she  Is  Symptomatic          Relevant Orders    Ambulatory Referral to Gastroenterology    Ambulatory Referral to General Surgery    Abdominal discomfort     She  States  That last  Night  She  Had  Rt  Upper  quadrant  Discomfort/ pain 5/10  No  Nausea  Or  Vomiting  No pain  Currently   Asked  Her to  Go  To  The  ER  If  Pain  Recurs   She  Verbalized  The  Understanding   reff to  gen  Surgery / GI         Relevant Orders    Ambulatory Referral to Gastroenterology    Ambulatory Referral to General Surgery            Reason for visit is   Chief Complaint   Patient presents with   • Virtual Regular Visit        Encounter provider Adina Page MD    Provider located at [address] [place] [id number] [phone]      Recent Visits  Date Type Provider Dept   12/27/22 Telephone [address] recent visits within past 7 days and meeting all other requirements  Today's Visits  Date Type Provider Dept   12/28/22 Telephone Charlene Hill [address]   12/28/22 Telemedicine Rita Garcia MD [facility]   Showing today's visits and meeting all other requirements  Future Appointments  No visits were found meeting these conditions  Showing future appointments within next 150 days and meeting all other requirements       The patient was identified by name and date of birth  Faye Santiago was informed that this is a telemedicine visit and that the visit is being conducted through the [address] [facility] platform  She agrees to proceed     My office door was closed  No one else was in the room  She acknowledged consent and understanding of privacy and security of the video platform  The patient has agreed to participate and understands they can discontinue the visit at any time  Patient is aware this is a billable service  Subjective  Chad Santoro is a 27 y o  female video visit        HPI - I  Did  Video  Visit  To  Review  Her  US  Abdomin  Report  Showing  Gatha Christopher   She  Has abdominal  Discomfort  Dyspepsia and  gerd    Off and on  No pain  Nausea  Or  Vomiting  Currently     Past Medical History:   Diagnosis Date   • Abnormal Pap smear of cervix     had cryosurgery 2015   • Anxiety    • Asthma    • Carrier of group B Streptococcus    • GERD (gastroesophageal reflux disease)    • High blood pressure     during pregnancy    • History of UTI    • Hx of absence seizures     once(per sanjiv)   • Hx of seasonal allergies    • Migraine headache    • Muscle spasm of back    • Papanicolaou smear for cervical cancer screening 06/2017    Pap neg 5/2016, pap- neg/GC/CT NEG   • Postpartum depression    • Preeclampsia    • Psychiatric disorder    • Umbilical hernia 05/7941   • Varicose veins of lower extremity        Past Surgical History:   Procedure Laterality Date   • GYNECOLOGIC CRYOSURGERY     • HERNIA REPAIR      2 years ago   • INSERTION OF INTRAUTERINE DEVICE (IUD)     • OTHER SURGICAL HISTORY 11/18/2015    cryosurgery    • WISDOM TOOTH EXTRACTION         Current Outpatient Medications   Medication Sig Dispense Refill   • albuterol (PROVENTIL HFA,VENTOLIN HFA) 90 mcg/act inhaler Inhale 1 puff every 6 (six) hours as needed for wheezing 18 g 1   • Cholecalciferol (Vitamin D) 50 MCG (2000 UT) CAPS Take 1 capsule (2,000 Units total) by mouth in the morning 90 capsule 0   • fluticasone (FLONASE) 50 mcg/act nasal spray 1 spray into each nostril daily for 10 days (Patient taking differently: 1 spray into each nostril daily prn) 18 mL 1   • fluticasone-vilanterol (Breo Ellipta) 200-25 MCG/INH inhaler Inhale 1 puff daily 60 blister 5   • medroxyPROGESTERone (DEPO-PROVERA) 150 mg/mL injection Inject 1 mL (150 mg total) into a muscle every 3 (three) months 1 mL 3   • medroxyPROGESTERone (DEPO-PROVERA) 150 mg/mL injection Inject 1 mL (150 mg total) into a muscle every 3 (three) months (Patient not taking: Reported on 12/12/2022) 1 mL 0   • montelukast (SINGULAIR) 10 mg tablet Take 1 tablet (10 mg total) by mouth daily at bedtime for 60 doses (Patient not taking: Reported on 10/28/2022) 30 tablet 2   • naproxen (Naprosyn) 500 mg tablet Take 1 tablet (500 mg total) by mouth 2 (two) times a day as needed (palvic pain/cramping ) Take with food (Patient not taking: Reported on 10/28/2022) 30 tablet 1   • Nerve Stimulator (STANDARD TENS) OMER by Does not apply route 4 (four) times a day (Patient not taking: Reported on 12/12/2022) 1 Device 0     No current facility-administered medications for this visit  Allergies   Allergen Reactions   • Phenazopyridine Anaphylaxis and Hives   • Other      Dove Deodorant     • Lavender Oil Hives       Review of Systems   Constitutional: Negative for fatigue and fever  HENT: Negative for congestion and postnasal drip  Respiratory: Negative for cough, shortness of breath and wheezing  Cardiovascular: Negative for chest pain, palpitations and leg swelling     Gastrointestinal: Negative for abdominal distention, abdominal pain, nausea and vomiting  Dyspepsia  gerd  And   Abdominal  Discomfort / gall  stones   Genitourinary: Negative for dysuria and flank pain  Musculoskeletal: Negative for arthralgias and back pain  Skin: Negative for wound  Neurological: Negative for headaches  Psychiatric/Behavioral: Negative for sleep disturbance and suicidal ideas  The patient is not nervous/anxious  Video Exam    There were no vitals filed for this visit  Physical Exam  Constitutional:       General: She is not in acute distress  Appearance: Normal appearance  She is not ill-appearing, toxic-appearing or diaphoretic  HENT:      Head: Normocephalic and atraumatic  Nose: Nose normal  No congestion or rhinorrhea  Eyes:      Extraocular Movements: Extraocular movements intact  Conjunctiva/sclera: Conjunctivae normal       Pupils: Pupils are equal, round, and reactive to light  Pulmonary:      Effort: Pulmonary effort is normal    Musculoskeletal:      Cervical back: Normal range of motion  Skin:     Findings: No erythema or rash  Neurological:      General: No focal deficit present  Mental Status: She is alert and oriented to person, place, and time     Psychiatric:         Mood and Affect: Mood normal          Behavior: Behavior normal           I spent 30 minutes directly with the patient during this visit

## 2022-12-30 ENCOUNTER — OFFICE VISIT (OUTPATIENT)
Dept: URGENT CARE | Facility: CLINIC | Age: 32
End: 2022-12-30

## 2022-12-30 VITALS
TEMPERATURE: 98.1 F | WEIGHT: 140 LBS | OXYGEN SATURATION: 99 % | BODY MASS INDEX: 23.9 KG/M2 | HEIGHT: 64 IN | HEART RATE: 84 BPM | RESPIRATION RATE: 15 BRPM

## 2022-12-30 DIAGNOSIS — U07.1 COVID-19: Primary | ICD-10-CM

## 2022-12-30 LAB
SARS-COV-2 AG UPPER RESP QL IA: POSITIVE
VALID CONTROL: ABNORMAL

## 2022-12-30 RX ORDER — BROMPHENIRAMINE MALEATE, PSEUDOEPHEDRINE HYDROCHLORIDE, AND DEXTROMETHORPHAN HYDROBROMIDE 2; 30; 10 MG/5ML; MG/5ML; MG/5ML
10 SYRUP ORAL 4 TIMES DAILY PRN
Qty: 120 ML | Refills: 0 | Status: SHIPPED | OUTPATIENT
Start: 2022-12-30

## 2022-12-30 NOTE — PATIENT INSTRUCTIONS
Please take Bromfed every 6 hours as needed for cough and congestion  Please quarantine until 1/5/2022  After this date, please wear a mask for an additional 5 days  [address]    Your healthcare provider and/or public health staff have evaluated you and have determined that you do not need to remain in the hospital at this time  At this time you can be isolated at home where you will be monitored by staff from your local or state health department  You should carefully follow the prevention and isolation steps below until a healthcare provider or local or state health department says that you can return to your normal activities  Stay home except to get medical care    People who are mildly ill with COVID-19 are able to isolate at home during their illness  You should restrict activities outside your home, except for getting medical care  Do not go to work, school, or public areas  Avoid using public transportation, ride-sharing, or taxis  Separate yourself from other people and animals in your home    People: As much as possible, you should stay in a specific room and away from other people in your home  Also, you should use a separate bathroom, if available  Animals: You should restrict contact with pets and other animals while you are sick with COVID-19, just like you would around other people  Although there have not been reports of pets or other animals becoming sick with COVID-19, it is still recommended that people sick with COVID-19 limit contact with animals until more information is known about the virus  When possible, have another member of your household care for your animals while you are sick  If you are sick with COVID-19, avoid contact with your pet, including petting, snuggling, being kissed or licked, and sharing food   If you must care for your pet or be around animals while you are sick, wash your hands before and after you interact with pets and wear a facemask  See COVID-19 and Animals for more information  Call ahead before visiting your doctor    If you have a medical appointment, call the healthcare provider and tell them that you have or may have COVID-19  This will help the healthcare provider’s office take steps to keep other people from getting infected or exposed  Wear a facemask    You should wear a facemask when you are around other people (e g , sharing a room or vehicle) or pets and before you enter a healthcare provider’s office  If you are not able to wear a facemask (for example, because it causes trouble breathing), then people who live with you should not stay in the same room with you, or they should wear a facemask if they enter your room  Cover your coughs and sneezes    Cover your mouth and nose with a tissue when you cough or sneeze  Throw used tissues in a lined trash can  Immediately wash your hands with soap and water for at least 20 seconds or, if soap and water are not available, clean your hands with an alcohol-based hand  that contains at least 60% alcohol  Clean your hands often    Wash your hands often with soap and water for at least 20 seconds, especially after blowing your nose, coughing, or sneezing; going to the bathroom; and before eating or preparing food  If soap and water are not readily available, use an alcohol-based hand  with at least 60% alcohol, covering all surfaces of your hands and rubbing them together until they feel dry  Soap and water are the best option if hands are visibly dirty  Avoid touching your eyes, nose, and mouth with unwashed hands  Avoid sharing personal household items    You should not share dishes, drinking glasses, cups, eating utensils, towels, or bedding with other people or pets in your home  After using these items, they should be washed thoroughly with soap and water      Clean all “high-touch” surfaces everyday    High touch surfaces include counters, tabletops, doorknobs, bathroom fixtures, toilets, phones, keyboards, tablets, and bedside tables  Also, clean any surfaces that may have blood, stool, or body fluids on them  Use a household cleaning spray or wipe, according to the label instructions  Labels contain instructions for safe and effective use of the cleaning product including precautions you should take when applying the product, such as wearing gloves and making sure you have good ventilation during use of the product  Monitor your symptoms    Seek prompt medical attention if your illness is worsening (e g , difficulty breathing)  Before seeking care, call your healthcare provider and tell them that you have, or are being evaluated for, COVID-19  Put on a facemask before you enter the facility  These steps will help the healthcare provider’s office to keep other people in the office or waiting room from getting infected or exposed  Ask your healthcare provider to call the local or [facility] health department  Persons who are placed under active monitoring or facilitated self-monitoring should follow instructions provided by their local health department or occupational health professionals, as appropriate  If you have a medical emergency and need to call 911, notify the dispatch personnel that you have, or are being evaluated for COVID-19  If possible, put on a facemask before emergency medical services arrive  Discontinuing home isolation    Patients with confirmed COVID-19 should remain under home isolation precautions until the following conditions are met:    They have had no fever for at least 24 hours (that is one full day of no fever without the use medicine that reduces fevers)  AND  other symptoms have improved (for example, when their cough or shortness of breath have improved)  AND  If had mild or moderate illness, at least 10 days have passed since their symptoms first appeared or if severe illness (needed oxygen) or immunosuppressed, at least 20 days have passed since symptoms first appeared  Patients with confirmed COVID-19 should also notify close contacts (including their workplace) and ask that they self-quarantine  Currently, close contact is defined as being within 6 feet for 15 minutes or more from the period 24 hours starting 48 hours before symptom onset to the time at which the patient went into isolation  Close contacts of patients diagnosed with COVID-19 should be instructed by the patient to self-quarantine for 14 days from the last time of their last contact with the patient       Source: [employer]

## 2022-12-30 NOTE — PROGRESS NOTES
[facility]        NAME: Richelle Pantoja is a 27 y o  female  : 1990    MRN: [id number]  DATE: 2022  TIME: 11:07 AM    Assessment and Plan   COVID-19 [U07 1]  1  COVID-19  Poct Covid 19 Rapid Antigen Test    brompheniramine-pseudoephedrine-DM 30-2-10 MG/5ML syrup        COVID antigen test positive, quarantine guidelines discussed with patient  Discussed supportive care with Bromfed as needed for cough and congestion  All questions and concerns were addressed during this visit  Patient Instructions     Supportive care and quarantine as discussed  Follow up with PCP in 3-5 days  Proceed to  ER if symptoms worsen  Chief Complaint     Chief Complaint   Patient presents with   • Cold Like Symptoms     Pt reports she started to feel sick last night- she stated she has sinus pressure/nausea/HA/burning in her nose and she took some medicine that is not helping at all  History of Present Illness       Patient presenting for evaluation of congestion, postnasal drip, sinus pressure, cough, nausea and headaches  Patient states his symptoms started 1 day ago  She states that she is taking Mucinex with minimal relief  She denies any recent sick contacts, and states that she is vaccinated not for COVID  Review of Systems   Review of Systems   Constitutional: Negative for chills and fever  HENT: Positive for congestion, postnasal drip and sinus pressure  Negative for sore throat  Respiratory: Positive for cough  Negative for shortness of breath  Cardiovascular: Negative for chest pain  Gastrointestinal: Positive for nausea  Negative for diarrhea and vomiting  Neurological: Positive for headaches  All other systems reviewed and are negative          Current Medications       Current Outpatient Medications:   •  albuterol (PROVENTIL HFA,VENTOLIN HFA) 90 mcg/act inhaler, Inhale 1 puff every 6 (six) hours as needed for wheezing, Disp: 18 g, Rfl: 1  • brompheniramine-pseudoephedrine-DM 30-2-10 MG/5ML syrup, Take 10 mL by mouth 4 (four) times a day as needed for congestion or cough, Disp: 120 mL, Rfl: 0  •  Cholecalciferol (Vitamin D) 50 MCG (2000 UT) CAPS, Take 1 capsule (2,000 Units total) by mouth in the morning, Disp: 90 capsule, Rfl: 0  •  medroxyPROGESTERone (DEPO-PROVERA) 150 mg/mL injection, Inject 1 mL (150 mg total) into a muscle every 3 (three) months, Disp: 1 mL, Rfl: 3  •  medroxyPROGESTERone (DEPO-PROVERA) 150 mg/mL injection, Inject 1 mL (150 mg total) into a muscle every 3 (three) months, Disp: 1 mL, Rfl: 0  •  naproxen (Naprosyn) 500 mg tablet, Take 1 tablet (500 mg total) by mouth 2 (two) times a day as needed (palvic pain/cramping ) Take with food, Disp: 30 tablet, Rfl: 1  •  Nerve Stimulator (STANDARD TENS) OMER, by Does not apply route 4 (four) times a day, Disp: 1 Device, Rfl: 0  •  fluticasone (FLONASE) 50 mcg/act nasal spray, 1 spray into each nostril daily for 10 days (Patient taking differently: 1 spray into each nostril daily prn), Disp: 18 mL, Rfl: 1  •  fluticasone-vilanterol (Breo Ellipta) 200-25 MCG/INH inhaler, Inhale 1 puff daily, Disp: 60 blister, Rfl: 5  •  montelukast (SINGULAIR) 10 mg tablet, Take 1 tablet (10 mg total) by mouth daily at bedtime for 60 doses (Patient not taking: Reported on 10/28/2022), Disp: 30 tablet, Rfl: 2    Current Allergies     Allergies as of 12/30/2022 - Reviewed 12/30/2022   Allergen Reaction Noted   • Phenazopyridine Anaphylaxis and Hives 09/21/2012   • Other  08/14/2019   • Lavender oil Hives 08/14/2019            The following portions of the patient's history were reviewed and updated as appropriate: allergies, current medications, past family history, past medical history, past social history, past surgical history and problem list      Past Medical History:   Diagnosis Date   • Abnormal Pap smear of cervix     had cryosurgery 2015   • Anxiety    • Asthma    • Carrier of group B Streptococcus • GERD (gastroesophageal reflux disease)    • High blood pressure     during pregnancy    • History of UTI    • Hx of absence seizures     once(per sanjiv)   • Hx of seasonal allergies    • Migraine headache    • Muscle spasm of back    • Papanicolaou smear for cervical cancer screening 2017    Pap neg 2016, pap- neg/GC/CT NEG   • Postpartum depression    • Preeclampsia    • Psychiatric disorder    • Umbilical hernia    • Varicose veins of lower extremity        Past Surgical History:   Procedure Laterality Date   • GYNECOLOGIC CRYOSURGERY     • HERNIA REPAIR      2 years ago   • INSERTION OF INTRAUTERINE DEVICE (IUD)     • OTHER SURGICAL HISTORY  2015    cryosurgery    • WISDOM TOOTH EXTRACTION         Family History   Problem Relation Age of Onset   • Hypertension Mother    • Arthritis Mother    • Asthma Mother    • Diabetes Mother         Prediabetic    • Sleep apnea Mother    • Lymphoma Maternal Grandmother    • Other Maternal Grandmother         Cardiomegaly   • Diabetes Maternal Grandmother    • COPD Maternal Grandmother    • Heart attack Maternal Grandfather          61   • Varicose Veins Paternal Grandmother    • Hyperthyroidism Maternal Aunt    • Asthma Maternal Aunt    • Bipolar disorder Maternal Aunt    • Thyroid disease Maternal Aunt    • Varicose Veins Paternal Aunt    • Cancer Cousin    • Diabetes Cousin    • Diabetes Other    • Asthma Sister    • Fibromyalgia Family    • Arthritis Family    • GI problems Family    • Asthma Son          Medications have been verified  Objective   Pulse 84   Temp 98 1 °F (36 7 °C)   Resp 15   Ht 5' 4" (1 626 m)   Wt 63 5 kg (140 lb)   SpO2 99%   BMI 24 03 kg/m²        Physical Exam     Physical Exam  Vitals reviewed  Constitutional:       General: She is not in acute distress  Appearance: Normal appearance  She is well-developed and normal weight  She is not ill-appearing, toxic-appearing or diaphoretic     HENT:      Head: Normocephalic and atraumatic  Right Ear: Tympanic membrane normal       Left Ear: Tympanic membrane normal       Nose: Congestion present  No rhinorrhea  Right Sinus: Maxillary sinus tenderness and frontal sinus tenderness present  Left Sinus: Maxillary sinus tenderness and frontal sinus tenderness present  Mouth/Throat:      Mouth: Mucous membranes are moist       Pharynx: Oropharynx is clear  Posterior oropharyngeal erythema present  No oropharyngeal exudate  Tonsils: No tonsillar exudate or tonsillar abscesses  Eyes:      General:         Right eye: No discharge  Left eye: No discharge  Cardiovascular:      Rate and Rhythm: Normal rate and regular rhythm  Pulses: Normal pulses  Heart sounds: Normal heart sounds  No murmur heard  No friction rub  No gallop  Pulmonary:      Effort: Pulmonary effort is normal  No respiratory distress  Breath sounds: Normal breath sounds  No stridor  No wheezing or rhonchi  Abdominal:      General: Bowel sounds are normal       Palpations: Abdomen is soft  Tenderness: There is no abdominal tenderness  Skin:     General: Skin is warm and dry  Neurological:      Mental Status: She is alert     Psychiatric:         Mood and Affect: Mood normal          Behavior: Behavior normal

## 2022-12-30 NOTE — LETTER
December 30, 2022     Patient: Avinash Martinez   YOB: 1990   Date of Visit: 12/30/2022       To Whom it May Concern:    Avinash Martinez was seen in my clinic on 12/30/2022  She may return to work on 1/5/2022  After this date, she must wear a mask for an additional 5 days  If you have any questions or concerns, please don't hesitate to call           Sincerely,          KOURTNEY Contreras        CC: No Recipients

## 2023-01-04 ENCOUNTER — TELEMEDICINE (OUTPATIENT)
Dept: PULMONOLOGY | Facility: CLINIC | Age: 33
End: 2023-01-04

## 2023-01-04 DIAGNOSIS — U07.1 COVID-19: ICD-10-CM

## 2023-01-04 DIAGNOSIS — J45.20 MILD INTERMITTENT ASTHMA WITHOUT COMPLICATION: Primary | ICD-10-CM

## 2023-01-04 NOTE — PROGRESS NOTES
Virtual Regular Visit    Verification of patient location:    Patient is located in the following state in which I hold an active license [place]      Assessment/Plan:    Problem List Items Addressed This Visit        Respiratory    Mild intermittent asthma without complication - Primary     Patient improved after starting Breo on a regular basis  Has basically been asymptomatic over the last several months  Did however get COVID-19 last week and senses a little more of dyspnea and cough  If symptoms get any worse the course of prednisone presumably would be indicated  From her history I think it is just a little too late to start Paxlovid            Other    COVID-19     Diagnosed with this on December 30  Apparently had been symptomatic for several days before  Has had a little bit of dyspnea and some cough since being diagnosed  Most of his original symptoms were nose and sinus related  I gather from the patient's history that she is slowly improving but if her symptoms relapse or get worse a course of prednisone might be helpful  So far there is no obvious flare of asthma by symptoms  Reason for visit is   Chief Complaint   Patient presents with   • Follow-up        Encounter provider Sonu Esqueda MD    Provider located at [address] [place] [id number] [phone]      Recent Visits  No visits were found meeting these conditions  Showing recent visits within past 7 days and meeting all other requirements  Today's Visits  Date Type Provider Dept   01/04/23 Telemedicine Sonu Esqueda MD [facility] [address] today's visits and meeting all other requirements  Future Appointments  No visits were found meeting these conditions    Showing future appointments within next 150 days and meeting all other requirements       The patient was identified by name and date of birth  Sophia Frost was informed that this is a telemedicine visit and that the visit is being conducted through the [employer]  She agrees to proceed     My office door was closed  No one else was in the room  She acknowledged consent and understanding of privacy and security of the video platform  The patient has agreed to participate and understands they can discontinue the visit at any time  Patient is aware this is a billable service  Karie Acosta is a 27 y o  female    This patient is interviewed by video based on previous history of asthma  She has been on Breo since we met in September  This has been helpful for her she is been basically asymptomatic  Does not seem to require albuterol on any regular basis  Did develop COVID-19 again last week  Most of her symptoms were upper respiratory (nose and sinus)  Since that diagnosis she has had some mild dyspnea and mild cough  She tells me that she thinks it is getting better today  She did not look unwell on the video  Not coughing and was not hoarse or obviously congested         Past Medical History:   Diagnosis Date   • Abnormal Pap smear of cervix     had cryosurgery 2015   • Anxiety    • Asthma    • Carrier of group B Streptococcus    • GERD (gastroesophageal reflux disease)    • High blood pressure     during pregnancy    • History of UTI    • Hx of absence seizures     once(per sanjiv)   • Hx of seasonal allergies    • Hypertension    • Migraine headache    • Muscle spasm of back    • Papanicolaou smear for cervical cancer screening 06/2017    Pap neg 5/2016, pap- neg/GC/CT NEG   • Postpartum depression    • Preeclampsia    • Psychiatric disorder    • Umbilical hernia 29/0376   • Varicose veins of lower extremity        Past Surgical History:   Procedure Laterality Date   • GYNECOLOGIC CRYOSURGERY     • HERNIA REPAIR      2 years ago   • INSERTION OF INTRAUTERINE DEVICE (IUD)     • OTHER SURGICAL HISTORY  11/18/2015    cryosurgery    • WISDOM TOOTH EXTRACTION         Current Outpatient Medications   Medication Sig Dispense Refill   • brompheniramine-pseudoephedrine-DM 30-2-10 MG/5ML syrup Take 10 mL by mouth 4 (four) times a day as needed for congestion or cough 120 mL 0   • Cholecalciferol (Vitamin D) 50 MCG (2000 UT) CAPS Take 1 capsule (2,000 Units total) by mouth in the morning 90 capsule 0   • fluticasone (FLONASE) 50 mcg/act nasal spray 1 spray into each nostril daily for 10 days (Patient taking differently: 1 spray into each nostril daily prn) 18 mL 1   • fluticasone-vilanterol (Breo Ellipta) 200-25 MCG/INH inhaler Inhale 1 puff daily 60 blister 5   • medroxyPROGESTERone (DEPO-PROVERA) 150 mg/mL injection Inject 1 mL (150 mg total) into a muscle every 3 (three) months 1 mL 3   • medroxyPROGESTERone (DEPO-PROVERA) 150 mg/mL injection Inject 1 mL (150 mg total) into a muscle every 3 (three) months 1 mL 0   • montelukast (SINGULAIR) 10 mg tablet Take 1 tablet (10 mg total) by mouth daily at bedtime for 60 doses (Patient not taking: Reported on 10/28/2022) 30 tablet 2   • naproxen (Naprosyn) 500 mg tablet Take 1 tablet (500 mg total) by mouth 2 (two) times a day as needed (palvic pain/cramping ) Take with food 30 tablet 1   • Nerve Stimulator (STANDARD TENS) OMER by Does not apply route 4 (four) times a day 1 Device 0     No current facility-administered medications for this visit  Allergies   Allergen Reactions   • Phenazopyridine Anaphylaxis and Hives   • Other      Dove Deodorant         Review of Systems   Constitutional: Negative for activity change  Respiratory: Positive for cough and shortness of breath  Negative for wheezing  Cardiovascular: Negative for chest pain, palpitations and leg swelling  Allergic/Immunologic: Positive for environmental allergies  Video Exam    There were no vitals filed for this visit      Physical Exam  Constitutional: Appearance: Normal appearance  She is not ill-appearing  Comments: Not coughing, voice normal   Neurological:      Mental Status: She is alert and oriented to person, place, and time     Psychiatric:         Mood and Affect: Mood normal          Behavior: Behavior normal           I spent 10 minutes with patient today in which greater than 50% of the time was spent in counseling/coordination of care regarding Asthma and COVID-19

## 2023-01-04 NOTE — ASSESSMENT & PLAN NOTE
Patient improved after starting Breo on a regular basis  Has basically been asymptomatic over the last several months  Did however get COVID-19 last week and senses a little more of dyspnea and cough  If symptoms get any worse the course of prednisone presumably would be indicated    From her history I think it is just a little too late to start Paxlovid

## 2023-01-04 NOTE — ASSESSMENT & PLAN NOTE
Diagnosed with this on December 30  Apparently had been symptomatic for several days before  Has had a little bit of dyspnea and some cough since being diagnosed  Most of his original symptoms were nose and sinus related  I gather from the patient's history that she is slowly improving but if her symptoms relapse or get worse a course of prednisone might be helpful  So far there is no obvious flare of asthma by symptoms

## 2023-01-12 ENCOUNTER — CONSULT (OUTPATIENT)
Dept: SURGERY | Facility: CLINIC | Age: 33
End: 2023-01-12

## 2023-01-12 VITALS
TEMPERATURE: 98 F | HEART RATE: 74 BPM | HEIGHT: 64 IN | BODY MASS INDEX: 23.22 KG/M2 | RESPIRATION RATE: 18 BRPM | WEIGHT: 136 LBS | OXYGEN SATURATION: 98 % | SYSTOLIC BLOOD PRESSURE: 108 MMHG | DIASTOLIC BLOOD PRESSURE: 62 MMHG

## 2023-01-12 DIAGNOSIS — R14.0 ABDOMINAL BLOATING: ICD-10-CM

## 2023-01-12 DIAGNOSIS — K80.20 GALL BLADDER STONES: ICD-10-CM

## 2023-01-12 DIAGNOSIS — R10.9 ABDOMINAL DISCOMFORT: ICD-10-CM

## 2023-01-12 DIAGNOSIS — K21.9 GASTROESOPHAGEAL REFLUX DISEASE WITHOUT ESOPHAGITIS: ICD-10-CM

## 2023-01-12 NOTE — PATIENT INSTRUCTIONS
I explained robotic cholecystectomy to her in detail  She cannot have elective surgery for at least 5 more weeks secondary to recent Covid  She is going to change her diet and see if this helps and then return to schedule surgery if needed    I also told her she can talk with her primary regarding Actigal

## 2023-01-12 NOTE — PROGRESS NOTES
Assessment/Plan:  Cholelithiasis  She is going to delay surgery at present  I will see her in 6 weeks to see how well she is doing    No problem-specific Assessment & Plan notes found for this encounter  Diagnoses and all orders for this visit:    [place] bladder stones  -     Ambulatory Referral to General Surgery    Abdominal bloating  -     Ambulatory Referral to General Surgery    Gastroesophageal reflux disease without esophagitis  -     Ambulatory Referral to General Surgery    Abdominal discomfort  -     Ambulatory Referral to General Surgery          Subjective:      Patient ID: Samy Pinon is a 27 y o  female  The patient is a 28 y/o  female who complains of R sided upper abdominal pain which does radiate to the back  This is associated with significant bloating  She has a hx of prior PUD and GERD, but says this is different  This is related to food intake  Recent US showed gallstones      The following portions of the patient's history were reviewed and updated as appropriate: allergies, current medications, past family history, past medical history, past social history, past surgical history and problem list     Review of Systems   Constitutional:        Has tested + for Covid 3 x, last 2 weeks ago  No covid vaccines   no weight loss   HENT:        Lost taste and smell first 2 times she had covid   Eyes:        Wears glasses   Respiratory:        Asthma  Smoked 1 ppd 15 years, quit years ago   Cardiovascular:        Syncope x 1     Gastrointestinal:        See hpi   Endocrine: Negative  Genitourinary: Hx bladder infections   Musculoskeletal: Positive for myalgias  Skin: Negative  Neurological:        Sciatica in past  Gets headaches     Hematological: Negative      Psychiatric/Behavioral:        Etoh in past         Objective:      /62 (BP Location: Left arm, Patient Position: Sitting, Cuff Size: Standard)   Pulse 74   Temp 98 °F (36 7 °C) (Tympanic) Resp 18   Ht 5' 4" (1 626 m)   Wt 61 7 kg (136 lb)   SpO2 98%   BMI 23 34 kg/m²          Physical Exam  Vitals reviewed  Constitutional:       Appearance: Normal appearance  She is normal weight  HENT:      Head: Normocephalic and atraumatic  Eyes:      General: No scleral icterus  Conjunctiva/sclera: Conjunctivae normal    Cardiovascular:      Rate and Rhythm: Normal rate and regular rhythm  Heart sounds: Normal heart sounds  No murmur heard  Pulmonary:      Effort: No respiratory distress  Breath sounds: Normal breath sounds  No stridor  No wheezing, rhonchi or rales  Chest:      Chest wall: No tenderness  Abdominal:      General: There is no distension  Palpations: Abdomen is soft  There is no mass  Tenderness: There is no abdominal tenderness  There is no guarding  Hernia: No hernia is present  Musculoskeletal:         General: Normal range of motion  Cervical back: Normal range of motion  Lymphadenopathy:      Cervical: No cervical adenopathy  Skin:     General: Skin is warm  Coloration: Skin is not jaundiced  Neurological:      Mental Status: She is oriented to person, place, and time  Psychiatric:         Mood and Affect: Mood normal          Behavior: Behavior normal          Thought Content:  Thought content normal          Judgment: Judgment normal

## 2023-01-29 DIAGNOSIS — R09.81 NASAL CONGESTION: ICD-10-CM

## 2023-01-30 RX ORDER — MONTELUKAST SODIUM 10 MG/1
TABLET ORAL
Qty: 30 TABLET | Refills: 2 | Status: SHIPPED | OUTPATIENT
Start: 2023-01-30

## 2023-02-03 ENCOUNTER — OFFICE VISIT (OUTPATIENT)
Dept: GASTROENTEROLOGY | Facility: CLINIC | Age: 33
End: 2023-02-03

## 2023-02-03 VITALS
HEIGHT: 64 IN | HEART RATE: 69 BPM | BODY MASS INDEX: 23.05 KG/M2 | WEIGHT: 135 LBS | DIASTOLIC BLOOD PRESSURE: 72 MMHG | SYSTOLIC BLOOD PRESSURE: 104 MMHG

## 2023-02-03 DIAGNOSIS — R14.0 ABDOMINAL BLOATING: ICD-10-CM

## 2023-02-03 DIAGNOSIS — K80.20 GALL BLADDER STONES: ICD-10-CM

## 2023-02-03 DIAGNOSIS — R10.9 ABDOMINAL DISCOMFORT: ICD-10-CM

## 2023-02-03 DIAGNOSIS — K21.9 GASTROESOPHAGEAL REFLUX DISEASE WITHOUT ESOPHAGITIS: ICD-10-CM

## 2023-02-03 RX ORDER — PANTOPRAZOLE SODIUM 40 MG/1
40 TABLET, DELAYED RELEASE ORAL DAILY
Qty: 30 TABLET | Refills: 3 | Status: SHIPPED | OUTPATIENT
Start: 2023-02-03

## 2023-02-03 NOTE — PROGRESS NOTES
[facility] - Outpatient Consultation  Kimberlyn Rubio 27 y o  female MRN: [id number]  Encounter: [id number]          ASSESSMENT AND PLAN:      1  Arnaldo Morales bladder stones  -     Ambulatory Referral to Gastroenterology    2  Abdominal bloating  -     Ambulatory Referral to Gastroenterology  -     pantoprazole (PROTONIX) 40 mg tablet; Take 1 tablet (40 mg total) by mouth daily    3  Gastroesophageal reflux disease without esophagitis  -     Ambulatory Referral to Gastroenterology  -     pantoprazole (PROTONIX) 40 mg tablet; Take 1 tablet (40 mg total) by mouth daily    4  Abdominal discomfort  -     Ambulatory Referral to Gastroenterology  -     pantoprazole (PROTONIX) 40 mg tablet; Take 1 tablet (40 mg total) by mouth daily    Patient has generalized abdominal discomfort fullness bloating feeling, also heartburn acid reflux indigestion, episode of nausea vomiting, finding of a single gallstone on ultrasound  Normal CBC CMP  I believe she has combination of GERD, dyspepsia IBS as well the symptoms worsened by anxiety, and a gallstone  I do not believe her abdominal pain is from the gallstones since it is diffuse and actually more so in the lower abdomen and left side  Antireflux measures reviewed diet discussed diabetes management reviewed avoid milk products ST fried foods, manage lifestyle and stress  Trial of PPI  She wants to try these conservative measures for now, if symptoms persist then she can revisit gallbladder stone management     ______________________________________________________________________    HPI:      Patient is a pleasant lady mother of 3, have some GI symptoms of heartburn acid reflux indigestion abdominal bloating discomfort generalized and more so as on the left side and lower abdomen, had his ultrasound of the abdomen done shows single 4 mm size gallstone, normal CBC CMP, sent for evaluation  She does have heartburn acid reflux symptoms as well  Patient denies any jaundice itching dark urine light stools  Appetite is fair weight stable  Has had an episode of nausea vomiting as well  No significant pain in the right upper quadrant tenderness  Abdominal fullness bloating has been generalized more so in the lower abdomen, she is trying to watch her diet and weight  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 and also taking classes for herself, lives with with her  and 3 children  Diet medications more than 10 pertinent systems some of the current records reviewed  REVIEW OF SYSTEMS:    CONSTITUTIONAL: Denies any fever, chills, rigors, and weight loss  HEENT: No earache or tinnitus  CARDIOVASCULAR: No chest pain or palpitations  RESPIRATORY: Denies any cough, hemoptysis, shortness of breath or dyspnea on exertion  GASTROINTESTINAL: As noted in the History of Present Illness  GENITOURINARY: Denies any hematuria or dysuria  NEUROLOGIC: No dizziness or vertigo  MUSCULOSKELETAL: Denies any joint swellings  SKIN: Denies skin rashes or itching  ENDOCRINE: Denies excessive thirst  Denies intolerance to heat or cold  PSYCHOSOCIAL: Denies depression or anxiety  Denies any recent memory loss         Historical Information   Past Medical History:   Diagnosis Date   • Abnormal Pap smear of cervix     had cryosurgery 2015   • Anxiety    • Asthma    • Carrier of group B Streptococcus    • GERD (gastroesophageal reflux disease)    • High blood pressure     during pregnancy    • History of UTI    • Hx of absence seizures     once(per sanjiv)   • Hx of seasonal allergies    • Hypertension    • Migraine headache    • Muscle spasm of back    • Papanicolaou smear for cervical cancer screening 06/2017    Pap neg 5/2016, pap- neg/GC/CT NEG   • Postpartum depression    • Preeclampsia    • Psychiatric disorder    • Umbilical hernia 34/2902   • Varicose veins of lower extremity      Past Surgical History:   Procedure Laterality Date   • GYNECOLOGIC CRYOSURGERY     • HERNIA REPAIR      2 years ago   • INSERTION OF INTRAUTERINE DEVICE (IUD)     • OTHER SURGICAL HISTORY  2015    cryosurgery    • WISDOM TOOTH EXTRACTION       Social History   Social History     Substance and Sexual Activity   Alcohol Use Not Currently    Comment: stopped drinking in      Social History     Substance and Sexual Activity   Drug Use Never    Comment: per Gyn notes, Marijuana HX age 11        Social History     Tobacco Use   Smoking Status Former   • Packs/day: 1 00   • Years: 15 00   • Pack years: 15 00   • Types: Cigarettes   • Quit date: 2015   • Years since quittin 0   Smokeless Tobacco Never   Tobacco Comments    Has smoked since age:   13     Family History   Problem Relation Age of Onset   • Hypertension Mother    • Arthritis Mother    • Asthma Mother    • Diabetes Mother         Prediabetic    • Sleep apnea Mother    • Lymphoma Maternal Grandmother    • Other Maternal Grandmother         Cardiomegaly   • Diabetes Maternal Grandmother    • COPD Maternal Grandmother    • Heart attack Maternal Grandfather          61   • Varicose Veins Paternal Grandmother    • Hyperthyroidism Maternal Aunt    • Asthma Maternal Aunt    • Bipolar disorder Maternal Aunt    • Thyroid disease Maternal Aunt    • Varicose Veins Paternal Aunt    • Cancer Cousin    • Diabetes Cousin    • Diabetes Other    • Asthma Sister    • Fibromyalgia Family    • Arthritis Family    • GI problems Family    • Asthma Son        Meds/Allergies       Current Outpatient Medications:   •  Cholecalciferol (Vitamin D) 50 MCG ( UT) CAPS  •  fluticasone (FLONASE) 50 mcg/act nasal spray  •  fluticasone-vilanterol (Breo Ellipta) 200-25 MCG/INH inhaler  •  medroxyPROGESTERone (DEPO-PROVERA) 150 mg/mL injection  •  medroxyPROGESTERone (DEPO-PROVERA) 150 mg/mL injection  •  montelukast (SINGULAIR) 10 mg tablet  •  naproxen (Naprosyn) 500 mg tablet  •  Nerve Stimulator (STANDARD TENS) OMER  •  pantoprazole (PROTONIX) 40 mg tablet  •  brompheniramine-pseudoephedrine-DM 30-2-10 MG/5ML syrup    Allergies   Allergen Reactions   • Phenazopyridine Anaphylaxis and Hives   • Other      Dove Deodorant             Objective     Blood pressure 104/72, pulse 69, height 5' 4" (1 626 m), weight 61 2 kg (135 lb), not currently breastfeeding  Body mass index is 23 17 kg/m²  PHYSICAL EXAM:      General Appearance:   Alert, cooperative, no distress   HEENT:   Normocephalic, atraumatic, anicteric  Neck:  Supple, symmetrical, trachea midline   Lungs:   Clear to auscultation bilaterally; no rales, rhonchi or wheezing; respirations unlabored    Heart[de-identified]   Regular rate and rhythm; no murmur  Abdomen:   Soft, non-tender, non-distended; normal bowel sounds; no masses, no organomegaly    Genitalia:   Deferred    Rectal:   Deferred    Extremities:  No cyanosis, clubbing or edema    Skin:  No jaundice, rashes, or lesions    Lymph nodes:  No palpable cervical lymphadenopathy        Lab Results:   No visits with results within 1 Day(s) from this visit  Latest known visit with results is:   Office Visit on 12/30/2022   Component Date Value   • POCT SARS-CoV-2 Ag 12/30/2022 Positive (A)    • VALID CONTROL 12/30/2022 Valid          Radiology Results:   No results found

## 2023-02-21 ENCOUNTER — HOSPITAL ENCOUNTER (EMERGENCY)
Facility: HOSPITAL | Age: 33
Discharge: HOME | End: 2023-02-21
Attending: EMERGENCY MEDICINE

## 2023-02-21 VITALS
HEART RATE: 72 BPM | RESPIRATION RATE: 18 BRPM | SYSTOLIC BLOOD PRESSURE: 109 MMHG | OXYGEN SATURATION: 97 % | DIASTOLIC BLOOD PRESSURE: 74 MMHG | TEMPERATURE: 97.8 F

## 2023-02-21 DIAGNOSIS — M79.18 MUSCULOSKELETAL PAIN: Primary | ICD-10-CM

## 2023-02-21 LAB
ALBUMIN SERPL BCP-MCNC: 4.1 G/DL (ref 3.5–5)
ALP SERPL-CCNC: 72 U/L (ref 34–104)
ALT SERPL W P-5'-P-CCNC: 31 U/L (ref 7–52)
ANION GAP SERPL CALCULATED.3IONS-SCNC: 7 MMOL/L (ref 4–13)
AST SERPL W P-5'-P-CCNC: 20 U/L (ref 13–39)
BASOPHILS # BLD AUTO: 0.01 THOUSANDS/ÂΜL (ref 0–0.1)
BASOPHILS NFR BLD AUTO: 0 % (ref 0–1)
BILIRUB DIRECT SERPL-MCNC: 0.07 MG/DL (ref 0–0.2)
BILIRUB SERPL-MCNC: 0.22 MG/DL (ref 0.2–1)
BILIRUB UR QL STRIP: NEGATIVE
BUN SERPL-MCNC: 17 MG/DL (ref 5–25)
CALCIUM SERPL-MCNC: 9.6 MG/DL (ref 8.4–10.2)
CHLORIDE SERPL-SCNC: 106 MMOL/L (ref 96–108)
CLARITY UR: ABNORMAL
CO2 SERPL-SCNC: 28 MMOL/L (ref 21–32)
COLOR UR: YELLOW
CREAT SERPL-MCNC: 0.58 MG/DL (ref 0.6–1.3)
EOSINOPHIL # BLD AUTO: 0.12 THOUSAND/ÂΜL (ref 0–0.61)
EOSINOPHIL NFR BLD AUTO: 2 % (ref 0–6)
ERYTHROCYTE [DISTWIDTH] IN BLOOD BY AUTOMATED COUNT: 13.9 % (ref 11.6–15.1)
EXT PREGNANCY TEST URINE: NEGATIVE
EXT. CONTROL: NORMAL
GFR SERPL CREATININE-BSD FRML MDRD: 120 ML/MIN/1.73SQ M
GLUCOSE SERPL-MCNC: 93 MG/DL (ref 65–140)
GLUCOSE UR STRIP-MCNC: NEGATIVE MG/DL
HCT VFR BLD AUTO: 36.1 % (ref 34.8–46.1)
HGB BLD-MCNC: 12 G/DL (ref 11.5–15.4)
HGB UR QL STRIP.AUTO: NEGATIVE
IMM GRANULOCYTES # BLD AUTO: 0.02 THOUSAND/UL (ref 0–0.2)
IMM GRANULOCYTES NFR BLD AUTO: 0 % (ref 0–2)
KETONES UR STRIP-MCNC: NEGATIVE MG/DL
LEUKOCYTE ESTERASE UR QL STRIP: NEGATIVE
LYMPHOCYTES # BLD AUTO: 2.19 THOUSANDS/ÂΜL (ref 0.6–4.47)
LYMPHOCYTES NFR BLD AUTO: 34 % (ref 14–44)
MCH RBC QN AUTO: 28.9 PG (ref 26.8–34.3)
MCHC RBC AUTO-ENTMCNC: 33.2 G/DL (ref 31.4–37.4)
MCV RBC AUTO: 87 FL (ref 82–98)
MONOCYTES # BLD AUTO: 0.59 THOUSAND/ÂΜL (ref 0.17–1.22)
MONOCYTES NFR BLD AUTO: 9 % (ref 4–12)
NEUTROPHILS # BLD AUTO: 3.48 THOUSANDS/ÂΜL (ref 1.85–7.62)
NEUTS SEG NFR BLD AUTO: 55 % (ref 43–75)
NITRITE UR QL STRIP: NEGATIVE
NRBC BLD AUTO-RTO: 0 /100 WBCS
PH UR STRIP.AUTO: 7.5 [PH]
PLATELET # BLD AUTO: 273 THOUSANDS/UL (ref 149–390)
PMV BLD AUTO: 10.2 FL (ref 8.9–12.7)
POTASSIUM SERPL-SCNC: 3.8 MMOL/L (ref 3.5–5.3)
PROT SERPL-MCNC: 6.9 G/DL (ref 6.4–8.4)
PROT UR STRIP-MCNC: NEGATIVE MG/DL
RBC # BLD AUTO: 4.15 MILLION/UL (ref 3.81–5.12)
SODIUM SERPL-SCNC: 141 MMOL/L (ref 135–147)
SP GR UR STRIP.AUTO: 1.02 (ref 1–1.03)
UROBILINOGEN UR QL STRIP.AUTO: 0.2 E.U./DL
WBC # BLD AUTO: 6.41 THOUSAND/UL (ref 4.31–10.16)

## 2023-02-21 RX ORDER — KETOROLAC TROMETHAMINE 30 MG/ML
15 INJECTION, SOLUTION INTRAMUSCULAR; INTRAVENOUS ONCE
Status: COMPLETED | OUTPATIENT
Start: 2023-02-21 | End: 2023-02-21

## 2023-02-21 RX ADMIN — KETOROLAC TROMETHAMINE 15 MG: 30 INJECTION, SOLUTION INTRAMUSCULAR at 22:26

## 2023-02-22 NOTE — DISCHARGE INSTRUCTIONS
Your blood and urine testing was normal   We suspect that the pain you are having in your flank is a muscle pain  If the symptoms persist we recommend talking to your primary care doctor about them  You can try taking 650 mg of Tylenol or 600 mg ibuprofen every 6 hours for pain

## 2023-02-23 NOTE — ED PROVIDER NOTES
History  Chief Complaint   Patient presents with   • Flank Pain     Pt presents to the ed right sided flank pain that started a few days ago, denies burning upon urination, and bleeding, no med pta      57-year-old female presenting with right-sided flank pain which she had for approximately the past 1 to 2 weeks  She states that the pain is worse with movement but varies in severity  No fever or chills  No dysuria or hematuria no frequency or urgency  No nausea vomiting or diarrhea  She has been told she has gallstones in the past but this does not feel like the right upper quadrant pain that she had when she was diagnosed with gallstones  Prior to Admission Medications   Prescriptions Last Dose Informant Patient Reported? Taking?    Cholecalciferol (Vitamin D) 50 MCG ( UT) CAPS   No No   Sig: Take 1 capsule (2,000 Units total) by mouth in the morning   Nerve Stimulator (STANDARD TENS) OMER   No No   Sig: by Does not apply route 4 (four) times a day   brompheniramine-pseudoephedrine-DM 30-2-10 MG/5ML syrup   No No   Sig: Take 10 mL by mouth 4 (four) times a day as needed for congestion or cough   Patient not taking: Reported on 2/3/2023   fluticasone (FLONASE) 50 mcg/act nasal spray   No No   Si spray into each nostril daily for 10 days   Patient taking differently: 1 spray into each nostril daily prn   fluticasone-vilanterol (Breo Ellipta) 200-25 MCG/INH inhaler   No No   Sig: Inhale 1 puff daily   medroxyPROGESTERone (DEPO-PROVERA) 150 mg/mL injection   No No   Sig: Inject 1 mL (150 mg total) into a muscle every 3 (three) months   medroxyPROGESTERone (DEPO-PROVERA) 150 mg/mL injection   No No   Sig: Inject 1 mL (150 mg total) into a muscle every 3 (three) months   montelukast (SINGULAIR) 10 mg tablet   No No   Sig: TAKE 1 TABLET (10 MG TOTAL) BY MOUTH DAILY AT BEDTIME   naproxen (Naprosyn) 500 mg tablet   No No   Sig: Take 1 tablet (500 mg total) by mouth 2 (two) times a day as needed (palvic pain/cramping ) Take with food   pantoprazole (PROTONIX) 40 mg tablet   No No   Sig: Take 1 tablet (40 mg total) by mouth daily      Facility-Administered Medications: None       Past Medical History:   Diagnosis Date   • Abnormal Pap smear of cervix     had cryosurgery    • Anxiety    • Asthma    • Carrier of group B Streptococcus    • GERD (gastroesophageal reflux disease)    • High blood pressure     during pregnancy    • History of UTI    • Hx of absence seizures     once(per sanjiv)   • Hx of seasonal allergies    • Hypertension    • Migraine headache    • Muscle spasm of back    • Papanicolaou smear for cervical cancer screening 2017    Pap neg 2016, pap- neg/GC/CT NEG   • Postpartum depression    • Preeclampsia    • Psychiatric disorder    • Umbilical hernia    • Varicose veins of lower extremity        Past Surgical History:   Procedure Laterality Date   • GYNECOLOGIC CRYOSURGERY     • HERNIA REPAIR      2 years ago   • INSERTION OF INTRAUTERINE DEVICE (IUD)     • OTHER SURGICAL HISTORY  2015    cryosurgery    • WISDOM TOOTH EXTRACTION         Family History   Problem Relation Age of Onset   • Hypertension Mother    • Arthritis Mother    • Asthma Mother    • Diabetes Mother         Prediabetic    • Sleep apnea Mother    • Lymphoma Maternal Grandmother    • Other Maternal Grandmother         Cardiomegaly   • Diabetes Maternal Grandmother    • COPD Maternal Grandmother    • Heart attack Maternal Grandfather          61   • Varicose Veins Paternal Grandmother    • Hyperthyroidism Maternal Aunt    • Asthma Maternal Aunt    • Bipolar disorder Maternal Aunt    • Thyroid disease Maternal Aunt    • Varicose Veins Paternal Aunt    • Cancer Cousin    • Diabetes Cousin    • Diabetes Other    • Asthma Sister    • Fibromyalgia Family    • Arthritis Family    • GI problems Family    • Asthma Son      I have reviewed and agree with the history as documented      E-Cigarette/Vaping   • E-Cigarette Use Never User      E-Cigarette/Vaping Substances   • Nicotine No    • THC No    • CBD No    • Flavoring No    • Other No    • Unknown No      Social History     Tobacco Use   • Smoking status: Former     Packs/day: 1 00     Years: 15      Pack years: 15 00     Types: Cigarettes     Quit date: 2015     Years since quittin 1   • Smokeless tobacco: Never   • Tobacco comments:     Has smoked since age:   13   Vaping Use   • Vaping Use: Never used   Substance Use Topics   • Alcohol use: Not Currently     Comment: stopped drinking in    • Drug use: Never     Comment: per Gyn notes, Marijuana HX age 11  Review of Systems   Constitutional: Negative for chills and fever  Respiratory: Negative for cough and shortness of breath  Cardiovascular: Negative for chest pain  Gastrointestinal: Negative for diarrhea and vomiting  Genitourinary: Positive for flank pain  Negative for dysuria and frequency  Musculoskeletal: Positive for back pain  Negative for gait problem and joint swelling  Physical Exam  Physical Exam  Constitutional:       Appearance: Normal appearance  HENT:      Head: Normocephalic and atraumatic  Nose: Nose normal       Mouth/Throat:      Mouth: Mucous membranes are moist       Pharynx: Oropharynx is clear  Eyes:      Conjunctiva/sclera: Conjunctivae normal    Cardiovascular:      Rate and Rhythm: Normal rate and regular rhythm  Heart sounds: Normal heart sounds  Pulmonary:      Effort: Pulmonary effort is normal       Breath sounds: Normal breath sounds  Abdominal:      General: Abdomen is flat  Palpations: Abdomen is soft  Tenderness: There is no abdominal tenderness  There is no right CVA tenderness, left CVA tenderness, guarding or rebound  Musculoskeletal:         General: Normal range of motion  Cervical back: Normal range of motion  Right lower leg: No edema  Left lower leg: No edema     Skin:     General: Skin is warm and dry       Findings: No erythema or rash  Neurological:      General: No focal deficit present  Mental Status: She is alert and oriented to person, place, and time     Psychiatric:         Mood and Affect: Mood normal          Behavior: Behavior normal          Vital Signs  ED Triage Vitals [02/21/23 2101]   Temperature Pulse Respirations Blood Pressure SpO2   97 8 °F (36 6 °C) 72 18 109/74 97 %      Temp Source Heart Rate Source Patient Position - Orthostatic VS BP Location FiO2 (%)   Oral Monitor Sitting Left arm --      Pain Score       8           Vitals:    02/21/23 2101   BP: 109/74   Pulse: 72   Patient Position - Orthostatic VS: Sitting         Visual Acuity      ED Medications  Medications   ketorolac (TORADOL) injection 15 mg (15 mg Intravenous Given 2/21/23 2226)       Diagnostic Studies  Results Reviewed     Procedure Component Value Units Date/Time    Basic metabolic panel [[id number]]  (Abnormal) Collected: 02/21/23 2142    Lab Status: Final result Specimen: Blood from Arm, Right Updated: 02/21/23 2208     Sodium 141 mmol/L      Potassium 3 8 mmol/L      Chloride 106 mmol/L      CO2 28 mmol/L      ANION GAP 7 mmol/L      BUN 17 mg/dL      Creatinine 0 58 mg/dL      Glucose 93 mg/dL      Calcium 9 6 mg/dL      eGFR 120 ml/min/1 73sq m     Narrative:      [place] guidelines for Chronic Kidney Disease (CKD):   •  Stage 1 with normal or high GFR (GFR > 90 mL/min/1 73 square meters)  •  Stage 2 Mild CKD (GFR = 60-89 mL/min/1 73 square meters)  •  Stage 3A Moderate CKD (GFR = 45-59 mL/min/1 73 square meters)  •  Stage 3B Moderate CKD (GFR = 30-44 mL/min/1 73 square meters)  •  Stage 4 Severe CKD (GFR = 15-29 mL/min/1 73 square meters)  •  Stage 5 End Stage CKD (GFR <15 mL/min/1 73 square meters)  Note: GFR calculation is accurate only with a steady state creatinine    Hepatic function panel [[id number]]  (Normal) Collected: 02/21/23 2142    Lab Status: Final result Specimen: Blood from Arm, Right Updated: 02/21/23 2208     Total Bilirubin 0 22 mg/dL      Bilirubin, Direct 0 07 mg/dL      Alkaline Phosphatase 72 U/L      AST 20 U/L      ALT 31 U/L      Total Protein 6 9 g/dL      Albumin 4 1 g/dL     CBC and differential [[id number]] Collected: 02/21/23 2142    Lab Status: Final result Specimen: Blood from Arm, Right Updated: 02/21/23 2147     WBC 6 41 Thousand/uL      RBC 4 15 Million/uL      Hemoglobin 12 0 g/dL      Hematocrit 36 1 %      MCV 87 fL      MCH 28 9 pg      MCHC 33 2 g/dL      RDW 13 9 %      MPV 10 2 fL      Platelets 967 Thousands/uL      nRBC 0 /100 WBCs      Neutrophils Relative 55 %      Immat GRANS % 0 %      Lymphocytes Relative 34 %      Monocytes Relative 9 %      Eosinophils Relative 2 %      Basophils Relative 0 %      Neutrophils Absolute 3 48 Thousands/µL      Immature Grans Absolute 0 02 Thousand/uL      Lymphocytes Absolute 2 19 Thousands/µL      Monocytes Absolute 0 59 Thousand/µL      Eosinophils Absolute 0 12 Thousand/µL      Basophils Absolute 0 01 Thousands/µL     UA (URINE) with reflex to Scope [[id number]]  (Abnormal) Collected: 02/21/23 2111    Lab Status: Final result Specimen: Urine, Clean Catch Updated: 02/21/23 2120     Color, UA Yellow     Clarity, UA Cloudy     Specific [place], UA 1 020     pH, UA 7 5     Leukocytes, UA Negative     Nitrite, UA Negative     Protein, UA Negative mg/dl      Glucose, UA Negative mg/dl      Ketones, UA Negative mg/dl      Urobilinogen, UA 0 2 E U /dl      Bilirubin, UA Negative     Occult Blood, UA Negative    POCT pregnancy, urine [[id number]]  (Normal) Resulted: 02/21/23 2115    Lab Status: Final result Updated: 02/21/23 2115     EXT Preg Test, Ur Negative     Control Valid                 No orders to display              Procedures  Procedures         ED Course     43-year-old female with 1 to 2 weeks of mild right flank/right lower back pain worse with movement    On exam patient is well-appearing with normal vitals  She does not have CVA tenderness or abdominal tenderness  Screening labs obtained show normal LFTs, CBC, kidney function and a normal UA with no signs of infection or kidney stone  Based on patient's well appearance and normal labs as well as lack of tenderness on exam will not pursue advanced imaging  Suspect that her pain is musculoskeletal in origin  Advised patient to follow-up with primary care doctor if her symptoms persist                           SBIRT 20yo+    Flowsheet Row Most Recent Value   SBIRT (25 yo +)    In order to provide better care to our patients, we are screening all of our patients for alcohol and drug use  Would it be okay to ask you these screening questions? No Filed at: 02/21/2023 2106                    MDM    Disposition  Final diagnoses:   Musculoskeletal pain     Time reflects when diagnosis was documented in both MDM as applicable and the Disposition within this note     Time User Action Codes Description Comment    2/22/2023  6:51 AM Kelly Moon Add [G00 21] Musculoskeletal pain       ED Disposition     ED Disposition   Discharge    Condition   Stable    Date/Time   Tue Feb 21, 2023 2215    [place] discharge to home/self care                 Follow-up Information     Follow up With Specialties Details Why Contact Info    Gerardo Frost MD Family Medicine Schedule an appointment as soon as possible for a visit  As needed [address] [place] [address]   [phone]            Discharge Medication List as of 2/21/2023 10:18 PM      CONTINUE these medications which have NOT CHANGED    Details   brompheniramine-pseudoephedrine-DM 30-2-10 MG/5ML syrup Take 10 mL by mouth 4 (four) times a day as needed for congestion or cough, Starting Fri 12/30/2022, Normal      Cholecalciferol (Vitamin D) 50 MCG (2000 UT) CAPS Take 1 capsule (2,000 Units total) by mouth in the morning, Starting Thu 12/1/2022, Until Wed 3/1/2023, Normal fluticasone (FLONASE) 50 mcg/act nasal spray 1 spray into each nostril daily for 10 days, Starting Wed 6/29/2022, Until Fri 2/3/2023, Normal      fluticasone-vilanterol (Breo Ellipta) 200-25 MCG/INH inhaler Inhale 1 puff daily, Starting Thu 9/29/2022, Until Fri 2/3/2023, Normal      !! medroxyPROGESTERone (DEPO-PROVERA) 150 mg/mL injection Inject 1 mL (150 mg total) into a muscle every 3 (three) months, Starting Fri 10/28/2022, Normal      !! medroxyPROGESTERone (DEPO-PROVERA) 150 mg/mL injection Inject 1 mL (150 mg total) into a muscle every 3 (three) months, Starting Mon 12/5/2022, Normal      montelukast (SINGULAIR) 10 mg tablet TAKE 1 TABLET (10 MG TOTAL) BY MOUTH DAILY AT BEDTIME, Normal      naproxen (Naprosyn) 500 mg tablet Take 1 tablet (500 mg total) by mouth 2 (two) times a day as needed (palvic pain/cramping ) Take with food, Starting Fri 3/18/2022, Normal      Nerve Stimulator (STANDARD TENS) OMER by Does not apply route 4 (four) times a day, Starting Wed 5/20/2020, Normal      pantoprazole (PROTONIX) 40 mg tablet Take 1 tablet (40 mg total) by mouth daily, Starting Fri 2/3/2023, Normal       !! - Potential duplicate medications found  Please discuss with provider  No discharge procedures on file      PDMP Review     None          ED Provider  Electronically Signed by           Adele Arevalo MD  02/23/23 0570

## 2023-02-24 ENCOUNTER — CLINICAL SUPPORT (OUTPATIENT)
Dept: OBGYN CLINIC | Facility: CLINIC | Age: 33
End: 2023-02-24

## 2023-02-24 VITALS
HEIGHT: 64 IN | DIASTOLIC BLOOD PRESSURE: 70 MMHG | SYSTOLIC BLOOD PRESSURE: 110 MMHG | WEIGHT: 133 LBS | BODY MASS INDEX: 22.71 KG/M2

## 2023-02-24 DIAGNOSIS — Z30.42 ENCOUNTER FOR SURVEILLANCE OF INJECTABLE CONTRACEPTIVE: Primary | ICD-10-CM

## 2023-02-24 DIAGNOSIS — Z30.013 ENCOUNTER FOR INITIAL PRESCRIPTION OF INJECTABLE CONTRACEPTIVE: ICD-10-CM

## 2023-02-24 RX ORDER — MEDROXYPROGESTERONE ACETATE 150 MG/ML
150 INJECTION, SUSPENSION INTRAMUSCULAR ONCE
Status: COMPLETED | OUTPATIENT
Start: 2023-02-24 | End: 2023-02-24

## 2023-02-24 RX ORDER — MEDROXYPROGESTERONE ACETATE 150 MG/ML
150 INJECTION, SUSPENSION INTRAMUSCULAR
Qty: 1 ML | Refills: 0 | Status: SHIPPED | OUTPATIENT
Start: 2023-02-24

## 2023-02-24 RX ADMIN — MEDROXYPROGESTERONE ACETATE 150 MG: 150 INJECTION, SUSPENSION INTRAMUSCULAR at 17:02

## 2023-02-25 DIAGNOSIS — R10.9 ABDOMINAL DISCOMFORT: ICD-10-CM

## 2023-02-25 DIAGNOSIS — K21.9 GASTROESOPHAGEAL REFLUX DISEASE WITHOUT ESOPHAGITIS: ICD-10-CM

## 2023-02-25 DIAGNOSIS — R14.0 ABDOMINAL BLOATING: ICD-10-CM

## 2023-02-25 RX ORDER — PANTOPRAZOLE SODIUM 40 MG/1
TABLET, DELAYED RELEASE ORAL
Qty: 90 TABLET | Refills: 2 | Status: SHIPPED | OUTPATIENT
Start: 2023-02-25

## 2023-03-02 ENCOUNTER — TELEPHONE (OUTPATIENT)
Dept: GASTROENTEROLOGY | Facility: CLINIC | Age: 33
End: 2023-03-02

## 2023-03-02 DIAGNOSIS — E55.9 VITAMIN D DEFICIENCY: ICD-10-CM

## 2023-03-02 RX ORDER — ACETAMINOPHEN 160 MG
TABLET,DISINTEGRATING ORAL
Qty: 90 CAPSULE | Refills: 0 | Status: SHIPPED | OUTPATIENT
Start: 2023-03-02

## 2023-03-02 NOTE — TELEPHONE ENCOUNTER
Left message for patient to return call  She had appointment with Dr Myrna Mcdonough in February, scheduled a "consult" with Dr Amor Cartwright for March  Need to know which provider she wants  Can not switch back and forth

## 2023-03-07 DIAGNOSIS — U07.1 COVID-19: ICD-10-CM

## 2023-03-07 DIAGNOSIS — R07.89 SENSATION OF CHEST PRESSURE: ICD-10-CM

## 2023-03-07 RX ORDER — ALBUTEROL SULFATE 90 UG/1
AEROSOL, METERED RESPIRATORY (INHALATION)
Qty: 18 G | Refills: 1 | Status: SHIPPED | OUTPATIENT
Start: 2023-03-07

## 2023-04-19 NOTE — DISCHARGE INSTRUCTIONS
Return to er with shortens sof breath, fever, if you feel worse or have new symptoms at any time  See pcp /urgent care or er this week for follow up 
Ambulatory

## 2023-04-23 ENCOUNTER — HOSPITAL ENCOUNTER (EMERGENCY)
Facility: HOSPITAL | Age: 33
Discharge: HOME | End: 2023-04-23
Attending: INTERNAL MEDICINE

## 2023-04-23 VITALS
TEMPERATURE: 98.2 F | SYSTOLIC BLOOD PRESSURE: 97 MMHG | DIASTOLIC BLOOD PRESSURE: 69 MMHG | RESPIRATION RATE: 18 BRPM | HEART RATE: 81 BPM | OXYGEN SATURATION: 100 %

## 2023-04-23 DIAGNOSIS — R35.89 POLYURIA: Primary | ICD-10-CM

## 2023-04-23 LAB
ALBUMIN SERPL BCP-MCNC: 4.4 G/DL (ref 3.5–5)
ALP SERPL-CCNC: 71 U/L (ref 34–104)
ALT SERPL W P-5'-P-CCNC: 25 U/L (ref 7–52)
ANION GAP SERPL CALCULATED.3IONS-SCNC: 9 MMOL/L (ref 4–13)
AST SERPL W P-5'-P-CCNC: 18 U/L (ref 13–39)
BASOPHILS # BLD AUTO: 0.01 THOUSANDS/ΜL (ref 0–0.1)
BASOPHILS NFR BLD AUTO: 0 % (ref 0–1)
BILIRUB SERPL-MCNC: 0.3 MG/DL (ref 0.2–1)
BILIRUB UR QL STRIP: NEGATIVE
BUN SERPL-MCNC: 11 MG/DL (ref 5–25)
CALCIUM SERPL-MCNC: 9.6 MG/DL (ref 8.4–10.2)
CHLORIDE SERPL-SCNC: 102 MMOL/L (ref 96–108)
CLARITY UR: CLEAR
CO2 SERPL-SCNC: 27 MMOL/L (ref 21–32)
COLOR UR: YELLOW
CREAT SERPL-MCNC: 0.75 MG/DL (ref 0.6–1.3)
EOSINOPHIL # BLD AUTO: 0.07 THOUSAND/ΜL (ref 0–0.61)
EOSINOPHIL NFR BLD AUTO: 1 % (ref 0–6)
ERYTHROCYTE [DISTWIDTH] IN BLOOD BY AUTOMATED COUNT: 13.5 % (ref 11.6–15.1)
EXT PREGNANCY TEST URINE: NEGATIVE
EXT. CONTROL: NORMAL
GFR SERPL CREATININE-BSD FRML MDRD: 104 ML/MIN/1.73SQ M
GLUCOSE SERPL-MCNC: 89 MG/DL (ref 65–140)
GLUCOSE UR STRIP-MCNC: NEGATIVE MG/DL
HCT VFR BLD AUTO: 41.7 % (ref 34.8–46.1)
HGB BLD-MCNC: 13.5 G/DL (ref 11.5–15.4)
HGB UR QL STRIP.AUTO: NEGATIVE
IMM GRANULOCYTES # BLD AUTO: 0.01 THOUSAND/UL (ref 0–0.2)
IMM GRANULOCYTES NFR BLD AUTO: 0 % (ref 0–2)
KETONES UR STRIP-MCNC: ABNORMAL MG/DL
LEUKOCYTE ESTERASE UR QL STRIP: NEGATIVE
LYMPHOCYTES # BLD AUTO: 2.18 THOUSANDS/ΜL (ref 0.6–4.47)
LYMPHOCYTES NFR BLD AUTO: 36 % (ref 14–44)
MCH RBC QN AUTO: 29 PG (ref 26.8–34.3)
MCHC RBC AUTO-ENTMCNC: 32.4 G/DL (ref 31.4–37.4)
MCV RBC AUTO: 90 FL (ref 82–98)
MONOCYTES # BLD AUTO: 0.3 THOUSAND/ΜL (ref 0.17–1.22)
MONOCYTES NFR BLD AUTO: 5 % (ref 4–12)
NEUTROPHILS # BLD AUTO: 3.49 THOUSANDS/ΜL (ref 1.85–7.62)
NEUTS SEG NFR BLD AUTO: 58 % (ref 43–75)
NITRITE UR QL STRIP: NEGATIVE
NRBC BLD AUTO-RTO: 0 /100 WBCS
PH UR STRIP.AUTO: 7 [PH]
PLATELET # BLD AUTO: 266 THOUSANDS/UL (ref 149–390)
PMV BLD AUTO: 10.5 FL (ref 8.9–12.7)
POTASSIUM SERPL-SCNC: 3.4 MMOL/L (ref 3.5–5.3)
PROT SERPL-MCNC: 7.7 G/DL (ref 6.4–8.4)
PROT UR STRIP-MCNC: NEGATIVE MG/DL
RBC # BLD AUTO: 4.66 MILLION/UL (ref 3.81–5.12)
SODIUM SERPL-SCNC: 138 MMOL/L (ref 135–147)
SP GR UR STRIP.AUTO: 1.02 (ref 1–1.03)
UROBILINOGEN UR QL STRIP.AUTO: 0.2 E.U./DL
WBC # BLD AUTO: 6.06 THOUSAND/UL (ref 4.31–10.16)

## 2023-04-23 NOTE — ED PROVIDER NOTES
History  Chief Complaint   Patient presents with   • Flank Pain     Pt presents for worsening right flank pain x 2-3 weeks  Pt states today she began to have burning on urination  Hx of gallstones and UTI  No meds pta  This is a 27years old came for having polyuria and dysuria  Patient has some pain around the right flank area and she has history of cholelithiasis  Patient had history of UTIs in the past  Pt denies h/o of DM  Pt denies N/V/D  Pt denies h/o of nephrolithiasis    Pt has no fever  Pt denies abdominal pain, vaginal bleeding  Prior to Admission Medications   Prescriptions Last Dose Informant Patient Reported? Taking?    Cholecalciferol (Vitamin D3) 50 MCG (2000 UT) capsule   No No   Sig: TAKE 1 CAPSULE (2,000 UNITS TOTAL) BY MOUTH IN THE MORNING   Nerve Stimulator (STANDARD TENS) OMER   No No   Sig: by Does not apply route 4 (four) times a day   albuterol (PROVENTIL HFA,VENTOLIN HFA) 90 mcg/act inhaler   No No   Sig: INHALE 1 PUFF BY MOUTH EVERY 6 HOURS AS NEEDED FOR WHEEZE   brompheniramine-pseudoephedrine-DM 30-2-10 MG/5ML syrup   No No   Sig: Take 10 mL by mouth 4 (four) times a day as needed for congestion or cough   Patient not taking: Reported on 2/3/2023   fluticasone (FLONASE) 50 mcg/act nasal spray   No No   Si spray into each nostril daily for 10 days   Patient taking differently: 1 spray into each nostril daily prn   fluticasone-vilanterol (Breo Ellipta) 200-25 MCG/INH inhaler   No No   Sig: Inhale 1 puff daily   medroxyPROGESTERone (DEPO-PROVERA) 150 mg/mL injection   No No   Sig: Inject 1 mL (150 mg total) into a muscle every 3 (three) months   medroxyPROGESTERone (DEPO-PROVERA) 150 mg/mL injection   No No   Sig: INJECT 1 ML (150 MG TOTAL) INTO A MUSCLE EVERY 3 (THREE) MONTHS   montelukast (SINGULAIR) 10 mg tablet   No No   Sig: TAKE 1 TABLET (10 MG TOTAL) BY MOUTH DAILY AT BEDTIME   naproxen (Naprosyn) 500 mg tablet   No No   Sig: Take 1 tablet (500 mg total) by mouth 2 (two) times a day as needed (palvic pain/cramping ) Take with food   pantoprazole (PROTONIX) 40 mg tablet   No No   Sig: TAKE 1 TABLET BY MOUTH EVERY DAY      Facility-Administered Medications: None       Past Medical History:   Diagnosis Date   • Abnormal Pap smear of cervix     had cryosurgery    • Anxiety    • Asthma    • Carrier of group B Streptococcus    • GERD (gastroesophageal reflux disease)    • High blood pressure     during pregnancy    • History of UTI    • Hx of absence seizures     once(per sanjiv)   • Hx of seasonal allergies    • Hypertension    • Migraine headache    • Muscle spasm of back    • Papanicolaou smear for cervical cancer screening 2017    Pap neg 2016, pap- neg/GC/CT NEG   • Postpartum depression    • Preeclampsia    • Psychiatric disorder    • Umbilical hernia    • Varicose veins of lower extremity        Past Surgical History:   Procedure Laterality Date   • GYNECOLOGIC CRYOSURGERY     • HERNIA REPAIR      2 years ago   • INSERTION OF INTRAUTERINE DEVICE (IUD)     • OTHER SURGICAL HISTORY  2015    cryosurgery    • WISDOM TOOTH EXTRACTION         Family History   Problem Relation Age of Onset   • Hypertension Mother    • Arthritis Mother    • Asthma Mother    • Diabetes Mother         Prediabetic    • Sleep apnea Mother    • Lymphoma Maternal Grandmother    • Other Maternal Grandmother         Cardiomegaly   • Diabetes Maternal Grandmother    • COPD Maternal Grandmother    • Heart attack Maternal Grandfather          61   • Varicose Veins Paternal Grandmother    • Hyperthyroidism Maternal Aunt    • Asthma Maternal Aunt    • Bipolar disorder Maternal Aunt    • Thyroid disease Maternal Aunt    • Varicose Veins Paternal Aunt    • Cancer Cousin    • Diabetes Cousin    • Diabetes Other    • Asthma Sister    • Fibromyalgia Family    • Arthritis Family    • GI problems Family    • Asthma Son      I have reviewed and agree with the history as documented  E-Cigarette/Vaping   • E-Cigarette Use Never User      E-Cigarette/Vaping Substances   • Nicotine No    • THC No    • CBD No    • Flavoring No    • Other No    • Unknown No      Social History     Tobacco Use   • Smoking status: Former     Packs/day: 1      Years: 15      Pack years: 15      Types: Cigarettes     Quit date: 2015     Years since quittin 3   • Smokeless tobacco: Never   • Tobacco comments:     Has smoked since age:   13   Vaping Use   • Vaping Use: Never used   Substance Use Topics   • Alcohol use: Not Currently     Comment: stopped drinking in    • Drug use: Never     Comment: per Gyn notes, Marijuana HX age 11  Review of Systems   Constitutional: Negative for fatigue and fever  HENT: Negative for congestion, sinus pressure, sinus pain, sneezing, sore throat, tinnitus and trouble swallowing  Respiratory: Negative for cough and shortness of breath  Cardiovascular: Negative for chest pain and palpitations  Gastrointestinal: Negative for abdominal pain, diarrhea, nausea and vomiting  Endocrine: Positive for polyuria  Genitourinary: Positive for dysuria  Negative for difficulty urinating, flank pain, frequency, hematuria, menstrual problem, pelvic pain and urgency  Musculoskeletal: Negative for back pain, myalgias, neck pain and neck stiffness  Skin: Negative for color change, pallor and rash  Neurological: Negative for dizziness, light-headedness and headaches  Psychiatric/Behavioral: Negative for agitation and behavioral problems  Physical Exam  Physical Exam  Vitals and nursing note reviewed  Constitutional:       General: She is not in acute distress  Appearance: She is well-developed  She is not ill-appearing, toxic-appearing or diaphoretic  HENT:      Head: Normocephalic and atraumatic        Right Ear: Tympanic membrane and ear canal normal       Left Ear: Tympanic membrane and ear canal normal       Nose: Nose normal  No congestion or rhinorrhea  Mouth/Throat:      Mouth: Mucous membranes are moist       Pharynx: No oropharyngeal exudate or posterior oropharyngeal erythema  Eyes:      Extraocular Movements: Extraocular movements intact  Pupils: Pupils are equal, round, and reactive to light  Neck:      Vascular: No carotid bruit  Cardiovascular:      Rate and Rhythm: Normal rate and regular rhythm  Heart sounds: Normal heart sounds  No murmur heard  No friction rub  No gallop  Pulmonary:      Effort: Pulmonary effort is normal  No respiratory distress  Breath sounds: Normal breath sounds  No stridor  No wheezing or rales  Chest:      Chest wall: No tenderness  Abdominal:      General: Bowel sounds are normal  There is no distension  Palpations: Abdomen is soft  There is no mass  Tenderness: There is no abdominal tenderness  There is no right CVA tenderness, left CVA tenderness, guarding or rebound  Hernia: No hernia is present  Musculoskeletal:         General: No swelling, tenderness, deformity or signs of injury  Normal range of motion  Cervical back: Normal range of motion and neck supple  No rigidity or tenderness  Right lower leg: No edema  Left lower leg: No edema  Lymphadenopathy:      Cervical: No cervical adenopathy  Skin:     General: Skin is warm and dry  Capillary Refill: Capillary refill takes less than 2 seconds  Coloration: Skin is not jaundiced or pale  Findings: No bruising, erythema, lesion or rash  Neurological:      General: No focal deficit present  Mental Status: She is alert and oriented to person, place, and time     Psychiatric:         Behavior: Behavior normal          Vital Signs  ED Triage Vitals   Temperature Pulse Respirations Blood Pressure SpO2   04/23/23 1532 04/23/23 1533 04/23/23 1533 04/23/23 1533 04/23/23 1533   98 2 °F (36 8 °C) 81 18 97/69 100 %      Temp Source Heart Rate Source Patient Position - Orthostatic VS BP Location FiO2 (%)   04/23/23 1532 04/23/23 1533 04/23/23 1533 04/23/23 1533 --   Oral Monitor Sitting Right arm       Pain Score       04/23/23 1533       8           Vitals:    04/23/23 1533   BP: 97/69   Pulse: 81   Patient Position - Orthostatic VS: Sitting         Visual Acuity      ED Medications  Medications - No data to display    Diagnostic Studies  Results Reviewed     Procedure Component Value Units Date/Time    Comprehensive metabolic panel [[id number]]  (Abnormal) Collected: 04/23/23 1557    Lab Status: Final result Specimen: Blood from Arm, Left Updated: 04/23/23 1618     Sodium 138 mmol/L      Potassium 3 4 mmol/L      Chloride 102 mmol/L      CO2 27 mmol/L      ANION GAP 9 mmol/L      BUN 11 mg/dL      Creatinine 0 75 mg/dL      Glucose 89 mg/dL      Calcium 9 6 mg/dL      AST 18 U/L      ALT 25 U/L      Alkaline Phosphatase 71 U/L      Total Protein 7 7 g/dL      Albumin 4 4 g/dL      Total Bilirubin 0 30 mg/dL      eGFR 104 ml/min/1 73sq m     Narrative:      [place] guidelines for Chronic Kidney Disease (CKD):   •  Stage 1 with normal or high GFR (GFR > 90 mL/min/1 73 square meters)  •  Stage 2 Mild CKD (GFR = 60-89 mL/min/1 73 square meters)  •  Stage 3A Moderate CKD (GFR = 45-59 mL/min/1 73 square meters)  •  Stage 3B Moderate CKD (GFR = 30-44 mL/min/1 73 square meters)  •  Stage 4 Severe CKD (GFR = 15-29 mL/min/1 73 square meters)  •  Stage 5 End Stage CKD (GFR <15 mL/min/1 73 square meters)  Note: GFR calculation is accurate only with a steady state creatinine    CBC and differential [[id number]] Collected: 04/23/23 1557    Lab Status: Final result Specimen: Blood from Arm, Left Updated: 04/23/23 1602     WBC 6 06 Thousand/uL      RBC 4 66 Million/uL      Hemoglobin 13 5 g/dL      Hematocrit 41 7 %      MCV 90 fL      MCH 29 0 pg      MCHC 32 4 g/dL      RDW 13 5 %      MPV 10 5 fL      Platelets 235 Thousands/uL      nRBC 0 /100 WBCs      Neutrophils Relative 58 %      Immat GRANS % 0 %      Lymphocytes Relative 36 %      Monocytes Relative 5 %      Eosinophils Relative 1 %      Basophils Relative 0 %      Neutrophils Absolute 3 49 Thousands/µL      Immature Grans Absolute 0 01 Thousand/uL      Lymphocytes Absolute 2 18 Thousands/µL      Monocytes Absolute 0 30 Thousand/µL      Eosinophils Absolute 0 07 Thousand/µL      Basophils Absolute 0 01 Thousands/µL     UA w Reflex to Microscopic w Reflex to Culture [[id number]]  (Abnormal) Collected: 04/23/23 1544    Lab Status: Final result Specimen: Urine, Clean Catch Updated: 04/23/23 1557     Color, UA Yellow     Clarity, UA Clear     Specific Gravity, UA 1 025     pH, UA 7 0     Leukocytes, UA Negative     Nitrite, UA Negative     Protein, UA Negative mg/dl      Glucose, UA Negative mg/dl      Ketones, UA Trace mg/dl      Urobilinogen, UA 0 2 E U /dl      Bilirubin, UA Negative     Occult Blood, UA Negative    POCT pregnancy, urine [[id number]]  (Normal) Resulted: 04/23/23 1545    Lab Status: Final result Updated: 04/23/23 1545     EXT Preg Test, Ur Negative     Control Valid                 No orders to display              Procedures  Procedures         ED Course                               SBIRT 22yo+    Flowsheet Row Most Recent Value   Initial Alcohol Screen: US AUDIT-C     1  How often do you have a drink containing alcohol? 0 Filed at: 04/23/2023 1532   2  How many drinks containing alcohol do you have on a typical day you are drinking? 0 Filed at: 04/23/2023 1532   3a  Male UNDER 65: How often do you have five or more drinks on one occasion? 0 Filed at: 04/23/2023 1532   3b  FEMALE Any Age, or MALE 65+: How often do you have 4 or more drinks on one occassion? 0 Filed at: 04/23/2023 1532   Audit-C Score 0 Filed at: 04/23/2023 1532   VIKTORIYA: How many times in the past year have you    Used an illegal drug or used a prescription medication for non-medical reasons?  Never Filed at: 04/23/2023 1532 Medical Decision Making  This is a 27years old came for having polyuria and dysuria  Patient stated that the dysuria just started today  The patient has history of UTI in the past   Patient also having history of cholelithiasis  Patient was complaining of right flank pain  The physical exam is completely normal on the patient  Blood test came back normal   UA does not show any infection  The case discussed with the patient and told her that I will do not like to give antibiotics especially the symptoms just started today and the urine is clean  Patient is not pregnant  Patient advised to drink plenty of water and no antibiotic at this time  Patient has BS 89  At this time I can send him home and follow-up with her PMD all the question concerns of the patient have been fully addressed  Amount and/or Complexity of Data Reviewed  Labs: ordered  Details: ALL WNL          Disposition  Final diagnoses:   Polyuria     Time reflects when diagnosis was documented in both MDM as applicable and the Disposition within this note     Time User Action Codes Description Comment    4/23/2023  4:42 PM Niyah De La Vega [R35 89] Polyuria       ED Disposition     ED Disposition   Discharge    Condition   Stable    Date/Time   Sun Apr 23, 2023  4:42 PM    Comment   Mario Lindquist discharge to home/self care                 Follow-up Information     Follow up With Specialties Details Why Contact Lilian Russo, MD Family Medicine In 1 week  [address] [facility] [address] [facility]   [phone]            Discharge Medication List as of 4/23/2023  4:43 PM      CONTINUE these medications which have NOT CHANGED    Details   albuterol (PROVENTIL HFA,VENTOLIN HFA) 90 mcg/act inhaler INHALE 1 PUFF BY MOUTH EVERY 6 HOURS AS NEEDED FOR WHEEZE, Normal      brompheniramine-pseudoephedrine-DM 30-2-10 MG/5ML syrup Take 10 mL by mouth 4 (four) times a day as needed for congestion or cough, Starting Fri 12/30/2022, Normal      Cholecalciferol (Vitamin D3) 50 MCG (2000 UT) capsule TAKE 1 CAPSULE (2,000 UNITS TOTAL) BY MOUTH IN THE MORNING, Normal      fluticasone (FLONASE) 50 mcg/act nasal spray 1 spray into each nostril daily for 10 days, Starting Wed 6/29/2022, Until Fri 2/3/2023, Normal      fluticasone-vilanterol (Breo Ellipta) 200-25 MCG/INH inhaler Inhale 1 puff daily, Starting Thu 9/29/2022, Until Fri 2/3/2023, Normal      !! medroxyPROGESTERone (DEPO-PROVERA) 150 mg/mL injection Inject 1 mL (150 mg total) into a muscle every 3 (three) months, Starting Fri 2/24/2023, Normal      !! medroxyPROGESTERone (DEPO-PROVERA) 150 mg/mL injection INJECT 1 ML (150 MG TOTAL) INTO A MUSCLE EVERY 3 (THREE) MONTHS, Starting Fri 2/24/2023, Normal      montelukast (SINGULAIR) 10 mg tablet TAKE 1 TABLET (10 MG TOTAL) BY MOUTH DAILY AT BEDTIME, Normal      naproxen (Naprosyn) 500 mg tablet Take 1 tablet (500 mg total) by mouth 2 (two) times a day as needed (palvic pain/cramping ) Take with food, Starting Fri 3/18/2022, Normal      Nerve Stimulator (STANDARD TENS) OMER by Does not apply route 4 (four) times a day, Starting Wed 5/20/2020, Normal      pantoprazole (PROTONIX) 40 mg tablet TAKE 1 TABLET BY MOUTH EVERY DAY, Normal       !! - Potential duplicate medications found  Please discuss with provider  No discharge procedures on file      PDMP Review     None          ED Provider  Electronically Signed by           Jayy Mclean MD  04/26/23 2820

## 2023-04-23 NOTE — DISCHARGE INSTRUCTIONS
Drink plenty of water daily  Follow up with your PMD   Labs Reviewed   UA W REFLEX TO MICROSCOPIC WITH REFLEX TO CULTURE - Abnormal       Result Value Ref Range Status    Color, UA Yellow  Yellow Final    Clarity, UA Clear  Clear Final    Specific Gravity, UA 1 025  1 001 - 1 030 Final    pH, UA 7 0  5 0, 5 5, 6 0, 6 5, 7 0, 7 5, 8 0 Final    Leukocytes, UA Negative  Negative Final    Nitrite, UA Negative  Negative Final    Protein, UA Negative  Negative, Interference- unable to analyze mg/dl Final    Glucose, UA Negative  Negative mg/dl Final    Ketones, UA Trace (*) Negative mg/dl Final    Urobilinogen, UA 0 2  0 2, 1 0 E U /dl E U /dl Final    Bilirubin, UA Negative  Negative Final    Occult Blood, UA Negative  Negative Final   COMPREHENSIVE METABOLIC PANEL - Abnormal    Sodium 138  135 - 147 mmol/L Final    Potassium 3 4 (*) 3 5 - 5 3 mmol/L Final    Chloride 102  96 - 108 mmol/L Final    CO2 27  21 - 32 mmol/L Final    ANION GAP 9  4 - 13 mmol/L Final    BUN 11  5 - 25 mg/dL Final    Creatinine 0 75  0 60 - 1 30 mg/dL Final    Comment: Standardized to IDMS reference method    Glucose 89  65 - 140 mg/dL Final    Comment: If the patient is fasting, the ADA then defines impaired fasting glucose as > 100 mg/dL and diabetes as > or equal to 123 mg/dL  Calcium 9 6  8 4 - 10 2 mg/dL Final    AST 18  13 - 39 U/L Final    ALT 25  7 - 52 U/L Final    Comment: Specimen collection should occur prior to Sulfasalazine administration due to the potential for falsely depressed results  Alkaline Phosphatase 71  34 - 104 U/L Final    Total Protein 7 7  6 4 - 8 4 g/dL Final    Albumin 4 4  3 5 - 5 0 g/dL Final    Total Bilirubin 0 30  0 20 - 1 00 mg/dL Final    Comment: Use of this assay is not recommended for patients undergoing treatment with eltrombopag due to the potential for falsely elevated results    N-acetyl-p-benzoquinone imine (metabolite of Acetaminophen) will generate erroneously low results in samples for patients that have taken an overdose of Acetaminophen      eGFR 104  ml/min/1 73sq m Final    Narrative:     National Kid[employer] guidelines for Chronic Kidney Disease (CKD):     Stage 1 with normal or high GFR (GFR > 90 mL/min/1 73 square meters)    Stage 2 Mild CKD (GFR = 60-89 mL/min/1 73 square meters)    Stage 3A Moderate CKD (GFR = 45-59 mL/min/1 73 square meters)    Stage 3B Moderate CKD (GFR = 30-44 mL/min/1 73 square meters)    Stage 4 Severe CKD (GFR = 15-29 mL/min/1 73 square meters)    Stage 5 End Stage CKD (GFR <15 mL/min/1 73 square meters)  Note: GFR calculation is accurate only with a steady state creatinine   POCT PREGNANCY, URINE - Normal    EXT Preg Test, Ur Negative   Final    Control Valid   Final   CBC AND DIFFERENTIAL    WBC 6 06  4 31 - 10 16 Thousand/uL Final    RBC 4 66  3 81 - 5 12 Million/uL Final    Hemoglobin 13 5  11 5 - 15 4 g/dL Final    Hematocrit 41 7  34 8 - 46 1 % Final    MCV 90  82 - 98 fL Final    MCH 29 0  26 8 - 34 3 pg Final    MCHC 32 4  31 4 - 37 4 g/dL Final    RDW 13 5  11 6 - 15 1 % Final    MPV 10 5  8 9 - 12 7 fL Final    Platelets 751  226 - 390 Thousands/uL Final    nRBC 0  /100 WBCs Final    Neutrophils Relative 58  43 - 75 % Final    Immat GRANS % 0  0 - 2 % Final    Lymphocytes Relative 36  14 - 44 % Final    Monocytes Relative 5  4 - 12 % Final    Eosinophils Relative 1  0 - 6 % Final    Basophils Relative 0  0 - 1 % Final    Neutrophils Absolute 3 49  1 85 - 7 62 Thousands/µL Final    Immature Grans Absolute 0 01  0 00 - 0 20 Thousand/uL Final    Lymphocytes Absolute 2 18  0 60 - 4 47 Thousands/µL Final    Monocytes Absolute 0 30  0 17 - 1 22 Thousand/µL Final    Eosinophils Absolute 0 07  0 00 - 0 61 Thousand/µL Final    Basophils Absolute 0 01  0 00 - 0 10 Thousands/µL Final

## 2023-04-24 ENCOUNTER — VBI (OUTPATIENT)
Dept: FAMILY MEDICINE CLINIC | Facility: CLINIC | Age: 33
End: 2023-04-24

## 2023-04-24 NOTE — TELEPHONE ENCOUNTER
Morelia [address]    ED Visit Information     Ed visit date: 4/23/23  Diagnosis Description: Flank Pain  In Network? Alin Marry  Discharge status: Home  Discharged with meds ? No  Number of ED visits to date: 1  ED Severity:3     Outreach Information    Outreach successful: Yes 2  Date letter mailed:n/a  Date Finalized:4/25/23    Care Coordination    Follow up appointment with specialilty: yes miranda   Transportation issues ? No    Value [facility]anner type: 3 Day Outreach  Emergent necessity warranted by diagnosis: Yes  [facility] PCP: Yes  Transportation: Self Transport  If able to choose an ED  Would you have chosen [facility]: Yes  Called PCP first?: Yes  Told to go to ED by PCP?: Yes  Same-Day or Next Day Appointment Offered?: No  Would have used same-day or next-day if offered?: Yes  Feels able to call PCP for urgent problems ?: Yes  Understands what emergencies can be handled by PCP ?: Yes  Ever any problems getting appointment with PCP for minor emergency/urgency problems?: Yes  Practice Contacted Patient ?: No  Pt had ED follow up with pcp/staff ?: No    Seen for follow-up out of network ?: No  Reason Patient went to ED instead of Urgent Care or PCP?: Perceived Severity of Illness  04/24/2023 04:10 PM EDT by Anamaria Rashid 04/24/2023 04:10 PM EDT by Nathan Amador (Self) [phone] ([id number])[phone] (Mobile)  [phone] (Mobile)   - Left MessageCommunicated - 1st attempt    04/25/2023 11:25 AM EDT by Anamaria Rashid 04/25/2023 11:25 AM EDT by Nathan Amador (Self) [phone] (GHFWIP)[phone] (Mobile)  [phone] (Mobile)  - Call Complete  Routed to pcp to schedule a follow up apt  Message was routed to pcp office to schedule an follow up

## 2023-04-28 DIAGNOSIS — U07.1 COVID-19: ICD-10-CM

## 2023-04-28 DIAGNOSIS — R07.89 SENSATION OF CHEST PRESSURE: ICD-10-CM

## 2023-04-28 RX ORDER — ALBUTEROL SULFATE 90 UG/1
AEROSOL, METERED RESPIRATORY (INHALATION)
Qty: 18 G | Refills: 1 | Status: SHIPPED | OUTPATIENT
Start: 2023-04-28

## 2023-04-29 DIAGNOSIS — J45.20 MILD INTERMITTENT ASTHMA WITHOUT COMPLICATION: ICD-10-CM

## 2023-05-11 DIAGNOSIS — Z30.013 ENCOUNTER FOR INITIAL PRESCRIPTION OF INJECTABLE CONTRACEPTIVE: ICD-10-CM

## 2023-05-11 RX ORDER — MEDROXYPROGESTERONE ACETATE 150 MG/ML
150 INJECTION, SUSPENSION INTRAMUSCULAR
Qty: 1 ML | Refills: 0 | Status: SHIPPED | OUTPATIENT
Start: 2023-05-11

## 2023-05-12 ENCOUNTER — CLINICAL SUPPORT (OUTPATIENT)
Dept: OBGYN CLINIC | Facility: CLINIC | Age: 33
End: 2023-05-12

## 2023-05-12 VITALS
WEIGHT: 135 LBS | DIASTOLIC BLOOD PRESSURE: 70 MMHG | BODY MASS INDEX: 23.05 KG/M2 | HEIGHT: 64 IN | SYSTOLIC BLOOD PRESSURE: 110 MMHG

## 2023-05-12 DIAGNOSIS — Z30.42 ENCOUNTER FOR SURVEILLANCE OF INJECTABLE CONTRACEPTIVE: Primary | ICD-10-CM

## 2023-05-12 RX ORDER — MEDROXYPROGESTERONE ACETATE 150 MG/ML
150 INJECTION, SUSPENSION INTRAMUSCULAR ONCE
Status: COMPLETED | OUTPATIENT
Start: 2023-05-12 | End: 2023-05-15

## 2023-05-15 RX ADMIN — MEDROXYPROGESTERONE ACETATE 150 MG: 150 INJECTION, SUSPENSION INTRAMUSCULAR at 08:03

## 2023-05-23 DIAGNOSIS — J45.20 MILD INTERMITTENT ASTHMA WITHOUT COMPLICATION: Primary | ICD-10-CM

## 2023-05-23 RX ORDER — MOMETASONE FUROATE AND FORMOTEROL FUMARATE DIHYDRATE 200; 5 UG/1; UG/1
2 AEROSOL RESPIRATORY (INHALATION) 2 TIMES DAILY
Qty: 13 G | Refills: 5 | Status: SHIPPED | OUTPATIENT
Start: 2023-05-23

## 2023-06-08 ENCOUNTER — TELEPHONE (OUTPATIENT)
Dept: DERMATOLOGY | Facility: CLINIC | Age: 33
End: 2023-06-08

## 2023-06-08 NOTE — TELEPHONE ENCOUNTER
Pt calling to make [facility] appt for hair loss offered Nov appt in [facility] as first available    She said she is going to look around first

## 2023-07-03 ENCOUNTER — OFFICE VISIT (OUTPATIENT)
Dept: FAMILY MEDICINE CLINIC | Facility: CLINIC | Age: 33
End: 2023-07-03
Payer: MEDICARE

## 2023-07-03 VITALS
OXYGEN SATURATION: 99 % | TEMPERATURE: 98 F | DIASTOLIC BLOOD PRESSURE: 72 MMHG | SYSTOLIC BLOOD PRESSURE: 110 MMHG | HEART RATE: 77 BPM | WEIGHT: 136.8 LBS | HEIGHT: 64 IN | BODY MASS INDEX: 23.35 KG/M2

## 2023-07-03 DIAGNOSIS — J45.20 MILD INTERMITTENT ASTHMA WITHOUT COMPLICATION: ICD-10-CM

## 2023-07-03 DIAGNOSIS — R68.82 LOSS OF LIBIDO: ICD-10-CM

## 2023-07-03 DIAGNOSIS — D50.8 IRON DEFICIENCY ANEMIA SECONDARY TO INADEQUATE DIETARY IRON INTAKE: ICD-10-CM

## 2023-07-03 DIAGNOSIS — M54.16 RIGHT LUMBAR RADICULOPATHY: ICD-10-CM

## 2023-07-03 DIAGNOSIS — R53.83 OTHER FATIGUE: ICD-10-CM

## 2023-07-03 DIAGNOSIS — E55.9 VITAMIN D DEFICIENCY: ICD-10-CM

## 2023-07-03 DIAGNOSIS — L65.9 HAIR LOSS: ICD-10-CM

## 2023-07-03 DIAGNOSIS — K21.9 GASTROESOPHAGEAL REFLUX DISEASE WITHOUT ESOPHAGITIS: Primary | ICD-10-CM

## 2023-07-03 PROCEDURE — 99214 OFFICE O/P EST MOD 30 MIN: CPT | Performed by: FAMILY MEDICINE

## 2023-07-03 NOTE — ASSESSMENT & PLAN NOTE
She feels  That  She   Does  Not  Want to  Do  Anything  No  Sob or  Cp  However  She  Says  She  Has no  Libido  She  Is loosing  Hair   No  Motivation feels  Low /  Depressed    No  suciadal  Thoughts   Or  negative  Thoughts   She  Feels  No interest  In  Things  She  Loved  Before    Discussed   Diet  exercise and  Life  Style modifications  In  Details will  Follow up  In  6  Weeks  If  No  Improvement  Then  Will  Consider a  Small  Dose  Of  lexapro return  Parameters  Discussed

## 2023-07-03 NOTE — PROGRESS NOTES
Assessment/Plan:as  below         Problem List Items Addressed This Visit        Digestive    Gastroesophageal reflux disease without esophagitis - Primary     Much  Stable  Now   With  Diet  adjustments            Respiratory    Mild intermittent asthma without complication     Stable   now            Nervous and Auditory    Right lumbar radiculopathy     Much  Stable  Now   Discussed  Precautions  And  care            Other    Other fatigue     She feels  That  She   Does  Not  Want to  Do  Anything  No  Sob or  Cp  However  She  Says  She  Has no  Libido  She  Is loosing  Hair   No  Motivation feels  Low /  Depressed    No  suciadal  Thoughts   Or  negative  Thoughts   She  Feels  No interest  In  Things  She  Loved  Before    Discussed   Diet  exercise and  Life  Style modifications  In  Details will  Follow up  In  6  Weeks  If  No  Improvement  Then  Will  Consider a  Small  Dose  Of  lexapro return  Parameters  Discussed            Relevant Orders    CBC and differential    Comprehensive metabolic panel    TSH, 3rd generation    Ambulatory Referral to Endocrinology    Iron deficiency anemia secondary to inadequate dietary iron intake     Will  F/u  With  labs         Relevant Orders    CBC and differential   Other Visit Diagnoses     Vitamin D deficiency        Relevant Orders    VITAMIN D, 25 HYDROXY, TOTAL    Loss of libido        Relevant Orders    Ambulatory Referral to Endocrinology    Hair loss        Relevant Orders    Ambulatory Referral to Endocrinology            Subjective:      Patient ID: Joanne Orr is a 27 y.o. female.     HPI-  Came  In  To  Get  Checked   For  Having  No  interest in the things  She  Always  Did   She   Feels  Low at  Times    She denies  Any  Back pains  Or  gerd  Any more   She  Feels fatigued and has hair  Thinning   Will  F/u  With  Labs  No  Cp  Sob  Or  Loss of  appetite  No  abdominal  Pain denies  Any  negative  Or  Suicidal  thoughts    The following portions of the patient's history were reviewed and updated as appropriate:   Past Medical History:  She has a past medical history of Abnormal Pap smear of cervix, Anxiety, Asthma, Carrier of group B Streptococcus, GERD (gastroesophageal reflux disease), High blood pressure, History of UTI, absence seizures, seasonal allergies, Hypertension, Migraine headache, Muscle spasm of back, Papanicolaou smear for cervical cancer screening (06/2017), Postpartum depression, Preeclampsia, Psychiatric disorder, Umbilical hernia (59/0751), Urinary tract infection, and Varicose veins of lower extremity. ,  _______________________________________________________________________  Medical Problems:  does not have any pertinent problems on file.,  _______________________________________________________________________  Past Surgical History:   has a past surgical history that includes Hernia repair; [place] tooth extraction; Other surgical history (11/18/2015); INSERTION OF INTRAUTERINE DEVICE (IUD); and Gynecologic cryosurgery. ,  _______________________________________________________________________  Family History:  family history includes Arthritis in her family and mother; Asthma in her maternal aunt, maternal grandmother, mother, sister, and son; Bipolar disorder in her maternal aunt; COPD in her maternal grandmother; Cancer in her cousin; Diabetes in her cousin, maternal grandmother, mother, and other; Fibromyalgia in her family; GI problems in her family; Heart attack in her maternal grandfather; Hypertension in her mother; Hyperthyroidism in her maternal aunt; Lymphoma in her maternal grandmother; Other in her maternal grandmother; Sleep apnea in her mother; Thyroid disease in her maternal aunt; Varicose Veins in her paternal aunt and paternal grandmother.,  _______________________________________________________________________  Social History:   reports that she quit smoking about 8 years ago.  Her smoking use included cigarettes. She has a 15.00 pack-year smoking history. She has never used smokeless tobacco. She reports that she does not currently use alcohol. She reports that she does not use drugs. ,  _______________________________________________________________________  Allergies:  is allergic to phenazopyridine and other. .  _______________________________________________________________________  Current Outpatient Medications   Medication Sig Dispense Refill   • albuterol (PROVENTIL HFA,VENTOLIN HFA) 90 mcg/act inhaler INHALE 1 PUFF BY MOUTH EVERY 6 HOURS AS NEEDED FOR WHEEZE 18 g 1   • Cholecalciferol (Vitamin D3) 50 MCG (2000 UT) capsule TAKE 1 CAPSULE (2,000 UNITS TOTAL) BY MOUTH IN THE MORNING 90 capsule 0   • fluticasone (FLONASE) 50 mcg/act nasal spray 1 spray into each nostril daily for 10 days (Patient taking differently: 1 spray into each nostril daily prn) 18 mL 1   • medroxyPROGESTERone (DEPO-PROVERA) 150 mg/mL injection Inject 1 mL (150 mg total) into a muscle every 3 (three) months 1 mL 3   • mometasone-formoterol (Dulera) 200-5 MCG/ACT inhaler Inhale 2 puffs 2 (two) times a day Rinse mouth after use. 13 g 5   • naproxen (Naprosyn) 500 mg tablet Take 1 tablet (500 mg total) by mouth 2 (two) times a day as needed (palvic pain/cramping.) Take with food 30 tablet 1   • Nerve Stimulator (STANDARD TENS) OMER by Does not apply route 4 (four) times a day 1 Device 0   • pantoprazole (PROTONIX) 40 mg tablet TAKE 1 TABLET BY MOUTH EVERY DAY 90 tablet 2   • montelukast (SINGULAIR) 10 mg tablet TAKE 1 TABLET (10 MG TOTAL) BY MOUTH DAILY AT BEDTIME (Patient not taking: Reported on 7/3/2023) 30 tablet 2     No current facility-administered medications for this visit.     _______________________________________________________________________  Review of Systems   Constitutional: Positive for fatigue. Negative for fever and unexpected weight change. HENT: Negative for congestion and postnasal drip.     Eyes: Negative for pain and discharge. Respiratory: Negative for cough and shortness of breath. Asthma   Cardiovascular: Negative for chest pain, palpitations and leg swelling. Gastrointestinal: Negative for abdominal distention and abdominal pain. Opal Avers   Endocrine: Negative for cold intolerance, heat intolerance, polydipsia and polyphagia. Loss of libido and  fatigue   Genitourinary: Negative for dysuria and flank pain. Musculoskeletal: Negative for arthralgias and back pain. Neurological: Negative for dizziness and headaches. Psychiatric/Behavioral: Negative for self-injury, sleep disturbance and suicidal ideas. The patient is not nervous/anxious. Feels  Low  At  Times  No  motivation         Objective:  Vitals:    07/03/23 1918   BP: 110/72   BP Location: Left arm   Patient Position: Sitting   Cuff Size: Standard   Pulse: 77   Temp: 98 °F (36.7 °C)   TempSrc: Temporal   SpO2: 99%   Weight: 62.1 kg (136 lb 12.8 oz)   Height: 5' 4" (1.626 m)     Body mass index is 23.48 kg/m². Physical Exam  Vitals and nursing note reviewed. Constitutional:       General: She is not in acute distress. Appearance: Normal appearance. She is not ill-appearing, toxic-appearing or diaphoretic. HENT:      Head: Normocephalic and atraumatic. Nose: Nose normal. No congestion. Mouth/Throat:      Mouth: Mucous membranes are moist.      Pharynx: No oropharyngeal exudate or posterior oropharyngeal erythema. Eyes:      Extraocular Movements: Extraocular movements intact. Conjunctiva/sclera: Conjunctivae normal.      Pupils: Pupils are equal, round, and reactive to light. Cardiovascular:      Rate and Rhythm: Normal rate and regular rhythm. Pulses: Normal pulses. Heart sounds: Normal heart sounds. No murmur heard. No gallop. Pulmonary:      Effort: Pulmonary effort is normal.      Breath sounds: Normal breath sounds. No wheezing, rhonchi or rales.    Abdominal:      General: There is no distension. Palpations: There is no mass. Tenderness: There is no abdominal tenderness. There is no right CVA tenderness, left CVA tenderness or guarding. Musculoskeletal:      Cervical back: Normal range of motion and neck supple. No rigidity or tenderness. Right lower leg: No edema. Left lower leg: No edema. Lymphadenopathy:      Cervical: No cervical adenopathy. Skin:     Findings: No erythema or rash. Neurological:      General: No focal deficit present. Mental Status: She is alert and oriented to person, place, and time. Psychiatric:         Mood and Affect: Mood normal.         Behavior: Behavior normal.         Thought Content:  Thought content normal.

## 2023-07-06 ENCOUNTER — TELEMEDICINE (OUTPATIENT)
Dept: PULMONOLOGY | Facility: CLINIC | Age: 33
End: 2023-07-06
Payer: MEDICARE

## 2023-07-06 DIAGNOSIS — J45.20 MILD INTERMITTENT ASTHMA WITHOUT COMPLICATION: Primary | ICD-10-CM

## 2023-07-06 PROCEDURE — 99212 OFFICE O/P EST SF 10 MIN: CPT | Performed by: INTERNAL MEDICINE

## 2023-07-06 NOTE — PROGRESS NOTES
Virtual Brief Visit    This Visit is being completed by telephone. The Patient is located at Home and in the following state in which I hold an active license [place]    The patient was identified by name and date of birth. Jhonatan Ruiz was informed that this is a telemedicine visit and that the visit is being conducted through Telephone. My office door was closed. No one else was in the room. She acknowledged consent and understanding of privacy and security of the video platform. The patient has agreed to participate and understands they can discontinue the visit at any time. Patient is aware this is a billable service. Assessment/Plan:    Problem List Items Addressed This Visit        Respiratory    Mild intermittent asthma without complication - Primary     Patient senses that has been has been really calm since that we last talked in January. She actually no longer takes preventive treatment. She previously had been on Breo but does not take this for now. No longer on montelukast.  Rarely uses albuterol. Does sense some dyspnea at the gym but course that is expected. I asked her to take albuterol before going to the gym to see if it would be helpful for her. She has a relapse of her allergies in the fall so I recommend that she restart her Breo in the fall when she starts to have problems. Let us know if she needs a refill. Recent Visits  Date Type Provider Dept   07/03/23 Office Visit [facility]   Showing recent visits within past 7 days and meeting all other requirements  Today's Visits  Date Type Provider Dept   07/06/23 Telemedicine Fannie Weaver MD [facility] [address] today's visits and meeting all other requirements  Future Appointments  No visits were found meeting these conditions.   Showing future appointments within next 150 days and meeting all other requirements         Visit Time  Total Visit Duration: 10 minutesAssessment/Plan:    Mild intermittent asthma without complication  Patient senses that has been has been really calm since that we last talked in January. She actually no longer takes preventive treatment. She previously had been on Breo but does not take this for now. No longer on montelukast.  Rarely uses albuterol. Does sense some dyspnea at the gym but course that is expected. I asked her to take albuterol before going to the gym to see if it would be helpful for her. She has a relapse of her allergies in the fall so I recommend that she restart her Breo in the fall when she starts to have problems. Let us know if she needs a refill. Diagnoses and all orders for this visit:    Mild intermittent asthma without complication          Subjective:      Patient ID: Raquel Knowles is a 27 y.o. female. I was not able to connect to this patient via the computer. Patient could not apparently access the video processed through her MyChart. We did talk by phone. Patient senses that her asthma has been quite calm since we last talked in January. She no longer takes preventive therapy. I stopped taking montelukast and nasal fluticasone. Allergies were quite calm this spring. She recalls that most of her troubles had been in the fall and winter before. Rarely takes albuterol. Does sense some dyspnea in the gym but of course that is expected. I asked her to pretreat herself with albuterol before gym sessions to see if it helps. Advised that if she needs albuterol more than 3 times a week she might benefit from preventive treatment. Plan to restrt Breo in the fall if her allergies kick in. For now no cough or wheeze. Recovered from covid 19 last January.       The following portions of the patient's history were reviewed and updated as appropriate: allergies, current medications, past family history, past medical history, past social history, past surgical history and problem list.    Review of Systems   Constitutional: Negative for activity change and unexpected weight change. HENT: Negative for congestion and sinus pain. Respiratory: Negative for cough, shortness of breath and wheezing. Cardiovascular: Negative for chest pain, palpitations and leg swelling. Allergic/Immunologic: Positive for environmental allergies. Objective: There were no vitals taken for this visit. Physical Exam  Neurological:      Mental Status: She is alert and oriented to person, place, and time.    Psychiatric:         Mood and Affect: Mood normal.         Behavior: Behavior normal.

## 2023-07-06 NOTE — ASSESSMENT & PLAN NOTE
Patient senses that has been has been really calm since that we last talked in January. She actually no longer takes preventive treatment. She previously had been on Breo but does not take this for now. No longer on montelukast.  Rarely uses albuterol. Does sense some dyspnea at the gym but course that is expected. I asked her to take albuterol before going to the gym to see if it would be helpful for her. She has a relapse of her allergies in the fall so I recommend that she restart her Breo in the fall when she starts to have problems. Let us know if she needs a refill.

## 2023-07-10 ENCOUNTER — APPOINTMENT (OUTPATIENT)
Dept: LAB | Facility: HOSPITAL | Age: 33
End: 2023-07-10
Payer: MEDICARE

## 2023-07-10 DIAGNOSIS — D50.8 IRON DEFICIENCY ANEMIA SECONDARY TO INADEQUATE DIETARY IRON INTAKE: ICD-10-CM

## 2023-07-10 DIAGNOSIS — E55.9 VITAMIN D DEFICIENCY: ICD-10-CM

## 2023-07-10 DIAGNOSIS — R53.83 OTHER FATIGUE: ICD-10-CM

## 2023-07-10 LAB
25(OH)D3 SERPL-MCNC: 18.6 NG/ML (ref 30–100)
ALBUMIN SERPL BCP-MCNC: 4.2 G/DL (ref 3.5–5)
ALP SERPL-CCNC: 64 U/L (ref 34–104)
ALT SERPL W P-5'-P-CCNC: 37 U/L (ref 7–52)
ANION GAP SERPL CALCULATED.3IONS-SCNC: 9 MMOL/L
AST SERPL W P-5'-P-CCNC: 25 U/L (ref 13–39)
BASOPHILS # BLD AUTO: 0.01 THOUSANDS/ÂΜL (ref 0–0.1)
BASOPHILS NFR BLD AUTO: 0 % (ref 0–1)
BILIRUB SERPL-MCNC: 0.6 MG/DL (ref 0.2–1)
BUN SERPL-MCNC: 9 MG/DL (ref 5–25)
CALCIUM SERPL-MCNC: 9.4 MG/DL (ref 8.4–10.2)
CHLORIDE SERPL-SCNC: 106 MMOL/L (ref 96–108)
CO2 SERPL-SCNC: 23 MMOL/L (ref 21–32)
CREAT SERPL-MCNC: 0.62 MG/DL (ref 0.6–1.3)
EOSINOPHIL # BLD AUTO: 0.11 THOUSAND/ÂΜL (ref 0–0.61)
EOSINOPHIL NFR BLD AUTO: 2 % (ref 0–6)
ERYTHROCYTE [DISTWIDTH] IN BLOOD BY AUTOMATED COUNT: 13.3 % (ref 11.6–15.1)
GFR SERPL CREATININE-BSD FRML MDRD: 118 ML/MIN/1.73SQ M
GLUCOSE P FAST SERPL-MCNC: 84 MG/DL (ref 65–99)
HCT VFR BLD AUTO: 41.1 % (ref 34.8–46.1)
HGB BLD-MCNC: 13.5 G/DL (ref 11.5–15.4)
IMM GRANULOCYTES # BLD AUTO: 0.02 THOUSAND/UL (ref 0–0.2)
IMM GRANULOCYTES NFR BLD AUTO: 0 % (ref 0–2)
LYMPHOCYTES # BLD AUTO: 1.94 THOUSANDS/ÂΜL (ref 0.6–4.47)
LYMPHOCYTES NFR BLD AUTO: 35 % (ref 14–44)
MCH RBC QN AUTO: 28.9 PG (ref 26.8–34.3)
MCHC RBC AUTO-ENTMCNC: 32.8 G/DL (ref 31.4–37.4)
MCV RBC AUTO: 88 FL (ref 82–98)
MONOCYTES # BLD AUTO: 0.36 THOUSAND/ÂΜL (ref 0.17–1.22)
MONOCYTES NFR BLD AUTO: 6 % (ref 4–12)
NEUTROPHILS # BLD AUTO: 3.19 THOUSANDS/ÂΜL (ref 1.85–7.62)
NEUTS SEG NFR BLD AUTO: 57 % (ref 43–75)
NRBC BLD AUTO-RTO: 0 /100 WBCS
PLATELET # BLD AUTO: 238 THOUSANDS/UL (ref 149–390)
PMV BLD AUTO: 10.7 FL (ref 8.9–12.7)
POTASSIUM SERPL-SCNC: 3.9 MMOL/L (ref 3.5–5.3)
PROT SERPL-MCNC: 7.4 G/DL (ref 6.4–8.4)
RBC # BLD AUTO: 4.67 MILLION/UL (ref 3.81–5.12)
SODIUM SERPL-SCNC: 138 MMOL/L (ref 135–147)
TSH SERPL DL<=0.05 MIU/L-ACNC: 0.91 UIU/ML (ref 0.45–4.5)
WBC # BLD AUTO: 5.63 THOUSAND/UL (ref 4.31–10.16)

## 2023-07-10 PROCEDURE — 84443 ASSAY THYROID STIM HORMONE: CPT

## 2023-07-10 PROCEDURE — 82306 VITAMIN D 25 HYDROXY: CPT

## 2023-07-10 PROCEDURE — 85025 COMPLETE CBC W/AUTO DIFF WBC: CPT

## 2023-07-10 PROCEDURE — 80053 COMPREHEN METABOLIC PANEL: CPT

## 2023-07-10 PROCEDURE — 36415 COLL VENOUS BLD VENIPUNCTURE: CPT

## 2023-07-11 ENCOUNTER — TELEPHONE (OUTPATIENT)
Dept: FAMILY MEDICINE CLINIC | Facility: CLINIC | Age: 33
End: 2023-07-11

## 2023-07-11 NOTE — TELEPHONE ENCOUNTER
Called and spoke to patient gave results advised to continue with taking vitamin D. Patient states is taking 50 mcg/2000 IU everyday was told to take by her OB/GYN due to getting Depo shot. Patient states is feeling very off, dizzy and is losing hair.  Please advise

## 2023-07-11 NOTE — TELEPHONE ENCOUNTER
----- Message from Vikas Vora MD sent at 7/10/2023  8:01 PM EDT -----  Low vit  d  continue  taking oral  vit  d3  rest of the  labs  normal  ----- Message -----  From: Lab, Background User  Sent: 7/10/2023  11:17 AM EDT  To: Vikas Vora MD

## 2023-07-14 DIAGNOSIS — U07.1 COVID-19: ICD-10-CM

## 2023-07-14 DIAGNOSIS — R07.89 SENSATION OF CHEST PRESSURE: ICD-10-CM

## 2023-07-14 RX ORDER — ALBUTEROL SULFATE 90 UG/1
AEROSOL, METERED RESPIRATORY (INHALATION)
Qty: 6.7 G | Refills: 1 | Status: SHIPPED | OUTPATIENT
Start: 2023-07-14

## 2023-07-18 DIAGNOSIS — Z30.013 ENCOUNTER FOR INITIAL PRESCRIPTION OF INJECTABLE CONTRACEPTIVE: ICD-10-CM

## 2023-07-18 DIAGNOSIS — E55.9 VITAMIN D DEFICIENCY: ICD-10-CM

## 2023-07-19 RX ORDER — ACETAMINOPHEN 160 MG
2000 TABLET,DISINTEGRATING ORAL EVERY MORNING
Qty: 90 CAPSULE | Refills: 0 | Status: SHIPPED | OUTPATIENT
Start: 2023-07-19

## 2023-07-19 RX ORDER — MEDROXYPROGESTERONE ACETATE 150 MG/ML
150 INJECTION, SUSPENSION INTRAMUSCULAR
Qty: 1 ML | Refills: 0 | OUTPATIENT
Start: 2023-07-19

## 2023-07-19 NOTE — TELEPHONE ENCOUNTER
Beth Rosa looked into patient's chart, I refilled depo back in may w/ 3 refills so she should just need to call the pharmacy to have them get refill together for her. She should have enough refill until next may 2024.  TY

## 2023-07-25 DIAGNOSIS — Z30.013 ENCOUNTER FOR INITIAL PRESCRIPTION OF INJECTABLE CONTRACEPTIVE: ICD-10-CM

## 2023-07-25 RX ORDER — MEDROXYPROGESTERONE ACETATE 150 MG/ML
150 INJECTION, SUSPENSION INTRAMUSCULAR
Qty: 1 ML | Refills: 1 | Status: SHIPPED | OUTPATIENT
Start: 2023-07-25

## 2023-07-28 ENCOUNTER — CLINICAL SUPPORT (OUTPATIENT)
Dept: OBGYN CLINIC | Facility: CLINIC | Age: 33
End: 2023-07-28
Payer: MEDICARE

## 2023-07-28 VITALS
DIASTOLIC BLOOD PRESSURE: 62 MMHG | WEIGHT: 137.4 LBS | BODY MASS INDEX: 23.46 KG/M2 | SYSTOLIC BLOOD PRESSURE: 124 MMHG | HEIGHT: 64 IN

## 2023-07-28 DIAGNOSIS — Z30.42 ENCOUNTER FOR SURVEILLANCE OF INJECTABLE CONTRACEPTIVE: Primary | ICD-10-CM

## 2023-07-28 PROCEDURE — 96372 THER/PROPH/DIAG INJ SC/IM: CPT

## 2023-07-28 RX ORDER — MEDROXYPROGESTERONE ACETATE 150 MG/ML
150 INJECTION, SUSPENSION INTRAMUSCULAR ONCE
Status: COMPLETED | OUTPATIENT
Start: 2023-07-28 | End: 2023-07-28

## 2023-07-28 RX ADMIN — MEDROXYPROGESTERONE ACETATE 150 MG: 150 INJECTION, SUSPENSION INTRAMUSCULAR at 15:34

## 2023-08-07 DIAGNOSIS — E55.9 VITAMIN D DEFICIENCY: ICD-10-CM

## 2023-08-07 RX ORDER — ACETAMINOPHEN 160 MG
2000 TABLET,DISINTEGRATING ORAL EVERY MORNING
Qty: 90 CAPSULE | Refills: 0 | OUTPATIENT
Start: 2023-08-07

## 2023-08-07 RX ORDER — ACETAMINOPHEN 160 MG
2000 TABLET,DISINTEGRATING ORAL EVERY MORNING
Qty: 90 CAPSULE | Refills: 1 | OUTPATIENT
Start: 2023-08-07

## 2023-08-15 ENCOUNTER — OFFICE VISIT (OUTPATIENT)
Dept: FAMILY MEDICINE CLINIC | Facility: CLINIC | Age: 33
End: 2023-08-15
Payer: MEDICARE

## 2023-08-15 VITALS
RESPIRATION RATE: 18 BRPM | SYSTOLIC BLOOD PRESSURE: 110 MMHG | HEART RATE: 78 BPM | WEIGHT: 136.4 LBS | TEMPERATURE: 97.3 F | OXYGEN SATURATION: 98 % | BODY MASS INDEX: 24.17 KG/M2 | DIASTOLIC BLOOD PRESSURE: 70 MMHG | HEIGHT: 63 IN

## 2023-08-15 DIAGNOSIS — R14.0 ABDOMINAL BLOATING: ICD-10-CM

## 2023-08-15 DIAGNOSIS — J45.20 MILD INTERMITTENT ASTHMA WITHOUT COMPLICATION: ICD-10-CM

## 2023-08-15 DIAGNOSIS — F41.8 DEPRESSION WITH ANXIETY: ICD-10-CM

## 2023-08-15 DIAGNOSIS — R10.9 ABDOMINAL DISCOMFORT: ICD-10-CM

## 2023-08-15 DIAGNOSIS — K21.9 GASTROESOPHAGEAL REFLUX DISEASE WITHOUT ESOPHAGITIS: Primary | ICD-10-CM

## 2023-08-15 DIAGNOSIS — E55.9 VITAMIN D DEFICIENCY: ICD-10-CM

## 2023-08-15 DIAGNOSIS — M54.16 RIGHT LUMBAR RADICULOPATHY: ICD-10-CM

## 2023-08-15 PROCEDURE — 99214 OFFICE O/P EST MOD 30 MIN: CPT | Performed by: FAMILY MEDICINE

## 2023-08-15 RX ORDER — ERGOCALCIFEROL 1.25 MG/1
50000 CAPSULE ORAL WEEKLY
Qty: 4 CAPSULE | Refills: 2 | Status: SHIPPED | OUTPATIENT
Start: 2023-08-15 | End: 2023-11-13

## 2023-08-15 RX ORDER — PANTOPRAZOLE SODIUM 40 MG/1
40 TABLET, DELAYED RELEASE ORAL DAILY
Qty: 90 TABLET | Refills: 2 | Status: SHIPPED | OUTPATIENT
Start: 2023-08-15

## 2023-08-15 RX ORDER — ESCITALOPRAM OXALATE 10 MG/1
10 TABLET ORAL DAILY
Qty: 30 TABLET | Refills: 5 | Status: SHIPPED | OUTPATIENT
Start: 2023-08-15 | End: 2024-02-11

## 2023-08-15 NOTE — PROGRESS NOTES
Depression Screening and Follow-up Plan: Patient was screened for depression during today's encounter. They screened negative with a PHQ-2 score of 2.

## 2023-08-15 NOTE — PROGRESS NOTES
Assessment/Plan:as  below         Problem List Items Addressed This Visit        Digestive    Gastroesophageal reflux disease without esophagitis - Primary     Much  Stable  Now   Discussed  diet         Relevant Medications    pantoprazole (PROTONIX) 40 mg tablet       Respiratory    Mild intermittent asthma without complication     Very  stable            Nervous and Auditory    Right lumbar radiculopathy     No pain  Any  More   discussed precautions            Other    Depression with anxiety     She  Ha s  Been  Doing  Much  Better  In  Terms  Of  Her  Fatigue  After  She  Started   Eating well  Exercising and  Did  Life  Style  Modifications however  She  Still  Feels  Low  And  Anxious at times   She  Feels  She has no interest and  Motivation  Feels  Low  No  negative or  suciadal  Thoughts   Will  Start  Her on  lexapro and  Will  Follow up  Return  Parameters  Discussed   reff  To  councelling         Relevant Medications    escitalopram (LEXAPRO) 10 mg tablet    Other Relevant Orders    Ambulatory Referral to [address] [place] [address]   Other Visit Diagnoses     Vitamin D deficiency        Relevant Medications    ergocalciferol (VITAMIN D2) 50,000 units    Abdominal bloating        Relevant Medications    pantoprazole (PROTONIX) 40 mg tablet    Abdominal discomfort        Relevant Medications    pantoprazole (PROTONIX) 40 mg tablet            Subjective:      Patient ID: Wilfred Galeazzi is a 26 y.o. female. HPI-  Came  In  To  Follow up  On her  Multiple  Health  Conditions   She  Has been  Doing  Well and  Much  Better  In  Terms  Of  Her  Fatigue    Feels  Better  In  Terms of  gerd  And  Back pain   She  Has vit  D deficiency  However  She  Feels  Low  ?   Depression  And  Also  Anxious  No  negative  Or  suciadal  Thoughts  Wants  To  Get  Some  Help     The following portions of the patient's history were reviewed and updated as appropriate:   Past Medical History:  She has a past medical history of Abnormal Pap smear of cervix, Anxiety, Asthma, Carrier of group B Streptococcus, GERD (gastroesophageal reflux disease), High blood pressure, History of UTI, absence seizures, seasonal allergies, Hypertension, Migraine headache, Muscle spasm of back, Papanicolaou smear for cervical cancer screening (06/2017), Postpartum depression, Preeclampsia, Psychiatric disorder, Umbilical hernia (95/5238), Urinary tract infection, and Varicose veins of lower extremity. ,  _______________________________________________________________________  Medical Problems:  does not have any pertinent problems on file.,  _______________________________________________________________________  Past Surgical History:   has a past surgical history that includes Hernia repair; [place] tooth extraction; Other surgical history (11/18/2015); INSERTION OF INTRAUTERINE DEVICE (IUD); and Gynecologic cryosurgery. ,  _______________________________________________________________________  Family History:  family history includes Arthritis in her family and mother; Asthma in her maternal aunt, maternal grandmother, mother, sister, and son; Bipolar disorder in her maternal aunt; COPD in her maternal grandmother; Cancer in her cousin; Diabetes in her cousin, maternal grandmother, mother, and other; Fibromyalgia in her family; GI problems in her family; Heart attack in her maternal grandfather; Hypertension in her mother; Hyperthyroidism in her maternal aunt; Lymphoma in her maternal grandmother; Other in her maternal grandmother; Sleep apnea in her mother; Thyroid disease in her maternal aunt; Varicose Veins in her paternal aunt and paternal grandmother.,  _______________________________________________________________________  Social History:   reports that she quit smoking about 8 years ago. Her smoking use included cigarettes. She has a 15.00 pack-year smoking history.  She has never used smokeless tobacco. She reports that she does not currently use alcohol. She reports that she does not use drugs. ,  _______________________________________________________________________  Allergies:  is allergic to phenazopyridine and other. .  _______________________________________________________________________  Current Outpatient Medications   Medication Sig Dispense Refill   • albuterol (PROVENTIL HFA,VENTOLIN HFA) 90 mcg/act inhaler INHALE 1 PUFF BY MOUTH EVERY 6 HOURS AS NEEDED FOR WHEEZE 6.7 g 1   • ergocalciferol (VITAMIN D2) 50,000 units Take 1 capsule (50,000 Units total) by mouth once a week 4 capsule 2   • escitalopram (LEXAPRO) 10 mg tablet Take 1 tablet (10 mg total) by mouth daily 30 tablet 5   • medroxyPROGESTERone (DEPO-PROVERA) 150 mg/mL injection INJECT 1 ML (150 MG TOTAL) INTO A MUSCLE EVERY 3 (THREE) MONTHS 1 mL 1   • Nerve Stimulator (STANDARD TENS) OMER by Does not apply route 4 (four) times a day 1 Device 0   • pantoprazole (PROTONIX) 40 mg tablet Take 1 tablet (40 mg total) by mouth daily 90 tablet 2     No current facility-administered medications for this visit.     _______________________________________________________________________  Review of Systems   Constitutional: Negative for fatigue. HENT: Negative for sinus pain. Eyes: Negative for pain, discharge and itching. Respiratory: Negative for cough and shortness of breath. Cardiovascular: Negative for chest pain, palpitations and leg swelling. Gastrointestinal: Negative for abdominal distention, blood in stool and diarrhea. Gerd   Endocrine: Negative for cold intolerance, heat intolerance and polydipsia. Vit  D defeciency   Genitourinary: Negative for dysuria and flank pain. Musculoskeletal: Positive for arthralgias. Skin: Negative for rash. Neurological: Negative for dizziness and headaches. Psychiatric/Behavioral: Negative for self-injury, sleep disturbance and suicidal ideas. The patient is nervous/anxious.          Depression         Objective:  Vitals: 08/15/23 1812   BP: 110/70   BP Location: Right arm   Patient Position: Sitting   Cuff Size: Standard   Pulse: 78   Resp: 18   Temp: (!) 97.3 °F (36.3 °C)   TempSrc: Temporal   SpO2: 98%   Weight: 61.9 kg (136 lb 6.4 oz)   Height: 5' 3" (1.6 m)     Body mass index is 24.16 kg/m². Physical Exam  Vitals and nursing note reviewed. Constitutional:       General: She is not in acute distress. Appearance: Normal appearance. She is not ill-appearing, toxic-appearing or diaphoretic. HENT:      Head: Normocephalic and atraumatic. Nose: Nose normal. No congestion. Mouth/Throat:      Pharynx: No oropharyngeal exudate or posterior oropharyngeal erythema. Eyes:      Extraocular Movements: Extraocular movements intact. Conjunctiva/sclera: Conjunctivae normal.      Pupils: Pupils are equal, round, and reactive to light. Cardiovascular:      Rate and Rhythm: Normal rate and regular rhythm. Pulses: Normal pulses. Heart sounds: Normal heart sounds. No murmur heard. No gallop. Pulmonary:      Effort: Pulmonary effort is normal.      Breath sounds: Normal breath sounds. No wheezing, rhonchi or rales. Abdominal:      General: There is no distension. Palpations: Abdomen is soft. There is no mass. Tenderness: There is no abdominal tenderness. There is no guarding. Musculoskeletal:      Cervical back: Normal range of motion and neck supple. No rigidity or tenderness. Right lower leg: No edema. Left lower leg: No edema. Lymphadenopathy:      Cervical: No cervical adenopathy. Skin:     Findings: No erythema or rash. Neurological:      General: No focal deficit present. Mental Status: She is alert and oriented to person, place, and time. Psychiatric:         Mood and Affect: Mood normal.         Behavior: Behavior normal.         Thought Content:  Thought content normal.

## 2023-08-15 NOTE — ASSESSMENT & PLAN NOTE
She  Ha s  Been  Doing  Much  Better  In  Terms  Of  Her  Fatigue  After  She  Started   Eating well  Exercising and  Did  Life  Style  Modifications however  She  Still  Feels  Low  And  Anxious at times   She  Feels  She has no interest and  Motivation  Feels  Low  No  negative or  suciadal  Thoughts   Will  Start  Her on  lexapro and  Will  Follow up  Return  Parameters  Discussed   reff  To  councelling

## 2023-08-25 ENCOUNTER — CONSULT (OUTPATIENT)
Dept: ENDOCRINOLOGY | Facility: CLINIC | Age: 33
End: 2023-08-25
Payer: MEDICARE

## 2023-08-25 VITALS
SYSTOLIC BLOOD PRESSURE: 98 MMHG | WEIGHT: 134 LBS | BODY MASS INDEX: 23.74 KG/M2 | HEIGHT: 63 IN | DIASTOLIC BLOOD PRESSURE: 78 MMHG | RESPIRATION RATE: 16 BRPM | TEMPERATURE: 98 F | HEART RATE: 62 BPM | OXYGEN SATURATION: 97 %

## 2023-08-25 DIAGNOSIS — L65.9 HAIR LOSS: ICD-10-CM

## 2023-08-25 DIAGNOSIS — R68.82 LOSS OF LIBIDO: ICD-10-CM

## 2023-08-25 DIAGNOSIS — R53.83 OTHER FATIGUE: Primary | ICD-10-CM

## 2023-08-25 DIAGNOSIS — D50.8 IRON DEFICIENCY ANEMIA SECONDARY TO INADEQUATE DIETARY IRON INTAKE: ICD-10-CM

## 2023-08-25 PROBLEM — J30.2 SEASONAL ALLERGIES: Status: RESOLVED | Noted: 2021-03-22 | Resolved: 2023-08-25

## 2023-08-25 PROBLEM — U07.1 COVID-19: Status: RESOLVED | Noted: 2021-11-24 | Resolved: 2023-08-25

## 2023-08-25 PROBLEM — L98.9 BACK SKIN LESION: Status: RESOLVED | Noted: 2020-06-23 | Resolved: 2023-08-25

## 2023-08-25 PROBLEM — Z23 INFLUENZA VACCINE NEEDED: Status: RESOLVED | Noted: 2020-12-11 | Resolved: 2023-08-25

## 2023-08-25 PROBLEM — G89.29 CHRONIC MIDLINE LOW BACK PAIN: Status: RESOLVED | Noted: 2020-05-12 | Resolved: 2023-08-25

## 2023-08-25 PROBLEM — R79.9 ABNORMAL BLOOD CHEMISTRY: Status: RESOLVED | Noted: 2021-07-14 | Resolved: 2023-08-25

## 2023-08-25 PROBLEM — M79.10 MYALGIA: Status: RESOLVED | Noted: 2021-01-18 | Resolved: 2023-08-25

## 2023-08-25 PROBLEM — M54.50 CHRONIC MIDLINE LOW BACK PAIN: Status: RESOLVED | Noted: 2020-05-12 | Resolved: 2023-08-25

## 2023-08-25 PROBLEM — M54.16 RIGHT LUMBAR RADICULOPATHY: Status: RESOLVED | Noted: 2020-05-15 | Resolved: 2023-08-25

## 2023-08-25 PROBLEM — B35.4 TINEA OF THE BODY: Status: RESOLVED | Noted: 2020-05-12 | Resolved: 2023-08-25

## 2023-08-25 PROBLEM — J30.89 NON-SEASONAL ALLERGIC RHINITIS DUE TO FUNGAL SPORES: Status: RESOLVED | Noted: 2020-12-11 | Resolved: 2023-08-25

## 2023-08-25 PROBLEM — Z20.822 EXPOSURE TO COVID-19 VIRUS: Status: RESOLVED | Noted: 2020-04-13 | Resolved: 2023-08-25

## 2023-08-25 PROCEDURE — 99244 OFF/OP CNSLTJ NEW/EST MOD 40: CPT | Performed by: INTERNAL MEDICINE

## 2023-08-25 RX ORDER — DEXAMETHASONE 1 MG
TABLET ORAL
Qty: 1 TABLET | Refills: 0 | Status: SHIPPED | OUTPATIENT
Start: 2023-08-25

## 2023-08-25 NOTE — PROGRESS NOTES
New consult note      CC: Fatigue    History of Present Illness:   78-year-old female with fatigue, mild intermittent asthma, cholelithiasis, GERD, anxiety/depression and vitamin D deficiency. She presents with symptoms of fatigue, hair loss, loss of libido, sleep disturbance and agitation. She has 3 children, is attending college, is active at [employer], has pets, manages household work and reports poor sleep. Menstrual Hx: menarche 13. Was regular since then. . Started depo injection about 2 years ago.     Patient Active Problem List   Diagnosis   • Special sensory attacks ([address])   • Exposure to COVID-19 virus   • Chronic midline low back pain   • Tinea of the body   • Right lumbar radiculopathy   • Falling hair   • Other fatigue   • Back skin lesion   • Iron deficiency anemia secondary to inadequate dietary iron intake   • Mild intermittent asthma without complication   • Non-seasonal allergic rhinitis due to fungal spores   • Influenza vaccine needed   • Myalgia   • Seasonal allergies   • Abnormal blood chemistry   • COVID-19   • Gall bladder stones   • Gastroesophageal reflux disease without esophagitis   • Body mass index (BMI) of 23.0-23.9 in adult   • Depression with anxiety     Past Medical History:   Diagnosis Date   • Abnormal Pap smear of cervix     had cryosurgery    • Anxiety    • Asthma    • Carrier of group B Streptococcus    • GERD (gastroesophageal reflux disease)    • High blood pressure     during pregnancy    • History of UTI    • Hx of absence seizures     once(per sanjiv)   • Hx of seasonal allergies    • Hypertension    • Migraine headache    • Muscle spasm of back    • Papanicolaou smear for cervical cancer screening 2017    Pap neg 2016, pap- neg/GC/CT NEG   • Postpartum depression    • Preeclampsia    • Psychiatric disorder    • Umbilical hernia    • Urinary tract infection    • Varicose veins of lower extremity       Past Surgical History:   Procedure Laterality Date   • GYNECOLOGIC CRYOSURGERY     • HERNIA REPAIR      2 years ago   • INSERTION OF INTRAUTERINE DEVICE (IUD)     • OTHER SURGICAL HISTORY  2015    cryosurgery    • WISDOM TOOTH EXTRACTION        Family History   Problem Relation Age of Onset   • Hypertension Mother    • Arthritis Mother         Viltigo   • Asthma Mother    • Diabetes Mother         Prediabetic    • Sleep apnea Mother    • Lymphoma Maternal Grandmother    • Other Maternal Grandmother         Cardiomegaly   • Diabetes Maternal Grandmother    • COPD Maternal Grandmother    • Asthma Maternal Grandmother    • Heart attack Maternal Grandfather          61   • Varicose Veins Paternal Grandmother    • Hyperthyroidism Maternal Aunt    • Asthma Maternal Aunt    • Bipolar disorder Maternal Aunt    • Thyroid disease Maternal Aunt    • Varicose Veins Paternal Aunt    • Cancer Cousin    • Diabetes Cousin    • Diabetes Other    • Asthma Sister    • Fibromyalgia Family    • Arthritis Family    • GI problems Family    • Asthma Son      Social History     Tobacco Use   • Smoking status: Former     Packs/day: 1.00     Years: 15.00     Total pack years: 15.00     Types: Cigarettes     Quit date: 2015     Years since quittin.6   • Smokeless tobacco: Never   • Tobacco comments:     Has smoked since age:   13   Substance Use Topics   • Alcohol use: Not Currently     Comment: stopped drinking in      Allergies   Allergen Reactions   • Phenazopyridine Anaphylaxis and Hives   • Other      Dove Deodorant           Current Outpatient Medications:   •  albuterol (PROVENTIL HFA,VENTOLIN HFA) 90 mcg/act inhaler, INHALE 1 PUFF BY MOUTH EVERY 6 HOURS AS NEEDED FOR WHEEZE, Disp: 6.7 g, Rfl: 1  •  ergocalciferol (VITAMIN D2) 50,000 units, Take 1 capsule (50,000 Units total) by mouth once a week, Disp: 4 capsule, Rfl: 2  •  escitalopram (LEXAPRO) 10 mg tablet, Take 1 tablet (10 mg total) by mouth daily, Disp: 30 tablet, Rfl: 5  •  medroxyPROGESTERone (DEPO-PROVERA) 150 mg/mL injection, INJECT 1 ML (150 MG TOTAL) INTO A MUSCLE EVERY 3 (THREE) MONTHS, Disp: 1 mL, Rfl: 1  •  Nerve Stimulator (STANDARD TENS) OMER, by Does not apply route 4 (four) times a day, Disp: 1 Device, Rfl: 0  •  pantoprazole (PROTONIX) 40 mg tablet, Take 1 tablet (40 mg total) by mouth daily, Disp: 90 tablet, Rfl: 2    Review of Systems   HENT: Negative. Eyes: Negative. Respiratory: Negative. Cardiovascular: Negative. Gastrointestinal: Negative. Endocrine: Negative. Musculoskeletal: Negative. Skin: Negative. Allergic/Immunologic: Negative. Neurological: Negative. Hematological: Negative. Psychiatric/Behavioral: Negative. Physical Exam:  Body mass index is 23.74 kg/m². BP 98/78   Pulse 62   Temp 98 °F (36.7 °C) (Tympanic)   Resp 16   Ht 5' 3" (1.6 m)   Wt 60.8 kg (134 lb)   SpO2 97%   BMI 23.74 kg/m²    Vitals:    08/25/23 1156   Weight: 60.8 kg (134 lb)        Physical Exam  Constitutional:       General: She is not in acute distress. Appearance: She is well-developed. She is not ill-appearing. HENT:      Head: Normocephalic and atraumatic. Nose: Nose normal.      Mouth/Throat:      Pharynx: Oropharynx is clear. Eyes:      Extraocular Movements: Extraocular movements intact. Conjunctiva/sclera: Conjunctivae normal.   Neck:      Thyroid: No thyromegaly. Cardiovascular:      Rate and Rhythm: Normal rate. Pulmonary:      Effort: Pulmonary effort is normal.   Musculoskeletal:         General: No deformity. Cervical back: Normal range of motion. Skin:     Capillary Refill: Capillary refill takes less than 2 seconds. Coloration: Skin is not pale. Findings: No rash. Neurological:      Mental Status: She is alert and oriented to person, place, and time.    Psychiatric:         Behavior: Behavior normal.         Labs:   No results found for: "HGBA1C"    Lab Results   Component Value Date    ZNS3WTYPYCDO 0.905 07/10/2023       Lab Results   Component Value Date    CREATININE 0.62 07/10/2023    CREATININE 0.75 04/23/2023    CREATININE 0.58 (L) 02/21/2023    BUN 9 07/10/2023     03/04/2015    K 3.9 07/10/2023     07/10/2023    CO2 23 07/10/2023     eGFR   Date Value Ref Range Status   07/10/2023 118 ml/min/1.73sq m Final       Lab Results   Component Value Date    ALT 37 07/10/2023    AST 25 07/10/2023    ALKPHOS 64 07/10/2023    BILITOT 0.1 (L) 03/04/2015       No results found for: "CHOLESTEROL"  No results found for: "HDL"  No results found for: "TRIG"  No results found for: "NONHDLC"      Impression:  1. Other fatigue    2. Loss of libido    3. Hair loss    4. Iron deficiency anemia secondary to inadequate dietary iron intake         Plan:    Diagnoses and all orders for this visit:    Other fatigue. Today we discussed the typical multifactorial etiology of fatigue including endocrinopathies, connective tissue disorders/rheumatologic conditions, metabolic illness, nutritional deficiencies, mental health illness, sleep disorder, infectious etiologies, disorders of the reticuloendothelial system and other chronic health issues. Today we agreed to rule out endocrine causes for her symptoms of fatigue and sleep disturbance however these do not patient report, this could be mostly associated with poor sleep due to multiple roles that she is currently applying herself to. This can be a source of constant stress that over a long period of time can be associated with slight variations in hypothalamic pituitary axes. Another cause for her fatigue/reduced libido could be the use of Depo progesterone. If her endocrine work-up is unremarkable, she may benefit from a behavioral health consultation or counseling. Follow-up as needed. -     Ambulatory Referral to Endocrinology  -     DHEA-sulfate; Future  -     Cortisol Level,7-9 AM Specimen; Future  -     dexamethasone (DECADRON) 1 mg tablet;  Take 1 tab at 11pm and get cortisol levels checked between 7:00 a.m. and 9:00 a.m. on the next morning.  -     Estradiol; Future  -     Androstenedione; Future  -     Testosterone, free, total; Future  -     T4, free; Future  -     TSH, 3rd generation; Future  -     Insulin-like growth factor 1 (IGF-1); Future  -     Prolactin; Future  -     Follicle stimulating hormone; Future  -     Luteinizing hormone; Future    Loss of libido  -     Ambulatory Referral to Endocrinology    Hair loss  -     Ambulatory Referral to Endocrinology    Iron deficiency anemia secondary to inadequate dietary iron intake        I have spent 35 minutes with patient today in which greater than 50% of this time was spent in counseling/coordination of care. Discussed with the patient and all questioned fully answered. She will call me if any problems arise. Educated/ Counseled patient on diagnostic test results, prognosis, risk vs benefit of treatment options, importance of treatment compliance, healthy life and lifestyle choices.       Anish

## 2023-08-28 ENCOUNTER — APPOINTMENT (OUTPATIENT)
Dept: LAB | Facility: HOSPITAL | Age: 33
End: 2023-08-28
Attending: INTERNAL MEDICINE
Payer: MEDICARE

## 2023-08-28 DIAGNOSIS — D50.8 IRON DEFICIENCY ANEMIA SECONDARY TO INADEQUATE DIETARY IRON INTAKE: ICD-10-CM

## 2023-08-28 DIAGNOSIS — R53.83 OTHER FATIGUE: ICD-10-CM

## 2023-08-28 DIAGNOSIS — E55.9 VITAMIN D DEFICIENCY: ICD-10-CM

## 2023-08-28 LAB
ESTRADIOL SERPL-MCNC: 60.1 PG/ML
FERRITIN SERPL-MCNC: 31 NG/ML (ref 11–307)
FSH SERPL-ACNC: 9.5 MIU/ML
IRON SATN MFR SERPL: 21 % (ref 15–50)
IRON SERPL-MCNC: 74 UG/DL (ref 50–212)
LH SERPL-ACNC: 4.7 MIU/ML
PROLACTIN SERPL-MCNC: 23.08 NG/ML (ref 3.34–26.72)
T4 FREE SERPL-MCNC: 0.78 NG/DL (ref 0.61–1.12)
TIBC SERPL-MCNC: 347 UG/DL (ref 250–450)
TSH SERPL DL<=0.05 MIU/L-ACNC: 1.11 UIU/ML (ref 0.45–4.5)
UIBC SERPL-MCNC: 273 UG/DL (ref 155–355)

## 2023-08-28 PROCEDURE — 83002 ASSAY OF GONADOTROPIN (LH): CPT

## 2023-08-28 PROCEDURE — 84146 ASSAY OF PROLACTIN: CPT

## 2023-08-28 PROCEDURE — 82728 ASSAY OF FERRITIN: CPT

## 2023-08-28 PROCEDURE — 83001 ASSAY OF GONADOTROPIN (FSH): CPT

## 2023-08-28 PROCEDURE — 82157 ASSAY OF ANDROSTENEDIONE: CPT

## 2023-08-28 PROCEDURE — 83540 ASSAY OF IRON: CPT

## 2023-08-28 PROCEDURE — 82670 ASSAY OF TOTAL ESTRADIOL: CPT

## 2023-08-28 PROCEDURE — 84305 ASSAY OF SOMATOMEDIN: CPT

## 2023-08-28 PROCEDURE — 84439 ASSAY OF FREE THYROXINE: CPT

## 2023-08-28 PROCEDURE — 82627 DEHYDROEPIANDROSTERONE: CPT

## 2023-08-28 PROCEDURE — 83550 IRON BINDING TEST: CPT

## 2023-08-28 PROCEDURE — 84402 ASSAY OF FREE TESTOSTERONE: CPT

## 2023-08-28 PROCEDURE — 84403 ASSAY OF TOTAL TESTOSTERONE: CPT

## 2023-08-28 PROCEDURE — 36415 COLL VENOUS BLD VENIPUNCTURE: CPT

## 2023-08-28 PROCEDURE — 84443 ASSAY THYROID STIM HORMONE: CPT

## 2023-08-28 RX ORDER — ERGOCALCIFEROL 1.25 MG/1
50000 CAPSULE ORAL WEEKLY
Qty: 4 CAPSULE | Refills: 0 | Status: SHIPPED | OUTPATIENT
Start: 2023-08-28 | End: 2023-11-26

## 2023-08-29 LAB
DHEA-S SERPL-MCNC: 95.4 UG/DL (ref 57.3–279.2)
TESTOST FREE SERPL-MCNC: 0.9 PG/ML (ref 0–4.2)
TESTOST SERPL-MCNC: 12 NG/DL (ref 8–60)

## 2023-08-30 LAB — IGF-I SERPL-MCNC: 189 NG/ML (ref 84–281)

## 2023-08-31 ENCOUNTER — TELEPHONE (OUTPATIENT)
Dept: PSYCHIATRY | Facility: CLINIC | Age: 33
End: 2023-08-31

## 2023-08-31 LAB — ANDROST SERPL-MCNC: 84 NG/DL (ref 41–262)

## 2023-08-31 NOTE — TELEPHONE ENCOUNTER
Contacted pt in regards to routine referral and to get placed on proper wait list.     Female provider pref; BE loc pref.

## 2023-09-01 ENCOUNTER — APPOINTMENT (OUTPATIENT)
Dept: LAB | Facility: HOSPITAL | Age: 33
End: 2023-09-01
Attending: INTERNAL MEDICINE
Payer: MEDICARE

## 2023-09-01 DIAGNOSIS — R53.83 TIREDNESS: ICD-10-CM

## 2023-09-01 LAB
CORTIS AM PEAK SERPL-MCNC: <0.4 UG/DL (ref 6.7–22.6)
CORTIS SERPL-MCNC: <0.4 UG/DL

## 2023-09-01 PROCEDURE — 82533 TOTAL CORTISOL: CPT

## 2023-09-01 PROCEDURE — 36415 COLL VENOUS BLD VENIPUNCTURE: CPT

## 2023-09-05 ENCOUNTER — TELEPHONE (OUTPATIENT)
Dept: ENDOCRINOLOGY | Facility: CLINIC | Age: 33
End: 2023-09-05

## 2023-09-05 NOTE — TELEPHONE ENCOUNTER
Pt called in stating that she got her Cortisol lab work done on Friday 9/1/23 and it came back abnormal she would like a call or message on [employer] to discuss what he next steps are. Please advise.

## 2023-09-18 DIAGNOSIS — E55.9 VITAMIN D DEFICIENCY: ICD-10-CM

## 2023-09-18 RX ORDER — ACETAMINOPHEN 160 MG
2000 TABLET,DISINTEGRATING ORAL EVERY MORNING
Qty: 90 CAPSULE | Refills: 1 | Status: SHIPPED | OUTPATIENT
Start: 2023-09-18

## 2023-10-07 DIAGNOSIS — E55.9 VITAMIN D DEFICIENCY: ICD-10-CM

## 2023-10-09 RX ORDER — ERGOCALCIFEROL 1.25 MG/1
50000 CAPSULE ORAL WEEKLY
Qty: 4 CAPSULE | Refills: 0 | Status: SHIPPED | OUTPATIENT
Start: 2023-10-09 | End: 2024-01-07

## 2023-10-16 DIAGNOSIS — Z30.013 ENCOUNTER FOR INITIAL PRESCRIPTION OF INJECTABLE CONTRACEPTIVE: ICD-10-CM

## 2023-10-16 RX ORDER — MEDROXYPROGESTERONE ACETATE 150 MG/ML
150 INJECTION, SUSPENSION INTRAMUSCULAR
Qty: 1 ML | Refills: 0 | Status: SHIPPED | OUTPATIENT
Start: 2023-10-16 | End: 2023-10-18 | Stop reason: SDUPTHER

## 2023-10-17 DIAGNOSIS — U07.1 COVID-19: ICD-10-CM

## 2023-10-17 DIAGNOSIS — R07.89 SENSATION OF CHEST PRESSURE: ICD-10-CM

## 2023-10-17 RX ORDER — ALBUTEROL SULFATE 90 UG/1
AEROSOL, METERED RESPIRATORY (INHALATION)
Qty: 8.5 G | Refills: 1 | Status: SHIPPED | OUTPATIENT
Start: 2023-10-17

## 2023-10-17 NOTE — TELEPHONE ENCOUNTER
Patient called and she states that she spoke with the phramacy and the doctor needs to refill this she needs this for Friday appointment

## 2023-10-18 ENCOUNTER — TELEPHONE (OUTPATIENT)
Dept: OBGYN CLINIC | Facility: CLINIC | Age: 33
End: 2023-10-18

## 2023-10-18 DIAGNOSIS — Z30.013 ENCOUNTER FOR INITIAL PRESCRIPTION OF INJECTABLE CONTRACEPTIVE: ICD-10-CM

## 2023-10-18 RX ORDER — MEDROXYPROGESTERONE ACETATE 150 MG/ML
150 INJECTION, SUSPENSION INTRAMUSCULAR
Qty: 1 ML | Refills: 0 | Status: SHIPPED | OUTPATIENT
Start: 2023-10-18 | End: 2023-10-20 | Stop reason: SDUPTHER

## 2023-10-20 DIAGNOSIS — Z30.013 ENCOUNTER FOR INITIAL PRESCRIPTION OF INJECTABLE CONTRACEPTIVE: ICD-10-CM

## 2023-10-20 RX ORDER — MEDROXYPROGESTERONE ACETATE 150 MG/ML
150 INJECTION, SUSPENSION INTRAMUSCULAR
Qty: 1 ML | Refills: 0 | Status: SHIPPED | OUTPATIENT
Start: 2023-10-20

## 2023-10-25 ENCOUNTER — CLINICAL SUPPORT (OUTPATIENT)
Dept: OBGYN CLINIC | Facility: CLINIC | Age: 33
End: 2023-10-25
Payer: MEDICARE

## 2023-10-25 VITALS
DIASTOLIC BLOOD PRESSURE: 70 MMHG | BODY MASS INDEX: 24.27 KG/M2 | HEIGHT: 63 IN | SYSTOLIC BLOOD PRESSURE: 118 MMHG | WEIGHT: 137 LBS

## 2023-10-25 DIAGNOSIS — Z30.42 ENCOUNTER FOR SURVEILLANCE OF INJECTABLE CONTRACEPTIVE: Primary | ICD-10-CM

## 2023-10-25 PROCEDURE — 96372 THER/PROPH/DIAG INJ SC/IM: CPT

## 2023-10-26 RX ORDER — MEDROXYPROGESTERONE ACETATE 150 MG/ML
150 INJECTION, SUSPENSION INTRAMUSCULAR ONCE
Status: COMPLETED | OUTPATIENT
Start: 2023-10-26 | End: 2023-10-26

## 2023-10-26 RX ADMIN — MEDROXYPROGESTERONE ACETATE 150 MG: 150 INJECTION, SUSPENSION INTRAMUSCULAR at 09:47

## 2023-11-15 ENCOUNTER — HOSPITAL ENCOUNTER (EMERGENCY)
Facility: HOSPITAL | Age: 33
Discharge: HOME | End: 2023-11-15
Attending: EMERGENCY MEDICINE | Admitting: EMERGENCY MEDICINE
Payer: MEDICARE

## 2023-11-15 VITALS
RESPIRATION RATE: 16 BRPM | TEMPERATURE: 97.8 F | HEART RATE: 81 BPM | WEIGHT: 135 LBS | OXYGEN SATURATION: 98 % | DIASTOLIC BLOOD PRESSURE: 71 MMHG | BODY MASS INDEX: 23.92 KG/M2 | SYSTOLIC BLOOD PRESSURE: 108 MMHG | HEIGHT: 63 IN

## 2023-11-15 DIAGNOSIS — N39.0 UTI (URINARY TRACT INFECTION): Primary | ICD-10-CM

## 2023-11-15 LAB
BACTERIA UR QL AUTO: ABNORMAL /HPF
BILIRUB UR QL STRIP: NEGATIVE
CLARITY UR: ABNORMAL
COLOR UR: YELLOW
EXT PREGNANCY TEST URINE: NEGATIVE
EXT. CONTROL: NORMAL
GLUCOSE UR STRIP-MCNC: NEGATIVE MG/DL
HGB UR QL STRIP.AUTO: ABNORMAL
KETONES UR STRIP-MCNC: NEGATIVE MG/DL
LEUKOCYTE ESTERASE UR QL STRIP: ABNORMAL
MUCOUS THREADS UR QL AUTO: ABNORMAL
NITRITE UR QL STRIP: NEGATIVE
NON-SQ EPI CELLS URNS QL MICRO: ABNORMAL /HPF
PH UR STRIP.AUTO: 6 [PH]
PROT UR STRIP-MCNC: ABNORMAL MG/DL
RBC #/AREA URNS AUTO: ABNORMAL /HPF
SP GR UR STRIP.AUTO: >=1.03 (ref 1–1.03)
UROBILINOGEN UR QL STRIP.AUTO: 0.2 E.U./DL
WBC #/AREA URNS AUTO: ABNORMAL /HPF

## 2023-11-15 PROCEDURE — 99284 EMERGENCY DEPT VISIT MOD MDM: CPT

## 2023-11-15 PROCEDURE — 87086 URINE CULTURE/COLONY COUNT: CPT | Performed by: EMERGENCY MEDICINE

## 2023-11-15 PROCEDURE — 81001 URINALYSIS AUTO W/SCOPE: CPT | Performed by: EMERGENCY MEDICINE

## 2023-11-15 PROCEDURE — 81025 URINE PREGNANCY TEST: CPT | Performed by: EMERGENCY MEDICINE

## 2023-11-15 PROCEDURE — 99284 EMERGENCY DEPT VISIT MOD MDM: CPT | Performed by: EMERGENCY MEDICINE

## 2023-11-15 PROCEDURE — 87147 CULTURE TYPE IMMUNOLOGIC: CPT | Performed by: EMERGENCY MEDICINE

## 2023-11-15 RX ORDER — CEPHALEXIN 500 MG/1
500 CAPSULE ORAL EVERY 12 HOURS SCHEDULED
Qty: 14 CAPSULE | Refills: 0 | Status: SHIPPED | OUTPATIENT
Start: 2023-11-15 | End: 2023-11-22

## 2023-11-15 RX ORDER — CEPHALEXIN 250 MG/1
500 CAPSULE ORAL ONCE
Status: COMPLETED | OUTPATIENT
Start: 2023-11-15 | End: 2023-11-15

## 2023-11-15 RX ADMIN — CEPHALEXIN 500 MG: 250 CAPSULE ORAL at 20:07

## 2023-11-16 ENCOUNTER — TELEPHONE (OUTPATIENT)
Dept: FAMILY MEDICINE CLINIC | Facility: CLINIC | Age: 33
End: 2023-11-16

## 2023-11-16 LAB
BACTERIA UR CULT: ABNORMAL
BACTERIA UR CULT: ABNORMAL

## 2023-11-16 NOTE — ED PROVIDER NOTES
History  Chief Complaint   Patient presents with    Possible UTI     Pt reports pain with urination starting yesterday as well as pink-tinged urine, also reports having sexual intercourse with her  this past Sunday     26year-old female presents to the emergency department for evaluation of a suspected UTI. The patient reports that she started with dysuria yesterday. She describes it as a burning sensation when she urinates similar to previous urinary tract infections. She reports associated pink-tinged urine. Patient states that she is prone to getting UTIs after having sexual intercourse. States that 3 days ago she had intercourse with her  and believes this may have resulted in a urinary tract infection. She denies having any new vaginal discharge. No fever, chills, nausea, vomiting, diarrhea, flank or abdominal pain. Prior to Admission Medications   Prescriptions Last Dose Informant Patient Reported? Taking?    Cholecalciferol (Vitamin D3) 50 MCG (2000 UT) capsule   No No   Sig: TAKE 1 CAPSULE (2,000 UNITS TOTAL) BY MOUTH IN THE MORNING   Nerve Stimulator (STANDARD TENS) OMER  Self No No   Sig: by Does not apply route 4 (four) times a day   albuterol (PROVENTIL HFA,VENTOLIN HFA) 90 mcg/act inhaler   No No   Sig: INHALE 1 PUFF BY MOUTH EVERY 6 HOURS AS NEEDED FOR WHEEZE   dexamethasone (DECADRON) 1 mg tablet   No No   Sig: Take 1 tab at 11pm and get cortisol levels checked between 7:00 a.m. and 9:00 a.m. on the next morning.   ergocalciferol (VITAMIN D2) 50,000 units   No No   Sig: Take 1 capsule (50,000 Units total) by mouth once a week   escitalopram (LEXAPRO) 10 mg tablet   No No   Sig: Take 1 tablet (10 mg total) by mouth daily   medroxyPROGESTERone (DEPO-PROVERA) 150 mg/mL injection   No No   Sig: Inject 1 mL (150 mg total) into a muscle every 3 (three) months   pantoprazole (PROTONIX) 40 mg tablet   No No   Sig: Take 1 tablet (40 mg total) by mouth daily Facility-Administered Medications: None       Past Medical History:   Diagnosis Date    Abnormal Pap smear of cervix     had cryosurgery     Anxiety     Asthma     Carrier of group B Streptococcus     GERD (gastroesophageal reflux disease)     High blood pressure     during pregnancy     History of UTI     Hx of absence seizures     once(per sanjiv)    Hx of seasonal allergies     Hypertension     Migraine headache     Muscle spasm of back     Papanicolaou smear for cervical cancer screening 2017    Pap neg 2016, pap- neg/GC/CT NEG    Postpartum depression     Preeclampsia     Psychiatric disorder     Umbilical hernia     Urinary tract infection     Varicose veins of lower extremity        Past Surgical History:   Procedure Laterality Date    GYNECOLOGIC CRYOSURGERY      HERNIA REPAIR      2 years ago    INSERTION OF INTRAUTERINE DEVICE (IUD)      OTHER SURGICAL HISTORY  2015    cryosurgery     WISDOM TOOTH EXTRACTION         Family History   Problem Relation Age of Onset    Hypertension Mother     Arthritis Mother         Viltigo    Asthma Mother     Diabetes Mother         Prediabetic     Sleep apnea Mother     Lymphoma Maternal Grandmother     Other Maternal Grandmother         Cardiomegaly    Diabetes Maternal Grandmother     COPD Maternal Grandmother     Asthma Maternal Grandmother     Heart attack Maternal Grandfather          61    Varicose Veins Paternal Grandmother     Hyperthyroidism Maternal Aunt     Asthma Maternal Aunt     Bipolar disorder Maternal Aunt     Thyroid disease Maternal Aunt     Varicose Veins Paternal Aunt     Cancer Cousin     Diabetes Cousin     Diabetes Other     Asthma Sister     Fibromyalgia Family     Arthritis Family     GI problems Family     Asthma Son      I have reviewed and agree with the history as documented.     E-Cigarette/Vaping    E-Cigarette Use Never User      E-Cigarette/Vaping Substances    Nicotine No     THC No     CBD No     Flavoring No Other No     Unknown No      Social History     Tobacco Use    Smoking status: Former     Packs/day: 1.00     Years: 15.00     Total pack years: 15.00     Types: Cigarettes     Quit date: 2015     Years since quittin.8    Smokeless tobacco: Never    Tobacco comments:     Has smoked since age:   13   Vaping Use    Vaping Use: Never used   Substance Use Topics    Alcohol use: Not Currently     Comment: stopped drinking in     Drug use: Never     Comment: per Gyn notes, Marijuana HX age 11. Review of Systems   Constitutional:  Negative for chills and fever. HENT:  Negative for ear pain and sore throat. Eyes:  Negative for pain and visual disturbance. Respiratory:  Negative for cough and shortness of breath. Cardiovascular:  Negative for chest pain and palpitations. Gastrointestinal:  Negative for abdominal pain and vomiting. Genitourinary:  Positive for dysuria. Negative for hematuria. Musculoskeletal:  Negative for arthralgias and back pain. Skin:  Negative for color change and rash. Neurological:  Negative for seizures and syncope. All other systems reviewed and are negative. Physical Exam  Physical Exam  Vitals and nursing note reviewed. Constitutional:       General: She is not in acute distress. Appearance: She is well-developed. HENT:      Head: Normocephalic and atraumatic. Right Ear: External ear normal.      Left Ear: External ear normal.      Nose: Nose normal.      Mouth/Throat:      Mouth: Mucous membranes are moist.   Eyes:      Conjunctiva/sclera: Conjunctivae normal.   Cardiovascular:      Rate and Rhythm: Normal rate and regular rhythm. Heart sounds: No murmur heard. Pulmonary:      Effort: Pulmonary effort is normal. No respiratory distress. Breath sounds: Normal breath sounds. Abdominal:      Palpations: Abdomen is soft. Tenderness: There is no abdominal tenderness. There is no right CVA tenderness or left CVA tenderness. Musculoskeletal:         General: No swelling. Cervical back: Neck supple. Skin:     General: Skin is warm and dry. Capillary Refill: Capillary refill takes less than 2 seconds. Neurological:      Mental Status: She is alert. Psychiatric:         Mood and Affect: Mood normal.         Vital Signs  ED Triage Vitals [11/15/23 1907]   Temperature Pulse Respirations Blood Pressure SpO2   97.8 °F (36.6 °C) 81 16 108/71 98 %      Temp Source Heart Rate Source Patient Position - Orthostatic VS BP Location FiO2 (%)   Oral Monitor Sitting Left arm --      Pain Score       --           Vitals:    11/15/23 1907   BP: 108/71   Pulse: 81   Patient Position - Orthostatic VS: Sitting         Visual Acuity      ED Medications  Medications   cephalexin (KEFLEX) capsule 500 mg (500 mg Oral Given 11/15/23 2007)       Diagnostic Studies  Results Reviewed       Procedure Component Value Units Date/Time    Urine Microscopic [[id number]]  (Abnormal) Collected: 11/15/23 1938    Lab Status: Final result Specimen: Urine, Clean Catch Updated: 11/15/23 1949     RBC, UA Innumerable /hpf      WBC, UA 10-20 /hpf      Epithelial Cells Moderate /hpf      Bacteria, UA Occasional /hpf      MUCUS THREADS Occasional    Urine culture [[id number]] Collected: 11/15/23 1938    Lab Status:  In process Specimen: Urine, Clean Catch Updated: 11/15/23 1949    UA w Reflex to Microscopic w Reflex to Culture [[id number]]  (Abnormal) Collected: 11/15/23 1938    Lab Status: Final result Specimen: Urine, Clean Catch Updated: 11/15/23 1943     Color, UA Yellow     Clarity, UA Slightly Cloudy     Specific Gravity, UA >=1.030     pH, UA 6.0     Leukocytes, UA Trace     Nitrite, UA Negative     Protein, UA 1+ mg/dl      Glucose, UA Negative mg/dl      Ketones, UA Negative mg/dl      Urobilinogen, UA 0.2 E.U./dl      Bilirubin, UA Negative     Occult Blood, UA 2+    POCT pregnancy, urine [[id number]]  (Normal) Resulted: 11/15/23 1941    Lab Status: Final result Updated: 11/15/23 1941     EXT Preg Test, Ur Negative     Control Valid                   No orders to display              Procedures  Procedures         ED Course                 SBIRT 20yo+      Flowsheet Row Most Recent Value   Initial Alcohol Screen: US AUDIT-C     1. How often do you have a drink containing alcohol? 0 Filed at: 11/15/2023 1942   2. How many drinks containing alcohol do you have on a typical day you are drinking? 0 Filed at: 11/15/2023 1942   3b. FEMALE Any Age, or MALE 65+: How often do you have 4 or more drinks on one occassion? 0 Filed at: 11/15/2023 1942   Audit-C Score 0 Filed at: 11/15/2023 1942   VIKTORIYA: How many times in the past year have you. .. Used an illegal drug or used a prescription medication for non-medical reasons? Never Filed at: 11/15/2023 1942                      Medical Decision Making  78-year-old female presented to the emergency department for evaluation of a suspected urinary tract infection. On arrival the patient was awake, alert, oriented and in no acute distress. Initial vital signs were within normal limits. The patient was afebrile. Physical exam was unremarkable including a benign abdominal examination. Urinalysis was consistent with a urinary tract infection. Patient treated with a dose of Keflex here in the emergency department and provided with a prescription for a 7-day course. Recommendation was made for the patient to follow-up with her PCP. Return precautions were discussed. Patient agrees with the plan for discharge and feels comfortable to go home with proper f/u. Advised to return for worsening or additional problems. Diagnostic tests were reviewed and questions answered. Diagnosis, care plan and treatment options were discussed. The patient understands instructions and will follow up as directed. Amount and/or Complexity of Data Reviewed  Labs: ordered. Risk  Prescription drug management.              Disposition  Final diagnoses:   UTI (urinary tract infection)     Time reflects when diagnosis was documented in both MDM as applicable and the Disposition within this note       Time User Action Codes Description Comment    11/15/2023  7:56 PM Estrella Constantino Add [N39.0] UTI (urinary tract infection)           ED Disposition       ED Disposition   Discharge    Condition   Stable    Date/Time   Wed Nov 15, 2023 1300 [facility] discharge to home/self care.                    Follow-up Information       Follow up With Specialties Details Why Contact Info Additional Information    Nette Amaya MD Family Medicine Schedule an appointment as soon as possible for a visit   [address] [facility] Emergency Department Emergency Medicine Go to  If symptoms worsen [address] [place] [address] [phone]  [address] [facility] Emergency Department, [address] [place] [address], [address] [facility]            Discharge Medication List as of 11/15/2023  7:58 PM        START taking these medications    Details   cephalexin (KEFLEX) 500 mg capsule Take 1 capsule (500 mg total) by mouth every 12 (twelve) hours for 7 days, Starting Wed 11/15/2023, Until Wed 11/22/2023, Normal           CONTINUE these medications which have NOT CHANGED    Details   albuterol (PROVENTIL HFA,VENTOLIN HFA) 90 mcg/act inhaler INHALE 1 PUFF BY MOUTH EVERY 6 HOURS AS NEEDED FOR WHEEZE, Normal      Cholecalciferol (Vitamin D3) 50 MCG (2000 UT) capsule TAKE 1 CAPSULE (2,000 UNITS TOTAL) BY MOUTH IN THE MORNING, Starting Mon 9/18/2023, Normal      dexamethasone (DECADRON) 1 mg tablet Take 1 tab at 11pm and get cortisol levels checked between 7:00 a.m. and 9:00 a.m. on the next morning., Normal      ergocalciferol (VITAMIN D2) 50,000 units Take 1 capsule (50,000 Units total) by mouth once a week, Starting Mon 10/9/2023, Until Sun 1/7/2024, Normal      escitalopram (LEXAPRO) 10 mg tablet Take 1 tablet (10 mg total) by mouth daily, Starting Tue 8/15/2023, Until Sun 2/11/2024, Normal      medroxyPROGESTERone (DEPO-PROVERA) 150 mg/mL injection Inject 1 mL (150 mg total) into a muscle every 3 (three) months, Starting Fri 10/20/2023, Normal      Nerve Stimulator (STANDARD TENS) OMER by Does not apply route 4 (four) times a day, Starting Wed 5/20/2020, Normal      pantoprazole (PROTONIX) 40 mg tablet Take 1 tablet (40 mg total) by mouth daily, Starting Tue 8/15/2023, Normal             No discharge procedures on file.     PDMP Review       None            ED Provider  Electronically Signed by             Jessica Harris MD  11/15/23 2050

## 2023-11-16 NOTE — ED NOTES
Discharge reviewed with patient. Patient verbalized understanding and has no further questions at this time. Patient ambulatory off unit with steady gait.        Brittany Lama RN  11/15/23 2009

## 2023-11-17 ENCOUNTER — VBI (OUTPATIENT)
Dept: FAMILY MEDICINE CLINIC | Facility: CLINIC | Age: 33
End: 2023-11-17

## 2023-11-17 ENCOUNTER — ANNUAL EXAM (OUTPATIENT)
Dept: OBGYN CLINIC | Facility: CLINIC | Age: 33
End: 2023-11-17
Payer: MEDICARE

## 2023-11-17 VITALS
DIASTOLIC BLOOD PRESSURE: 74 MMHG | HEIGHT: 63 IN | BODY MASS INDEX: 24.8 KG/M2 | SYSTOLIC BLOOD PRESSURE: 100 MMHG | WEIGHT: 140 LBS

## 2023-11-17 DIAGNOSIS — N39.0 URINARY TRACT INFECTION WITHOUT HEMATURIA, SITE UNSPECIFIED: ICD-10-CM

## 2023-11-17 DIAGNOSIS — Z30.013 ENCOUNTER FOR INITIAL PRESCRIPTION OF INJECTABLE CONTRACEPTIVE: ICD-10-CM

## 2023-11-17 DIAGNOSIS — Z12.4 SCREENING FOR MALIGNANT NEOPLASM OF THE CERVIX: ICD-10-CM

## 2023-11-17 DIAGNOSIS — Z11.51 SCREENING FOR HUMAN PAPILLOMAVIRUS: ICD-10-CM

## 2023-11-17 DIAGNOSIS — Z01.419 ENCOUNTER FOR GYNECOLOGICAL EXAMINATION WITHOUT ABNORMAL FINDING: Primary | ICD-10-CM

## 2023-11-17 PROCEDURE — G0476 HPV COMBO ASSAY CA SCREEN: HCPCS | Performed by: OBSTETRICS & GYNECOLOGY

## 2023-11-17 PROCEDURE — G0145 SCR C/V CYTO,THINLAYER,RESCR: HCPCS | Performed by: OBSTETRICS & GYNECOLOGY

## 2023-11-17 PROCEDURE — 99395 PREV VISIT EST AGE 18-39: CPT | Performed by: OBSTETRICS & GYNECOLOGY

## 2023-11-17 RX ORDER — MEDROXYPROGESTERONE ACETATE 150 MG/ML
150 INJECTION, SUSPENSION INTRAMUSCULAR
Qty: 1 ML | Refills: 3 | Status: SHIPPED | OUTPATIENT
Start: 2023-11-17

## 2023-11-17 RX ORDER — NITROFURANTOIN 25; 75 MG/1; MG/1
100 CAPSULE ORAL 2 TIMES DAILY
Qty: 14 CAPSULE | Refills: 3 | Status: SHIPPED | OUTPATIENT
Start: 2023-11-17 | End: 2023-11-24

## 2023-11-17 NOTE — PROGRESS NOTES
Mortimer Siva is a 26 y.o. female who presents for annual well woman exam. Periods are irregular, lasting 5 days. No intermenstrual bleeding, spotting, or discharge. Current contraception: Depo-Provera injections  History of abnormal Pap smear: yes - cryo  Family history of uterine or ovarian cancer: no    Family history of breast cancer:  G maternal aunt     Menstrual History:  OB History          3    Para   3    Term   3            AB   0    Living   3         SAB   0    IAB   0    Ectopic   0    Multiple        Live Births   3           Obstetric Comments   Menarche: 13  First child: 23                   No LMP recorded. Patient has had an injection. The following portions of the patient's history were reviewed and updated as appropriate: allergies, current medications, past family history, past medical history, past social history, past surgical history, and problem list.    Review of Systems  Review of Systems   Constitutional:  Negative for activity change, appetite change, chills, fatigue and fever. Respiratory:  Negative for apnea, cough, chest tightness and shortness of breath. Cardiovascular:  Negative for chest pain, palpitations and leg swelling. Gastrointestinal:  Negative for abdominal pain, constipation, diarrhea, nausea and vomiting. Genitourinary:  Negative for difficulty urinating, dysuria, flank pain, frequency, hematuria and urgency. Neurological:  Negative for dizziness, seizures, syncope, light-headedness, numbness and headaches. Psychiatric/Behavioral:  Negative for agitation and confusion.            Objective      /74 (BP Location: Left arm, Patient Position: Sitting, Cuff Size: Adult)   Ht 5' 3" (1.6 m)   Wt 63.5 kg (140 lb)   BMI 24.80 kg/m²     Physical Exam  OBGyn Exam     General:   alert and oriented, in no acute distress, alert, appears stated age, and cooperative   Heart: regular rate and rhythm, S1, S2 normal, no murmur, click, rub or gallop   Lungs: clear to auscultation bilaterally   Abdomen: soft, non-tender, without masses or organomegaly   Vulva: normal   Vagina: normal mucosa, normal discharge   Cervix: no cervical motion tenderness and no lesions   Uterus: normal size   Adnexa:  Breast Exam:  normal adnexa  breasts appear normal, no suspicious masses, no skin or nipple changes or axillary nodes. Assessment      @well woman@ . 27-year-old female  Annual exam  Prior 3 vaginal delivery    Depo-Provera for contraception desire to continue  Plan   Pap/HPV   Diet/exercise  Calcium/vitamin-D  Continue Depo-Provera   Return to office for annual exam  Kegel exercise and bladder diet discussed secondary to mild YAMILETH   All questions answered. There are no Patient Instructions on file for this visit.

## 2023-11-17 NOTE — TELEPHONE ENCOUNTER
11/17/23 12:09 PM    Patient contacted post ED visit, [facility] department spoke with patient/caregiver and outreach was successful. Thank you.   Martha Daily MA  [facility]

## 2023-11-27 ENCOUNTER — OFFICE VISIT (OUTPATIENT)
Dept: FAMILY MEDICINE CLINIC | Facility: CLINIC | Age: 33
End: 2023-11-27
Payer: MEDICARE

## 2023-11-27 VITALS
SYSTOLIC BLOOD PRESSURE: 112 MMHG | DIASTOLIC BLOOD PRESSURE: 68 MMHG | OXYGEN SATURATION: 98 % | HEIGHT: 63 IN | TEMPERATURE: 97.5 F | HEART RATE: 81 BPM | BODY MASS INDEX: 25.2 KG/M2 | WEIGHT: 142.2 LBS

## 2023-11-27 DIAGNOSIS — R53.83 OTHER FATIGUE: ICD-10-CM

## 2023-11-27 DIAGNOSIS — F41.8 DEPRESSION WITH ANXIETY: Primary | ICD-10-CM

## 2023-11-27 DIAGNOSIS — K21.9 GASTROESOPHAGEAL REFLUX DISEASE WITHOUT ESOPHAGITIS: ICD-10-CM

## 2023-11-27 DIAGNOSIS — J45.20 MILD INTERMITTENT ASTHMA WITHOUT COMPLICATION: ICD-10-CM

## 2023-11-27 DIAGNOSIS — E55.9 VITAMIN D DEFICIENCY: ICD-10-CM

## 2023-11-27 LAB
LAB AP GYN PRIMARY INTERPRETATION: NORMAL
Lab: NORMAL

## 2023-11-27 PROCEDURE — 99214 OFFICE O/P EST MOD 30 MIN: CPT | Performed by: FAMILY MEDICINE

## 2023-11-27 NOTE — PROGRESS NOTES
BMI Counseling: Body mass index is 25.19 kg/m². The BMI is above normal. Nutrition recommendations include decreasing portion sizes, encouraging healthy choices of fruits and vegetables, decreasing fast food intake, consuming healthier snacks, limiting drinks that contain sugar, moderation in carbohydrate intake, increasing intake of lean protein, reducing intake of saturated and trans fat and reducing intake of cholesterol. Rationale for BMI follow-up plan is due to patient being overweight or obese. Depression Screening and Follow-up Plan: Clincally patient does not have depression. No treatment is required. Patient advised to follow-up with PCP for further management.

## 2023-11-27 NOTE — ASSESSMENT & PLAN NOTE
Much  stable  now  with  life  style modifications  she  took lexapro  for a  while  but  after  that  she  feels  she does  not  need it and  stopped  it  her  life  style   modifications have  helped  her  a great  deal  she  denies  any  negetive  or  suciadel thoughts

## 2023-11-27 NOTE — PROGRESS NOTES
Assessment/Plan:as  below         Problem List Items Addressed This Visit          Digestive    Gastroesophageal reflux disease without esophagitis     Better  on pantoprazole   discussed  diet         Relevant Orders    CBC and differential    Comprehensive metabolic panel       Respiratory    Mild intermittent asthma without complication     Much  better  and  stable  now  discussed  precautions            Other    Other fatigue     Much  stable  now  after  taking  vit d         Relevant Orders    TSH, 3rd generation    Depression with anxiety - Primary     Much  stable  now  with  life  style modifications  she  took lexapro  for a  while  but  after  that  she  feels  she does  not  need it and  stopped  it  her  life  style   modifications have  helped  her  a great  deal  she  denies  any  negetive  or  suciadel thoughts            Other Visit Diagnoses       Vitamin D deficiency        Relevant Orders    Vitamin D 25 hydroxy              Subjective:      Patient ID: Mariam Cedillo is a 26 y.o. female.     HPI-  came  in  to  follow up  on  her  gerd  stable  asthma  stable   back pain and  fatigue  stable   she  says   she  stopped  lexapro  after  few  months  as she  seriously  worked  on  her life  style as  advised  and  feels  so  good  no  depression  anxiety or negetive  thoughts    The following portions of the patient's history were reviewed and updated as appropriate:   Past Medical History:  She has a past medical history of Abnormal Pap smear of cervix, Anxiety, Asthma, Carrier of group B Streptococcus, GERD (gastroesophageal reflux disease), High blood pressure, History of UTI, absence seizures, seasonal allergies, Hypertension, Migraine headache, Muscle spasm of back, Papanicolaou smear for cervical cancer screening (06/2017), Postpartum depression, Preeclampsia, Psychiatric disorder, Umbilical hernia (48/9140), Urinary tract infection, and Varicose veins of lower extremity. ,  _______________________________________________________________________  Medical Problems:  does not have any pertinent problems on file.,  _______________________________________________________________________  Past Surgical History:   has a past surgical history that includes Hernia repair; [place] tooth extraction; Other surgical history (11/18/2015); INSERTION OF INTRAUTERINE DEVICE (IUD); and Gynecologic cryosurgery. ,  _______________________________________________________________________  Family History:  family history includes Arthritis in her family and mother; Asthma in her maternal aunt, maternal grandmother, mother, sister, and son; Bipolar disorder in her maternal aunt; COPD in her maternal grandmother; Cancer in her cousin; Diabetes in her cousin, maternal grandmother, mother, and other; Fibromyalgia in her family; GI problems in her family; Heart attack in her maternal grandfather and maternal grandmother; Hypertension in her mother; Hyperthyroidism in her maternal aunt; Lymphoma in her maternal grandmother; Other in her maternal grandmother; Sleep apnea in her mother; Thyroid disease in her maternal aunt; Varicose Veins in her paternal aunt and paternal grandmother.,  _______________________________________________________________________  Social History:   reports that she quit smoking about 8 years ago. Her smoking use included cigarettes. She has a 15.00 pack-year smoking history. She has never used smokeless tobacco. She reports that she does not currently use alcohol. She reports that she does not use drugs. ,  _______________________________________________________________________  Allergies:  is allergic to phenazopyridine and other. .  _______________________________________________________________________  Current Outpatient Medications   Medication Sig Dispense Refill    albuterol (PROVENTIL HFA,VENTOLIN HFA) 90 mcg/act inhaler INHALE 1 PUFF BY MOUTH EVERY 6 HOURS AS NEEDED FOR WHEEZE 8.5 g 1    Cholecalciferol (Vitamin D3) 50 MCG (2000 UT) capsule TAKE 1 CAPSULE (2,000 UNITS TOTAL) BY MOUTH IN THE MORNING 90 capsule 1    ergocalciferol (VITAMIN D2) 50,000 units Take 1 capsule (50,000 Units total) by mouth once a week 4 capsule 0    medroxyPROGESTERone (DEPO-PROVERA) 150 mg/mL injection Inject 1 mL (150 mg total) into a muscle every 3 (three) months 1 mL 3    Nerve Stimulator (STANDARD TENS) OMER by Does not apply route 4 (four) times a day 1 Device 0    pantoprazole (PROTONIX) 40 mg tablet Take 1 tablet (40 mg total) by mouth daily 90 tablet 2     No current facility-administered medications for this visit.     _______________________________________________________________________  Review of Systems   Constitutional:  Negative for fatigue and fever. HENT:  Negative for congestion and postnasal drip. Eyes:  Negative for pain, discharge and itching. Respiratory:  Negative for cough and shortness of breath. Asthma   Cardiovascular:  Negative for chest pain, palpitations and leg swelling. Gastrointestinal:  Negative for abdominal distention, abdominal pain, constipation and diarrhea. Gerd   Endocrine: Negative for cold intolerance, heat intolerance and polydipsia. Genitourinary:  Negative for dysuria and flank pain. Neurological:  Negative for dizziness and headaches. Psychiatric/Behavioral:  Negative for self-injury, sleep disturbance and suicidal ideas. The patient is not nervous/anxious. Depression  anxiety  much improved         Objective:  Vitals:    11/27/23 1713   BP: 112/68   BP Location: Left arm   Patient Position: Sitting   Cuff Size: Standard   Pulse: 81   Temp: 97.5 °F (36.4 °C)   TempSrc: Temporal   SpO2: 98%   Weight: 64.5 kg (142 lb 3.2 oz)   Height: 5' 3" (1.6 m)     Body mass index is 25.19 kg/m². Physical Exam  Vitals and nursing note reviewed. Constitutional:       General: She is not in acute distress.      Appearance: Normal appearance. She is not ill-appearing, toxic-appearing or diaphoretic. HENT:      Head: Normocephalic and atraumatic. Right Ear: Tympanic membrane normal.      Left Ear: Tympanic membrane normal.      Nose: Nose normal. No congestion. Mouth/Throat:      Mouth: Mucous membranes are moist.      Pharynx: Oropharynx is clear. No oropharyngeal exudate. Eyes:      General:         Right eye: No discharge. Extraocular Movements: Extraocular movements intact. Conjunctiva/sclera: Conjunctivae normal.      Pupils: Pupils are equal, round, and reactive to light. Cardiovascular:      Rate and Rhythm: Normal rate and regular rhythm. Pulses: Normal pulses. Heart sounds: Normal heart sounds. No murmur heard. No gallop. Pulmonary:      Effort: Pulmonary effort is normal.      Breath sounds: Normal breath sounds. No wheezing, rhonchi or rales. Abdominal:      General: Abdomen is flat. There is no distension. Palpations: Abdomen is soft. There is no mass. Musculoskeletal:      Cervical back: Normal range of motion and neck supple. No rigidity. Right lower leg: No edema. Left lower leg: No edema. Skin:     Findings: No erythema or rash. Neurological:      General: No focal deficit present. Mental Status: She is alert and oriented to person, place, and time. Psychiatric:         Mood and Affect: Mood normal.         Behavior: Behavior normal.         Thought Content:  Thought content normal.

## 2024-01-02 ENCOUNTER — TELEPHONE (OUTPATIENT)
Dept: PULMONOLOGY | Facility: CLINIC | Age: 34
End: 2024-01-02

## 2024-01-02 NOTE — TELEPHONE ENCOUNTER
Pt called left VM stating her asthma has flared up and albuterol is offering no relief. Asking if it would possible to get a steroid sent in or if she would have to be seen? Please advise.

## 2024-01-03 DIAGNOSIS — J45.901 EXACERBATION OF ALLERGIC ASTHMA: ICD-10-CM

## 2024-01-03 DIAGNOSIS — J45.20 MILD INTERMITTENT ASTHMA WITHOUT COMPLICATION: Primary | ICD-10-CM

## 2024-01-03 DIAGNOSIS — J45.901 EXACERBATION OF ALLERGIC ASTHMA: Primary | ICD-10-CM

## 2024-01-03 RX ORDER — PREDNISONE 20 MG/1
TABLET ORAL
Qty: 9 TABLET | Refills: 0 | Status: SHIPPED | OUTPATIENT
Start: 2024-01-03

## 2024-01-03 RX ORDER — FLUTICASONE FUROATE AND VILANTEROL 200; 25 UG/1; UG/1
1 POWDER RESPIRATORY (INHALATION) DAILY
Qty: 60 BLISTER | Refills: 5 | Status: SHIPPED | OUTPATIENT
Start: 2024-01-03 | End: 2024-07-01

## 2024-01-03 NOTE — PROGRESS NOTES
Patient called with exacerbation of asthma.  I ordered a brief course of prednisone and asked her to restart the Breo that she took before.

## 2024-01-05 ENCOUNTER — TELEPHONE (OUTPATIENT)
Dept: PULMONOLOGY | Facility: CLINIC | Age: 34
End: 2024-01-05

## 2024-01-05 DIAGNOSIS — E55.9 VITAMIN D DEFICIENCY: ICD-10-CM

## 2024-01-06 RX ORDER — ERGOCALCIFEROL 1.25 MG/1
CAPSULE ORAL
Qty: 4 CAPSULE | Refills: 0 | Status: SHIPPED | OUTPATIENT
Start: 2024-01-06

## 2024-01-08 NOTE — TELEPHONE ENCOUNTER
Pt left v/m re: pre auth.     Returned call and advised pt that pre auth was started.  Waiting to hear from insurance company.

## 2024-01-09 ENCOUNTER — TELEPHONE (OUTPATIENT)
Age: 34
End: 2024-01-09

## 2024-01-10 ENCOUNTER — OFFICE VISIT (OUTPATIENT)
Dept: PULMONOLOGY | Facility: CLINIC | Age: 34
End: 2024-01-10
Payer: MEDICARE

## 2024-01-10 VITALS
SYSTOLIC BLOOD PRESSURE: 100 MMHG | DIASTOLIC BLOOD PRESSURE: 70 MMHG | HEIGHT: 63 IN | BODY MASS INDEX: 24.98 KG/M2 | HEART RATE: 88 BPM | WEIGHT: 141 LBS | OXYGEN SATURATION: 98 %

## 2024-01-10 DIAGNOSIS — J45.20 MILD INTERMITTENT ASTHMA WITHOUT COMPLICATION: Primary | ICD-10-CM

## 2024-01-10 DIAGNOSIS — J45.901 EXACERBATION OF ALLERGIC ASTHMA: ICD-10-CM

## 2024-01-10 PROCEDURE — 99213 OFFICE O/P EST LOW 20 MIN: CPT | Performed by: INTERNAL MEDICINE

## 2024-01-10 RX ORDER — FLUTICASONE PROPIONATE AND SALMETEROL XINAFOATE 230; 21 UG/1; UG/1
2 AEROSOL, METERED RESPIRATORY (INHALATION) 2 TIMES DAILY
Qty: 12 G | Refills: 5 | Status: SHIPPED | OUTPATIENT
Start: 2024-01-10

## 2024-01-10 NOTE — PROGRESS NOTES
Answers submitted by the patient for this visit:  Pulmonology Questionnaire (Submitted on 1/9/2024)  Chief Complaint: Primary symptoms  Do you have chest tightness?: Yes  Do you have a cough?: Yes  Do you have difficulty breathing?: Yes  Do you have a hoarse voice?: Yes  Do you have shortness of breath?: Yes  Do you have wheezing?: Yes  Chronicity: recurrent  When did you first notice your symptoms?: 1 to 4 weeks ago  How often do your symptoms occur?: 2 to 4 times per day  Since you first noticed this problem, how has it changed?: gradually improving  Have you had a change in appetite?: No  Do you have chest pain?: Yes  Do you have shortness of breath that occurs with effort or exertion?: Yes  Do you have ear congestion?: No  Do you have ear pain?: No  Do you have a fever?: No  Do you have headaches?: Yes  Do you have heartburn?: No  Do you have fatigue?: Yes  Do you have muscle pain?: No  Do you have nasal congestion?: No  Do you have shortness of breath when lying flat?: No  Do you have shortness of breath when you wake up?: No  Do you have post-nasal drip?: No  Do you have a runny nose?: No  Do you have sneezing?: No  Do you have a sore throat?: No  Do you have sweats?: No  Do you have trouble swallowing?: No  Have you experienced weight loss?: No  Which of the following makes your symptoms worse?: animal exposure, change in weather, climbing stairs, eating, emotional stress, exposure to fumes, exposure to smoke, URI  Which of the following makes your symptoms better?: oral steroids, OTC inhaler, prescription cough suppressant, steroid inhaler  Risk factors for lung disease: animal exposure  Assessment/Plan:    Mild intermittent asthma without complication  Her asthma this past fall.  She was in emergency in November and required dosing of prednisone earlier this month.  Felt better with the prednisone but now is having symptoms at the end of the day with chest tightness and wheezing.  Previously had been on  Breo but this drug is not available to her through her drug insurance.  Ordered Advair HFA which is what her drug formulary.  Response of this within a week I would assume.  She continues to take albuterol as needed.  She remains on pantoprazole for gastroesophageal reflux.  She thinks she is allergic to her dog and is thinking about getting rid of this dog.     Diagnoses and all orders for this visit:    Mild intermittent asthma without complication  -     fluticasone-salmeterol (Advair HFA) 230-21 MCG/ACT inhaler; Inhale 2 puffs 2 (two) times a day Rinse mouth after use.    Exacerbation of allergic asthma          Subjective:      Patient ID: Porsha Lindquist is a 33 y.o. female.    Patient is here for problems with her previously well-controlled asthma.  Not been on any preventive treatment over the summer but in November had a flareup requiring ER care.  She thought since she had a respiratory infection at the time.  I would like to a chest tightness wheezing and cough with white sputum.  Senses that she may be allergic to her dog.  Recently diagnosed with allergic conjunctivitis by ophthalmologist.  Using albuterol fairly frequently with benefit.  Previously had been on Breo I ordered this when she had another flareup earlier this month.  This drug formulary so I ordered Advair HFA today.  She should have some improvement in her end of the day chest tightness and wheezing within a week.  Recommended flu vaccine and COVID-19 booster.  Not having any allergic nose problems for now.  She notes that previous endocrine testing was all normal.    primary symptoms  Associated symptoms include chest pain, coughing and headaches. Pertinent negatives include no fever, myalgias or sore throat.       The following portions of the patient's history were reviewed and updated as appropriate: allergies, current medications, past family history, past medical history, past social history, past surgical history and problem  "list.    Review of Systems   Constitutional:  Negative for activity change, appetite change, fever and unexpected weight change.   HENT:  Negative for ear pain, postnasal drip, rhinorrhea, sneezing, sore throat and trouble swallowing.    Respiratory:  Positive for cough, shortness of breath and wheezing.    Cardiovascular:  Positive for chest pain and leg swelling. Negative for palpitations.   Musculoskeletal:  Negative for myalgias.   Allergic/Immunologic: Positive for environmental allergies.   Neurological:  Positive for headaches.         Objective:      /70 (BP Location: Right arm, Patient Position: Sitting, Cuff Size: Standard)   Pulse 88   Ht 5' 3\" (1.6 m)   Wt 64 kg (141 lb)   SpO2 98%   BMI 24.98 kg/m²          Physical Exam  Vitals reviewed.   Constitutional:       General: She is not in acute distress.     Appearance: She is normal weight. She is not ill-appearing.   Cardiovascular:      Rate and Rhythm: Normal rate and regular rhythm.      Pulses:           Radial pulses are 2+ on the right side and 2+ on the left side.      Heart sounds: Normal heart sounds.   Pulmonary:      Effort: Pulmonary effort is normal.      Breath sounds: Normal breath sounds. No stridor. No wheezing or rhonchi.   Musculoskeletal:         General: No swelling.      Cervical back: No rigidity or tenderness.      Right lower leg: No edema.      Left lower leg: No edema.   Lymphadenopathy:      Cervical: No cervical adenopathy.   Skin:     General: Skin is warm and dry.   Neurological:      Mental Status: She is alert and oriented to person, place, and time.   Psychiatric:         Mood and Affect: Mood normal.         Behavior: Behavior normal.         "

## 2024-01-10 NOTE — ASSESSMENT & PLAN NOTE
Her asthma this past fall.  She was in emergency in November and required dosing of prednisone earlier this month.  Felt better with the prednisone but now is having symptoms at the end of the day with chest tightness and wheezing.  Previously had been on Breo but this drug is not available to her through her drug insurance.  Ordered Advair HFA which is what her drug formulary.  Response of this within a week I would assume.  She continues to take albuterol as needed.  She remains on pantoprazole for gastroesophageal reflux.  She thinks she is allergic to her dog and is thinking about getting rid of this dog.

## 2024-01-12 ENCOUNTER — CLINICAL SUPPORT (OUTPATIENT)
Dept: OBGYN CLINIC | Facility: CLINIC | Age: 34
End: 2024-01-12
Payer: MEDICARE

## 2024-01-12 VITALS
WEIGHT: 142.4 LBS | DIASTOLIC BLOOD PRESSURE: 72 MMHG | SYSTOLIC BLOOD PRESSURE: 118 MMHG | HEIGHT: 63 IN | BODY MASS INDEX: 25.23 KG/M2

## 2024-01-12 DIAGNOSIS — Z30.42 ENCOUNTER FOR SURVEILLANCE OF INJECTABLE CONTRACEPTIVE: Primary | ICD-10-CM

## 2024-01-12 PROCEDURE — 96372 THER/PROPH/DIAG INJ SC/IM: CPT

## 2024-01-12 RX ORDER — MEDROXYPROGESTERONE ACETATE 150 MG/ML
150 INJECTION, SUSPENSION INTRAMUSCULAR ONCE
Status: COMPLETED | OUTPATIENT
Start: 2024-01-12 | End: 2024-01-12

## 2024-01-12 RX ADMIN — MEDROXYPROGESTERONE ACETATE 150 MG: 150 INJECTION, SUSPENSION INTRAMUSCULAR at 14:35

## 2024-02-02 DIAGNOSIS — E55.9 VITAMIN D DEFICIENCY: ICD-10-CM

## 2024-02-02 RX ORDER — ERGOCALCIFEROL 1.25 MG/1
CAPSULE ORAL
Qty: 4 CAPSULE | Refills: 0 | Status: SHIPPED | OUTPATIENT
Start: 2024-02-02

## 2024-02-07 ENCOUNTER — OFFICE VISIT (OUTPATIENT)
Dept: OBGYN CLINIC | Facility: CLINIC | Age: 34
End: 2024-02-07
Payer: MEDICARE

## 2024-02-07 VITALS
BODY MASS INDEX: 25.16 KG/M2 | HEIGHT: 63 IN | DIASTOLIC BLOOD PRESSURE: 88 MMHG | SYSTOLIC BLOOD PRESSURE: 122 MMHG | WEIGHT: 142 LBS

## 2024-02-07 DIAGNOSIS — N93.9 ABNORMAL UTERINE BLEEDING (AUB): Primary | ICD-10-CM

## 2024-02-07 PROCEDURE — 99213 OFFICE O/P EST LOW 20 MIN: CPT | Performed by: OBSTETRICS & GYNECOLOGY

## 2024-02-07 NOTE — PROGRESS NOTES
"Assessment/Plan:     Diagnoses and all orders for this visit:    Abnormal uterine bleeding (AUB)  -     US pelvis complete w transvaginal; Future  -     TSH + Free T4; Future  -     CBC and differential; Future           33-year-old female  Prior 3 vaginal delivery   Depo-Provera for contraception desire to continue  Episode of AUB  Plan   LABS/U/S  Menstrual chart for 3m   Diet/exercise  Calcium/vitamin-D  Continue Depo-Provera   Return to office for annual exam  Kegel exercise and bladder diet discussed secondary to mild YAMILETH  All questions answered.          Subjective:      Patient ID: Porsha Lindquist is a 33 y.o. female.    HPI  Patient seen evaluated present to the office today sec AUB happen last week   Patient receive depo on 1/17  Last week have episdoe of cramping follow by heavy VB lasted for 1w  Denies any more bleeding   Denies any pelvic pain   This is the first time this happen to the patient   Different etiology of patient symptoms discuss   Recommend pelvic U/S and labs  Then  menstrual chart for 3m   If bleeding continue to become irreg   Then I recommend to rto in 3m if bleeding continue to be irreg then I recommend different method for contraception  Plan explain and discuss all questions answered and patient was satisfied     The following portions of the patient's history were reviewed and updated as appropriate: allergies, current medications, past family history, past medical history, past social history, past surgical history and problem list.    Review of Systems      Objective:      /88 (BP Location: Left arm, Patient Position: Sitting, Cuff Size: Adult)   Ht 5' 3\" (1.6 m)   Wt 64.4 kg (142 lb)   LMP  (LMP Unknown) Comment: depo Injection  BMI 25.15 kg/m²          Physical Exam  Constitutional:       Appearance: Normal appearance.   Neurological:      General: No focal deficit present.      Mental Status: She is alert and oriented to person, place, and time.   Psychiatric: "         Mood and Affect: Mood normal.         Behavior: Behavior normal.

## 2024-02-12 DIAGNOSIS — Z00.6 ENCOUNTER FOR EXAMINATION FOR NORMAL COMPARISON OR CONTROL IN CLINICAL RESEARCH PROGRAM: ICD-10-CM

## 2024-02-16 ENCOUNTER — APPOINTMENT (OUTPATIENT)
Dept: LAB | Facility: HOSPITAL | Age: 34
End: 2024-02-16
Payer: MEDICARE

## 2024-02-16 ENCOUNTER — HOSPITAL ENCOUNTER (OUTPATIENT)
Dept: ULTRASOUND IMAGING | Facility: HOSPITAL | Age: 34
End: 2024-02-16
Attending: OBSTETRICS & GYNECOLOGY
Payer: MEDICARE

## 2024-02-16 DIAGNOSIS — K21.9 GASTROESOPHAGEAL REFLUX DISEASE WITHOUT ESOPHAGITIS: ICD-10-CM

## 2024-02-16 DIAGNOSIS — E55.9 VITAMIN D DEFICIENCY: ICD-10-CM

## 2024-02-16 DIAGNOSIS — Z00.6 ENCOUNTER FOR EXAMINATION FOR NORMAL COMPARISON OR CONTROL IN CLINICAL RESEARCH PROGRAM: ICD-10-CM

## 2024-02-16 DIAGNOSIS — N93.9 ABNORMAL UTERINE BLEEDING (AUB): ICD-10-CM

## 2024-02-16 DIAGNOSIS — R53.83 OTHER FATIGUE: ICD-10-CM

## 2024-02-16 LAB
25(OH)D3 SERPL-MCNC: 31.9 NG/ML (ref 30–100)
ALBUMIN SERPL BCP-MCNC: 4.4 G/DL (ref 3.5–5)
ALP SERPL-CCNC: 61 U/L (ref 34–104)
ALT SERPL W P-5'-P-CCNC: 19 U/L (ref 7–52)
ANION GAP SERPL CALCULATED.3IONS-SCNC: 9 MMOL/L
AST SERPL W P-5'-P-CCNC: 17 U/L (ref 13–39)
BASOPHILS # BLD AUTO: 0.02 THOUSANDS/ÂΜL (ref 0–0.1)
BASOPHILS NFR BLD AUTO: 0 % (ref 0–1)
BILIRUB SERPL-MCNC: 0.53 MG/DL (ref 0.2–1)
BUN SERPL-MCNC: 10 MG/DL (ref 5–25)
CALCIUM SERPL-MCNC: 9.9 MG/DL (ref 8.4–10.2)
CHLORIDE SERPL-SCNC: 101 MMOL/L (ref 96–108)
CO2 SERPL-SCNC: 26 MMOL/L (ref 21–32)
CREAT SERPL-MCNC: 0.58 MG/DL (ref 0.6–1.3)
EOSINOPHIL # BLD AUTO: 0.08 THOUSAND/ÂΜL (ref 0–0.61)
EOSINOPHIL NFR BLD AUTO: 1 % (ref 0–6)
ERYTHROCYTE [DISTWIDTH] IN BLOOD BY AUTOMATED COUNT: 13.2 % (ref 11.6–15.1)
GFR SERPL CREATININE-BSD FRML MDRD: 119 ML/MIN/1.73SQ M
GLUCOSE SERPL-MCNC: 82 MG/DL (ref 65–140)
HCT VFR BLD AUTO: 39.1 % (ref 34.8–46.1)
HGB BLD-MCNC: 12.7 G/DL (ref 11.5–15.4)
IMM GRANULOCYTES # BLD AUTO: 0.01 THOUSAND/UL (ref 0–0.2)
IMM GRANULOCYTES NFR BLD AUTO: 0 % (ref 0–2)
LYMPHOCYTES # BLD AUTO: 2.46 THOUSANDS/ÂΜL (ref 0.6–4.47)
LYMPHOCYTES NFR BLD AUTO: 34 % (ref 14–44)
MCH RBC QN AUTO: 28.9 PG (ref 26.8–34.3)
MCHC RBC AUTO-ENTMCNC: 32.5 G/DL (ref 31.4–37.4)
MCV RBC AUTO: 89 FL (ref 82–98)
MONOCYTES # BLD AUTO: 0.53 THOUSAND/ÂΜL (ref 0.17–1.22)
MONOCYTES NFR BLD AUTO: 7 % (ref 4–12)
NEUTROPHILS # BLD AUTO: 4.21 THOUSANDS/ÂΜL (ref 1.85–7.62)
NEUTS SEG NFR BLD AUTO: 58 % (ref 43–75)
NRBC BLD AUTO-RTO: 0 /100 WBCS
PLATELET # BLD AUTO: 274 THOUSANDS/UL (ref 149–390)
PMV BLD AUTO: 10.4 FL (ref 8.9–12.7)
POTASSIUM SERPL-SCNC: 3.9 MMOL/L (ref 3.5–5.3)
PROT SERPL-MCNC: 7.6 G/DL (ref 6.4–8.4)
RBC # BLD AUTO: 4.4 MILLION/UL (ref 3.81–5.12)
SODIUM SERPL-SCNC: 136 MMOL/L (ref 135–147)
TSH SERPL DL<=0.05 MIU/L-ACNC: 0.84 UIU/ML (ref 0.45–4.5)
WBC # BLD AUTO: 7.31 THOUSAND/UL (ref 4.31–10.16)

## 2024-02-16 PROCEDURE — 85025 COMPLETE CBC W/AUTO DIFF WBC: CPT

## 2024-02-16 PROCEDURE — 76830 TRANSVAGINAL US NON-OB: CPT

## 2024-02-16 PROCEDURE — 36415 COLL VENOUS BLD VENIPUNCTURE: CPT

## 2024-02-16 PROCEDURE — 84443 ASSAY THYROID STIM HORMONE: CPT

## 2024-02-16 PROCEDURE — 82306 VITAMIN D 25 HYDROXY: CPT

## 2024-02-16 PROCEDURE — 76856 US EXAM PELVIC COMPLETE: CPT

## 2024-02-16 PROCEDURE — 80053 COMPREHEN METABOLIC PANEL: CPT

## 2024-02-23 ENCOUNTER — TELEPHONE (OUTPATIENT)
Age: 34
End: 2024-02-23

## 2024-02-23 NOTE — TELEPHONE ENCOUNTER
Patient called requesting results for her pelvic ultrasound completed on 2/16/24.  Results not finalized at this time. Placed call to reading room, spoke to Kay. Requested report be read.

## 2024-03-10 DIAGNOSIS — E55.9 VITAMIN D DEFICIENCY: ICD-10-CM

## 2024-03-10 LAB
APOB+LDLR+PCSK9 GENE MUT ANL BLD/T: NOT DETECTED
BRCA1+BRCA2 DEL+DUP + FULL MUT ANL BLD/T: NOT DETECTED
MLH1+MSH2+MSH6+PMS2 GN DEL+DUP+FUL M: NOT DETECTED

## 2024-03-10 RX ORDER — ERGOCALCIFEROL 1.25 MG/1
CAPSULE ORAL
Qty: 4 CAPSULE | Refills: 0 | Status: SHIPPED | OUTPATIENT
Start: 2024-03-10

## 2024-04-05 ENCOUNTER — CLINICAL SUPPORT (OUTPATIENT)
Dept: OBGYN CLINIC | Facility: CLINIC | Age: 34
End: 2024-04-05
Payer: MEDICARE

## 2024-04-05 VITALS
WEIGHT: 144 LBS | DIASTOLIC BLOOD PRESSURE: 80 MMHG | HEIGHT: 63 IN | BODY MASS INDEX: 25.52 KG/M2 | SYSTOLIC BLOOD PRESSURE: 122 MMHG

## 2024-04-05 DIAGNOSIS — Z30.42 ENCOUNTER FOR SURVEILLANCE OF INJECTABLE CONTRACEPTIVE: Primary | ICD-10-CM

## 2024-04-05 PROCEDURE — 96372 THER/PROPH/DIAG INJ SC/IM: CPT

## 2024-04-05 RX ORDER — MEDROXYPROGESTERONE ACETATE 150 MG/ML
150 INJECTION, SUSPENSION INTRAMUSCULAR ONCE
Status: COMPLETED | OUTPATIENT
Start: 2024-04-05 | End: 2024-04-05

## 2024-04-05 RX ADMIN — MEDROXYPROGESTERONE ACETATE 150 MG: 150 INJECTION, SUSPENSION INTRAMUSCULAR at 14:34

## 2024-04-10 ENCOUNTER — OFFICE VISIT (OUTPATIENT)
Dept: PULMONOLOGY | Facility: CLINIC | Age: 34
End: 2024-04-10
Payer: MEDICARE

## 2024-04-10 VITALS
OXYGEN SATURATION: 98 % | DIASTOLIC BLOOD PRESSURE: 78 MMHG | TEMPERATURE: 98.7 F | BODY MASS INDEX: 25.52 KG/M2 | HEIGHT: 63 IN | SYSTOLIC BLOOD PRESSURE: 118 MMHG | WEIGHT: 144 LBS | HEART RATE: 75 BPM

## 2024-04-10 DIAGNOSIS — J30.1 SEASONAL ALLERGIC RHINITIS DUE TO POLLEN: Primary | ICD-10-CM

## 2024-04-10 DIAGNOSIS — J45.20 MILD INTERMITTENT ASTHMA WITHOUT COMPLICATION: ICD-10-CM

## 2024-04-10 DIAGNOSIS — F41.8 DEPRESSION WITH ANXIETY: ICD-10-CM

## 2024-04-10 PROBLEM — J45.901: Status: RESOLVED | Noted: 2024-01-03 | Resolved: 2024-04-10

## 2024-04-10 PROCEDURE — 99213 OFFICE O/P EST LOW 20 MIN: CPT | Performed by: INTERNAL MEDICINE

## 2024-04-10 RX ORDER — FLUTICASONE PROPIONATE 50 MCG
2 SPRAY, SUSPENSION (ML) NASAL DAILY
Qty: 16 G | Refills: 5 | Status: SHIPPED | OUTPATIENT
Start: 2024-04-10

## 2024-04-10 NOTE — ASSESSMENT & PLAN NOTE
Asthma has been calm since we last spoke in January.  Has not had any respiratory infections of importance.  Rarely takes albuterol.  For the most part has been taking the Advair HFA twice daily but sometimes once daily.  Did have an episode of anxiety associated with dyspnea which seemed to improve with albuterol yesterday.  Advised to continue with the same therapy.  Now having some allergy symptoms as the spring advances.  See comments under allergic rhinitis.

## 2024-04-10 NOTE — PROGRESS NOTES
Answers submitted by the patient for this visit:  Pulmonology Questionnaire (Submitted on 4/3/2024)  Chief Complaint: Primary symptoms  Do you have chest tightness?: Yes  Do you have shortness of breath?: Yes  Do you have a wet cough?: Yes  When did you first notice your symptoms?: in the past 7 days  How often do your symptoms occur?: intermittently  Since you first noticed this problem, how has it changed?: gradually improving  Have you had a change in appetite?: Yes  Do you have chest pain?: Yes  Do you have shortness of breath that occurs with effort or exertion?: Yes  Do you have ear congestion?: No  Do you have ear pain?: No  Do you have a fever?: No  Do you have headaches?: No  Do you have heartburn?: No  Do you have fatigue?: Yes  Do you have muscle pain?: Yes  Do you have nasal congestion?: Yes  Do you have shortness of breath when lying flat?: No  Do you have shortness of breath when you wake up?: No  Do you have post-nasal drip?: No  Do you have a runny nose?: No  Do you have sneezing?: Yes  Do you have a sore throat?: No  Do you have sweats?: No  Do you have trouble swallowing?: No  Have you experienced weight loss?: No  Which of the following makes your symptoms worse?: animal exposure, change in weather, climbing stairs, emotional stress, pollen, URI  Which of the following makes your symptoms better?: oral steroids, OTC inhaler, prescription cough suppressant, rest, steroid inhaler  Risk factors for lung disease: animal exposure  Assessment/Plan:    Mild intermittent asthma without complication  Asthma has been calm since we last spoke in January.  Has not had any respiratory infections of importance.  Rarely takes albuterol.  For the most part has been taking the Advair HFA twice daily but sometimes once daily.  Did have an episode of anxiety associated with dyspnea which seemed to improve with albuterol yesterday.  Advised to continue with the same therapy.  Now having some allergy symptoms as the  spring advances.  See comments under allergic rhinitis.    Seasonal allergic rhinitis due to pollen  Allergic rhinitis and conjunctivitis symptoms starting with the onset of spring.  I advised regular use of nasal fluticasone with an alternative of nasal azelastine.  Can also take oral nonsedating antihistamine available in any pharmacy as needed.    Depression with anxiety  Increase in anxiety attacks related to interactions with her children.  I advised her to meet with Dr. Bryson for advice of either medication or counseling.     Diagnoses and all orders for this visit:    Seasonal allergic rhinitis due to pollen  -     fluticasone (FLONASE) 50 mcg/act nasal spray; 2 sprays into each nostril daily    Mild intermittent asthma without complication    Depression with anxiety          Subjective:      Patient ID: Porsha Lindquist is a 33 y.o. female.    This patient returns for reevaluation of allergic asthma.  Previously she was not able to get a hold of the prescribed medication and that was the reason for her increased symptoms.  Since being on Advair HFA on a regular basis she has been relatively asymptomatic.  Occasionally misses an evening dose of this drug.  Rarely needs to use albuterol.  Did have an episode yesterday which may have been anxiety related but seem to improve also with albuterol.  States that she is having increasing anxiety problems related to interactions with her children.  I advised that she meet with her primary care physician regarding this to see what solution would be available.  Is having increasing nasal and eye symptoms relative to allergies.  I prescribed for her nasal fluticasone and we talked about the alternative of azelastine.  Could also take oral nonsedating antihistamines available in every pharmacy.    primary symptoms  Associated symptoms include chest pain, congestion, headaches and myalgias. Pertinent negatives include no coughing, fever or sore throat.       The  "following portions of the patient's history were reviewed and updated as appropriate: allergies, current medications, past family history, past medical history, past social history, past surgical history and problem list.    Review of Systems   Constitutional:  Positive for appetite change. Negative for activity change, fever and unexpected weight change.   HENT:  Positive for congestion and sneezing. Negative for ear pain, postnasal drip, rhinorrhea, sore throat and trouble swallowing.    Eyes:  Positive for redness and itching.   Respiratory:  Positive for shortness of breath. Negative for cough and wheezing.    Cardiovascular:  Positive for chest pain. Negative for palpitations and leg swelling.   Musculoskeletal:  Positive for myalgias.   Allergic/Immunologic: Positive for environmental allergies.   Neurological:  Positive for headaches.        Had a migraine attack this morning   Psychiatric/Behavioral:  The patient is nervous/anxious.          Objective:      /78 (BP Location: Left arm, Patient Position: Sitting, Cuff Size: Standard)   Pulse 75   Temp 98.7 °F (37.1 °C) (Tympanic)   Ht 5' 3\" (1.6 m)   Wt 65.3 kg (144 lb)   SpO2 98%   BMI 25.51 kg/m²          Physical Exam  Vitals reviewed.   Constitutional:       General: She is not in acute distress.     Appearance: She is normal weight. She is not ill-appearing.   Cardiovascular:      Rate and Rhythm: Normal rate and regular rhythm.      Pulses:           Radial pulses are 2+ on the right side and 2+ on the left side.      Heart sounds: Normal heart sounds.   Pulmonary:      Effort: Pulmonary effort is normal.      Breath sounds: Normal breath sounds. No wheezing or rhonchi.   Musculoskeletal:         General: No swelling.      Cervical back: No rigidity or tenderness.      Right lower leg: No edema.      Left lower leg: No edema.   Lymphadenopathy:      Cervical: No cervical adenopathy.   Skin:     General: Skin is warm and dry.   Neurological: "      Mental Status: She is alert and oriented to person, place, and time.   Psychiatric:         Mood and Affect: Mood normal.         Behavior: Behavior normal.

## 2024-04-10 NOTE — ASSESSMENT & PLAN NOTE
Allergic rhinitis and conjunctivitis symptoms starting with the onset of spring.  I advised regular use of nasal fluticasone with an alternative of nasal azelastine.  Can also take oral nonsedating antihistamine available in any pharmacy as needed.

## 2024-04-10 NOTE — ASSESSMENT & PLAN NOTE
Increase in anxiety attacks related to interactions with her children.  I advised her to meet with Dr. Bryson for advice of either medication or counseling.

## 2024-04-16 ENCOUNTER — HOSPITAL ENCOUNTER (EMERGENCY)
Facility: HOSPITAL | Age: 34
Discharge: HOME | End: 2024-04-16
Attending: EMERGENCY MEDICINE
Payer: MEDICARE

## 2024-04-16 VITALS
OXYGEN SATURATION: 99 % | TEMPERATURE: 98.1 F | SYSTOLIC BLOOD PRESSURE: 112 MMHG | RESPIRATION RATE: 18 BRPM | DIASTOLIC BLOOD PRESSURE: 77 MMHG | HEART RATE: 88 BPM

## 2024-04-16 DIAGNOSIS — J06.9 URI (UPPER RESPIRATORY INFECTION): Primary | ICD-10-CM

## 2024-04-16 LAB
FLUAV RNA RESP QL NAA+PROBE: NEGATIVE
FLUBV RNA RESP QL NAA+PROBE: NEGATIVE
RSV RNA RESP QL NAA+PROBE: NEGATIVE
SARS-COV-2 RNA RESP QL NAA+PROBE: NEGATIVE

## 2024-04-16 PROCEDURE — 0241U HB NFCT DS VIR RESP RNA 4 TRGT: CPT | Performed by: EMERGENCY MEDICINE

## 2024-04-16 PROCEDURE — 99283 EMERGENCY DEPT VISIT LOW MDM: CPT

## 2024-04-16 PROCEDURE — 99284 EMERGENCY DEPT VISIT MOD MDM: CPT | Performed by: EMERGENCY MEDICINE

## 2024-04-16 RX ORDER — IBUPROFEN 600 MG/1
600 TABLET ORAL ONCE
Status: COMPLETED | OUTPATIENT
Start: 2024-04-16 | End: 2024-04-16

## 2024-04-16 RX ADMIN — IBUPROFEN 600 MG: 600 TABLET, FILM COATED ORAL at 21:46

## 2024-04-17 ENCOUNTER — VBI (OUTPATIENT)
Dept: FAMILY MEDICINE CLINIC | Facility: CLINIC | Age: 34
End: 2024-04-17

## 2024-04-17 NOTE — ED PROVIDER NOTES
HPI: Patient is a 33 y.o. female who presents with 2 days of cough, sore throat, and congestion  which the patient describes as mild. The patient has had contact with people with similar symptoms.  The patient has not taken any medication.    Allergies   Allergen Reactions    Phenazopyridine Anaphylaxis and Hives    Other      Dove Deodorant         Past Medical History:   Diagnosis Date    Abnormal Pap smear of cervix     had cryosurgery     Anxiety     Asthma     Carrier of group B Streptococcus     GERD (gastroesophageal reflux disease)     High blood pressure     during pregnancy     History of UTI     Hx of absence seizures     once(per sanjiv)    Hx of seasonal allergies     Hypertension     Migraine headache     Muscle spasm of back     Papanicolaou smear for cervical cancer screening 2017    Pap neg 2016, pap- neg/GC/CT NEG    Postpartum depression     Preeclampsia     Psychiatric disorder     Umbilical hernia 2018    Urinary tract infection     Varicose veins of lower extremity       Past Surgical History:   Procedure Laterality Date    GYNECOLOGIC CRYOSURGERY      HERNIA REPAIR      2 years ago    INSERTION OF INTRAUTERINE DEVICE (IUD)      OTHER SURGICAL HISTORY  2015    cryosurgery     WISDOM TOOTH EXTRACTION       Social History     Tobacco Use    Smoking status: Former     Current packs/day: 0.00     Average packs/day: 1 pack/day for 15.0 years (15.0 ttl pk-yrs)     Types: Cigarettes     Start date: 2000     Quit date: 2015     Years since quittin.2    Smokeless tobacco: Never    Tobacco comments:     Has smoked since age:   10   Vaping Use    Vaping status: Never Used   Substance Use Topics    Alcohol use: Not Currently     Comment: stopped drinking in     Drug use: Never     Comment: per Gyn notes, Marijuana HX age 13.        Nursing notes reviewed  Physical Exam:  ED Triage Vitals   Temperature Pulse Respirations Blood Pressure SpO2   24 2130 24 2129  04/16/24 2129 04/16/24 2130 04/16/24 2129   98.1 °F (36.7 °C) 88 18 112/77 99 %      Temp src Heart Rate Source Patient Position - Orthostatic VS BP Location FiO2 (%)   -- 04/16/24 2129 -- -- --    Monitor         Pain Score       04/16/24 2146       5           ROS: Positive for As stated above in HPI, the remainder of a 10 organ system ROS was otherwise unremarkable.  General: awake, alert, no acute distress    Head: normocephalic, atraumatic    Eyes: no scleral icterus  Ears: external ears normal, hearing grossly intact  Nose: external exam grossly normal, negative nasal discharge  Throat: No swelling, erythema or exudates.  Uvula midline.  Neck: symmetric, No JVD noted, trachea midline  Pulmonary: no respiratory distress, no tachypnea noted, CTAB  Cardiovascular: appears well perfused, RRR  Abdomen: no distention noted  Musculoskeletal: no deformities noted, tone normal  Neuro: grossly non-focal  Psych: mood and affect appropriate    Medical Decision Making  The patient is stable and has a history and physical exam consistent with a viral illness. COVID19/FLU/RSV testing has been performed.  I considered the patient's other medical conditions as applicable/noted above in my medical decision making.  The patient is stable upon discharge. The plan is for supportive care at home.    The patient (and any family present) verbalized understanding of the discharge instructions and warnings that would necessitate return to the Emergency Department.  All questions were answered prior to discharge.    Risk  Prescription drug management.          Medications   ibuprofen (MOTRIN) tablet 600 mg (600 mg Oral Given 4/16/24 2146)     Final diagnoses:   URI (upper respiratory infection)     Time reflects when diagnosis was documented in both MDM as applicable and the Disposition within this note       Time User Action Codes Description Comment    4/16/2024 10:21 PM Clinton Purdy Add [J06.9] URI (upper respiratory infection)            ED Disposition       ED Disposition   Discharge    Condition   Stable    Date/Time   Tue Apr 16, 2024 10:21 PM    Comment   Porsha Lindquist discharge to home/self care.                   Follow-up Information       Follow up With Specialties Details Why Contact Info Additional Information    Hien Bryson MD Family Medicine Schedule an appointment as soon as possible for a visit   [address]  [place] [address] [phone]       [facility] Emergency Medicine Go to  If symptoms worsen [address] [address] [phone] [facility], [address] [address]          Discharge Medication List as of 4/16/2024 10:21 PM        CONTINUE these medications which have NOT CHANGED    Details   albuterol (PROVENTIL HFA,VENTOLIN HFA) 90 mcg/act inhaler INHALE 1 PUFF BY MOUTH EVERY 6 HOURS AS NEEDED FOR WHEEZE, Normal      Cholecalciferol (Vitamin D3) 50 MCG (2000 UT) capsule TAKE 1 CAPSULE (2,000 UNITS TOTAL) BY MOUTH IN THE MORNING, Starting Mon 9/18/2023, Normal      ergocalciferol (VITAMIN D2) 50,000 units TAKE 1 CAPSULE BY MOUTH ONE TIME PER WEEK, Normal      fluticasone (FLONASE) 50 mcg/act nasal spray 2 sprays into each nostril daily, Starting Wed 4/10/2024, Normal      fluticasone-salmeterol (Advair HFA) 230-21 MCG/ACT inhaler Inhale 2 puffs 2 (two) times a day Rinse mouth after use., Starting Wed 1/10/2024, Normal      medroxyPROGESTERone (DEPO-PROVERA) 150 mg/mL injection Inject 1 mL (150 mg total) into a muscle every 3 (three) months, Starting Fri 11/17/2023, Normal      Nerve Stimulator (STANDARD TENS) OMER by Does not apply route 4 (four) times a day, Starting Wed 5/20/2020, Normal      pantoprazole (PROTONIX) 40 mg tablet Take 1 tablet (40 mg total) by mouth daily, Starting Tue 8/15/2023, Normal           No discharge procedures on file.    Electronically Signed by        Clinton Purdy MD  04/17/24 8507

## 2024-04-17 NOTE — LETTER
[facility] PRIMARY [facility]  [address]  [address]  [facility] [id number] [phone]    Date: 04/19/24    Porsha Lindquist  [address] [facility] [address]    Dear Porsha:                                                                                                                                Thank you for choosing [facility] emergency department for care.  Your primary care provider wants to make sure that your ongoing medical care is being addressed. If you require follow up care as a result of your emergency department visit, there are a few things the practice would like you to know.                As part of the network's continuing commitment to caring for our patients, we have added more same day appointments and have extended office hours to meet your medical needs. After hours, on-call physicians are available via your primary care provider's main office line.               We encourage you to contact our office prior to seeking treatment to discuss your symptoms with the medical staff.  Together, we can determine the correct course of action.  A majority of non-emergent conditions such as: common cold, flu-like symptoms, fevers, strains/sprains, dislocations, minor burns, cuts and animal bites can be treated at [facility] facilities. Diagnostic testing is available at some sites.               Of course, if you are experiencing a life threatening medical emergency call 911 or proceed directly to the nearest emergency room.    Your nearest [facility] facility is conveniently located at:    [address] [address]  [phone]  SKIP THE WAIT  Conveniently offered at most [facility] locations  [place] your spot online at [email or web address] or on the [facility]    Sincerely,    [facility]  Dept: [phone]

## 2024-04-17 NOTE — TELEPHONE ENCOUNTER
04/17/24 10:47 AM    Patient contacted post ED visit, first outreach attempt made. Message was left for patient to return a call to the VBI Department at [facility]: Phone [phone].    Thank you.  [place]  [facility]

## 2024-04-18 DIAGNOSIS — E55.9 VITAMIN D DEFICIENCY: ICD-10-CM

## 2024-04-18 NOTE — TELEPHONE ENCOUNTER
04/18/24 12:27 PM    Patient contacted post ED visit, second outreach attempt made. Message was left for patient to return a call to the VBI Department at [facility]: Phone [phone].    Thank you.  [place]  [facility]

## 2024-04-19 RX ORDER — ERGOCALCIFEROL 1.25 MG/1
CAPSULE ORAL
Qty: 4 CAPSULE | Refills: 0 | Status: SHIPPED | OUTPATIENT
Start: 2024-04-19

## 2024-04-19 NOTE — TELEPHONE ENCOUNTER
04/19/24 10:24 AM    Patient contacted post ED visit, third outreach attempt made. Message was left for patient to return a call to either the VBI Department at [facility]: Phone [phone] or the PCP office.     Thank you.  [place]  [facility]

## 2024-04-19 NOTE — TELEPHONE ENCOUNTER
04/19/24 10:26 AM    Patient contacted post ED visit, phone outreaches were unsuccessful and a MyChart letter has been sent to the patient as follow-up.    Thank you.  [place]  [facility]

## 2024-05-02 ENCOUNTER — TELEPHONE (OUTPATIENT)
Age: 34
End: 2024-05-02

## 2024-05-02 NOTE — TELEPHONE ENCOUNTER
Pt calling in stating that she has been struggling with anxiety as of recently. Pt states she feels stressed with work/school. Pt has f/u appt on 5/20. Offered sooner appt but none available. Pt asking is anything can be done in the meantime. Please advise.

## 2024-05-03 DIAGNOSIS — F32.A DEPRESSION, UNSPECIFIED DEPRESSION TYPE: Primary | ICD-10-CM

## 2024-05-03 RX ORDER — SERTRALINE HYDROCHLORIDE 25 MG/1
25 TABLET, FILM COATED ORAL DAILY
Qty: 30 TABLET | Refills: 5 | Status: SHIPPED | OUTPATIENT
Start: 2024-05-03 | End: 2024-10-30

## 2024-05-10 DIAGNOSIS — J30.1 SEASONAL ALLERGIC RHINITIS DUE TO POLLEN: ICD-10-CM

## 2024-05-10 RX ORDER — FLUTICASONE PROPIONATE 50 MCG
SPRAY, SUSPENSION (ML) NASAL
Qty: 48 ML | Refills: 1 | Status: SHIPPED | OUTPATIENT
Start: 2024-05-10

## 2024-05-20 ENCOUNTER — OFFICE VISIT (OUTPATIENT)
Dept: FAMILY MEDICINE CLINIC | Facility: CLINIC | Age: 34
End: 2024-05-20
Payer: MEDICARE

## 2024-05-20 VITALS
OXYGEN SATURATION: 98 % | BODY MASS INDEX: 25.55 KG/M2 | HEART RATE: 92 BPM | TEMPERATURE: 98.4 F | SYSTOLIC BLOOD PRESSURE: 106 MMHG | WEIGHT: 144.2 LBS | DIASTOLIC BLOOD PRESSURE: 64 MMHG | HEIGHT: 63 IN

## 2024-05-20 DIAGNOSIS — F41.8 DEPRESSION WITH ANXIETY: Primary | ICD-10-CM

## 2024-05-20 DIAGNOSIS — E55.9 VITAMIN D DEFICIENCY: ICD-10-CM

## 2024-05-20 DIAGNOSIS — R53.83 OTHER FATIGUE: ICD-10-CM

## 2024-05-20 DIAGNOSIS — K21.9 GASTROESOPHAGEAL REFLUX DISEASE WITHOUT ESOPHAGITIS: ICD-10-CM

## 2024-05-20 DIAGNOSIS — D50.8 IRON DEFICIENCY ANEMIA SECONDARY TO INADEQUATE DIETARY IRON INTAKE: ICD-10-CM

## 2024-05-20 DIAGNOSIS — J45.20 MILD INTERMITTENT ASTHMA WITHOUT COMPLICATION: ICD-10-CM

## 2024-05-20 PROCEDURE — 99214 OFFICE O/P EST MOD 30 MIN: CPT | Performed by: FAMILY MEDICINE

## 2024-05-20 RX ORDER — FERROUS SULFATE 324(65)MG
324 TABLET, DELAYED RELEASE (ENTERIC COATED) ORAL
Qty: 90 TABLET | Refills: 0 | Status: SHIPPED | OUTPATIENT
Start: 2024-05-20 | End: 2024-08-18

## 2024-05-20 RX ORDER — ACETAMINOPHEN 160 MG
2000 TABLET,DISINTEGRATING ORAL EVERY MORNING
Qty: 90 CAPSULE | Refills: 0 | Status: SHIPPED | OUTPATIENT
Start: 2024-05-20 | End: 2024-08-18

## 2024-05-20 NOTE — PROGRESS NOTES
Assessment/Plan:as  below         Problem List Items Addressed This Visit          Respiratory    Mild intermittent asthma without complication     Much  stable  discussed  precautions            Digestive    Gastroesophageal reflux disease without esophagitis     Much  stable  now   discussed  diet            Behavioral Health    Depression with anxiety - Primary     She  is  here  to  follow up on her  depression and  anxiety  she  had  a history of  having a  panic  attack  last month  she  was started  on  zoloft 25 mg  she  is  feeling  so  much  better  she  says  no  anxiety  depression  or  panic  attacks   no  negative  thoughts  discussed   continuing the  current  management  return  parameters  discussed              Blood    Iron deficiency anemia secondary to inadequate dietary iron intake     Much  stable  asked  her to  take  iron pills            Other    Other fatigue     Much  stable          Other Visit Diagnoses       Vitamin D deficiency        Relevant Medications    Cholecalciferol (Vitamin D3) 50 MCG (2000 UT) capsule              Subjective:      Patient ID: Porsha Lindquist is a 33 y.o. female.    Anxiety  Patient reports no chest pain, dizziness, nervous/anxious behavior, palpitations, shortness of breath or suicidal ideas.       - came  in  to  follow up on  her  multiple  health  conditions  listed  above  her  anxiety  and  depression  is  much  stable on meds  now  she  is in  care  of of  podiatrist  for  her  nail  fungus   her  asthma  and  gerd  is  much  stable  now     The following portions of the patient's history were reviewed and updated as appropriate:   Past Medical History:  She has a past medical history of Abnormal Pap smear of cervix, Anxiety, Asthma, Carrier of group B Streptococcus, GERD (gastroesophageal reflux disease), High blood pressure, History of UTI, absence seizures, seasonal allergies, Hypertension, Migraine headache, Muscle spasm of back,  Papanicolaou smear for cervical cancer screening (06/2017), Postpartum depression, Preeclampsia, Psychiatric disorder, Umbilical hernia (11/2018), Urinary tract infection, and Varicose veins of lower extremity.,  _______________________________________________________________________  Medical Problems:  does not have any pertinent problems on file.,  _______________________________________________________________________  Past Surgical History:   has a past surgical history that includes Hernia repair; [place] tooth extraction; Other surgical history (11/18/2015); INSERTION OF INTRAUTERINE DEVICE (IUD); and Gynecologic cryosurgery.,  _______________________________________________________________________  Family History:  family history includes Arthritis in her family and mother; Asthma in her maternal aunt, maternal grandmother, mother, sister, and son; Bipolar disorder in her maternal aunt; COPD in her maternal grandmother; Cancer in her cousin; Diabetes in her cousin, maternal grandmother, mother, and other; Fibromyalgia in her family; GI problems in her family; Heart attack in her maternal grandfather and maternal grandmother; Hypertension in her mother; Hyperthyroidism in her maternal aunt; Lymphoma in her maternal grandmother; Other in her maternal grandmother; Sleep apnea in her mother; Thyroid disease in her maternal aunt; Varicose Veins in her paternal aunt and paternal grandmother.,  _______________________________________________________________________  Social History:   reports that she quit smoking about 9 years ago. Her smoking use included cigarettes. She started smoking about 24 years ago. She has a 15 pack-year smoking history. She has never used smokeless tobacco. She reports that she does not currently use alcohol. She reports that she does not use drugs.,  _______________________________________________________________________  Allergies:  is allergic to phenazopyridine and  other..  _______________________________________________________________________  Current Outpatient Medications   Medication Sig Dispense Refill    albuterol (PROVENTIL HFA,VENTOLIN HFA) 90 mcg/act inhaler INHALE 1 PUFF BY MOUTH EVERY 6 HOURS AS NEEDED FOR WHEEZE 8.5 g 1    Cholecalciferol (Vitamin D3) 50 MCG (2000 UT) capsule Take 1 capsule (2,000 Units total) by mouth every morning 90 capsule 0    fluticasone (FLONASE) 50 mcg/act nasal spray SPRAY 2 SPRAYS INTO EACH NOSTRIL EVERY DAY 48 mL 1    fluticasone-salmeterol (Advair HFA) 230-21 MCG/ACT inhaler Inhale 2 puffs 2 (two) times a day Rinse mouth after use. 12 g 5    medroxyPROGESTERone (DEPO-PROVERA) 150 mg/mL injection Inject 1 mL (150 mg total) into a muscle every 3 (three) months 1 mL 3    pantoprazole (PROTONIX) 40 mg tablet Take 1 tablet (40 mg total) by mouth daily 90 tablet 2    sertraline (ZOLOFT) 25 mg tablet Take 1 tablet (25 mg total) by mouth daily 30 tablet 5    Nerve Stimulator (STANDARD TENS) OMER by Does not apply route 4 (four) times a day (Patient not taking: Reported on 4/10/2024) 1 Device 0     No current facility-administered medications for this visit.     _______________________________________________________________________  Review of Systems   Constitutional:  Negative for fatigue and fever.   HENT:  Negative for congestion, sinus pressure and sinus pain.    Eyes:  Negative for discharge and redness.   Respiratory:  Negative for cough, chest tightness and shortness of breath.         Asthma   Cardiovascular:  Negative for chest pain, palpitations and leg swelling.   Gastrointestinal:  Negative for abdominal distention and abdominal pain.        Gerd    Endocrine: Negative for cold intolerance, heat intolerance and polydipsia.   Genitourinary:  Negative for dysuria and flank pain.   Musculoskeletal:  Negative for arthralgias and back pain.   Skin:  Negative for rash.   Allergic/Immunologic: Negative for environmental allergies and food  "allergies.   Neurological:  Negative for dizziness and headaches.   Psychiatric/Behavioral:  Negative for self-injury, sleep disturbance and suicidal ideas. The patient is not nervous/anxious.         Depression  and  anxiety  stable         Objective:  Vitals:    05/20/24 1813   BP: 106/64   BP Location: Left arm   Patient Position: Sitting   Cuff Size: Standard   Pulse: 92   Temp: 98.4 °F (36.9 °C)   TempSrc: Temporal   SpO2: 98%   Weight: 65.4 kg (144 lb 3.2 oz)   Height: 5' 3\" (1.6 m)     Body mass index is 25.54 kg/m².     Physical Exam  Vitals and nursing note reviewed.   Constitutional:       General: She is not in acute distress.     Appearance: Normal appearance. She is not ill-appearing or toxic-appearing.   HENT:      Head: Normocephalic.      Nose: Nose normal. No congestion.      Mouth/Throat:      Mouth: Mucous membranes are moist.      Pharynx: No oropharyngeal exudate or posterior oropharyngeal erythema.   Eyes:      Extraocular Movements: Extraocular movements intact.      Conjunctiva/sclera: Conjunctivae normal.      Pupils: Pupils are equal, round, and reactive to light.   Cardiovascular:      Rate and Rhythm: Normal rate and regular rhythm.      Pulses: Normal pulses.      Heart sounds: Normal heart sounds. No murmur heard.     No gallop.   Pulmonary:      Effort: Pulmonary effort is normal. No respiratory distress.      Breath sounds: Normal breath sounds. No wheezing or rhonchi.   Abdominal:      General: There is no distension.      Palpations: Abdomen is soft. There is no mass.      Tenderness: There is no abdominal tenderness. There is no guarding.   Musculoskeletal:      Cervical back: Normal range of motion and neck supple. No rigidity or tenderness.      Right lower leg: No edema.      Left lower leg: No edema.   Lymphadenopathy:      Cervical: No cervical adenopathy.   Skin:     Findings: No erythema or rash.   Neurological:      General: No focal deficit present.      Mental Status: She " is alert and oriented to person, place, and time.   Psychiatric:         Mood and Affect: Mood normal.         Behavior: Behavior normal.

## 2024-05-20 NOTE — ASSESSMENT & PLAN NOTE
She  is  here  to  follow up on her  depression and  anxiety  she  had  a history of  having a  panic  attack  last month  she  was started  on  zoloft 25 mg  she  is  feeling  so  much  better  she  says  no  anxiety  depression  or  panic  attacks   no  negative  thoughts  discussed   continuing the  current  management  return  parameters  discussed

## 2024-05-29 DIAGNOSIS — F32.A DEPRESSION, UNSPECIFIED DEPRESSION TYPE: ICD-10-CM

## 2024-05-30 RX ORDER — SERTRALINE HYDROCHLORIDE 25 MG/1
25 TABLET, FILM COATED ORAL DAILY
Qty: 90 TABLET | Refills: 1 | Status: SHIPPED | OUTPATIENT
Start: 2024-05-30 | End: 2024-11-26

## 2024-06-14 DIAGNOSIS — Z30.013 ENCOUNTER FOR INITIAL PRESCRIPTION OF INJECTABLE CONTRACEPTIVE: ICD-10-CM

## 2024-06-18 ENCOUNTER — TELEPHONE (OUTPATIENT)
Age: 34
End: 2024-06-18

## 2024-06-18 NOTE — TELEPHONE ENCOUNTER
Pt called to say since starting sertraline 25mg she has been feeling good until three days ago. She started with panic attacks again and she feels overstimulated during the day sometimes. The pt currently takes the sertraline at night because it makes her tired. Does she need an increase? Another appt? Pt was feeling ok when we spoke and she declined a nurse triage. Please advise.

## 2024-06-19 RX ORDER — MEDROXYPROGESTERONE ACETATE 150 MG/ML
150 INJECTION, SUSPENSION INTRAMUSCULAR
Qty: 1 ML | Refills: 1 | Status: SHIPPED | OUTPATIENT
Start: 2024-06-19

## 2024-06-19 NOTE — TELEPHONE ENCOUNTER
Called pt --  lvm informing of PCP message and advised call back next wk w update as to how medication at new dosage is working.

## 2024-06-21 ENCOUNTER — CLINICAL SUPPORT (OUTPATIENT)
Dept: OBGYN CLINIC | Facility: CLINIC | Age: 34
End: 2024-06-21
Payer: MEDICARE

## 2024-06-21 VITALS — BODY MASS INDEX: 25.51 KG/M2 | WEIGHT: 144 LBS

## 2024-06-21 DIAGNOSIS — Z30.42 ENCOUNTER FOR SURVEILLANCE OF INJECTABLE CONTRACEPTIVE: Primary | ICD-10-CM

## 2024-06-21 PROCEDURE — 96372 THER/PROPH/DIAG INJ SC/IM: CPT

## 2024-06-21 RX ORDER — MEDROXYPROGESTERONE ACETATE 150 MG/ML
150 INJECTION, SUSPENSION INTRAMUSCULAR ONCE
Status: COMPLETED | OUTPATIENT
Start: 2024-06-21 | End: 2024-06-21

## 2024-06-21 RX ADMIN — MEDROXYPROGESTERONE ACETATE 150 MG: 150 INJECTION, SUSPENSION INTRAMUSCULAR at 15:55

## 2024-07-08 ENCOUNTER — OFFICE VISIT (OUTPATIENT)
Dept: DERMATOLOGY | Facility: CLINIC | Age: 34
End: 2024-07-08
Payer: MEDICARE

## 2024-07-08 VITALS — BODY MASS INDEX: 25.34 KG/M2 | TEMPERATURE: 97.1 F | HEIGHT: 63 IN | WEIGHT: 143 LBS

## 2024-07-08 DIAGNOSIS — L70.0 ACNE VULGARIS: ICD-10-CM

## 2024-07-08 DIAGNOSIS — Z12.83 SCREENING FOR SKIN CANCER: Primary | ICD-10-CM

## 2024-07-08 DIAGNOSIS — D48.9 NEOPLASM OF UNCERTAIN BEHAVIOR: ICD-10-CM

## 2024-07-08 DIAGNOSIS — D22.9 MULTIPLE MELANOCYTIC NEVI: ICD-10-CM

## 2024-07-08 DIAGNOSIS — L81.4 LENTIGINES: ICD-10-CM

## 2024-07-08 PROCEDURE — 88342 IMHCHEM/IMCYTCHM 1ST ANTB: CPT | Performed by: PATHOLOGY

## 2024-07-08 PROCEDURE — 11102 TANGNTL BX SKIN SINGLE LES: CPT | Performed by: NURSE PRACTITIONER

## 2024-07-08 PROCEDURE — 88305 TISSUE EXAM BY PATHOLOGIST: CPT | Performed by: PATHOLOGY

## 2024-07-08 PROCEDURE — 99214 OFFICE O/P EST MOD 30 MIN: CPT | Performed by: NURSE PRACTITIONER

## 2024-07-08 PROCEDURE — 11103 TANGNTL BX SKIN EA SEP/ADDL: CPT | Performed by: NURSE PRACTITIONER

## 2024-07-08 NOTE — PROGRESS NOTES
"[facility] Dermatology Clinic Note     Patient Name: Porsha Lindquist  Encounter Date: 7/8/2024     Have you been cared for by a [facility] Dermatologist in the last 3 years and, if so, which description applies to you?    Yes.  I have been here within the last 3 years, and my medical history has NOT changed since that time.  I am FEMALE/of child-bearing potential.    REVIEW OF SYSTEMS:  Have you recently had or currently have any of the following? No changes in my recent health.   PAST MEDICAL HISTORY:  Have you personally ever had or currently have any of the following?  If \"YES,\" then please provide more detail. No changes in my medical history.   HISTORY OF IMMUNOSUPPRESSION: Do you have a history of any of the following:  Systemic Immunosuppression such as Diabetes, Biologic or Immunotherapy, Chemotherapy, Organ Transplantation, Bone Marrow Transplantation?  No     Answering \"YES\" requires the addition of the dotphrase \"IMMUNOSUPPRESSED\" as the first diagnosis of the patient's visit.   FAMILY HISTORY:  Any \"first degree relatives\" (parent, brother, sister, or child) with the following?    No changes in my family's known health.   PATIENT EXPERIENCE:    Do you want the Dermatologist to perform a COMPLETE skin exam today including a clinical examination under the \"bra and underwear\" areas?  NO, did not examine under the bra or underwear  If necessary, do we have your permission to call and leave a detailed message on your Preferred Phone number that includes your specific medical information?  Yes      Allergies   Allergen Reactions    Phenazopyridine Anaphylaxis and Hives    Other      Dove Deodorant        Current Outpatient Medications:     albuterol (PROVENTIL HFA,VENTOLIN HFA) 90 mcg/act inhaler, INHALE 1 PUFF BY MOUTH EVERY 6 HOURS AS NEEDED FOR WHEEZE, Disp: 8.5 g, Rfl: 1    Cholecalciferol (Vitamin D3) 50 MCG (2000 UT) capsule, Take 1 capsule (2,000 Units total) by mouth every morning, Disp: 90 " "capsule, Rfl: 0    ferrous sulfate 324 (65 Fe) mg, Take 1 tablet (324 mg total) by mouth daily before breakfast, Disp: 90 tablet, Rfl: 0    fluticasone (FLONASE) 50 mcg/act nasal spray, SPRAY 2 SPRAYS INTO EACH NOSTRIL EVERY DAY, Disp: 48 mL, Rfl: 1    fluticasone-salmeterol (Advair HFA) 230-21 MCG/ACT inhaler, Inhale 2 puffs 2 (two) times a day Rinse mouth after use., Disp: 12 g, Rfl: 5    medroxyPROGESTERone (DEPO-PROVERA) 150 mg/mL injection, INJECT 1 ML (150 MG TOTAL) INTO A MUSCLE EVERY 3 (THREE) MONTHS, Disp: 1 mL, Rfl: 1    Nerve Stimulator (STANDARD TENS) OMER, by Does not apply route 4 (four) times a day (Patient not taking: Reported on 4/10/2024), Disp: 1 Device, Rfl: 0    pantoprazole (PROTONIX) 40 mg tablet, Take 1 tablet (40 mg total) by mouth daily, Disp: 90 tablet, Rfl: 2    sertraline (ZOLOFT) 25 mg tablet, TAKE 1 TABLET (25 MG TOTAL) BY MOUTH DAILY., Disp: 90 tablet, Rfl: 1          Whom besides the patient is providing clinical information about today's encounter?   NO ADDITIONAL HISTORIAN (patient alone provided history)    Physical Exam and Assessment/Plan by Diagnosis:      NEOPLASM OF UNCERTAIN BEHAVIOR OF SKIN    Physical Exam:  (Anatomic Location); (Size and Morphological Description); (Differential Diagnosis):  SPECIMEN A; Skin; Shave biopsy; Anatomic Location: LEFT INDEX FINGER, LATERAL ASPECT;   33 y.o. year old  Female with a Morphological Description: 1 mm dark black macule  Differential Diagnosis and/or Specific Clinical Question: rule out atypical nevi      SPECIMEN B; Skin; Shave biopsy; Anatomic Location: RIGHT GREAT TOE; LATERAL ASPECT;   33 y.o. year old  Female with a Morphological Description: 3 mm dark black macule   Differential Diagnosis and/or Specific Clinical Question: rule out atypical nevi           Additional History of Present Condition:  present on exam.     Assessment and Plan:  I have discussed with the patient that a sample of skin via a \"skin biopsy” would be " potentially helpful to further make a specific diagnosis under the microscope.  Based on a thorough discussion of this condition and the management approach to it (including a comprehensive discussion of the known risks, side effects and potential benefits of treatment), the patient (family) agrees to implement the following specific plan:    Procedure:  Skin Biopsy.  After a thorough discussion of treatment options and risk/benefits/alternatives (including but not limited to local pain, scarring, dyspigmentation, blistering, possible superinfection, and inability to confirm a diagnosis via histopathology), verbal and written consent were obtained and portion of the rash was biopsied for tissue sample.  See below for consent that was obtained from patient and subsequent Procedure Note.    PROCEDURE TANGENTIAL (SHAVE) BIOPSY NOTE:    Performing Physician: KOURTNEY Arvizu  Anatomic Location; Clinical Description with size (cm); Pre-Op Diagnosis:   Specimen A: LEFT INDEX FINGER LATERAL ASPECT; 1 mm dark black macule; RULE out ATYPICAL NEVI   Post-op diagnosis: Same   Local anesthesia: 3:1 1% xylocaine with epi and 1-100,000 buffered   Topical anesthesia: None  Hemostasis: Aluminum chloride     After obtaining informed consent  at which time there was a discussion about the purpose of biopsy  and low risks of infection and bleeding.  The area was prepped and draped in the usual fashion. Anesthesia was obtained with 1% lidocaine with epinephrine. A shave biopsy to an appropriate sampling depth was obtained by Shave (Dermablade or 15 blade) The resulting wound was covered with surgical ointment and bandaged appropriately.     The patient tolerated the procedure well without complications and was without signs of functional compromise.      Specimen has been sent for review by Dermatopathology.    Standard post-procedure care has been explained and has been included in written form within the patient's copy of  "Informed Consent.      PROCEDURE TANGENTIAL (SHAVE) BIOPSY NOTE:    Performing Physician: KOURTNEY Arvizu  Anatomic Location; Clinical Description with size (cm); Pre-Op Diagnosis:   Specimen B: RIGHT GREAT TOE; LATERAL ASPECT;  3mm dark black macule; Rule out ATYPICAL NEVI   Post-op diagnosis: Same   Local anesthesia: 3:1 1% xylocaine with epi and 1-100,000 buffered   Topical anesthesia: None  Hemostasis: Aluminum chloride       After obtaining informed consent  at which time there was a discussion about the purpose of biopsy  and low risks of infection and bleeding.  The area was prepped and draped in the usual fashion. Anesthesia was obtained with 1% lidocaine with epinephrine. A shave biopsy to an appropriate sampling depth was obtained by Shave (Dermablade or 15 blade) The resulting wound was covered with surgical ointment and bandaged appropriately.     The patient tolerated the procedure well without complications and was without signs of functional compromise.      Specimen has been sent for review by Dermatopathology.    Standard post-procedure care has been explained and has been included in written form within the patient's copy of Informed Consent.    INFORMED CONSENT DISCUSSION AND POST-OPERATIVE INSTRUCTIONS FOR PATIENT    I.  RATIONALE FOR PROCEDURE  I understand that a skin biopsy allows the Dermatologist to test a lesion or rash under the microscope to obtain a diagnosis.  It usually involves numbing the area with numbing medication and removing a small piece of skin; sometimes the area will be closed with sutures. In this specific procedure, sutures are not usually needed.  If any sutures are placed, then they are usually need to be removed in 2 weeks or less.    I understand that my Dermatologist recommends that a skin \"shave\" biopsy be performed today.  A local anesthetic, similar to the kind that a dentist uses when filling a cavity, will be injected with a very small needle into the " "skin area to be sampled.  The injected skin and tissue underneath \"will go to sleep” and become numb so no pain should be felt afterwards.  An instrument shaped like a tiny \"razor blade\" (shave biopsy instrument) will be used to cut a small piece of tissue and skin from the area so that a sample of tissue can be taken and examined more closely under the microscope.  A slight amount of bleeding will occur, but it will be stopped with direct pressure and a pressure bandage and any other appropriate methods.  I understands that a scar will form where the wound was created.  Surgical ointment will be applied to help protect the wound.  Sutures are not usually needed.    II.  RISKS AND POTENTIAL COMPLICATIONS   I understand the risks and potential complications of a skin biopsy include but are not limited to the following:  Bleeding  Infection  Pain  Scar/keloid  Skin discoloration  Incomplete Removal  Recurrence  Nerve Damage/Numbness/Loss of Function  Allergic Reaction to Anesthesia  Biopsies are diagnostic procedures and based on findings additional treatment or evaluation may be required  Loss or destruction of specimen resulting in no additional findings    My Dermatologist has explained to me the nature of the condition, the nature of the procedure, and the benefits to be reasonably expected compared with alternative approaches.  My Dermatologist has discussed the likelihood of major risks or complications of this procedure including the specific risks listed above, such as bleeding, infection, and scarring/keloid.  I understand that a scar is expected after this procedure.  I understand that my physician cannot predict if the scar will form a \"keloid,\" which extends beyond the borders of the wound that is created.  A keloid is a thick, painful, and bumpy scar.  A keloid can be difficult to treat, as it does not always respond well to therapy, which includes injecting cortisone directly into the keloid every few " "weeks.  While this usually reduces the pain and size of the scar, it does not eliminate it.      I understand that photographs may be taken before and after the procedure.  These will be maintained as part of the medical providers confidential records and may not be made available to me.  I further authorize the medical provider to use the photographs for teaching purposes or to illustrate scientific papers, books, or lectures if in his/her judgment, medical research, education, or science may benefit from its use.    I have had an opportunity to fully inquire about the risks and benefits of this procedure and its alternatives.   I have been given ample time and opportunity to ask questions and to seek a second opinion if I wished to do so.  I acknowledge that there have specifically been no guarantees as to the cosmetic results from the procedure.  I am aware that with any procedure there is always the possibility of an unexpected complication.    III. POST-PROCEDURAL CARE (WHAT YOU WILL NEED TO DO \"AFTER THE BIOPSY\" TO OPTIMIZE HEALING)    Keep the area clean and dry.  Try NOT to remove the bandage or get it wet for the first 24 hours.    Gently clean the area and apply surgical ointment (such as Vaseline petrolatum ointment, which is available \"over the counter\" and not a prescription) to the biopsy site for up to 2 weeks straight.  This acts to protect the wound from the outside world.      Sutures are not usually placed in this procedure.  If any sutures were placed, return for suture removal as instructed (generally 1 week for the face, 2 weeks for the body).      Take Acetaminophen (Tylenol) for discomfort, if no contraindications.  Ibuprofen or aspirin could make bleeding worse.    Call our office immediately for signs of infection: fever, chills, increased redness, warmth, tenderness, discomfort/pain, or pus or foul smell coming from the wound.    WHAT TO DO IF THERE IS ANY BLEEDING?  If a small amount of " "bleeding is noticed, place a clean cloth over the area and apply firm pressure for ten minutes.  Check the wound after 10 minutes of direct pressure.  If bleeding persists, try one more time for an additional 10 minutes of direct pressure on the area.  If the bleeding becomes heavier or does not stop after the second attempt, or if you have any other questions about this procedure, then please call your [facility]'s Dermatologist by calling [phone] (SKIN).     I hereby acknowledge that I have reviewed and verified the site with my Dermatologist and have requested and authorized my Dermatologist to proceed with the procedure.        ACNE VULGARIS    Physical Exam:  Anatomic Locations Involved: Face  Global Assessment: ALMOST CLEAR: A few scattered comedones and a few small inflammatory papules.   Scarring Present? NONE    Additional History of Present Condition:  acne has been present for several years; she has not tried any prescription strength medication in the past. She uses OTC Neutrogena wash and moisturizer.        TODAY'S PLAN:     PRESCRIPTION MANAGEMENT:  Several treatment options were discussed including topical retinoids and their side effects.     Skin Hygiene:      Wash affected areas (face, chest, and back) TWICE A DAY with a mild cleanser such as Cerave.  Use only mild cleansers (hypoallergenic and without fragrances) and fragrance free detergent (not \"unscented\" products which contain a masking agent); we discussed avoiding irritants/fragranced products.  Apply a good oil-free facial moisturizer AT LEAST TWO TIMES A DAY \" such as Cerave.  Minimize the application of oils and cosmetics to the affected skin.  This includes HAIR PRODUCTS such as \"leave in\" conditioners.  Unless the product specifically states that it \"won't cause acne,\" \"won't clog pores,\" and/or \"is non-comedogenic\" then it may actually CAUSE acne.  If you smoke, STOP. Nicotine increases sebum retention and increased scale within " "the follicles, forming comedones (blackheads and whiteheads).  Abrasive treatments such as dermabrasion and spa facials may aggravate inflammatory acne.  Do NOT scratch or pick your acne bumps.  The evidence that diet directly affects acne remains weak.  However, diet does affect your overall health.  Eat plenty of fresh fruit and vegetables.  Avoid protein or amino acid supplements, particularly if they contain leucine. Consider a low-glycemic, low-protein and low-dairy diet.  Be mindful that certain medications may cause of aggravate acne.  Make sure to tell your Dermatologist if you start a new prescription, nutritional supplement, and/or herbal remedy.      MORNING Topical Regimen:      Benzoyl Peroxide Wash:  Apply to skin while in shower/bath; gently lather into affected areas; leave on for 2-3 minutes; then, rinse completely.      EVENING Topical Regimen:      Adapalene 0.1% gel (available \"over-the-counter;\" usually under $35) AT LEAST 1 HOUR BEFORE BEDTIME:  Evenly spread a SINGLE pea-sized amount of this medication over your entire face, avoiding the eyes and corners of the mouth.      SYSTEMIC Strategies:      NONE        MEDICAL DECISION MAKING  Treatment Goal:  Resolution of the CHRONIC condition.       Chronic condition is NOT at treatment goal.  It is progressing along its expected course OR is poorly-controlled.            MULTIPLE MELANOCYTIC NEVI  -Relevant exam: Scattered over the trunk/extremities are homogenously pigmented brown macules and papules. ELM performed and without concerning findings. No outliers unless otherwise noted in today's note  - Exam and clinical history consistent with melanocytic nevi  - Counseled to return to clinic prior to scheduled appointment should any of these lesions change or should any new lesions of concern arise  - Counseled on use of sun protection daily. Reviewed latest FDA sunscreen guidelines, including use of broad spectrum (UVA and UVB blocking) sunscreen " or sun protective clothing with SPF 30-50 every 2-3 hours and reapplied after exposure to water    LENTIGINES  OTHER SKIN CHANGES DUE TO CHRONIC EXPOSURE TO NONIONIZING RADIATION  - Relevant exam: Over sun exposed areas are brown macules. ELM performed and without concerning findings.  - Exam and clinical history consistent with lentigines.  - Counseled to return to clinic prior to scheduled appointment should any of these lesions change or should any new lesions of concern arise.  - Recommended use of sunscreen as above and below.      Scribe Attestation      I,:  KOURTNEY Moreau am acting as a scribe while in the presence of the attending physician.:       I,:  KOURTNEY Moreau personally performed the services described in this documentation    as scribed in my presence.:

## 2024-07-11 PROCEDURE — 88305 TISSUE EXAM BY PATHOLOGIST: CPT | Performed by: PATHOLOGY

## 2024-07-11 PROCEDURE — 88342 IMHCHEM/IMCYTCHM 1ST ANTB: CPT | Performed by: PATHOLOGY

## 2024-07-12 ENCOUNTER — TELEPHONE (OUTPATIENT)
Age: 34
End: 2024-07-12

## 2024-07-12 ENCOUNTER — TELEPHONE (OUTPATIENT)
Dept: FAMILY MEDICINE CLINIC | Facility: CLINIC | Age: 34
End: 2024-07-12

## 2024-07-12 NOTE — TELEPHONE ENCOUNTER
"Health Maintenance reviewed and plan for update discussed.  Patient asked to schedule her mammogram [phone]    The new Shingrix is supposed to be more effective at preventing shingles.  Even if you had Zostavax, this is recommended. I encourage people to check with their pharmacy (or ours) and have them run it through to check the cost.  It's often better covered through your pharmacy benefit.  It is a two part vaccine series - one now and one in 2-6 months.  Many people will feel a bit \"flu like\" with fever and body aches for a few days after the injection.  Taking ibuprofen can help with this.      Our Clinic hours are:  Mondays    7:20 am - 7 pm  Tues -  Fri  7:20 am - 5 pm    Clinic Phone: [phone]    The clinic lab opens at 7:30 am Mon - Fri and appointments are required.    [facility]  Ph. [phone]  Monday  8 am - 7pm  Tues - Fri 8 am - 5:30 pm         " ----- Message from Hien Bryson MD sent at 7/11/2024  7:37 PM EDT -----  Please  make  sure  she follows  with her dermatologist  for  her  skin bx results  ----- Message -----  From: Lab, Background User  Sent: 7/11/2024  11:43 AM EDT  To: Hien Bryson MD   No pertinent past medical history

## 2024-07-16 NOTE — RESULT ENCOUNTER NOTE
DERMATOPATHOLOGY RESULT NOTE    Results reviewed by ordering physician.  Called patient to personally discuss results.       Instructions for Clinical Derm Team:   (remember to route Result Note to appropriate staff):    Call patient and schedule for excision (B) in excision clinic!     Result & Plan by Specimen:    Specimen A: benign  Plan: monitor      Specimen B: melanocytic nevus with moderate atypia extending to lateral margins   Plan: excision      [id number]  Order: [id number]   Status: Final result      Visible to patient: Yes (seen)      Dx: Neoplasm of uncertain behavior    1 Result Note      1 Follow-up Encounter     Component   Case Report  Surgical Pathology Report                         Case: [id number]                                  Authorizing Provider:  KOURTNEY Moreau Collected:           07/08/2024 1150              Ordering Location:     [facility]      Received:            07/08/2024 1150                                     [place]                                                                      Pathologist:           Justen Yusuf MD                                                      Specimens:   A) - Skin, Other, [place] A: LEFT INDEX FINGER LATERAL ASPECT                                     B) - Skin, Other, [place] B: RIGHT GREAT TOE; LATERAL ASPECT                            Final Diagnosis  A. 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  Melan-A immunostain is positive. Deeper sections were also evaluated.    B. Skin, right great toe, lateal aspect:  JUNCTIONAL MELANOCYTIC NEVUS WITH MODERATE ATYPIA (SEE NOTE)    Note: This lesion is unusual because solitary melanocytes predominate over nests. The lesion extends to the lateral margins of the tissue specimen.  Conservative excision or  "clinical follow up for recurrence is recommended.  Melan-A immunostain is positive. Deeper sections were also evaluated.    Electronically signed by Justen Yusuf MD on 7/11/2024 at 11:43 AM  Note   Interpretation performed at [facility] [address], [place] [address]    Additional Information   All reported additional testing was performed with appropriately reactive controls.  These tests were developed and their performance characteristics determined by [facility] or appropriate performing facility, though some tests may be performed on tissues which have not been validated for performance characteristics (such as staining performed on alcohol exposed cell blocks and decalcified tissues).  Results should be interpreted with caution and in the context of the patients' clinical condition. These tests may not be cleared or approved by the U.S. Food and Drug Administration, though the FDA has determined that such clearance or approval is not necessary. These tests are used for clinical purposes and they should not be regarded as investigational or for research. This laboratory has been approved by [place] 88, designated as a high-complexity laboratory and is qualified to perform these tests.  .  Gross Description   A. The specimen is received in formalin, labeled with the patient's name and hospital number, and is designated \" left index finger lateral aspect\".  The specimen consists of a 0.4 x 0.2 cm shave biopsy of pale-tan white skin.  The epithelial surface exhibits a black macule measuring 0.1 x 0.1 cm.  The skin surface is inked red and the margin of resection is inked green.  Entirely submitted. One cassette.  Between sponges.  B. The specimen is received in formalin, labeled with the patient's name and hospital number, and is designated \" right great toe lateral aspect\".  The specimen consists of a 0.6 x 0.4 cm shave biopsy of tan skin.  The epithelial surface " exhibits a brown-black macule measuring 0.3 x 0.3 cm.  The skin surface is inked red and the margin of resection is inked green.  The specimen is bisected.  Entirely submitted. One cassette.  Between sponges.    Note: The estimated total formalin fixation time based upon information provided by the submitting clinician and the standard processing schedule is under 72 hours.      Marguerite  Clinical Information   ATTENTION:  DERMPATH GROUP    SPECIMEN A; Skin; Shave biopsy; Anatomic Location: LEFT INDEX FINGER, LATERAL ASPECT;  33 y.o. year old  Female with a Morphological Description: 1 mm dark black macule  Differential Diagnosis and/or Specific Clinical Question: rule out atypical nevi      SPECIMEN B; Skin; Shave biopsy; Anatomic Location: RIGHT GREAT TOE; LATERAL ASPECT;  33 y.o. year old  Female with a Morphological Description: 3 mm dark black macule  Differential Diagnosis and/or Specific Clinical Question: rule out atypical nevi  Resulting Agency [id number]          Specimen Collected: 07/08/24 11:50 AM Last Resulted: 07/11/24 11:43 AM

## 2024-07-16 NOTE — TELEPHONE ENCOUNTER
Patient called once more for her results. Advised that we were waiting for the provider to respond as to whether or not we will monitor or move forward with excision.     Please call patient with results.

## 2024-07-18 ENCOUNTER — TELEPHONE (OUTPATIENT)
Age: 34
End: 2024-07-18

## 2024-07-18 NOTE — TELEPHONE ENCOUNTER
Contacted pt. In regards to scheduling off the [facility], Pt. No longer interested in services. Removed from [facility]   Patient alert/oriented.  Independent.  Full liquid diet.  Vomited earlier in the day; states he drank a protein drink to fast.  Denies n/v/pain.  Temp down to 99.9 after Tylenol.  Tachy.  Tele on. Patient states loose stools improving. Supplies in bathroom to collect for labs if patient does have loose stools again. Possible transfer to [facility].  Patient's wife, Yuli, visiting.  Patient's appetite has improved.  Requesting a sandwich.  Nutrition's note reviewed. DC'd full liquid diet and regular diet now ordered.  Patient and wife think that his loose stools may be from the fruit called tamarinds--every time he eats these, a day later he has loose stools.    Shantelle Dumont RN on 2/18/2021 at 5:16 PM

## 2024-07-29 DIAGNOSIS — F41.0 PANIC DISORDER: Primary | ICD-10-CM

## 2024-07-30 ENCOUNTER — TELEPHONE (OUTPATIENT)
Age: 34
End: 2024-07-30

## 2024-07-30 NOTE — TELEPHONE ENCOUNTER
Patient calls stating she requested Zoloft 50 mg prescription yesterday and was told it was sent. She states the prescription is not at the Pharmacy.  In epic under medication tab it says the medication never reached it destination.      Patient has 1 tablet left.  Requesting the Zoloft 50 mg be resent asap .  Also please  call or send a my chart message once resent.

## 2024-08-22 DIAGNOSIS — F41.0 PANIC DISORDER: ICD-10-CM

## 2024-08-25 DIAGNOSIS — J45.20 MILD INTERMITTENT ASTHMA WITHOUT COMPLICATION: ICD-10-CM

## 2024-08-26 RX ORDER — FLUTICASONE PROPIONATE AND SALMETEROL XINAFOATE 230; 21 UG/1; UG/1
AEROSOL, METERED RESPIRATORY (INHALATION)
Qty: 12 G | Refills: 5 | Status: SHIPPED | OUTPATIENT
Start: 2024-08-26

## 2024-09-06 ENCOUNTER — OFFICE VISIT (OUTPATIENT)
Dept: DERMATOLOGY | Facility: CLINIC | Age: 34
End: 2024-09-06
Payer: MEDICARE

## 2024-09-06 VITALS — HEIGHT: 63 IN | WEIGHT: 143 LBS | BODY MASS INDEX: 25.34 KG/M2 | TEMPERATURE: 97.6 F

## 2024-09-06 DIAGNOSIS — D22.5 MELANOCYTIC NEVUS OF TRUNK: Primary | ICD-10-CM

## 2024-09-06 PROCEDURE — 99212 OFFICE O/P EST SF 10 MIN: CPT | Performed by: NURSE PRACTITIONER

## 2024-09-06 NOTE — PROGRESS NOTES
"[facility] Note     Patient Name: Porsha Lindquist  Encounter Date: 9/6/24     Have you been cared for by a [facility] Dermatologist in the last 3 years and, if so, which description applies to you?    Yes.  I have been here within the last 3 years, and my medical history has NOT changed since that time.  I am FEMALE/of child-bearing potential.    REVIEW OF SYSTEMS:  Have you recently had or currently have any of the following? No changes in my recent health.   PAST MEDICAL HISTORY:  Have you personally ever had or currently have any of the following?  If \"YES,\" then please provide more detail. No changes in my medical history.   HISTORY OF IMMUNOSUPPRESSION: Do you have a history of any of the following:  Systemic Immunosuppression such as Diabetes, Biologic or Immunotherapy, Chemotherapy, Organ Transplantation, Bone Marrow Transplantation or Prednisone?  No     Answering \"YES\" requires the addition of the dotphrase \"IMMUNOSUPPRESSED\" as the first diagnosis of the patient's visit.   FAMILY HISTORY:  Any \"first degree relatives\" (parent, brother, sister, or child) with the following?    No changes in my family's known health.   PATIENT EXPERIENCE:    Do you want the Dermatologist to perform a COMPLETE skin exam today including a clinical examination under the \"bra and underwear\" areas?  NO  If necessary, do we have your permission to call and leave a detailed message on your Preferred Phone number that includes your specific medical information?  Yes      Allergies   Allergen Reactions    Phenazopyridine Anaphylaxis and Hives    Other      Dove Deodorant        Current Outpatient Medications:     albuterol (PROVENTIL HFA,VENTOLIN HFA) 90 mcg/act inhaler, INHALE 1 PUFF BY MOUTH EVERY 6 HOURS AS NEEDED FOR WHEEZE, Disp: 8.5 g, Rfl: 1    fluticasone (FLONASE) 50 mcg/act nasal spray, SPRAY 2 SPRAYS INTO EACH NOSTRIL EVERY DAY, Disp: 48 mL, Rfl: 1    fluticasone-salmeterol (Advair HFA) 230-21 " "MCG/ACT inhaler, INHALE 2 PUFFS BY MOUTH 2 TIMES A DAY RINSE MOUTH AFTER USE., Disp: 12 g, Rfl: 5    medroxyPROGESTERone (DEPO-PROVERA) 150 mg/mL injection, INJECT 1 ML (150 MG TOTAL) INTO A MUSCLE EVERY 3 (THREE) MONTHS, Disp: 1 mL, Rfl: 1    pantoprazole (PROTONIX) 40 mg tablet, Take 1 tablet (40 mg total) by mouth daily, Disp: 90 tablet, Rfl: 2    sertraline (ZOLOFT) 50 mg tablet, TAKE 1 TABLET BY MOUTH EVERY DAY, Disp: 90 tablet, Rfl: 1    Cholecalciferol (Vitamin D3) 50 MCG (2000 UT) capsule, Take 1 capsule (2,000 Units total) by mouth every morning, Disp: 90 capsule, Rfl: 0    ferrous sulfate 324 (65 Fe) mg, Take 1 tablet (324 mg total) by mouth daily before breakfast, Disp: 90 tablet, Rfl: 0    Nerve Stimulator (STANDARD TENS) OMER, by Does not apply route 4 (four) times a day (Patient not taking: Reported on 4/10/2024), Disp: 1 Device, Rfl: 0          Whom besides the patient is providing clinical information about today's encounter?   NO ADDITIONAL HISTORIAN (patient alone provided history)    Physical Exam and Assessment/Plan by Diagnosis:    MELANOCYTIC NEVI      Physical Exam:  Anatomic Location Affected:  left lower abdomen  Morphological Description:  Scattered, 1-4mm round to ovoid, symmetrical-appearing, even bordered, skin colored to dark brown macules/papules, mostly in sun-exposed areas    Additional History of Present Condition:  patient presents for spot check. She reports spot may have gotten bigger over time, but has not changed color.     Assessment and Plan:  Based on a thorough discussion of this condition and the management approach to it (including a comprehensive discussion of the known risks, side effects and potential benefits of treatment), the patient (family) agrees to implement the following specific plan:  Reassured, benign.  Monitor for changes.     Melanocytic Nevi  Melanocytic nevi (\"moles\") are caused by collections of the color producing skin cells, or melanocytes, in 1 area " "in the skin. They can range in color from pink to dark brown and be either raised or flat.      Some moles are present at birth (I.e., \"congenital nevi\"), while others come up later in life (i.e., \"acquired nevi\").  Sun exposure also stimulates the body to make more moles, ie the more sun you get the more moles you'll grow.    Clinically distinguishing a healthy mole from melanoma may be difficult. The \"ABCDE's\" of moles have been suggested as a means of helping to alert a person to a suspicious mole and the possible increased risk of melanoma.      Asymmetry: Healthy moles tend to be symmetric, while melanomas are often asymmetric.  Asymmetry means if you draw a line through the mole, the two halves do not match in color, size, shape, or surface texture Any mole that starts to demonstrate \"asymmetry\" should be examined promptly by a board certified dermatologist.     Border: Healthy moles tend to have discrete, even borders.  The border of a melanoma often blends into the normal skin and does not sharply delineate the mole from normal skin.  Any mole that starts to demonstrate \"uneven borders\" should be examined promptly     Color: Healthy moles tend to be one color throughout.  Melanomas tend to be made up of different colors ranging from dark black, blue, white, or red.  Any mole that demonstrates a color change should be examined promptly    Diameter: Healthy moles tend to be smaller than 0.6 cm in size; an exception are \"congenital nevi\" that can be larger.  Melanomas tend to grow and can often be greater than 0.6 cm (1/4 of an inch, or the size of a pencil eraser). This is only a guideline, and many normal moles may be larger than 0.6 cm without being unhealthy.  Any mole that starts to change in size (small to bigger or bigger to smaller) should be examined promptly    Evolving: Healthy moles tend to \"stay the same.\"  Melanomas may often show signs of change or evolution such as a change in size, shape, " "color, or elevation.  Any mole that starts to itch, bleed, crust, burn, hurt, or ulcerate or demonstrate a change or evolution should be examined promptly by a board certified dermatologist.      What are atypical moles or dysplastic nevi?  Dysplastic moles are moles that have some of the ABCDE  changes listed above but  are not cancerous  Sometimes a biopsy and microscopic examination are needed to determine the difference. They may indicate an increased risk of melanoma in that person, especially if there is a family history of melanoma.    What is a Melanoma?  The main concern when looking at a new or changing mole it to evaluate whether it may be a melanoma. The appearance of a \"new mole\" remains one of the most reliable methods for identifying a malignant melanoma.   A melanoma is a type of skin cancer that can be deadly if it spreads throughout the body. The prognosis of a Melanoma depends on how deep it has penetrated in the skin.  If caught early, they generally will not have had time to grow into the deeper layers of the skin and they cure rate is then very high. Once the melanoma grows deeper into the skin, the cure rate drops dramatically. Therefore, early detection and removal of a malignant melanoma results in a much better chance of complete cure.       Scribe Attestation      I,:  Jenna Patricia MA am acting as a scribe while in the presence of the attending physician.:       I,:  KOURTNEY Moreau personally performed the services described in this documentation    as scribed in my presence.:            "

## 2024-09-11 ENCOUNTER — PROCEDURE VISIT (OUTPATIENT)
Dept: DERMATOLOGY | Facility: CLINIC | Age: 34
End: 2024-09-11
Payer: MEDICARE

## 2024-09-11 VITALS
DIASTOLIC BLOOD PRESSURE: 74 MMHG | WEIGHT: 142 LBS | BODY MASS INDEX: 25.15 KG/M2 | HEART RATE: 69 BPM | TEMPERATURE: 96.8 F | SYSTOLIC BLOOD PRESSURE: 110 MMHG

## 2024-09-11 DIAGNOSIS — D22.9 ATYPICAL NEVI: Primary | ICD-10-CM

## 2024-09-11 PROCEDURE — 88342 IMHCHEM/IMCYTCHM 1ST ANTB: CPT | Performed by: STUDENT IN AN ORGANIZED HEALTH CARE EDUCATION/TRAINING PROGRAM

## 2024-09-11 PROCEDURE — 12031 INTMD RPR S/A/T/EXT 2.5 CM/<: CPT | Performed by: DERMATOLOGY

## 2024-09-11 PROCEDURE — 11401 EXC TR-EXT B9+MARG 0.6-1 CM: CPT | Performed by: DERMATOLOGY

## 2024-09-11 PROCEDURE — 88305 TISSUE EXAM BY PATHOLOGIST: CPT | Performed by: STUDENT IN AN ORGANIZED HEALTH CARE EDUCATION/TRAINING PROGRAM

## 2024-09-11 NOTE — PATIENT INSTRUCTIONS
"YOUR \"AFTER SURGERY\" REVIEW & INSTRUCTIONS    What to Know About Your Procedure  An \"excision\" was performed today to allow the dermatologist to remove a skin lesion. The procedure involves a local numbing medication and removing the entire lesion (or as much as possible). Typically, the lesion is being removed because it does not look \"normal,\" because it is becoming irritated and traumatized, or for significant appearance reasons.  The skin was cut deeply and then repaired - usually with sutures (stitches).  The removed tissue has been sent to the pathologist who will confirm the diagnosis and verify if the lesion has been completely removed.  Surgical “Vaseline-type” ointment has been applied after the procedure to help create a barrier between your wound and the outside world.     The advantage of using sutures (stitches) to repair a skin excision is that it allows the lesion to heal as quickly as possible with the least amount of scarring and risk of infection, Still, there are some risks and potential complications that you should watch out for that include but are not limited to the following:    Some bleeding is normal at the time of procedure and some bleeding on the gauze bandage after the procedure is normal for the first few days after surgery.  Profuse bleeding or bleeding with swelling and pain is NOT normal and should be reported as detailed below.  Infection is uncommon after skin surgery.  Infection should be reported and is indicated by pain, redness, and discharge of purulent material.  Some pain may occur initially the day after surgery.  Persistent pain or increasing pain days after surgery is not expected and should be reported.  Every effort is made to minimize scar, but location, size, and genetics do play a role in scar appearance.  A surgical wound does not achieve its optimal appearance until 6 months.  There are several treatments available if scarring would be problematic including scar " "creams, silicone pad, laser and scar revision.  Skin discoloration can occur especially in people of color.  Its important to avoid sun on wound in first 6 months after surgery.  Treatment is available if pigment is problematic.  Incomplete removal of the lesion or recurrence of lesion can occur and - depending on the lesion - this would then require further treatment and more surgery.  Nerve damage/numbness and/or loss of function is very rare, but is most likely to occur if the lesion being removed is large or if it is in a \"high risk\" location.  Allergic reaction to lidocaine is rare.  More commonly, epinephrine is used with the lidocaine, and, occasionally, epinephrine (a.k.a., adrenalin) may cause a brief feeling of anxiety or jitteriness.  The person at the microscope (pathologist) may provide additional information that was unexpected. This unexpected finding could prompt the need for additional treatment or evaluation.    At-Home Wound Care  Try NOT to remove the pressure bandage for 48 hours. Keep the area clean and dry while this bandage is on.   After removing the bandage for the first time, gently clean the area with soap and water. If the bandage is difficult to remove, getting the bandage wet in the shower will sometimes help soften the adhesive and allow it to be removed more easily.   You will now need to cleanse this area daily in the shower with gentle soap. There is no need to scrub the area. You will need to apply plain Vaseline ointment (this is over the counter and not a prescription) to the site for up to 2 weeks followed by a clean appropriately sized bandage to area.  Non stick dressing and paper tape (or Hypafix) are recommended for sensitive skin but a bandaid is fine if it covers the area well.  In general, sutures (stitches) are removed in about 5-7 days for face wounds and in about 12-14 days for the body wounds.  Your dermatologist wants you to return for suture removal in 14 DAYS. " "    General Restrictions  For TWO (2) DAYS:  You will need to take it very easy as this time is highest risk for bleeding. Being a \"couch potato\" during these two days is generally recommended.   For surgeries on the face/neck/scalp: Avoid leaning down to pick things up off the floor as this brings blood up to your head. Instead, squat down to pick things up.     For TWO WEEKS (14 DAYS):   No heavy lifting (anything greater than 10 pounds)   You can start to do slow, gentle activities such as slow walking but nothing to increase your heart rate and blood pressure too much (such as cardiovascular exercise).  It is important to take it easy as there is still a risk for bleeding and a high risk popping of stitches open during this time.     Site Specific Restrictions  If we did surgery near your eyes (including the nose, forehead, front part of your scalp, cheeks): It is VERY common to get a large amount of swelling around your eyes (puffy eyes). Although less frequent, this can be enough to swell your eyes shut and can also come along with bruising. This should not hurt and is very expected and normal. It is typically worst at ~ 3 days out from your surgery and dramatically better 1 week post-operatively.   If we did surgery around your nose: No blowing your nose as this puts you at higher risk of popping stitches durign this time. Instead dab under your nose with a tissue or use a Q-tip inside your nose.  If we did surgery on the skin above or below your lip or your lip itself: No sipping from straws as this uses a lot of the muscles around your mouth and increases the risk of popping stitches during this time.    Managing Your Pain After Surgery  You can expect to have some pain after surgery. This is normal. The pain is typically worse the first two days after surgery, and quickly begins to get better.   You can use heating pads or ice packs on your incisions to help reduce your pain.   The best strategy for " "controlling your pain after surgery is \"around the clock\" pain control. You can take \"over-the-counter\" (non-prescription) Acetaminophen (Tylenol) for discomfort, unless you have been told not to by your physician.  If you are taking Tylenol at the maximum dose, you can alternate Tylenol with Advil/Motrin (ibuprofen) as long as there are no contraindications.  Alternating these medications with each other (I.e., Tylenol followed by Motrin/Advil) allows you to maximize your pain control.  To alternate these medications properly, you will take a dose of pain medication every three hours, alternating Tylenol (acetaminophen) and Advil/Motrin (ibuprofen).  Start by taking 650 mg of Tylenol (2 pills of 325 mg)  3 hours later take 600 mg of Motrin (3 pills of 200 mg)  3 hours after taking the Motrin take 650 mg of Tylenol  3 hours after that take 600 mg of Motrin.    As an example, if your first dose of Tylenol (acetaminophen) is at 12:00 PM, then you would alternate with Motrin as directed below, continuing usually for no more than a total of 48 hours straight:     12:00 PM  Tylenol 650 mg (2 pills of 325 mg)    3:00 PM  Motrin 600 mg (3 pills of 200 mg)    6:00 PM  Tylenol 650 mg (2 pills of 325 mg)    9:00 PM  Motrin 600 mg (3 pills of 200 mg)      WARNING:  Do NOT take more than 4000 mg of Tylenol or 3200 mg of Motrin in a \"24-hour\" period.       What if you still have pain?    If you have pain that is not controlled with the over-the-counter pain medications (Tylenol and Motrin/Advil), do not hesitate to call our staff using the number provided. We will help make sure you are managing your pain in the best way possible, and if necessary, we can provide a prescription for additional pain medication.     Call our office IMMEDIATELY with any signs of wound infection.  This includes fever, chills, increasing redness, warmth, tenderness, severe discomfort/pain, or pus or foul smell coming from the wound. [facility] can be directly at [phone] (SKIN) and ask for the on-call Dermatologist covering surgery/Mohs.    If Bleeding is Noticed  If bleeding is soaking through the bandage, remove the bandage and see where the bleeding is coming from.  Place a clean cloth over the area and apply firm pressure directly to the area that is bleeding for thirty minutes.    Check the wound ONLY after 30 minutes of direct pressure; do not cheat and sneak a peak, as that does not count (i.e., resets the clock back to zero).  If bleeding persists after 30 minutes of legitimate direct pressure, then try one more round of direct pressure to the area.    Should bleeding become heavier or not stop after the second application of direct pressure for 30 minutes, then call [facility] directly at [phone] (SKIN) and ask to speak with the on-call Dermatologist covering surgery/Mohs.  If after hours, go to your nearest Emergency Room or Urgent Care and have the team call [facility] directly at [phone] (SKIN); you will be connected to our after hours team.

## 2024-09-11 NOTE — PROGRESS NOTES
"  PROCEDURE:  EXCISION NOTE     Procedural Plan:     Attending:  & Dr. Mcguire  Assistant:  Padma Augustin  Lesion Anatomic Location (use description from previous biopsy if applicable): right great toe  Pre-Op Diagnosis: Moderate atypia nevi  Lesion is being treated as \"benign\" or \"MALIGNANT\": benign  Accession Number of any associated previous biopsy/excision: [id number]    Written and verbal (witnessed) informed consent was obtained. We discussed that \"excision\" is a method of removing lesions, both benign and malignant lesions.  A portion of normal skin is often taken to ensure completeness of removal.  I reviewed that this procedure will include numbing the area, cutting around and under the skin lesion, undermining (\"freeing up\") surrounding tissue, and closing the wound with sutures (stitches) both inside and out.  Risks include and are not limited to the following:  Bleeding, pain, infection, scarring, recurrence, more required treatment, no additional information, numbness and/or loss of function (if nerves are damaged).  These risks were considered against the benefits that we discussed, and the patient opted to continue with the procedure. It was discussed with patient that every effort is made to minimize scarring, but scarring is influenced also by extrinsic factor such as location, age and genetics.     Procedural Time Out:      Correct patient? yes  Correct site per Clinic Report? yes  Correct site per previous Path Report? yes  Correct site per Patient's recollection? yes    Anesthesia:      Local anesthesia: 3:1 1% xylocaine with epi and 1-100,000 buffered    Excision Description:      Post-Op Diagnosis: Same as Pre-Op Diagnosis (above)  Pre-op Size: 0.5 x 0.5 cm  Margins (enter \"0\" for lipoma/cyst or similar diagnosis): 0.2 cm  TOTAL POSTOPERATIVE DEFECT SIZE (I.e., Pre-op Size + Margins on both sides):  0.9 x 0.9 cm    The patient was seated in the procedure/exam room, anesthetized locally, " "prepped and draped in the usual fashion. Using a #15 blade with a scalpel, the lesion was excised in elliptical fashion.     REQUIRED Fabiana MELANOMA DATA      This procedure was not performed to treat primary cutaneous melanoma through wide local excision      Closure Description:      The specific type of closure that was utilized:     INTERMEDIATE Closure  INTERMEDIATE CLOSURE     The patient was brought back into the procedure room, anesthetized locally, prepped and draped in the usual fashion. Using a #15 blade with a scalpel, the lesion was excised in elliptical fashion. The wound was  undermined in the fascial plane.  Purpose of undermining was to decrease wound tension and facilitate closure. Hemostasis was achieved with light electrocoagulation.    The wound was closed with subcutaneous sutures as follows:    Deep Suture Throw, Size, and Type (select all that apply):  Interrupted Deeps; 4-0; vicryl     Epidermal edge closure was accomplished with superficial sutures as follows:    Superficial Suture Throw, Size and Type (select all that apply):  Simple Interrupted; 4-0; Prolene    FINAL LENGTH OF CLOSURE (please enter a length even for lipoma/cyst or similar diagnosis): 1 cm                     Postoperative Care:      The wound was cleaned with sterile saline, dried off, surgical vaseline ointment was applied, and the wound was covered. A pressure dressing was applied for stabilization and light pressure.    Estimated blood loss:  Less than 3ml.  Complications: none  Post-op medications: none  Additional notes: none    Discharge Plans:      Discharge plans: Plan for return to us for suture removal, as scheduled in 14 days.   Patient condition at discharge: STABLE    The patient was given detailed oral and written instructions on postoperative care as detailed in consent. We urged the patient to call us if any problems or question should arise.         Please complete this section and then \"cut and paste\" it " "into the Patient Instructions section.  These notes should be printed and shared directly with the patient for review PRIOR TO leaving our office.         YOUR \"AFTER SURGERY\" REVIEW & INSTRUCTIONS    What to Know About Your Procedure  An \"excision\" was performed today to allow the dermatologist to remove a skin lesion. The procedure involves a local numbing medication and removing the entire lesion (or as much as possible). Typically, the lesion is being removed because it does not look \"normal,\" because it is becoming irritated and traumatized, or for significant appearance reasons.  The skin was cut deeply and then repaired - usually with sutures (stitches).  The removed tissue has been sent to the pathologist who will confirm the diagnosis and verify if the lesion has been completely removed.  Surgical “Vaseline-type” ointment has been applied after the procedure to help create a barrier between your wound and the outside world.     The advantage of using sutures (stitches) to repair a skin excision is that it allows the lesion to heal as quickly as possible with the least amount of scarring and risk of infection, Still, there are some risks and potential complications that you should watch out for that include but are not limited to the following:    Some bleeding is normal at the time of procedure and some bleeding on the gauze bandage after the procedure is normal for the first few days after surgery.  Profuse bleeding or bleeding with swelling and pain is NOT normal and should be reported as detailed below.  Infection is uncommon after skin surgery.  Infection should be reported and is indicated by pain, redness, and discharge of purulent material.  Some pain may occur initially the day after surgery.  Persistent pain or increasing pain days after surgery is not expected and should be reported.  Every effort is made to minimize scar, but location, size, and genetics do play a role in scar appearance.  A " "surgical wound does not achieve its optimal appearance until 6 months.  There are several treatments available if scarring would be problematic including scar creams, silicone pad, laser and scar revision.  Skin discoloration can occur especially in people of color.  Its important to avoid sun on wound in first 6 months after surgery.  Treatment is available if pigment is problematic.  Incomplete removal of the lesion or recurrence of lesion can occur and - depending on the lesion - this would then require further treatment and more surgery.  Nerve damage/numbness and/or loss of function is very rare, but is most likely to occur if the lesion being removed is large or if it is in a \"high risk\" location.  Allergic reaction to lidocaine is rare.  More commonly, epinephrine is used with the lidocaine, and, occasionally, epinephrine (a.k.a., adrenalin) may cause a brief feeling of anxiety or jitteriness.  The person at the microscope (pathologist) may provide additional information that was unexpected. This unexpected finding could prompt the need for additional treatment or evaluation.    At-Home Wound Care  Try NOT to remove the pressure bandage for 48 hours. Keep the area clean and dry while this bandage is on.   After removing the bandage for the first time, gently clean the area with soap and water. If the bandage is difficult to remove, getting the bandage wet in the shower will sometimes help soften the adhesive and allow it to be removed more easily.   You will now need to cleanse this area daily in the shower with gentle soap. There is no need to scrub the area. You will need to apply plain Vaseline ointment (this is over the counter and not a prescription) to the site for up to 2 weeks followed by a clean appropriately sized bandage to area.  Non stick dressing and paper tape (or Hypafix) are recommended for sensitive skin but a bandaid is fine if it covers the area well.  In general, sutures (stitches) are " "removed in about 5-7 days for face wounds and in about 12-14 days for the body wounds.  Your dermatologist wants you to return for suture removal in 14 DAYS.     General Restrictions  For TWO (2) DAYS:  You will need to take it very easy as this time is highest risk for bleeding. Being a \"couch potato\" during these two days is generally recommended.   For surgeries on the face/neck/scalp: Avoid leaning down to pick things up off the floor as this brings blood up to your head. Instead, squat down to pick things up.     For TWO WEEKS (14 DAYS):   No heavy lifting (anything greater than 10 pounds)   You can start to do slow, gentle activities such as slow walking but nothing to increase your heart rate and blood pressure too much (such as cardiovascular exercise).  It is important to take it easy as there is still a risk for bleeding and a high risk popping of stitches open during this time.     Site Specific Restrictions  If we did surgery near your eyes (including the nose, forehead, front part of your scalp, cheeks): It is VERY common to get a large amount of swelling around your eyes (puffy eyes). Although less frequent, this can be enough to swell your eyes shut and can also come along with bruising. This should not hurt and is very expected and normal. It is typically worst at ~ 3 days out from your surgery and dramatically better 1 week post-operatively.   If we did surgery around your nose: No blowing your nose as this puts you at higher risk of popping stitches durign this time. Instead dab under your nose with a tissue or use a Q-tip inside your nose.  If we did surgery on the skin above or below your lip or your lip itself: No sipping from straws as this uses a lot of the muscles around your mouth and increases the risk of popping stitches during this time.    Managing Your Pain After Surgery  You can expect to have some pain after surgery. This is normal. The pain is typically worse the first two days after " "surgery, and quickly begins to get better.   You can use heating pads or ice packs on your incisions to help reduce your pain.   The best strategy for controlling your pain after surgery is \"around the clock\" pain control. You can take \"over-the-counter\" (non-prescription) Acetaminophen (Tylenol) for discomfort, unless you have been told not to by your physician.  If you are taking Tylenol at the maximum dose, you can alternate Tylenol with Advil/Motrin (ibuprofen) as long as there are no contraindications.  Alternating these medications with each other (I.e., Tylenol followed by Motrin/Advil) allows you to maximize your pain control.  To alternate these medications properly, you will take a dose of pain medication every three hours, alternating Tylenol (acetaminophen) and Advil/Motrin (ibuprofen).  Start by taking 650 mg of Tylenol (2 pills of 325 mg)  3 hours later take 600 mg of Motrin (3 pills of 200 mg)  3 hours after taking the Motrin take 650 mg of Tylenol  3 hours after that take 600 mg of Motrin.    As an example, if your first dose of Tylenol (acetaminophen) is at 12:00 PM, then you would alternate with Motrin as directed below, continuing usually for no more than a total of 48 hours straight:     12:00 PM  Tylenol 650 mg (2 pills of 325 mg)    3:00 PM  Motrin 600 mg (3 pills of 200 mg)    6:00 PM  Tylenol 650 mg (2 pills of 325 mg)    9:00 PM  Motrin 600 mg (3 pills of 200 mg)      WARNING:  Do NOT take more than 4000 mg of Tylenol or 3200 mg of Motrin in a \"24-hour\" period.       What if you still have pain?    If you have pain that is not controlled with the over-the-counter pain medications (Tylenol and Motrin/Advil), do not hesitate to call our staff using the number provided. We will help make sure you are managing your pain in the best way possible, and if necessary, we can provide a prescription for additional pain medication.     Call our office IMMEDIATELY with any signs of wound infection.  This " includes fever, chills, increasing redness, warmth, tenderness, severe discomfort/pain, or pus or foul smell coming from the wound. [facility] can be directly at [phone] (SKIN) and ask for the on-call Dermatologist covering surgery/Mohs.    If Bleeding is Noticed  If bleeding is soaking through the bandage, remove the bandage and see where the bleeding is coming from.  Place a clean cloth over the area and apply firm pressure directly to the area that is bleeding for thirty minutes.    Check the wound ONLY after 30 minutes of direct pressure; do not cheat and sneak a peak, as that does not count (i.e., resets the clock back to zero).  If bleeding persists after 30 minutes of legitimate direct pressure, then try one more round of direct pressure to the area.    Should bleeding become heavier or not stop after the second application of direct pressure for 30 minutes, then call [facility] directly at [phone] (SKIN) and ask to speak with the on-call Dermatologist covering surgery/Mohs.  If after hours, go to your nearest Emergency Room or Urgent Care and have the team call [facility] directly at [phone] (SKIN); you will be connected to our after hours team.    Atypical nevus with moderate atypia excised with 0.2cm margins for 0.9cm excision and 1cm closure.  Well tolerated.  S/R in 2 weeks.  Lauryn Mcguire MD  Dermatology, PGY-2  Scribe Attestation      I,:  Padma Augustin am acting as a scribe while in the presence of the attending physician.:       I,:  Babatunde Akhtar MD personally performed the services described in this documentation    as scribed in my presence.:

## 2024-09-12 ENCOUNTER — NURSE TRIAGE (OUTPATIENT)
Age: 34
End: 2024-09-12

## 2024-09-12 NOTE — TELEPHONE ENCOUNTER
Patient calls stating she has increased fatigued lately. States she is fine all day but at 3 pm completely exhausted .  Patient takes  iron pill daily but believes it wears off during the day.    Appointment scheduled for 9 17 2024.

## 2024-09-12 NOTE — TELEPHONE ENCOUNTER
"Reason for Disposition  • MILD weakness (i.e., does not interfere with ability to work, go to school, normal activities) and persists > 1 week    Answer Assessment - Initial Assessment Questions  1. DESCRIPTION: \"Describe how you are feeling.\"          Fatigued    Taking Iron pills        2. SEVERITY: \"How bad is it?\"  \"Can you stand and walk?\"    - MILD - Feels weak or tired, but does not interfere with work, school or normal activities    - MODERATE - Able to stand and walk; weakness interferes with work, school, or normal activities    - SEVERE - Unable to stand or walk         Unsure        3. ONSET:  \"When did the weakness begin?\"          Few weeks      4. CAUSE: \"What do you think is causing the weakness?\"          Unsure    5. MEDICINES: \"Have you recently started a new medicine or had a change in the amount of a medicine?\"        Iron zoloft     6. OTHER SYMPTOMS: \"Do you have any other symptoms?\" (e.g., chest pain, fever, cough, SOB, vomiting, diarrhea, bleeding, other areas of pain)        Denies    Protocols used: Weakness (Generalized) and Fatigue-ADULT-[place]    "

## 2024-09-13 NOTE — TELEPHONE ENCOUNTER
Called and spoke w pt -- she reports she is taking Ferrous sulfate 325 mg OD in AM prior to breakfast and will start also supplementing w Sublingual B12. She does have an appt on 9/17 w Gila to discuss fatigue. She is aware she will need an additional longer visit to formally establish care. She is wondering if she can change her visit on 9/17 to a virtual visit due to recent foot procedure. Please advise.

## 2024-09-13 NOTE — TELEPHONE ENCOUNTER
Yes we can change 9/17 in office to a virtual visit, she will need blood work as her last blood work is from February 2024.  We can discuss ordering the blood work and then arrange for an office establish care and review of blood work to develop treatment plan for her fatigue.  Please advise patient thank you

## 2024-09-16 PROCEDURE — 88305 TISSUE EXAM BY PATHOLOGIST: CPT | Performed by: STUDENT IN AN ORGANIZED HEALTH CARE EDUCATION/TRAINING PROGRAM

## 2024-09-16 PROCEDURE — 88342 IMHCHEM/IMCYTCHM 1ST ANTB: CPT | Performed by: STUDENT IN AN ORGANIZED HEALTH CARE EDUCATION/TRAINING PROGRAM

## 2024-09-17 ENCOUNTER — OFFICE VISIT (OUTPATIENT)
Dept: OBGYN CLINIC | Facility: CLINIC | Age: 34
End: 2024-09-17
Payer: MEDICARE

## 2024-09-17 VITALS
HEIGHT: 63 IN | DIASTOLIC BLOOD PRESSURE: 82 MMHG | WEIGHT: 142.8 LBS | BODY MASS INDEX: 25.3 KG/M2 | SYSTOLIC BLOOD PRESSURE: 116 MMHG

## 2024-09-17 DIAGNOSIS — Z30.42 ENCOUNTER FOR SURVEILLANCE OF INJECTABLE CONTRACEPTIVE: ICD-10-CM

## 2024-09-17 DIAGNOSIS — Z30.09 ENCOUNTER FOR OTHER GENERAL COUNSELING OR ADVICE ON CONTRACEPTION: Primary | ICD-10-CM

## 2024-09-17 PROCEDURE — 99213 OFFICE O/P EST LOW 20 MIN: CPT | Performed by: OBSTETRICS & GYNECOLOGY

## 2024-09-17 PROCEDURE — 96372 THER/PROPH/DIAG INJ SC/IM: CPT | Performed by: OBSTETRICS & GYNECOLOGY

## 2024-09-17 RX ORDER — MEDROXYPROGESTERONE ACETATE 150 MG/ML
150 INJECTION, SUSPENSION INTRAMUSCULAR ONCE
Status: COMPLETED | OUTPATIENT
Start: 2024-09-17 | End: 2024-09-17

## 2024-09-17 RX ADMIN — MEDROXYPROGESTERONE ACETATE 150 MG: 150 INJECTION, SUSPENSION INTRAMUSCULAR at 13:32

## 2024-09-17 NOTE — PROGRESS NOTES
"Assessment/Plan:     Diagnoses and all orders for this visit:    Encounter for other general counseling or advice on contraception    Encounter for surveillance of injectable contraceptive         33-year-old female  Prior 3 vaginal delivery   Depo-Provera for contraception desire to continue  Contraception counseling considering permanent sterilization  Plan   Return to office for Depo-Provera  To call back if desired to proceed with sterilization  To return to office for annual exam    Subjective:      Patient ID: Porsha Lindquist is a 34 y.o. female.    HPI  Patient seen evaluated present to the office today for Depo-Provera injection  Patient also desires to discuss different contraception methods interested in permanent sterilization  Depo-Provera injection was given to the patient today  Different contraception option explained and discussed with patient in details with risk-benefit side effect long-term reversible contraception also discussed IUD with both type, Nexplanon discussed with patient and partner vasectomy as well discussed as well as bilateral salpingectomy permanent nature of the procedure reviewed and discussed with patient  Information given about IUD, sterilization, Nexplanon, and brochure provided to the patient  Patient with call back with her decision otherwise to return to office for next Depo-Provera/annual exam  Plan explained and discussed with patient all patient questions answered and patient was satisfied  The following portions of the patient's history were reviewed and updated as appropriate: allergies, current medications, past family history, past medical history, past social history, past surgical history and problem list.    Review of Systems      Objective:      /82 (BP Location: Left arm, Patient Position: Sitting, Cuff Size: Adult)   Ht 5' 3\" (1.6 m)   Wt 64.8 kg (142 lb 12.8 oz)   BMI 25.30 kg/m²          Physical Exam  Constitutional:       Appearance: " Normal appearance.   Neurological:      General: No focal deficit present.      Mental Status: She is alert and oriented to person, place, and time.   Psychiatric:         Mood and Affect: Mood normal.         Behavior: Behavior normal.

## 2024-09-19 NOTE — RESULT ENCOUNTER NOTE
"DERMATOPATHOLOGY RESULT NOTE    Results reviewed by ordering physician.  Called patient to personally discuss results. Discussed results with patient.       Instructions for Clinical Derm Team:   (remember to route Result Note to appropriate staff):    None    Result & Plan by Specimen:    Specimen A: indeterminate  Plan: margins clear    [id number]  Order: [id number]   Status: Edited Result - FINAL      Visible to patient: Yes (seen)      Dx: Atypical nevi    1 Result Note     Component   Case Report  Surgical Pathology Report                         Case: [id number]                                  Authorizing Provider:  Babatunde Akhtar MD         Collected:           09/11/2024 1153              Ordering Location:     [facility]      Received:            09/11/2024 1153                                     [place]                                                                      Pathologist:           Amna Block MD                                                          Specimen:    Skin, Other, A; right great toe                                                          Addendum  SOX10 immunostain was reviewed, and an atypical melanocytic proliferation is not seen. The final diagnosis remains unchanged.   Addendum electronically signed by Amna Block MD on 9/17/2024 at  5:32 PM  Final Diagnosis  A. Skin, right great toe, excision:    -Prior procedure site changes present.    -Residual atypical nevus not seen.   Electronically signed by Amna Block MD on 9/16/2024 at  3:44 PM    Click \"FILE TO CHART\" button so that a copy of this Dermatopathology Result Note will be automatically placed in the patient's chart.  "

## 2024-09-23 ENCOUNTER — TELEPHONE (OUTPATIENT)
Age: 34
End: 2024-09-23

## 2024-09-23 NOTE — TELEPHONE ENCOUNTER
Patient calls in today stating that she has been very fatigued.  That is her only symptom.  She has taken a pregnancy test and it was negative.  She would like to know if blood tests can be ordered for her.    Please advise.

## 2024-09-24 ENCOUNTER — HOSPITAL ENCOUNTER (EMERGENCY)
Facility: HOSPITAL | Age: 34
Discharge: HOME | End: 2024-09-24
Attending: EMERGENCY MEDICINE
Payer: MEDICARE

## 2024-09-24 VITALS
BODY MASS INDEX: 25.81 KG/M2 | TEMPERATURE: 98 F | RESPIRATION RATE: 16 BRPM | OXYGEN SATURATION: 99 % | HEART RATE: 68 BPM | SYSTOLIC BLOOD PRESSURE: 108 MMHG | DIASTOLIC BLOOD PRESSURE: 77 MMHG | WEIGHT: 145.7 LBS

## 2024-09-24 DIAGNOSIS — R53.1 WEAKNESS: Primary | ICD-10-CM

## 2024-09-24 LAB
ALBUMIN SERPL BCG-MCNC: 4.3 G/DL (ref 3.5–5)
ALP SERPL-CCNC: 88 U/L (ref 34–104)
ALT SERPL W P-5'-P-CCNC: 48 U/L (ref 7–52)
ANION GAP SERPL CALCULATED.3IONS-SCNC: 7 MMOL/L (ref 4–13)
AST SERPL W P-5'-P-CCNC: 35 U/L (ref 13–39)
ATRIAL RATE: 68 BPM
BASOPHILS # BLD AUTO: 0.02 THOUSANDS/ΜL (ref 0–0.1)
BASOPHILS NFR BLD AUTO: 0 % (ref 0–1)
BILIRUB SERPL-MCNC: 0.29 MG/DL (ref 0.2–1)
BILIRUB UR QL STRIP: NEGATIVE
BUN SERPL-MCNC: 13 MG/DL (ref 5–25)
CALCIUM SERPL-MCNC: 9.6 MG/DL (ref 8.4–10.2)
CHLORIDE SERPL-SCNC: 103 MMOL/L (ref 96–108)
CLARITY UR: CLEAR
CO2 SERPL-SCNC: 28 MMOL/L (ref 21–32)
COLOR UR: ABNORMAL
CREAT SERPL-MCNC: 0.57 MG/DL (ref 0.6–1.3)
EOSINOPHIL # BLD AUTO: 0.23 THOUSAND/ΜL (ref 0–0.61)
EOSINOPHIL NFR BLD AUTO: 4 % (ref 0–6)
ERYTHROCYTE [DISTWIDTH] IN BLOOD BY AUTOMATED COUNT: 14.2 % (ref 11.6–15.1)
EXT PREGNANCY TEST URINE: NEGATIVE
EXT. CONTROL: NORMAL
GFR SERPL CREATININE-BSD FRML MDRD: 119 ML/MIN/1.73SQ M
GLUCOSE SERPL-MCNC: 92 MG/DL (ref 65–140)
GLUCOSE UR STRIP-MCNC: NEGATIVE MG/DL
HCT VFR BLD AUTO: 39 % (ref 34.8–46.1)
HGB BLD-MCNC: 12.4 G/DL (ref 11.5–15.4)
HGB UR QL STRIP.AUTO: NEGATIVE
IMM GRANULOCYTES # BLD AUTO: 0.01 THOUSAND/UL (ref 0–0.2)
IMM GRANULOCYTES NFR BLD AUTO: 0 % (ref 0–2)
KETONES UR STRIP-MCNC: NEGATIVE MG/DL
LEUKOCYTE ESTERASE UR QL STRIP: NEGATIVE
LYMPHOCYTES # BLD AUTO: 2.27 THOUSANDS/ΜL (ref 0.6–4.47)
LYMPHOCYTES NFR BLD AUTO: 35 % (ref 14–44)
MCH RBC QN AUTO: 28.6 PG (ref 26.8–34.3)
MCHC RBC AUTO-ENTMCNC: 31.8 G/DL (ref 31.4–37.4)
MCV RBC AUTO: 90 FL (ref 82–98)
MONOCYTES # BLD AUTO: 0.38 THOUSAND/ΜL (ref 0.17–1.22)
MONOCYTES NFR BLD AUTO: 6 % (ref 4–12)
NEUTROPHILS # BLD AUTO: 3.58 THOUSANDS/ΜL (ref 1.85–7.62)
NEUTS SEG NFR BLD AUTO: 55 % (ref 43–75)
NITRITE UR QL STRIP: NEGATIVE
NRBC BLD AUTO-RTO: 0 /100 WBCS
P AXIS: -23 DEGREES
PH UR STRIP.AUTO: 7 [PH]
PLATELET # BLD AUTO: 295 THOUSANDS/UL (ref 149–390)
PMV BLD AUTO: 10.2 FL (ref 8.9–12.7)
POTASSIUM SERPL-SCNC: 3.8 MMOL/L (ref 3.5–5.3)
PR INTERVAL: 132 MS
PROT SERPL-MCNC: 7.3 G/DL (ref 6.4–8.4)
PROT UR STRIP-MCNC: NEGATIVE MG/DL
QRS AXIS: 84 DEGREES
QRSD INTERVAL: 78 MS
QT INTERVAL: 422 MS
QTC INTERVAL: 448 MS
RBC # BLD AUTO: 4.33 MILLION/UL (ref 3.81–5.12)
SODIUM SERPL-SCNC: 138 MMOL/L (ref 135–147)
SP GR UR STRIP.AUTO: <=1.005 (ref 1–1.03)
T WAVE AXIS: 70 DEGREES
UROBILINOGEN UR QL STRIP.AUTO: 0.2 E.U./DL
VENTRICULAR RATE: 68 BPM
WBC # BLD AUTO: 6.49 THOUSAND/UL (ref 4.31–10.16)

## 2024-09-24 PROCEDURE — 81025 URINE PREGNANCY TEST: CPT | Performed by: EMERGENCY MEDICINE

## 2024-09-24 PROCEDURE — 36415 COLL VENOUS BLD VENIPUNCTURE: CPT | Performed by: EMERGENCY MEDICINE

## 2024-09-24 PROCEDURE — 85025 COMPLETE CBC W/AUTO DIFF WBC: CPT | Performed by: EMERGENCY MEDICINE

## 2024-09-24 PROCEDURE — 93005 ELECTROCARDIOGRAM TRACING: CPT

## 2024-09-24 PROCEDURE — 80053 COMPREHEN METABOLIC PANEL: CPT | Performed by: EMERGENCY MEDICINE

## 2024-09-24 PROCEDURE — 93010 ELECTROCARDIOGRAM REPORT: CPT | Performed by: INTERNAL MEDICINE

## 2024-09-24 PROCEDURE — 99284 EMERGENCY DEPT VISIT MOD MDM: CPT | Performed by: EMERGENCY MEDICINE

## 2024-09-24 PROCEDURE — 99285 EMERGENCY DEPT VISIT HI MDM: CPT

## 2024-09-24 PROCEDURE — 81003 URINALYSIS AUTO W/O SCOPE: CPT | Performed by: EMERGENCY MEDICINE

## 2024-09-24 PROCEDURE — 96360 HYDRATION IV INFUSION INIT: CPT

## 2024-09-24 RX ADMIN — SODIUM CHLORIDE 1000 ML: 0.9 INJECTION, SOLUTION INTRAVENOUS at 15:49

## 2024-09-24 NOTE — ED NOTES
Patient ambulated out of the ED, AAOx4 resp even and unlabored with no S$S of distress.       Nela Gruber RN  09/24/24 8610

## 2024-09-25 ENCOUNTER — OFFICE VISIT (OUTPATIENT)
Age: 34
End: 2024-09-25
Payer: MEDICARE

## 2024-09-25 ENCOUNTER — CLINICAL SUPPORT (OUTPATIENT)
Dept: DERMATOLOGY | Facility: CLINIC | Age: 34
End: 2024-09-25

## 2024-09-25 ENCOUNTER — VBI (OUTPATIENT)
Dept: FAMILY MEDICINE CLINIC | Facility: CLINIC | Age: 34
End: 2024-09-25

## 2024-09-25 VITALS
HEIGHT: 63 IN | OXYGEN SATURATION: 98 % | BODY MASS INDEX: 25.69 KG/M2 | HEART RATE: 69 BPM | TEMPERATURE: 97.8 F | SYSTOLIC BLOOD PRESSURE: 120 MMHG | WEIGHT: 145 LBS | DIASTOLIC BLOOD PRESSURE: 62 MMHG

## 2024-09-25 DIAGNOSIS — K21.9 GASTROESOPHAGEAL REFLUX DISEASE WITHOUT ESOPHAGITIS: ICD-10-CM

## 2024-09-25 DIAGNOSIS — R53.83 FATIGUE, UNSPECIFIED TYPE: Primary | ICD-10-CM

## 2024-09-25 DIAGNOSIS — Z86.39 HISTORY OF VITAMIN D DEFICIENCY: ICD-10-CM

## 2024-09-25 DIAGNOSIS — R42 DIZZINESS: ICD-10-CM

## 2024-09-25 DIAGNOSIS — Z48.02 VISIT FOR SUTURE REMOVAL: Primary | ICD-10-CM

## 2024-09-25 PROCEDURE — RECHECK

## 2024-09-25 PROCEDURE — 99214 OFFICE O/P EST MOD 30 MIN: CPT

## 2024-09-25 RX ORDER — PANTOPRAZOLE SODIUM 40 MG/1
40 TABLET, DELAYED RELEASE ORAL DAILY
Qty: 90 TABLET | Refills: 2 | Status: SHIPPED | OUTPATIENT
Start: 2024-09-25

## 2024-09-25 NOTE — ASSESSMENT & PLAN NOTE
Patient reports occasional symptoms of acid reflux and would like her Protonix prescription refilled. Protonix 40mg refilled.  Orders:    pantoprazole (PROTONIX) 40 mg tablet; Take 1 tablet (40 mg total) by mouth daily

## 2024-09-25 NOTE — PROGRESS NOTES
"Ambulatory Visit  Name: Porsha Lindquist      : 1990      MRN: [id number]  Encounter Provider: Sindi Sawant MD  Encounter Date: 2024   Encounter department: [facility]    Assessment & Plan  Fatigue, unspecified type  Patient reports 7 months of daily fatigue usually beginning in the afternoon. PMHx includes iron deficiency anemia and vitamin D deficiency. She reports associated hot flashes and intolerance to heat and cold. Physical exam was unremarkable, with no thyroid mass, thyromegaly or thyroid tenderness. Plan to check patient's thyroid function with labs. Ordered additional labs to address patient's vitamin and mineral deficiencies.  Orders:    TSH, 3rd generation with Free T4 reflex; Future    Vitamin B12; Future    Iron Panel (Includes Ferritin, Iron Sat%, Iron, and TIBC); Future    Dizziness  Patient reports episodes of dizziness described as \"disorienting\" and \"lags in time\" with associated tingling in fingers and toes. Denies room spinning, lightheadedness, tunnel vision, syncope, chest pain, or palpitations. ECG done in ED visit 2024 was unremarkable. Labs ordered today including TSH, B12, and Iron panel (see above).       Gastroesophageal reflux disease without esophagitis  Patient reports occasional symptoms of acid reflux and would like her Protonix prescription refilled. Protonix 40mg refilled.  Orders:    pantoprazole (PROTONIX) 40 mg tablet; Take 1 tablet (40 mg total) by mouth daily    History of vitamin D deficiency  See \"fatigue, unspecified type\" above.  Orders:    Vitamin D 25 hydroxy; Future      Depression Screening and Follow-up Plan: Patient was screened for depression during today's encounter. They screened negative with a PHQ-9 score of 3.      History of Present Illness     Patient is a 34-year-old female with a past medical history of asthma, GERD, and iron deficiency anemia who presents to the the clinic to establish care. Patient " "reports recently experiencing fatigue and dizziness.    Dizziness:  Patient reports an episode of dizziness yesterday for which she was seen in the ED. Patient recalls that her dizziness \"feels disorienting \"and \"like there is a lag in time\"and describes an episode yesterday morning where she was driving her daughter to school and saw a car coming but had a delay in her reaction. Patient describes \"feeling like fainting\" but denies any syncopal episode. Patient denies any sensation of room-spinning, lightheadedness, or tunnel vision but notes associated \"tingling in my fingers and toes.\" She recalls 1 other episode similar to this last month, as well as 1 in 2019. Per EMR review, patient was diagnosed with a \"special sensory attack\" following an episode of syncope at that time. She reports that she does not follow-up with cardiology or neurology.    Fatigue:  Patient reports ongoing fatigue for the last 7 months, beginning March 2024, which is consistent everyday. She recalls being diagnosed with iron deficiency anemia by her previous PCP and was started on iron supplementation which she states she is compliant with daily. Patient also notes being started on vitamin D supplementation with her depot injections for birth control but states she is not compliant with taking them. Of note, her last depot injection was 9/17/24 and she denies experiencing side effects. Patient reports that she sleeps 7 hours a night and wakes up feeling refreshed; she notes her fatigue usually begins in the afternoon. She does report heat and cold intolerance and occasional hot flashes. Patient denies any unexpected weight changes, palpitations, hair loss or notable swelling of her face or extremities. Patient recalls developing a rash on her next 2 years ago and had imaging done to rule out a thyroid process. Per EMR review, a 2022 thyroid US revealed no nodules and was unremarkable. She reports a family history of hyperthyroidism in her " maternal aunt.    Asthma:  Patient reports that she follows-up with a pulmonologist regarding her asthma and denies any respiratory symptoms at this time including cough, wheeze, shortness of breath, or any other dyspnea. She does report experiencing occasional 5/10 chest pain with her asthma symptoms. Of note, her next appointment is 9/16/2024. She recalls being prescribed a daily steroid inhaler and a rescue albuterol inhaler but notes that she is not currently using them.     GERD:  Patient reports experiencing occasional symptoms of acid reflux. She recalls being prescribed pantoprazole by her previous PCP but states she ran out    Social Hx: Patient is  and lives with her  and 3 children, ages 13, 7, and 4, and pet bird. 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 Diet: Patient reports adhering to a healthy diet with 3 daily meals consisting of eggs, salads, soups, rice and beans, and protein. She notes that she drinks 3 cups of coffee a day and will occasionally add an energy drink if needed. Patient mentions supplementing each caffeinated beverage with a cup of water for every cup of coffee in addition to the large bottle she finishes daily. Patient denies the use of alcohol, tobacco, or any illicit substances.        Review of Systems   Constitutional:  Positive for fatigue. Negative for chills, fever and unexpected weight change.   HENT:  Negative for congestion, rhinorrhea and sore throat.    Respiratory:  Negative for cough, chest tightness, shortness of breath and wheezing.    Cardiovascular:  Negative for chest pain and leg swelling.   Gastrointestinal:  Positive for abdominal distention. Negative for abdominal pain, nausea and vomiting.   Endocrine: Positive for cold intolerance and heat intolerance.   Genitourinary:   "Negative for difficulty urinating and dysuria.   Skin:  Negative for rash.   Neurological:  Positive for dizziness. Negative for syncope, weakness and light-headedness.           Objective     /62   Pulse 69   Temp 97.8 °F (36.6 °C)   Ht 5' 3\" (1.6 m)   Wt 65.8 kg (145 lb)   SpO2 98%   BMI 25.69 kg/m²     Physical Exam  Constitutional:       General: She is not in acute distress.     Appearance: Normal appearance.      Comments: Body mass index is 25.69 kg/m².   HENT:      Head: Normocephalic and atraumatic.      Right Ear: Tympanic membrane and ear canal normal.      Left Ear: Tympanic membrane and ear canal normal.      Nose: Nose normal. No congestion.      Mouth/Throat:      Mouth: Mucous membranes are moist.      Pharynx: Oropharynx is clear. No posterior oropharyngeal erythema.   Eyes:      Extraocular Movements: Extraocular movements intact.      Conjunctiva/sclera: Conjunctivae normal.   Neck:      Thyroid: No thyroid mass, thyromegaly or thyroid tenderness.   Cardiovascular:      Rate and Rhythm: Normal rate and regular rhythm.      Pulses: Normal pulses.      Heart sounds: Normal heart sounds.   Pulmonary:      Effort: Pulmonary effort is normal. No respiratory distress.      Breath sounds: Normal breath sounds. No wheezing.   Abdominal:      General: Abdomen is flat. There is no distension.      Palpations: Abdomen is soft.      Tenderness: There is no abdominal tenderness.   Musculoskeletal:         General: Normal range of motion.      Cervical back: Normal range of motion and neck supple.      Right lower leg: No edema.      Left lower leg: No edema.   Lymphadenopathy:      Cervical: No cervical adenopathy.   Skin:     General: Skin is warm and dry.      Capillary Refill: Capillary refill takes less than 2 seconds.      Findings: No rash.   Neurological:      General: No focal deficit present.      Mental Status: She is alert and oriented to person, place, and time.      Motor: No weakness. "   Psychiatric:         Mood and Affect: Mood normal.         Behavior: Behavior normal.         Thought Content: Thought content normal.         Judgment: Judgment normal.

## 2024-09-25 NOTE — TELEPHONE ENCOUNTER
09/25/24 9:35 AM    Patient contacted post ED visit, [facility] department spoke with patient/caregiver and outreach was successful.    Thank you.  Eve Finch MA  [facility]

## 2024-09-25 NOTE — PROGRESS NOTES
Suture removal    Date/Time: 9/25/2024 12:00 PM    Performed by: Yesi Washburn MA  Authorized by: Amna Block MD  [place] Protocol:  procedure performed by consultantConsent: Verbal consent obtained.  Consent given by: patient  Patient understanding: patient states understanding of the procedure being performed  Patient consent: the patient's understanding of the procedure matches consent given  Procedure consent: procedure consent matches procedure scheduled  Relevant documents: relevant documents present and verified  Patient identity confirmed: verbally with patient      Patient location:  Clinic  Location:     Laterality:  Right    Location:  Lower extremity    Lower extremity location:  Toe    Toe location:  R big toe  Procedure details:     Tools used:  Suture removal kit    Wound appearance:  No sign(s) of infection, good wound healing and clean    Number of sutures removed:  2  Post-procedure details:     Post-removal:  Band-Aid applied (Vaseline applied)    Patient tolerance of procedure:  Tolerated well, no immediate complications

## 2024-09-25 NOTE — ED PROVIDER NOTES
1. Weakness      ED Disposition       ED Disposition   Discharge    Condition   Stable    Date/Time   Tue Sep 24, 2024  4:50 PM    Comment   Porsha Lindquist discharge to home/self care.                   Assessment & Plan       Medical Decision Making  This is a 34-year-old female presented to the emergency department planing of weakness and dizziness.  I considered electrolyte abnormality, anemia, pregnancy. These and other diagnoses were considered.         Amount and/or Complexity of Data Reviewed  Labs: ordered.                ED Course as of 09/24/24 2051   Tue Sep 24, 2024   2051 The patient had lab work that was significant for a white count of 6, the hemoglobin of 12.4, a creatinine of 0.57, and a UA that is unremarkable.  The patient was given IV fluids and had significant relief of her symptoms.  She will be discharged with follow-up to her primary care physician.       Medications   sodium chloride 0.9 % bolus 1,000 mL (0 mL Intravenous Stopped 9/24/24 1658)       History of Present Illness       This is a 34-year-old female presented to the emergency department planing of weakness and dizziness.  The patient states has been ongoing for few weeks.  She states that she felt more weak and dizzy today.  She states that she describes it as generalized weakness and feeling that she was in a pass out.  She denies chest pain or trouble breathing.  She denies palpitations.  She denies nausea or vomiting.  She has had normal urination and normal bowel movements.  She denies frequency or urgency.  She states she has a history of asymptomatic urinary tract infections.        Review of Systems   All other systems reviewed and are negative.          Objective     ED Triage Vitals [09/24/24 1527]   Temperature Pulse Blood Pressure Respirations SpO2 Patient Position - Orthostatic VS   98.2 °F (36.8 °C) 74 124/88 16 100 % Sitting      Temp Source Heart Rate Source BP Location FiO2 (%) Pain Score    Oral Monitor  Left arm -- No Pain        Physical Exam  Constitutional:  Vital signs reviewed, patient appears nontoxic, no acute distress  Eyes: Pupils equal round reactive to light and accommodation, extraocular muscles intact  HEENT: trachea midline, no JVD, moist mucous membranes  Respiratory: lung sounds clear throughout, no rhonchi, no rales  Cardiovascular: regular rate rhythm, no murmurs or rubs  Abdomen: soft, nontender, nondistended, no rebound or guarding  Back: no CVA tenderness, normal inspection  Extremities: no edema, pulses equal in all 4 extremities  Neuro: awake, alert, oriented, no focal weakness  Skin: warm, dry, intact, no rashes noted    Labs Reviewed   COMPREHENSIVE METABOLIC PANEL - Abnormal       Result Value    Sodium 138      Potassium 3.8      Chloride 103      CO2 28      ANION GAP 7      BUN 13      Creatinine 0.57 (*)     Glucose 92      Calcium 9.6      AST 35      ALT 48      Alkaline Phosphatase 88      Total Protein 7.3      Albumin 4.3      Total Bilirubin 0.29      eGFR 119      Narrative:     National Kidney Disease Foundation guidelines for Chronic Kidney Disease (CKD):     Stage 1 with normal or high GFR (GFR > 90 mL/min/1.73 square meters)    Stage 2 Mild CKD (GFR = 60-89 mL/min/1.73 square meters)    Stage 3A Moderate CKD (GFR = 45-59 mL/min/1.73 square meters)    Stage 3B Moderate CKD (GFR = 30-44 mL/min/1.73 square meters)    Stage 4 Severe CKD (GFR = 15-29 mL/min/1.73 square meters)    Stage 5 End Stage CKD (GFR <15 mL/min/1.73 square meters)  Note: GFR calculation is accurate only with a steady state creatinine   URINALYSIS WITH REFLEX TO SCOPE - Abnormal    Color, UA Straw (*)     Clarity, UA Clear      Specific Gravity, UA <=1.005      pH, UA 7.0      Leukocytes, UA Negative      Nitrite, UA Negative      Protein, UA Negative      Glucose, UA Negative      Ketones, UA Negative      Urobilinogen, UA 0.2      Bilirubin, UA Negative      Occult Blood, UA Negative     POCT PREGNANCY,  URINE - Normal    EXT Preg Test, Ur Negative      Control Valid     CBC AND DIFFERENTIAL    WBC 6.49      RBC 4.33      Hemoglobin 12.4      Hematocrit 39.0      MCV 90      MCH 28.6      MCHC 31.8      RDW 14.2      MPV 10.2      Platelets 295      nRBC 0      Segmented % 55      Immature Grans % 0      Lymphocytes % 35      Monocytes % 6      Eosinophils Relative 4      Basophils Relative 0      Absolute Neutrophils 3.58      Absolute Immature Grans 0.01      Absolute Lymphocytes 2.27      Absolute Monocytes 0.38      Eosinophils Absolute 0.23      Basophils Absolute 0.02       No orders to display       ECG 12 Lead Documentation Only    Date/Time: 9/24/2024 8:46 PM    Performed by: Alfred Payton DO  Authorized by: Alfred Payton DO    ECG reviewed by me, the ED Provider: yes    Patient location:  ED  Comments:      Normal sinus rhythm, rate of 68, normal CO, normal QTc, no STEMI, no significant change compared to EKG dated June 17, 2022, EKG independently interpreted by me      ED Medication and Procedure Management   Prior to Admission Medications   Prescriptions Last Dose Informant Patient Reported? Taking?   Cholecalciferol (Vitamin D3) 50 MCG (2000 UT) capsule   No No   Sig: Take 1 capsule (2,000 Units total) by mouth every morning   Nerve Stimulator (STANDARD TENS) OMER  Self No No   Sig: by Does not apply route 4 (four) times a day   albuterol (PROVENTIL HFA,VENTOLIN HFA) 90 mcg/act inhaler  Self No No   Sig: INHALE 1 PUFF BY MOUTH EVERY 6 HOURS AS NEEDED FOR WHEEZE   ferrous sulfate 324 (65 Fe) mg   No No   Sig: Take 1 tablet (324 mg total) by mouth daily before breakfast   fluticasone (FLONASE) 50 mcg/act nasal spray   No No   Sig: SPRAY 2 SPRAYS INTO EACH NOSTRIL EVERY DAY   fluticasone-salmeterol (Advair HFA) 230-21 MCG/ACT inhaler   No No   Sig: INHALE 2 PUFFS BY MOUTH 2 TIMES A DAY RINSE MOUTH AFTER USE.   medroxyPROGESTERone (DEPO-PROVERA) 150 mg/mL injection   No No   Sig: INJECT 1 ML (150 MG  TOTAL) INTO A MUSCLE EVERY 3 (THREE) MONTHS   pantoprazole (PROTONIX) 40 mg tablet  Self No No   Sig: Take 1 tablet (40 mg total) by mouth daily   sertraline (ZOLOFT) 50 mg tablet   No No   Sig: TAKE 1 TABLET BY MOUTH EVERY DAY      Facility-Administered Medications: None     Discharge Medication List as of 9/24/2024  4:50 PM        CONTINUE these medications which have NOT CHANGED    Details   albuterol (PROVENTIL HFA,VENTOLIN HFA) 90 mcg/act inhaler INHALE 1 PUFF BY MOUTH EVERY 6 HOURS AS NEEDED FOR WHEEZE, Normal      Cholecalciferol (Vitamin D3) 50 MCG (2000 UT) capsule Take 1 capsule (2,000 Units total) by mouth every morning, Starting Mon 5/20/2024, Until Tue 9/17/2024, Normal      ferrous sulfate 324 (65 Fe) mg Take 1 tablet (324 mg total) by mouth daily before breakfast, Starting Mon 5/20/2024, Until Tue 9/17/2024, Normal      fluticasone (FLONASE) 50 mcg/act nasal spray SPRAY 2 SPRAYS INTO EACH NOSTRIL EVERY DAY, Normal      fluticasone-salmeterol (Advair HFA) 230-21 MCG/ACT inhaler INHALE 2 PUFFS BY MOUTH 2 TIMES A DAY RINSE MOUTH AFTER USE., Normal      medroxyPROGESTERone (DEPO-PROVERA) 150 mg/mL injection INJECT 1 ML (150 MG TOTAL) INTO A MUSCLE EVERY 3 (THREE) MONTHS, Starting Wed 6/19/2024, Normal      Nerve Stimulator (STANDARD TENS) OMER by Does not apply route 4 (four) times a day, Starting Wed 5/20/2020, Normal      pantoprazole (PROTONIX) 40 mg tablet Take 1 tablet (40 mg total) by mouth daily, Starting Tue 8/15/2023, Normal      sertraline (ZOLOFT) 50 mg tablet TAKE 1 TABLET BY MOUTH EVERY DAY, Starting Thu 8/22/2024, Normal           No discharge procedures on file.     Alfred Payton, DO  09/24/24 2051

## 2024-09-26 ENCOUNTER — APPOINTMENT (OUTPATIENT)
Dept: LAB | Facility: HOSPITAL | Age: 34
End: 2024-09-26
Payer: MEDICARE

## 2024-09-26 DIAGNOSIS — Z86.39 HISTORY OF VITAMIN D DEFICIENCY: ICD-10-CM

## 2024-09-26 DIAGNOSIS — R53.83 FATIGUE, UNSPECIFIED TYPE: ICD-10-CM

## 2024-09-26 LAB
25(OH)D3 SERPL-MCNC: 18.1 NG/ML (ref 30–100)
FERRITIN SERPL-MCNC: 39 NG/ML (ref 11–307)
IRON SATN MFR SERPL: 23 % (ref 15–50)
IRON SERPL-MCNC: 69 UG/DL (ref 50–212)
TIBC SERPL-MCNC: 296 UG/DL (ref 250–450)
TSH SERPL DL<=0.05 MIU/L-ACNC: 1 UIU/ML (ref 0.45–4.5)
UIBC SERPL-MCNC: 227 UG/DL (ref 155–355)
VIT B12 SERPL-MCNC: 218 PG/ML (ref 180–914)

## 2024-09-26 PROCEDURE — 83550 IRON BINDING TEST: CPT

## 2024-09-26 PROCEDURE — 82728 ASSAY OF FERRITIN: CPT

## 2024-09-26 PROCEDURE — 84443 ASSAY THYROID STIM HORMONE: CPT

## 2024-09-26 PROCEDURE — 82306 VITAMIN D 25 HYDROXY: CPT

## 2024-09-26 PROCEDURE — 36415 COLL VENOUS BLD VENIPUNCTURE: CPT

## 2024-09-26 PROCEDURE — 82607 VITAMIN B-12: CPT

## 2024-09-26 PROCEDURE — 83540 ASSAY OF IRON: CPT

## 2024-09-27 ENCOUNTER — TELEPHONE (OUTPATIENT)
Age: 34
End: 2024-09-27

## 2024-09-27 NOTE — TELEPHONE ENCOUNTER
I called the patient to discuss her vitamin D results, letting her know that her levels were low at 18.1. Patient was previously prescribed 50mcg daily supplements which she states she has not been taking. I advised the patient to resume taking her vitamin D supplements, which she told me she still had. I let her know that we will re-check her vitamin D level after her next visit in 4 weeks. She expressed understanding and is agreeable to resuming vitamin D supplementation at this time.  Sindi Sawant MD

## 2024-10-04 ENCOUNTER — TELEPHONE (OUTPATIENT)
Age: 34
End: 2024-10-04

## 2024-10-04 NOTE — TELEPHONE ENCOUNTER
Patient called in to follow up with Dr Chandler, she stated they discussed surgery at her last appointment and she wants to follow through with it.  She also wanted to give an update that she is still having issues with her bladder where sometimes she is not able to hold it at all. Please follow up, thank you.

## 2024-10-04 NOTE — TELEPHONE ENCOUNTER
Patient states that she does not feel as if she's getting better and would like the dr to order her testing for Multiple sclerosis (MS)       Please review and advise.

## 2024-10-11 ENCOUNTER — OFFICE VISIT (OUTPATIENT)
Dept: OBGYN CLINIC | Facility: CLINIC | Age: 34
End: 2024-10-11
Payer: MEDICARE

## 2024-10-11 VITALS
DIASTOLIC BLOOD PRESSURE: 68 MMHG | SYSTOLIC BLOOD PRESSURE: 102 MMHG | WEIGHT: 151 LBS | BODY MASS INDEX: 26.75 KG/M2 | HEIGHT: 63 IN

## 2024-10-11 DIAGNOSIS — Z30.09 STERILIZATION CONSULT: Primary | ICD-10-CM

## 2024-10-11 PROCEDURE — 99213 OFFICE O/P EST LOW 20 MIN: CPT | Performed by: OBSTETRICS & GYNECOLOGY

## 2024-10-11 NOTE — TELEPHONE ENCOUNTER
"I called the patient today and discussed with her that based on her exam during last visit, testing for Multiple Sclerosis is not indicated at this time. I also discussed with her the [place] diagnostic criteria for MS and indications for MRI testing which were not met at the last visit. 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 I advised the patient to make an earlier appointment (current next visit 11/6/24) to address her new complaints and for a full neurological exam at that visit which will help address further management of her symptoms.  Sindi Sawant MD  "

## 2024-10-11 NOTE — PROGRESS NOTES
Assessment/Plan:     There are no diagnoses linked to this encounter.      34-year-old female  Prior 3 vaginal delivery  Currently on Depo-Provera for contraception  Patient requesting permanent sterilization  Plan  Procedure reviewed and discussed with patient with risk-benefit side effect  Permanent nature of the procedure discussed with patient  Patient desired to proceed with VNOTES laparoscopic bilateral salpingectomy      Subjective:      Patient ID: Porsha Lindquist is a 34 y.o. female.    Pre-op Exam    Pre-operative Risk Factors:    History of cerebrovascular disease: No    History of ischemic heart disease: No  Pre-operative treatment with insulin: No  Pre-operative creatinine >2 mg/dL: No    History of congestive heart failure: No    34-year-old female present to the office today requesting permanent sterilization  Patient is currently taking Depo-Provera for contraception  Patient desire permanent sterilization  Patient has prior 3 vaginal delivery  Different contraception option explained and discussed with patient in details with risk-benefit side effect patient aware and desire to proceed with permanent sterilization  Partner vasectomy also discussed with patient patient still desired to proceed with bilateral salpingectomy procedure explained and discussed with patient risk of bleeding, infection, blood transfusion, bowel injury, rectal injury, all explained and discussed with patient risk of anesthesia and postop recovery time reviewed and discussed with patient patient aware of all the risk and still desired to proceed with the V notes bilateral salpingectomy all patient question answered in details and patient was satisfied  The following portions of the patient's history were reviewed and updated as appropriate: allergies, current medications, past family history, past medical history, past social history, past surgical history and problem list.    Review of Systems      Objective:      BP  "102/68 (BP Location: Left arm, Patient Position: Sitting, Cuff Size: Adult)   Ht 5' 3\" (1.6 m)   Wt 68.5 kg (151 lb)   BMI 26.75 kg/m²          Physical Exam  Vitals and nursing note reviewed.   Constitutional:       Appearance: Normal appearance. She is well-developed.   Neck:      Thyroid: No thyroid mass or thyromegaly.   Cardiovascular:      Rate and Rhythm: Regular rhythm.      Heart sounds: Normal heart sounds.   Pulmonary:      Breath sounds: Normal breath sounds.   Abdominal:      Palpations: Abdomen is soft.      Hernia: No hernia is present.   Genitourinary:     Vagina: Normal. No vaginal discharge, erythema, tenderness or bleeding.      Cervix: No cervical motion tenderness, discharge or friability.      Uterus: Normal. Not enlarged and not tender.       Adnexa:         Right: No mass or tenderness.          Left: No mass or tenderness.     Skin:     General: Skin is warm and dry.   Neurological:      Mental Status: She is alert and oriented to person, place, and time.         "

## 2024-10-15 ENCOUNTER — TELEPHONE (OUTPATIENT)
Dept: OBGYN CLINIC | Facility: CLINIC | Age: 34
End: 2024-10-15

## 2024-10-15 NOTE — TELEPHONE ENCOUNTER
----- Message from Ashutosh Chandler MD sent at 10/11/2024  1:06 PM EDT -----    vnotes bilateral salpingectomy    [facility] Department  Surgery Scheduling Sheet    Patient Name: Porsha aLl  : 1990    Provider: Ashutosh Chandler MD     Needed: no; Language: N/A    Procedure: exam under anesthesia vnotes bilateral salpingectomy    Diagnosis: Multigravida desires sterilization    Special Needs or Equipment: none    Anesthesia: General anesthesia    Length of stay: outpatient  -Does patient have comorbid conditions that will require close perioperative monitoring prior to safe discharge: no    -The patient has comorbid conditions that will require close perioperative monitoring prior to safe discharge, including N/A.   -This may require acute care beyond the usual and routine recovery period. As such, inpatient admission post-operatively is expected and appropriate, and anticipated hospital length of stay will be >2 midnights.    Pre-Admission Testing Needed: No   Labs that should be ordered: urine pregnancy test    Order PAT that is recommended in prep for procedure?: Yes    Medical Clearance Needed: no; Provider: N/A    MA Form Signed (tubals/hysterectomy): Yes 10/11/24     Surgical Drink Given: no     How many days out of work: 2 week(s)     How many days no drivin week(s)       Is pre op appt needed?  no  Interval for post op appt: 1 week(s)     For Surgical Scheduler:     Surgery Scheduled On: MON. 24-MORNING  [place]: [facility]    Pre-op Appt: COMPLETED ON 10/11/24  Post op Appt: TO BE SCHEDULED  Consult/Medical clearance appt: N/A

## 2024-10-16 ENCOUNTER — OFFICE VISIT (OUTPATIENT)
Dept: PULMONOLOGY | Facility: CLINIC | Age: 34
End: 2024-10-16
Payer: MEDICARE

## 2024-10-16 VITALS
TEMPERATURE: 98.3 F | WEIGHT: 151 LBS | BODY MASS INDEX: 26.75 KG/M2 | DIASTOLIC BLOOD PRESSURE: 70 MMHG | HEART RATE: 70 BPM | OXYGEN SATURATION: 97 % | SYSTOLIC BLOOD PRESSURE: 110 MMHG | HEIGHT: 63 IN

## 2024-10-16 DIAGNOSIS — J45.20 MILD INTERMITTENT ASTHMA WITHOUT COMPLICATION: Primary | ICD-10-CM

## 2024-10-16 DIAGNOSIS — J30.1 SEASONAL ALLERGIC RHINITIS DUE TO POLLEN: ICD-10-CM

## 2024-10-16 DIAGNOSIS — Z01.811 PREOPERATIVE RESPIRATORY EXAMINATION: ICD-10-CM

## 2024-10-16 PROCEDURE — 99213 OFFICE O/P EST LOW 20 MIN: CPT | Performed by: INTERNAL MEDICINE

## 2024-10-16 NOTE — ASSESSMENT & PLAN NOTE
Asthma has been quite calm since we last spoke in the springtime.  Patient no longer takes Advair on a regular basis.  She uses albuterol less than 4 times weekly.  I advised her that she could use albuterol before activities that might make her short of breath.  If she uses it more than 4 times weekly she ideally should go back to taking Advair twice daily.  No recent respiratory infections.  I recommended a flu vaccine and COVID-19 booster this fall.

## 2024-10-16 NOTE — ASSESSMENT & PLAN NOTE
Asthma has been calm over the last 6 months.  Patient is cleared for proposed gynecologic surgery.  Although she does not take Advair on a regular basis I recommend that she consider taking it twice daily for a few days before her planned surgery.

## 2024-10-16 NOTE — PROGRESS NOTES
Preoperative respiratory examination Assessment/Plan:    Preoperative respiratory examination  Asthma has been calm over the last 6 months.  Patient is cleared for proposed gynecologic surgery.  Although she does not take Advair on a regular basis I recommend that she consider taking it twice daily for a few days before her planned surgery.    Seasonal allergic rhinitis due to pollen  Only mild symptoms this fall.    Mild intermittent asthma without complication  Asthma has been quite calm since we last spoke in the springtime.  Patient no longer takes Advair on a regular basis.  She uses albuterol less than 4 times weekly.  I advised her that she could use albuterol before activities that might make her short of breath.  If she uses it more than 4 times weekly she ideally should go back to taking Advair twice daily.  No recent respiratory infections.  I recommended a flu vaccine and COVID-19 booster this fall.     Diagnoses and all orders for this visit:    Mild intermittent asthma without complication    Preoperative respiratory examination    Seasonal allergic rhinitis due to pollen          Subjective:      Patient ID: Porsha Lindquist is a 34 y.o. female.    This patient returns for reevaluation of mild allergic asthma.  Since her last visit here in the spring she has not had any major issues.  In fact her asthma has been so, she stopped taking Advair on a regular basis.  I get the feeling that it was somewhat of a hassle to take this medicine twice daily.  She uses albuterol less than 3 times weekly.  When she uses that she gets relief of symptoms.  No respiratory infections.  No major trouble with allergies this fall.  Has symptoms of GERD controlled with pantoprazole.  Has gynecologic surgery planned in December and patient would be cleared for that.    primary symptoms  Associated symptoms include coughing, headaches and myalgias. Pertinent negatives include no chest pain, fever or sore throat.  "      The following portions of the patient's history were reviewed and updated as appropriate: allergies, current medications, past family history, past medical history, past social history, past surgical history and problem list.    Review of Systems   Constitutional:  Negative for activity change, appetite change, fever and unexpected weight change.   HENT:  Positive for sneezing. Negative for ear pain, postnasal drip, rhinorrhea, sore throat and trouble swallowing.    Respiratory:  Positive for cough, shortness of breath and wheezing.    Cardiovascular:  Negative for chest pain, palpitations and leg swelling.   Musculoskeletal:  Positive for myalgias.   Allergic/Immunologic: Positive for environmental allergies.   Neurological:  Positive for headaches.         Objective:      /70 (BP Location: Left arm, Patient Position: Sitting, Cuff Size: Standard)   Pulse 70   Temp 98.3 °F (36.8 °C) (Tympanic)   Ht 5' 3\" (1.6 m)   Wt 68.5 kg (151 lb)   SpO2 97%   BMI 26.75 kg/m²          Physical Exam  Vitals reviewed.   Constitutional:       General: She is not in acute distress.     Appearance: She is normal weight. She is not ill-appearing.   Cardiovascular:      Rate and Rhythm: Normal rate and regular rhythm.      Pulses:           Radial pulses are 2+ on the right side and 1+ on the left side.      Heart sounds: Normal heart sounds.   Pulmonary:      Effort: Pulmonary effort is normal.      Breath sounds: Normal breath sounds. No wheezing or rhonchi.   Musculoskeletal:         General: No swelling.      Cervical back: No rigidity or tenderness.      Right lower leg: No edema.      Left lower leg: No edema.   Lymphadenopathy:      Cervical: No cervical adenopathy.   Skin:     General: Skin is warm and dry.   Neurological:      Mental Status: She is alert and oriented to person, place, and time.   Psychiatric:         Mood and Affect: Mood normal.         Behavior: Behavior normal.       answers submitted by " the patient for this visit:  Pulmonology Questionnaire (Submitted on 10/16/2024)  Chief Complaint: Primary symptoms  Do you have a cough?: Yes  Do you experience frequent throat clearing?: Yes  Do you have shortness of breath?: Yes  Do you have wheezing?: Yes  Chronicity: recurrent  When did you first notice your symptoms?: 1 to 4 weeks ago  How often do your symptoms occur?: daily  Since you first noticed this problem, how has it changed?: gradually improving  Have you had a change in appetite?: No  Do you have chest pain?: No  Do you have shortness of breath that occurs with effort or exertion?: Yes  Do you have ear congestion?: No  Do you have ear pain?: No  Do you have a fever?: No  Do you have headaches?: Yes  Do you have heartburn?: Yes  Do you have fatigue?: Yes  Do you have muscle pain?: Yes  Do you have nasal congestion?: No  Do you have shortness of breath when lying flat?: No  Do you have shortness of breath when you wake up?: No  Do you have post-nasal drip?: No  Do you have a runny nose?: No  Do you have sneezing?: Yes  Do you have a sore throat?: No  Do you have sweats?: No  Do you have trouble swallowing?: No  Have you experienced weight loss?: No  Which of the following makes your symptoms worse?: animal exposure, change in weather, climbing stairs, emotional stress, exercise, pollen, URI  Which of the following makes your symptoms better?: oral steroids, OTC cough suppressant, OTC inhaler, steroid inhaler  Risk factors for lung disease: occupational exposure

## 2024-10-21 ENCOUNTER — TELEPHONE (OUTPATIENT)
Age: 34
End: 2024-10-21

## 2024-10-31 NOTE — TELEPHONE ENCOUNTER
Patient had called in and was wanting to know the status on her form that needed to be filled out. She needs the form as soon as possible and any vaccination that might need to be done, as the deadline is November 25th.     Please have someone advise back to patient with any information.

## 2024-11-05 NOTE — PRE-PROCEDURE INSTRUCTIONS
Pre-Surgery Instructions:   Medication Instructions    albuterol (PROVENTIL HFA,VENTOLIN HFA) 90 mcg/act inhaler Uses PRN- OK to take day of surgery    Cholecalciferol (Vitamin D3) 50 MCG (2000 UT) capsule Hold day of surgery.    ferrous sulfate 324 (65 Fe) mg Stop taking 7 days prior to surgery.    fluticasone (FLONASE) 50 mcg/act nasal spray Take day of surgery.    fluticasone-salmeterol (Advair HFA) 230-21 MCG/ACT inhaler Take day of surgery.    medroxyPROGESTERone (DEPO-PROVERA) 150 mg/mL injection Next dose after surgery    pantoprazole (PROTONIX) 40 mg tablet Take day of surgery.    Medication instructions for day surgery reviewed. Please use only a sip of water to take your instructed medications. Avoid all over the counter vitamins, supplements and NSAIDS for one week prior to surgery per anesthesia guidelines. Tylenol is ok to take as needed.     You will receive a call one business day prior to surgery with an arrival time and hospital directions. If your surgery is scheduled on a Monday, the hospital will be calling you on the Friday prior to your surgery. If you have not heard from anyone by 8pm, please call the hospital supervisor through the hospital  at [phone]. ([place] [phone] or [place] [phone]).    Do not eat or drink anything after midnight the night before your surgery, including candy, mints, lifesavers, or chewing gum. Do not drink alcohol 24hrs before your surgery. Try not to smoke at least 24hrs before your surgery.       Follow the pre surgery showering instructions as listed in the “My Surgical Experience Booklet” or otherwise provided by your surgeon's office. Do not use a blade to shave the surgical area 1 week before surgery. It is okay to use a clean electric clippers up to 24 hours before surgery. Do not apply any lotions, creams, including makeup, cologne, deodorant, or perfumes after showering on the day of your surgery. Do not use dry shampoo, hair spray,  hair gel, or any type of hair products.     No contact lenses, eye make-up, or artificial eyelashes. Remove nail polish, including gel polish, and any artificial, gel, or acrylic nails if possible. Remove all jewelry including rings and body piercing jewelry.     Wear causal clothing that is easy to take on and off. Consider your type of surgery.    Keep any valuables, jewelry, piercings at home. Please bring any specially ordered equipment (sling, braces) if indicated.    Arrange for a responsible person to drive you to and from the hospital on the day of your surgery. Please confirm the visitor policy for the day of your procedure when you receive your phone call with an arrival time.     Call the surgeon's office with any new illnesses, exposures, or additional questions prior to surgery.    Please reference your “My Surgical Experience Booklet” for additional information to prepare for your upcoming surgery.

## 2024-11-06 ENCOUNTER — OFFICE VISIT (OUTPATIENT)
Age: 34
End: 2024-11-06
Payer: MEDICARE

## 2024-11-06 VITALS
HEART RATE: 94 BPM | DIASTOLIC BLOOD PRESSURE: 60 MMHG | HEIGHT: 63 IN | WEIGHT: 148 LBS | SYSTOLIC BLOOD PRESSURE: 122 MMHG | BODY MASS INDEX: 26.22 KG/M2 | OXYGEN SATURATION: 99 % | TEMPERATURE: 97.6 F

## 2024-11-06 DIAGNOSIS — R20.2 TINGLING SENSATION IN FACE: ICD-10-CM

## 2024-11-06 DIAGNOSIS — R20.2 NUMBNESS AND TINGLING IN BOTH HANDS: ICD-10-CM

## 2024-11-06 DIAGNOSIS — R42 DIZZINESS: ICD-10-CM

## 2024-11-06 DIAGNOSIS — R53.83 OTHER FATIGUE: Primary | ICD-10-CM

## 2024-11-06 DIAGNOSIS — Z71.85 ENCOUNTER FOR COUNSELING REGARDING IMMUNIZATION: ICD-10-CM

## 2024-11-06 DIAGNOSIS — G24.5 EYE TWITCH: ICD-10-CM

## 2024-11-06 DIAGNOSIS — R20.0 NUMBNESS AND TINGLING IN BOTH HANDS: ICD-10-CM

## 2024-11-06 DIAGNOSIS — J45.20 MILD INTERMITTENT ASTHMA WITHOUT COMPLICATION: ICD-10-CM

## 2024-11-06 PROCEDURE — 99214 OFFICE O/P EST MOD 30 MIN: CPT

## 2024-11-06 NOTE — ASSESSMENT & PLAN NOTE
"Patient reports ongoing fatigue x 7 months with associated symptoms including numbness and tingling in both hands, tingling of the head, dizziness, and new onset right eye twitch x 1 month. Previous serologic testing was unremarkable aside from low vitamin D. Patient notes only mild improvement of fatigue with supplementation but notes that she still does not feel at her baseline. Patient previously diagnosed with a \"special sensory attack\" in 2019 following MRI. Complete neurological exam today was unremarkable; remainder of physical exam unremarkable. Etiology of the patient's neurological symptoms are unclear, will E-consult Neurology for review and evaluation of patient's symptoms.  Orders:    AMB E-CONSULT TO NEUROLOGY    "

## 2024-11-06 NOTE — ASSESSMENT & PLAN NOTE
Patient reports using her maintenance inhaler BID in addition to rescue inhaler once daily since seasonal transition, states that her asthma is well-controlled at this time. Pulmonary exam today unremarkable. Patient follows up with pulmonologist.

## 2024-11-06 NOTE — PROGRESS NOTES
"Ambulatory Visit  Name: Porsha Lindquist      : 1990      MRN: [id number]  Encounter Provider: Sindi Sawant MD  Encounter Date: 2024   Encounter department: [facility]    Assessment & Plan  Other fatigue  Patient reports ongoing fatigue x 7 months with associated symptoms including numbness and tingling in both hands, tingling of the head, dizziness, and new onset right eye twitch x 1 month. Previous serologic testing was unremarkable aside from low vitamin D. Patient notes only mild improvement of fatigue with supplementation but notes that she still does not feel at her baseline. Patient previously diagnosed with a \"special sensory attack\" in 2019 following MRI. Complete neurological exam today was unremarkable; remainder of physical exam unremarkable. Etiology of the patient's neurological symptoms are unclear, will E-consult Neurology for review and evaluation of patient's symptoms.  Orders:    AMB E-CONSULT TO NEUROLOGY    Numbness and tingling in both hands  See \"other fatigue' above.  Orders:    AMB E-CONSULT TO NEUROLOGY    Tingling sensation in face  See \"other fatigue' above.  Orders:    AMB E-CONSULT TO NEUROLOGY    Dizziness  See \"other fatigue' above.  Orders:    AMB E-CONSULT TO NEUROLOGY    Eye twitch  Right eye twitch x 1 month. See \"other fatigue' above.  Orders:    AMB E-CONSULT TO NEUROLOGY    Mild intermittent asthma without complication  Patient reports using her maintenance inhaler BID in addition to rescue inhaler once daily since seasonal transition, states that her asthma is well-controlled at this time. Pulmonary exam today unremarkable. Patient follows up with pulmonologist.       Encounter for counseling regarding immunization  Patient states she is starting a new job, provided documentation for onboarding which includes requirements for titers and proof of immunization. Titers ordered.  Orders:    Varicella zoster antibody, IgG; Future    " "Quantiferon TB Gold Plus Assay; Future    Hepatitis B surface antigen; Future    Rubeola antibody IgG; Future    Mumps antibody, IgG; Future    Rubella antibody, IgG; Future       History of Present Illness     Patient is a 34-year-old female with a past medical history of asthma, vitamin D deficiency and iron deficiency anemia who presents to the clinic for a follow-up visit. She was previously seen on 9/25 for evaluation of fatigue and dizziness for the last 7 months and had reached out via telephone call on 10/4 with concerns for Multiple Sclerosis. Today, the patient reports that her fatigue has mildly improved since re-starting vitamin D supplementation and beginning a new exercise regimen. However, the patient describes experiencing continued neurological symptoms including \"a tingling sensation in my face and head,\" heaviness and brief loss of function in her upper extremities, and states \"my right eye has been twitching for a whole month.\" Patient expresses concern that this is not her baseline. She denies any chest pain, palpitations, or syncopal episodes. 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 She also requests review of immunization records today as a requirement of her on-boarding process.    Asthma:  Patient reports that her asthma is well-controlled. She notes using her maintenance inhaler in the morning and at night, and her rescue inhaler once a day since the changing of seasons. She denies and dyspnea, cough, or wheezing.              Review of Systems   Constitutional:  Positive for fatigue. Negative for chills and fever.   HENT:  Negative for congestion, ear pain, rhinorrhea and sore throat.    Eyes:  Negative for pain and visual disturbance.        (+) R eye twitching   Respiratory:  Negative for cough, shortness of breath and wheezing.    Cardiovascular:  Negative for chest pain and palpitations.   Gastrointestinal:  Negative for " "abdominal pain, diarrhea, nausea and vomiting.   Genitourinary:  Negative for dysuria.   Neurological:  Positive for dizziness, weakness and numbness. Negative for seizures, syncope, facial asymmetry and speech difficulty.           Objective     /60   Pulse 94   Temp 97.6 °F (36.4 °C)   Ht 5' 3\" (1.6 m)   Wt 67.1 kg (148 lb)   SpO2 99%   BMI 26.22 kg/m²     Physical Exam  Constitutional:       General: She is not in acute distress.     Appearance: Normal appearance.      Comments: Body mass index is 26.22 kg/m².   HENT:      Head: Normocephalic and atraumatic.      Nose: Nose normal. No congestion or rhinorrhea.      Mouth/Throat:      Mouth: Mucous membranes are moist.      Pharynx: Oropharynx is clear.   Eyes:      General: No visual field deficit.     Extraocular Movements: Extraocular movements intact.      Conjunctiva/sclera: Conjunctivae normal.      Pupils: Pupils are equal, round, and reactive to light.   Cardiovascular:      Rate and Rhythm: Normal rate and regular rhythm.      Pulses: Normal pulses.      Heart sounds: Normal heart sounds.   Pulmonary:      Effort: Pulmonary effort is normal. No respiratory distress.      Breath sounds: Normal breath sounds. No wheezing.   Abdominal:      General: Abdomen is flat. There is no distension.      Palpations: Abdomen is soft.      Tenderness: There is no abdominal tenderness.   Musculoskeletal:         General: Normal range of motion.      Cervical back: Normal range of motion and neck supple.      Right lower leg: No edema.      Left lower leg: No edema.   Skin:     General: Skin is warm and dry.      Capillary Refill: Capillary refill takes less than 2 seconds.      Coloration: Skin is not pale.      Findings: No erythema.   Neurological:      General: No focal deficit present.      Mental Status: She is alert and oriented to person, place, and time.      Cranial Nerves: Cranial nerves 2-12 are intact. No cranial nerve deficit, dysarthria or " facial asymmetry.      Sensory: Sensation is intact.      Motor: Motor function is intact. No weakness, tremor, abnormal muscle tone or pronator drift.      Coordination: Coordination is intact. Coordination normal.      Gait: Gait is intact.      Deep Tendon Reflexes:      Reflex Scores:       Achilles reflexes are 2+ on the right side and 2+ on the left side.  Psychiatric:         Mood and Affect: Mood normal.         Speech: Speech normal.         Behavior: Behavior normal.         Thought Content: Thought content normal.         Judgment: Judgment normal.

## 2024-11-06 NOTE — ASSESSMENT & PLAN NOTE
"Right eye twitch x 1 month. See \"other fatigue' above.  Orders:    AMB E-CONSULT TO NEUROLOGY    "

## 2024-11-07 ENCOUNTER — TELEPHONE (OUTPATIENT)
Age: 34
End: 2024-11-07

## 2024-11-09 ENCOUNTER — APPOINTMENT (OUTPATIENT)
Dept: LAB | Facility: HOSPITAL | Age: 34
End: 2024-11-09
Payer: MEDICARE

## 2024-11-09 DIAGNOSIS — Z71.85 ENCOUNTER FOR COUNSELING REGARDING IMMUNIZATION: ICD-10-CM

## 2024-11-09 LAB — RUBV IGG SERPL IA-ACNC: 50 IU/ML

## 2024-11-09 PROCEDURE — 86762 RUBELLA ANTIBODY: CPT

## 2024-11-09 PROCEDURE — 86787 VARICELLA-ZOSTER ANTIBODY: CPT

## 2024-11-09 PROCEDURE — 86735 MUMPS ANTIBODY: CPT

## 2024-11-09 PROCEDURE — 86480 TB TEST CELL IMMUN MEASURE: CPT

## 2024-11-09 PROCEDURE — 86765 RUBEOLA ANTIBODY: CPT

## 2024-11-09 PROCEDURE — 36415 COLL VENOUS BLD VENIPUNCTURE: CPT

## 2024-11-09 PROCEDURE — 87340 HEPATITIS B SURFACE AG IA: CPT

## 2024-11-10 ENCOUNTER — ANESTHESIA EVENT (OUTPATIENT)
Dept: PERIOP | Facility: HOSPITAL | Age: 34
End: 2024-11-10
Payer: MEDICARE

## 2024-11-10 LAB
GAMMA INTERFERON BACKGROUND BLD IA-ACNC: 0.04 IU/ML
HBV SURFACE AG SER QL: NORMAL
M TB IFN-G BLD-IMP: NEGATIVE
M TB IFN-G CD4+ BCKGRND COR BLD-ACNC: 0 IU/ML
M TB IFN-G CD4+ BCKGRND COR BLD-ACNC: 0 IU/ML
MEV IGG SER QL IA: NORMAL
MITOGEN IGNF BCKGRD COR BLD-ACNC: 9.96 IU/ML
MUV IGG SER QL IA: ABNORMAL
VZV IGG SER QL IA: NORMAL

## 2024-11-10 NOTE — ANESTHESIA PREPROCEDURE EVALUATION
Procedure:  EXAM UNDER ANESTHESIA; (VNOTE) SALPINGECTOMY, LAPAROSCOPIC (Bilateral: Abdomen)    Relevant Problems   GI/HEPATIC   (+) Gastroesophageal reflux disease without esophagitis      HEMATOLOGY   (+) Iron deficiency anemia secondary to inadequate dietary iron intake      MUSCULOSKELETAL   (+) Eye twitch      NEURO/PSYCH   (+) Depression with anxiety   (+) Numbness and tingling in both hands   (+) Special sensory attacks (HCC)      PULMONARY   (+) Mild intermittent asthma without complication        Physical Exam    Airway    Mallampati score: II  TM Distance: >3 FB  Neck ROM: full     Dental   No notable dental hx     Cardiovascular  Rhythm: regular, Rate: normal, Cardiovascular exam normal    Pulmonary  Pulmonary exam normal Breath sounds clear to auscultation    Other Findings  post-pubertal.      Anesthesia Plan  ASA Score- 2     Anesthesia Type- general with ASA Monitors.         Additional Monitors:     Airway Plan: ETT.           Plan Factors-Exercise tolerance (METS): >4 METS.    Chart reviewed.   Existing labs reviewed. Patient summary reviewed.    Patient is not a current smoker.              Induction- intravenous.    Postoperative Plan- Plan for postoperative opioid use. Planned trial extubation        Informed Consent- Anesthetic plan and risks discussed with patient.  I personally reviewed this patient with the CRNA. Discussed and agreed on the Anesthesia Plan with the CRNA..

## 2024-11-11 ENCOUNTER — ANESTHESIA (OUTPATIENT)
Dept: PERIOP | Facility: HOSPITAL | Age: 34
End: 2024-11-11
Payer: MEDICARE

## 2024-11-11 ENCOUNTER — HOSPITAL ENCOUNTER (OUTPATIENT)
Facility: HOSPITAL | Age: 34
Setting detail: OUTPATIENT SURGERY
Discharge: HOME | End: 2024-11-11
Attending: OBSTETRICS & GYNECOLOGY | Admitting: OBSTETRICS & GYNECOLOGY
Payer: MEDICARE

## 2024-11-11 VITALS
RESPIRATION RATE: 18 BRPM | WEIGHT: 146.6 LBS | OXYGEN SATURATION: 98 % | SYSTOLIC BLOOD PRESSURE: 113 MMHG | HEIGHT: 63 IN | BODY MASS INDEX: 25.98 KG/M2 | TEMPERATURE: 97.5 F | DIASTOLIC BLOOD PRESSURE: 71 MMHG | HEART RATE: 66 BPM

## 2024-11-11 DIAGNOSIS — Z30.2 ADMISSION FOR STERILIZATION: ICD-10-CM

## 2024-11-11 DIAGNOSIS — Z90.79 STATUS POST BILATERAL SALPINGECTOMY: Primary | ICD-10-CM

## 2024-11-11 LAB
EXT PREGNANCY TEST URINE: NEGATIVE
EXT. CONTROL: NORMAL

## 2024-11-11 PROCEDURE — 81025 URINE PREGNANCY TEST: CPT | Performed by: OBSTETRICS & GYNECOLOGY

## 2024-11-11 PROCEDURE — 58661 LAPAROSCOPY REMOVE ADNEXA: CPT | Performed by: OBSTETRICS & GYNECOLOGY

## 2024-11-11 PROCEDURE — 88302 TISSUE EXAM BY PATHOLOGIST: CPT | Performed by: PATHOLOGY

## 2024-11-11 PROCEDURE — NC001 PR NO CHARGE: Performed by: OBSTETRICS & GYNECOLOGY

## 2024-11-11 RX ORDER — HYDROMORPHONE HCL/PF 1 MG/ML
SYRINGE (ML) INJECTION AS NEEDED
Status: DISCONTINUED | OUTPATIENT
Start: 2024-11-11 | End: 2024-11-11

## 2024-11-11 RX ORDER — NEOSTIGMINE METHYLSULFATE 1 MG/ML
INJECTION INTRAVENOUS AS NEEDED
Status: DISCONTINUED | OUTPATIENT
Start: 2024-11-11 | End: 2024-11-11

## 2024-11-11 RX ORDER — LABETALOL HYDROCHLORIDE 5 MG/ML
10 INJECTION, SOLUTION INTRAVENOUS
Status: DISCONTINUED | OUTPATIENT
Start: 2024-11-11 | End: 2024-11-11 | Stop reason: HOSPADM

## 2024-11-11 RX ORDER — GLYCOPYRROLATE 0.2 MG/ML
INJECTION INTRAMUSCULAR; INTRAVENOUS AS NEEDED
Status: DISCONTINUED | OUTPATIENT
Start: 2024-11-11 | End: 2024-11-11

## 2024-11-11 RX ORDER — HYDROMORPHONE HCL/PF 1 MG/ML
0.5 SYRINGE (ML) INJECTION
Status: DISCONTINUED | OUTPATIENT
Start: 2024-11-11 | End: 2024-11-11 | Stop reason: HOSPADM

## 2024-11-11 RX ORDER — ONDANSETRON 2 MG/ML
INJECTION INTRAMUSCULAR; INTRAVENOUS AS NEEDED
Status: DISCONTINUED | OUTPATIENT
Start: 2024-11-11 | End: 2024-11-11

## 2024-11-11 RX ORDER — CEFAZOLIN SODIUM 2 G/50ML
2000 SOLUTION INTRAVENOUS ONCE
Status: DISCONTINUED | OUTPATIENT
Start: 2024-11-11 | End: 2024-11-11 | Stop reason: HOSPADM

## 2024-11-11 RX ORDER — MIDAZOLAM HYDROCHLORIDE 2 MG/2ML
INJECTION, SOLUTION INTRAMUSCULAR; INTRAVENOUS AS NEEDED
Status: DISCONTINUED | OUTPATIENT
Start: 2024-11-11 | End: 2024-11-11

## 2024-11-11 RX ORDER — LORAZEPAM 2 MG/ML
1 INJECTION INTRAMUSCULAR
Status: DISCONTINUED | OUTPATIENT
Start: 2024-11-11 | End: 2024-11-11 | Stop reason: HOSPADM

## 2024-11-11 RX ORDER — SODIUM CHLORIDE, SODIUM LACTATE, POTASSIUM CHLORIDE, CALCIUM CHLORIDE 600; 310; 30; 20 MG/100ML; MG/100ML; MG/100ML; MG/100ML
INJECTION, SOLUTION INTRAVENOUS CONTINUOUS PRN
Status: DISCONTINUED | OUTPATIENT
Start: 2024-11-11 | End: 2024-11-11

## 2024-11-11 RX ORDER — HYDROMORPHONE HCL IN WATER/PF 6 MG/30 ML
0.2 PATIENT CONTROLLED ANALGESIA SYRINGE INTRAVENOUS
Status: DISCONTINUED | OUTPATIENT
Start: 2024-11-11 | End: 2024-11-11 | Stop reason: HOSPADM

## 2024-11-11 RX ORDER — METOCLOPRAMIDE HYDROCHLORIDE 5 MG/ML
10 INJECTION INTRAMUSCULAR; INTRAVENOUS ONCE AS NEEDED
Status: DISCONTINUED | OUTPATIENT
Start: 2024-11-11 | End: 2024-11-11 | Stop reason: HOSPADM

## 2024-11-11 RX ORDER — FENTANYL CITRATE/PF 50 MCG/ML
25 SYRINGE (ML) INJECTION
Status: DISCONTINUED | OUTPATIENT
Start: 2024-11-11 | End: 2024-11-11 | Stop reason: HOSPADM

## 2024-11-11 RX ORDER — FENTANYL CITRATE 50 UG/ML
INJECTION, SOLUTION INTRAMUSCULAR; INTRAVENOUS AS NEEDED
Status: DISCONTINUED | OUTPATIENT
Start: 2024-11-11 | End: 2024-11-11

## 2024-11-11 RX ORDER — ONDANSETRON 2 MG/ML
4 INJECTION INTRAMUSCULAR; INTRAVENOUS ONCE AS NEEDED
Status: DISCONTINUED | OUTPATIENT
Start: 2024-11-11 | End: 2024-11-11 | Stop reason: HOSPADM

## 2024-11-11 RX ORDER — PROMETHAZINE HYDROCHLORIDE 25 MG/ML
12.5 INJECTION, SOLUTION INTRAMUSCULAR; INTRAVENOUS ONCE AS NEEDED
Status: DISCONTINUED | OUTPATIENT
Start: 2024-11-11 | End: 2024-11-11 | Stop reason: HOSPADM

## 2024-11-11 RX ORDER — SODIUM CHLORIDE, SODIUM LACTATE, POTASSIUM CHLORIDE, CALCIUM CHLORIDE 600; 310; 30; 20 MG/100ML; MG/100ML; MG/100ML; MG/100ML
125 INJECTION, SOLUTION INTRAVENOUS CONTINUOUS
Status: DISCONTINUED | OUTPATIENT
Start: 2024-11-11 | End: 2024-11-11 | Stop reason: HOSPADM

## 2024-11-11 RX ORDER — HYDRALAZINE HYDROCHLORIDE 20 MG/ML
5 INJECTION INTRAMUSCULAR; INTRAVENOUS
Status: DISCONTINUED | OUTPATIENT
Start: 2024-11-11 | End: 2024-11-11 | Stop reason: HOSPADM

## 2024-11-11 RX ORDER — DEXAMETHASONE SODIUM PHOSPHATE 10 MG/ML
INJECTION, SOLUTION INTRAMUSCULAR; INTRAVENOUS AS NEEDED
Status: DISCONTINUED | OUTPATIENT
Start: 2024-11-11 | End: 2024-11-11

## 2024-11-11 RX ORDER — PROPOFOL 10 MG/ML
INJECTION, EMULSION INTRAVENOUS CONTINUOUS PRN
Status: DISCONTINUED | OUTPATIENT
Start: 2024-11-11 | End: 2024-11-11

## 2024-11-11 RX ORDER — DOCUSATE SODIUM 100 MG/1
100 CAPSULE, LIQUID FILLED ORAL DAILY
Qty: 10 CAPSULE | Refills: 1 | Status: SHIPPED | OUTPATIENT
Start: 2024-11-11

## 2024-11-11 RX ORDER — KETOROLAC TROMETHAMINE 30 MG/ML
30 INJECTION, SOLUTION INTRAMUSCULAR; INTRAVENOUS ONCE
Status: COMPLETED | OUTPATIENT
Start: 2024-11-11 | End: 2024-11-11

## 2024-11-11 RX ORDER — PROPOFOL 10 MG/ML
INJECTION, EMULSION INTRAVENOUS AS NEEDED
Status: DISCONTINUED | OUTPATIENT
Start: 2024-11-11 | End: 2024-11-11

## 2024-11-11 RX ORDER — ALBUTEROL SULFATE 0.83 MG/ML
2.5 SOLUTION RESPIRATORY (INHALATION) ONCE AS NEEDED
Status: DISCONTINUED | OUTPATIENT
Start: 2024-11-11 | End: 2024-11-11 | Stop reason: HOSPADM

## 2024-11-11 RX ORDER — IBUPROFEN 600 MG/1
600 TABLET, FILM COATED ORAL EVERY 6 HOURS PRN
Qty: 30 TABLET | Refills: 0 | Status: SHIPPED | OUTPATIENT
Start: 2024-11-11

## 2024-11-11 RX ORDER — METRONIDAZOLE 500 MG/1
500 TABLET ORAL EVERY 12 HOURS SCHEDULED
Qty: 14 TABLET | Refills: 0 | Status: SHIPPED | OUTPATIENT
Start: 2024-11-11 | End: 2024-11-18

## 2024-11-11 RX ORDER — MAGNESIUM HYDROXIDE 1200 MG/15ML
LIQUID ORAL AS NEEDED
Status: DISCONTINUED | OUTPATIENT
Start: 2024-11-11 | End: 2024-11-11 | Stop reason: HOSPADM

## 2024-11-11 RX ORDER — ROCURONIUM BROMIDE 10 MG/ML
INJECTION, SOLUTION INTRAVENOUS AS NEEDED
Status: DISCONTINUED | OUTPATIENT
Start: 2024-11-11 | End: 2024-11-11

## 2024-11-11 RX ORDER — LIDOCAINE HYDROCHLORIDE 10 MG/ML
INJECTION, SOLUTION EPIDURAL; INFILTRATION; INTRACAUDAL; PERINEURAL AS NEEDED
Status: DISCONTINUED | OUTPATIENT
Start: 2024-11-11 | End: 2024-11-11

## 2024-11-11 RX ADMIN — ROCURONIUM BROMIDE 40 MG: 10 INJECTION, SOLUTION INTRAVENOUS at 08:03

## 2024-11-11 RX ADMIN — PROPOFOL 130 MG: 10 INJECTION, EMULSION INTRAVENOUS at 08:03

## 2024-11-11 RX ADMIN — NEOSTIGMINE METHYLSULFATE 3 MG: 1 INJECTION INTRAVENOUS at 08:55

## 2024-11-11 RX ADMIN — LIDOCAINE HYDROCHLORIDE 50 MG: 10 INJECTION, SOLUTION EPIDURAL; INFILTRATION; INTRACAUDAL at 08:03

## 2024-11-11 RX ADMIN — DEXMEDETOMIDINE HYDROCHLORIDE 4 MCG: 100 INJECTION, SOLUTION INTRAVENOUS at 08:22

## 2024-11-11 RX ADMIN — DEXMEDETOMIDINE HYDROCHLORIDE 4 MCG: 100 INJECTION, SOLUTION INTRAVENOUS at 08:31

## 2024-11-11 RX ADMIN — KETOROLAC TROMETHAMINE 30 MG: 30 INJECTION, SOLUTION INTRAMUSCULAR at 09:30

## 2024-11-11 RX ADMIN — FENTANYL CITRATE 100 MCG: 50 INJECTION, SOLUTION INTRAMUSCULAR; INTRAVENOUS at 08:03

## 2024-11-11 RX ADMIN — ONDANSETRON 4 MG: 2 INJECTION INTRAMUSCULAR; INTRAVENOUS at 08:09

## 2024-11-11 RX ADMIN — SODIUM CHLORIDE, SODIUM LACTATE, POTASSIUM CHLORIDE, AND CALCIUM CHLORIDE: .6; .31; .03; .02 INJECTION, SOLUTION INTRAVENOUS at 08:57

## 2024-11-11 RX ADMIN — HYDROMORPHONE HYDROCHLORIDE 1 MG: 1 INJECTION, SOLUTION INTRAMUSCULAR; INTRAVENOUS; SUBCUTANEOUS at 09:00

## 2024-11-11 RX ADMIN — GLYCOPYRROLATE 0.6 MG: 0.2 INJECTION, SOLUTION INTRAMUSCULAR; INTRAVENOUS at 08:55

## 2024-11-11 RX ADMIN — DEXAMETHASONE SODIUM PHOSPHATE 10 MG: 10 INJECTION, SOLUTION INTRAMUSCULAR; INTRAVENOUS at 08:09

## 2024-11-11 RX ADMIN — SODIUM CHLORIDE, SODIUM LACTATE, POTASSIUM CHLORIDE, AND CALCIUM CHLORIDE: .6; .31; .03; .02 INJECTION, SOLUTION INTRAVENOUS at 07:39

## 2024-11-11 RX ADMIN — PROPOFOL 80 MCG/KG/MIN: 10 INJECTION, EMULSION INTRAVENOUS at 08:03

## 2024-11-11 RX ADMIN — MIDAZOLAM HYDROCHLORIDE 2 MG: 1 INJECTION, SOLUTION INTRAMUSCULAR; INTRAVENOUS at 07:57

## 2024-11-11 RX ADMIN — GLYCOPYRROLATE 0.1 MG: 0.2 INJECTION, SOLUTION INTRAMUSCULAR; INTRAVENOUS at 07:57

## 2024-11-11 NOTE — OP NOTE
OPERATIVE REPORT  PATIENT NAME: Porsha Lindquist    :  1990  MRN: [id number]  Pt Location: [facility] OR ROOM 03    SURGERY DATE: 2024    Surgeons and Role:     * Ashutosh Chandler MD - Primary     * Tonya Norris PA-C - Assisting    Preop Diagnosis:  Admission for sterilization [Z30.2]    Post-Op Diagnosis Codes:     * Admission for sterilization [Z30.2]    Procedure(s):  Bilateral - EXAM UNDER ANESTHESIA; (VNOTE) SALPINGECTOMY. LAPAROSCOPIC    Specimen(s):  ID Type Source Tests Collected by Time Destination   1 : BILATERAL FALLOPIAN TIBES Tissue Fallopian Tubes, Bilateral TISSUE EXAM Ashutosh Chandler MD 2024  8:38 AM        Estimated Blood Loss:   Minimal    Drains:  * No LDAs found *    Anesthesia Type:   General    Operative Indications:  Admission for sterilization [Z30.2]      Operative Findings:  Both ovaries looks normal  Uterus looks normal  Good hemostasis noted at the end of the procedure bilateral salpingectomy performed      Complications:   None    Procedure and Technique:  A red rubber was introduced into the bladder. A weighted speculum was inserted into the vagina and [place] retractor used to visualize the anterior lip of the cervix, which was then grasped with an Allis. 1 cm below the cervicovaginal crease the vaginal mucosa was infiltrated with 0.25% Marcaine with epinephrine.      A 2cm incision was made at the infiltrated edge of the vaginal mucosa and the peritoneum was grasped with forceps and entered sharply with Parker scissors for introduction of the gel port ring.  The anterior and posterior edges of the colpotomy were tagged with 0 Vicryl securing peritoneum to both edges oft he colpotomny.  Intraperitoneal entrance confirmed with the surgeon's hand palpating the posterior surface of the uterus. Allis clamp was removed an the gel port ring was inserted and the gel port was then attached and insufflation was attached and pneumoperitoneum was maintained at 15mmHG.      Patient was placed in Trendelenburg and the uterus was elevated to visualize the fallopian tubes. Findings were noted as above. A bowel grasper was inserted through the port and used to visualize the fimbriated ends of bilateral tubes. voyant bipolar was used to cauterize and transect the mesosalpinx starting from the fimbriae traveling proximally before being transected across the tub for removal from the abdomen into the vaginal canal.  Excellent hemostasis was confirmed  Under direct visualization.     The entire abdomen and pelvis was inspected and there was no evidence of injury to bowel, bladder, vasculature, or other structures.  Pneumoperitoneum was then allowed to escape. The gel port was removed and both tubes were removed from the vagina.     Weighted speculum was reinserted into the vagina and the colpotomy was closed with running unlocked 0 Vicryl sutures. Two figure of eight sutures were required to achieve hemostasis. All instruments were then removed from the vagina.    I was present for the entire procedure.    Patient Disposition:  PACU              SIGNATURE: Ashutosh Chandler MD  DATE: November 11, 2024  TIME: 9:14 AM

## 2024-11-11 NOTE — ANESTHESIA POSTPROCEDURE EVALUATION
Post-Op Assessment Note    CV Status:  Stable    Pain management: adequate       Mental Status:  Lethargic and sleepy   Hydration Status:  Stable   PONV Controlled:  None   Airway Patency:  Patent     Post Op Vitals Reviewed: Yes    No anethesia notable event occurred.    Staff: CRNA           Last Filed PACU Vitals:  Vitals Value Taken Time   Temp 96.8    Pulse 79    /74    Resp 12    SpO2 94        Modified Gladys:  No data recorded

## 2024-11-11 NOTE — ANESTHESIA POSTPROCEDURE EVALUATION
Post-Op Assessment Note    Last Filed PACU Vitals:  Vitals Value Taken Time   Temp 97.4 °F (36.3 °C) 11/11/24 0941   Pulse 59 11/11/24 0941   /65 11/11/24 0941   Resp 16 11/11/24 0941   SpO2 98 % 11/11/24 0941       Modified Gladys:  Activity: 2 (11/11/2024  9:41 AM)  Respiration: 2 (11/11/2024  9:41 AM)  Circulation: 2 (11/11/2024  9:41 AM)  Consciousness: 2 (11/11/2024  9:41 AM)  Oxygen Saturation: 2 (11/11/2024  9:41 AM)  Modified Gladys Score: 10 (11/11/2024  9:41 AM)

## 2024-11-11 NOTE — H&P
34-year-old female  Prior 3 vaginal delivery  Currently on Depo-Provera for contraception  Patient requesting permanent sterilization  Plan  Procedure reviewed and discussed with patient with risk-benefit side effect  Permanent nature of the procedure discussed with patient  Patient desired to proceed with VNOTES laparoscopic bilateral salpingectomy        Subjective:         Subjective  Patient ID: Porsha Lindquist is a 34 y.o. female.     Pre-op Exam     Pre-operative Risk Factors:     History of cerebrovascular disease: No    History of ischemic heart disease: No  Pre-operative treatment with insulin: No  Pre-operative creatinine >2 mg/dL: No    History of congestive heart failure: No     34-year-old female present to the office today requesting permanent sterilization  Patient is currently taking Depo-Provera for contraception  Patient desire permanent sterilization  Patient has prior 3 vaginal delivery  Different contraception option explained and discussed with patient in details with risk-benefit side effect patient aware and desire to proceed with permanent sterilization  Partner vasectomy also discussed with patient patient still desired to proceed with bilateral salpingectomy procedure explained and discussed with patient risk of bleeding, infection, blood transfusion, bowel injury, rectal injury, all explained and discussed with patient risk of anesthesia and postop recovery time reviewed and discussed with patient patient aware of all the risk and still desired to proceed with the V notes bilateral salpingectomy all patient question answered in details and patient was satisfied  The following portions of the patient's history were reviewed and updated as appropriate: allergies, current medications, past family history, past medical history, past social history, past surgical history and problem list.     Review of Systems        Objective:        /68 (BP Location: Left arm, Patient Position:  "Sitting, Cuff Size: Adult)   Ht 5' 3\" (1.6 m)   Wt 68.5 kg (151 lb)   BMI 26.75 kg/m²               Objective[]Expand by Default  Physical Exam  Vitals and nursing note reviewed.   Constitutional:       Appearance: Normal appearance. She is well-developed.   Neck:      Thyroid: No thyroid mass or thyromegaly.   Cardiovascular:      Rate and Rhythm: Regular rhythm.      Heart sounds: Normal heart sounds.   Pulmonary:      Breath sounds: Normal breath sounds.   Abdominal:      Palpations: Abdomen is soft.      Hernia: No hernia is present.   Genitourinary:     Vagina: Normal. No vaginal discharge, erythema, tenderness or bleeding.      Cervix: No cervical motion tenderness, discharge or friability.      Uterus: Normal. Not enlarged and not tender.       Adnexa:         Right: No mass or tenderness.          Left: No mass or tenderness.     Skin:     General: Skin is warm and dry.   Neurological:      Mental Status: She is alert and oriented to person, place, and time.      "

## 2024-11-11 NOTE — INTERVAL H&P NOTE
H&P reviewed. After examining the patient I find no changes in the patients condition since the H&P had been written.    Vitals:    11/11/24 0723   BP: 114/80   Pulse: 87   Resp: 16   Temp: 98.7 °F (37.1 °C)   SpO2: 98%

## 2024-11-13 ENCOUNTER — IMMUNIZATIONS (OUTPATIENT)
Age: 34
End: 2024-11-13
Payer: MEDICARE

## 2024-11-13 DIAGNOSIS — Z23 ENCOUNTER FOR IMMUNIZATION: Primary | ICD-10-CM

## 2024-11-13 PROCEDURE — 90471 IMMUNIZATION ADMIN: CPT

## 2024-11-13 PROCEDURE — 90656 IIV3 VACC NO PRSV 0.5 ML IM: CPT

## 2024-11-15 PROCEDURE — 88302 TISSUE EXAM BY PATHOLOGIST: CPT | Performed by: PATHOLOGY

## 2024-11-18 DIAGNOSIS — G24.5 EYE TWITCH: ICD-10-CM

## 2024-11-18 DIAGNOSIS — G40.109 SPECIAL SENSORY ATTACKS (HCC): ICD-10-CM

## 2024-11-18 DIAGNOSIS — R20.0 NUMBNESS AND TINGLING IN BOTH HANDS: ICD-10-CM

## 2024-11-18 DIAGNOSIS — R20.2 TINGLING SENSATION IN FACE: ICD-10-CM

## 2024-11-18 DIAGNOSIS — R53.83 OTHER FATIGUE: ICD-10-CM

## 2024-11-18 DIAGNOSIS — R42 DIZZINESS: Primary | ICD-10-CM

## 2024-11-18 DIAGNOSIS — R20.2 NUMBNESS AND TINGLING IN BOTH HANDS: ICD-10-CM

## 2024-11-18 NOTE — PROGRESS NOTES
"Patient reporting fatigue, dizziness, heaviness and tingling in UE with episodic loss of function, right eye twitch, tingling in face. PMHx of special sensory attack. Concern for possible neurological work-up, started with Neurology E-consult. Per Neurology E-consult with : \"Brain MRI MS protocol, we can safely see her for evaluation. Dr. Marcial has several practices in [facility]- please place a referral.\" MRI MS ordered, referral to Dr. Marcial ordered.  "

## 2024-11-26 ENCOUNTER — OFFICE VISIT (OUTPATIENT)
Dept: OBGYN CLINIC | Facility: CLINIC | Age: 34
End: 2024-11-26

## 2024-11-26 VITALS
WEIGHT: 148 LBS | HEIGHT: 63 IN | SYSTOLIC BLOOD PRESSURE: 116 MMHG | DIASTOLIC BLOOD PRESSURE: 60 MMHG | BODY MASS INDEX: 26.22 KG/M2

## 2024-11-26 DIAGNOSIS — Z90.79 STATUS POST BILATERAL SALPINGECTOMY: Primary | ICD-10-CM

## 2024-11-26 PROCEDURE — 99024 POSTOP FOLLOW-UP VISIT: CPT | Performed by: OBSTETRICS & GYNECOLOGY

## 2024-11-27 NOTE — PROGRESS NOTES
"Assessment/Plan:     Diagnoses and all orders for this visit:    Status post bilateral salpingectomy        34-year-old female  Prior 3 vaginal delivery  Currently on Depo-Provera for contraception  Patient requesting permanent sterilization  S/p v notes b/l salpingectomy  Plan  Continue pelvic rest  Return to office in 4 weeks for postop visit  Subjective:      Patient ID: Porsha Lindquist is a 34 y.o. female.    HPI  Patient seen evaluated present to the office today for postop visit  Denies any complaint  Denies any vaginal bleeding  Denies any vaginal itching odor or discharge  Denies any pelvic pain  Denies any urgency frequency or dysuria  Pathology result reviewed and discussed with patient    Final Diagnosis   A. Bilateral fallopian tubes, sterilization:  -   Completely transected bilateral fimbriated fallopian tube tissue with paratubal cysts.          The following portions of the patient's history were reviewed and updated as appropriate: allergies, current medications, past family history, past medical history, past social history, past surgical history and problem list.    Review of Systems      Objective:      /60 (BP Location: Left arm, Patient Position: Sitting)   Ht 5' 3\" (1.6 m)   Wt 67.1 kg (148 lb)   LMP  (LMP Unknown) Comment: doesnt remember  BMI 26.22 kg/m²          Physical Exam  Constitutional:       Appearance: She is well-developed.   Abdominal:      General: There is no distension.      Palpations: Abdomen is soft.      Tenderness: There is no abdominal tenderness.   Genitourinary:     Labia:         Right: No rash, tenderness or lesion.         Left: No rash, tenderness or lesion.       Vagina: No signs of injury. No vaginal discharge, erythema or tenderness.      Cervix: No cervical motion tenderness, discharge or friability.      Adnexa:         Right: No mass, tenderness or fullness.          Left: No mass, tenderness or fullness.              Comments: Incision " clean/dry/intact  Neurological:      Mental Status: She is alert and oriented to person, place, and time.   Psychiatric:         Behavior: Behavior normal.

## 2024-12-04 ENCOUNTER — OFFICE VISIT (OUTPATIENT)
Age: 34
End: 2024-12-04
Payer: MEDICARE

## 2024-12-04 VITALS
HEIGHT: 63 IN | SYSTOLIC BLOOD PRESSURE: 118 MMHG | OXYGEN SATURATION: 98 % | BODY MASS INDEX: 27.14 KG/M2 | DIASTOLIC BLOOD PRESSURE: 60 MMHG | HEART RATE: 72 BPM | WEIGHT: 153.2 LBS

## 2024-12-04 DIAGNOSIS — J45.20 MILD INTERMITTENT ASTHMA WITHOUT COMPLICATION: ICD-10-CM

## 2024-12-04 DIAGNOSIS — G40.109 SPECIAL SENSORY ATTACKS (HCC): Primary | ICD-10-CM

## 2024-12-04 DIAGNOSIS — L20.9 ATOPIC DERMATITIS OF BOTH HANDS: ICD-10-CM

## 2024-12-04 DIAGNOSIS — R20.8 BURNING SENSATION OF LOWER EXTREMITY: ICD-10-CM

## 2024-12-04 PROCEDURE — 99214 OFFICE O/P EST MOD 30 MIN: CPT

## 2024-12-04 RX ORDER — BENZOCAINE/MENTHOL 6 MG-10 MG
LOZENGE MUCOUS MEMBRANE 4 TIMES DAILY PRN
Qty: 14 G | Refills: 0 | Status: SHIPPED | OUTPATIENT
Start: 2024-12-04

## 2024-12-04 NOTE — PROGRESS NOTES
"Name: Porsha Lindquist      : 1990      MRN: [id number]  Encounter Provider: Sindi Sawant MD  Encounter Date: 2024   Encounter department: [facility]  :  Assessment & Plan  Special sensory attacks (HCC)  Pt reports ongoing neurological symptoms including dizziness, eye twitch, fatigue, numbness and tingling in the face and extremities. Physical exam today unremarkable. Patient has upcoming neurology appointment with Dr. Marcial in [place] on 2025 and an MRI scheduled for 2024 to be reviewed at that visit. She will follow up with me on 1/15.        Atopic dermatitis of both hands  Pt reports an intermittent itchy, rough rash on her bilateral knuckles that usually occurs each winter and notes sx improvement with hydrocortisone, states she ran out. Physical exam significant for thickening, darkening and scaling to the skin over the patient's right MCP joints. Patient was provided rx for hydrocortisone 1% cream and advised to use petroleum jelly in between applications. She is agreeable.  Orders:    hydrocortisone 1 % cream; Apply topically 4 (four) times a day as needed for irritation or rash    Mild intermittent asthma without complication  Pt has a history of asthma;denies any current symptoms. Physical exam today significant for mild wheezing in the bilateral upper lobes on inspiration and expiration. Pt states she has not been using her maintenance or rescue inhalers. Patient was advised to resume use of her daily maintenance inhaler to avoid any exacerbation of her asthma. She is agreeable.       Burning sensation of lower extremity  Pt reports intermittent burning sensation in her bilateral knees x 2 weeks. No associated pain and no specific trigger; unaffected by ambulation or rest.Pt believes it could be neurological in nature and is related to her other ongoing symptoms. See \"Special sensory attacks\" above.            History of Present Illness     Patient is " "a 34-year-old female with a past medical history of special sensory attack, asthma, vitamin D deficiency, and iron deficiency anemia who presents to the clinic for a follow-up visit. Today she reports an intermittent burning sensation in her knees for the last 2 weeks. The sensation is no associated with pain and has no specific trigger. The patient endorses that she can still walk and the burning is not affected by ambulation or rest. She states, \"it comes and goes\" and believes it could possibly be neurological in nature.  Of note, the patient was seen on 11/6/2024 for continuing symptoms including dizziness, eye twitch, fatigue, numbness and tingling in the face and extremities. She reports that she is still intermittently experiencing these symptoms. The patient has previously expressed concern for Multiple Sclerosis over the last few months and was provided a referral to neurology; she has an appointment with Dr. Marcial in [place] on 1/14/2025. She also has an MRI for MS scheduled for 12/21/2024.  Additionally, the patient reports that she usually breaks out in an itchy rash on her knuckles every winter for which she usually uses hydrocortisone cream, and notes that she has run out.    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      Review of Systems   Constitutional:  Positive for fatigue. Negative for chills and fever.   HENT:  Negative for congestion, rhinorrhea and sore throat.    Eyes:  Negative for photophobia and visual disturbance.        (+) Eye twitching   Respiratory:  Negative for cough, shortness of breath and wheezing.    Cardiovascular:  Negative for chest pain and palpitations.   Gastrointestinal:  Negative for abdominal pain, constipation, diarrhea, nausea and vomiting.   Musculoskeletal: Negative.    Skin:  Positive for rash (on hands).   Neurological:  Positive for dizziness, light-headedness and numbness. Negative for seizures. " "  Hematological: Negative.    Psychiatric/Behavioral: Negative.            Objective   /60   Pulse 72   Ht 5' 3\" (1.6 m)   Wt 69.5 kg (153 lb 3.2 oz)   LMP  (LMP Unknown) Comment: doesnt remember  SpO2 98%   BMI 27.14 kg/m²      Physical Exam  Vitals reviewed.   Constitutional:       General: She is not in acute distress.     Appearance: Normal appearance.      Comments: Body mass index is 27.14 kg/m².   HENT:      Head: Normocephalic and atraumatic.      Mouth/Throat:      Mouth: Mucous membranes are moist.      Pharynx: Oropharynx is clear.   Eyes:      Extraocular Movements: Extraocular movements intact.      Conjunctiva/sclera: Conjunctivae normal.   Cardiovascular:      Rate and Rhythm: Normal rate and regular rhythm.      Pulses: Normal pulses.      Heart sounds: Normal heart sounds.   Pulmonary:      Effort: Pulmonary effort is normal.      Breath sounds: Wheezing present.      Comments: Mild wheezing appreciated in the bilateral upper lung fields on inspiration and expiration.  Abdominal:      General: There is no distension.      Palpations: Abdomen is soft.      Tenderness: There is no abdominal tenderness.   Musculoskeletal:         General: Normal range of motion.      Cervical back: Normal range of motion.      Right lower leg: No edema.      Left lower leg: No edema.   Skin:     Findings: Rash present. Rash is urticarial (right knuckles).   Neurological:      General: No focal deficit present.      Mental Status: She is alert and oriented to person, place, and time.      Motor: No weakness.   Psychiatric:         Mood and Affect: Mood normal.         Behavior: Behavior normal.         "

## 2024-12-05 NOTE — ASSESSMENT & PLAN NOTE
Pt has a history of asthma;denies any current symptoms. Physical exam today significant for mild wheezing in the bilateral upper lobes on inspiration and expiration. Pt states she has not been using her maintenance or rescue inhalers. Patient was advised to resume use of her daily maintenance inhaler to avoid any exacerbation of her asthma. She is agreeable.

## 2024-12-11 ENCOUNTER — APPOINTMENT (EMERGENCY)
Dept: RADIOLOGY | Facility: HOSPITAL | Age: 34
End: 2024-12-11
Payer: MEDICARE

## 2024-12-11 ENCOUNTER — HOSPITAL ENCOUNTER (EMERGENCY)
Facility: HOSPITAL | Age: 34
Discharge: HOME | End: 2024-12-11
Attending: INTERNAL MEDICINE
Payer: MEDICARE

## 2024-12-11 VITALS
OXYGEN SATURATION: 96 % | RESPIRATION RATE: 18 BRPM | HEART RATE: 81 BPM | SYSTOLIC BLOOD PRESSURE: 121 MMHG | DIASTOLIC BLOOD PRESSURE: 55 MMHG | TEMPERATURE: 98.6 F

## 2024-12-11 DIAGNOSIS — M54.9 BACK PAIN: Primary | ICD-10-CM

## 2024-12-11 PROCEDURE — 72100 X-RAY EXAM L-S SPINE 2/3 VWS: CPT

## 2024-12-11 PROCEDURE — 96372 THER/PROPH/DIAG INJ SC/IM: CPT

## 2024-12-11 PROCEDURE — 99283 EMERGENCY DEPT VISIT LOW MDM: CPT

## 2024-12-11 PROCEDURE — 99284 EMERGENCY DEPT VISIT MOD MDM: CPT | Performed by: INTERNAL MEDICINE

## 2024-12-11 RX ORDER — CYCLOBENZAPRINE HCL 10 MG
10 TABLET ORAL ONCE
Status: DISCONTINUED | OUTPATIENT
Start: 2024-12-11 | End: 2024-12-11 | Stop reason: HOSPADM

## 2024-12-11 RX ORDER — KETOROLAC TROMETHAMINE 30 MG/ML
30 INJECTION, SOLUTION INTRAMUSCULAR; INTRAVENOUS ONCE
Status: COMPLETED | OUTPATIENT
Start: 2024-12-11 | End: 2024-12-11

## 2024-12-11 RX ORDER — CYCLOBENZAPRINE HCL 10 MG
10 TABLET ORAL 2 TIMES DAILY PRN
Qty: 20 TABLET | Refills: 0 | Status: SHIPPED | OUTPATIENT
Start: 2024-12-11

## 2024-12-11 RX ADMIN — KETOROLAC TROMETHAMINE 30 MG: 30 INJECTION, SOLUTION INTRAMUSCULAR at 10:43

## 2024-12-11 NOTE — DISCHARGE INSTRUCTIONS
Take ibuprofen 600 mg x 3 daily.  Take Flexeril x 3 daily as needed.  Follow-up with your PMD  XR spine lumbar 2 or 3 views injury   ED Interpretation   XR ; lumbar spine shows no fracture, subluxation, no acute osseous abnormalities.

## 2024-12-11 NOTE — ED NOTES
Discharge instructions reviewed with pt. Pt verbalized understanding. And has no further questions at this time. Pt ambulatory off unit with steady gait.      Edith Ford RN  12/11/24 [id number]

## 2024-12-11 NOTE — ED PROVIDER NOTES
Time reflects when diagnosis was documented in both MDM as applicable and the Disposition within this note       Time User Action Codes Description Comment    12/11/2024 10:43 AM Kei Hull [M54.9] Back pain           ED Disposition       ED Disposition   Discharge    Condition   Stable    Date/Time   Wed Dec 11, 2024 11:27 AM    Comment   Porsha Lindquist discharge to home/self care.                   Assessment & Plan       Medical Decision Making  This is 34 years old came for having back pain.  Patient has long history of back pain.  Patient is going for MRI according to her PMD for her back.  Patient denies any urinary symptoms or rectal symptoms.  Patient denies lower extremity numbness or tingling.  Patient came to the ER walking. Physical exam is pertinent for; Examination of the back has pain at the lower lumbar spine.  Bilateral lower extremity sensation is intact neurovascular intact.  Gait is normal.  Patient is wearing  heeled shoes .   Patient received Toradol and Flexeril felt better.  XR ; lumbar spine shows no fracture, subluxation, no acute osseous abnormalities.  Patient to be discharged home follow-up with her PMD and follow-up with MRI.  Differential diagnosis include but not limited to; musculoskeletal pain, lumbar strain, herniated disc, muscle spasm.  Strict return precautions discussed. Patient at time of discharge well-appearing in no acute distress, all questions answered. Patient agreeable to plan.  Patient's vitals, imaging results, diagnosis, and treatment plan were discussed with the patient. All new/changed medications were discussed with patient, specifically, route of administration, how often and when to take, and where they can be picked up. Strict return precautions as well as close follow up with PCP was discussed with the patient and the patient was agreeable to my recommendations.  Patient verbally acknowledged understanding of the above communications. All  "labs reviewed and utilized in the medical decision making process (if labs were ordered). Portions of the record may have been created with voice recognition software.  Occasional wrong word or \"sound a like\" substitutions may have occurred due to the inherent limitations of voice recognition software.  Read the chart carefully and recognize, using context, where substitutions have occurred.      Amount and/or Complexity of Data Reviewed  Radiology: ordered and independent interpretation performed.     Details: XR ; lumbar spine shows no fracture, subluxation, no acute osseous abnormalities.      Risk  Prescription drug management.             Medications   ketorolac (TORADOL) injection 30 mg (30 mg Intramuscular Given 12/11/24 1043)       ED Risk Strat Scores                           SBIRT 20yo+      Flowsheet Row Most Recent Value   Initial Alcohol Screen: US AUDIT-C     1. How often do you have a drink containing alcohol? 0 Filed at: 12/11/2024 1013   2. How many drinks containing alcohol do you have on a typical day you are drinking?  0 Filed at: 12/11/2024 1013   3a. Male UNDER 65: How often do you have five or more drinks on one occasion? 0 Filed at: 12/11/2024 1013   3b. FEMALE Any Age, or MALE 65+: How often do you have 4 or more drinks on one occassion? 0 Filed at: 12/11/2024 1013   Audit-C Score 0 Filed at: 12/11/2024 1013   VIKTORIYA: How many times in the past year have you...    Used an illegal drug or used a prescription medication for non-medical reasons? Never Filed at: 12/11/2024 1013                            History of Present Illness       Chief Complaint   Patient presents with    Back Pain     Pt presents to the ed with lower back pain that radiates down both legs, no meds pta, hx of sciatica        Past Medical History:   Diagnosis Date    Abnormal Pap smear of cervix     had cryosurgery 2015    Anxiety     Asthma     Carrier of group B Streptococcus     GERD (gastroesophageal reflux disease)     " High blood pressure     during pregnancy     History of UTI     Hx of absence seizures     once(per sanjiv)    Hx of seasonal allergies     Hypertension     Migraine headache     Muscle spasm of back     Papanicolaou smear for cervical cancer screening 2017    Pap neg 2016, pap- neg/GC/CT NEG    Postpartum depression     Preeclampsia     Psychiatric disorder     Umbilical hernia 2018    Urinary tract infection     Varicose veins of lower extremity       Past Surgical History:   Procedure Laterality Date    GYNECOLOGIC CRYOSURGERY      HERNIA REPAIR      2 years ago    INSERTION OF INTRAUTERINE DEVICE (IUD)      OTHER SURGICAL HISTORY  2015    cryosurgery     WI LAPAROSCOPY W/RMVL ADNEXAL STRUCTURES Bilateral 2024    Procedure: EXAM UNDER ANESTHESIA; (VNOTE) SALPINGECTOMY, LAPAROSCOPIC;  Surgeon: Ashutosh Chandler MD;  Location:  MAIN OR;  Service: Gynecology    SKIN BIOPSY      WISDOM TOOTH EXTRACTION        Family History   Problem Relation Age of Onset    Hypertension Mother     Arthritis Mother         Viltigo    Asthma Mother     Diabetes Mother         Prediabetic     Sleep apnea Mother     Lymphoma Maternal Grandmother     Other Maternal Grandmother         Cardiomegaly    Diabetes Maternal Grandmother     COPD Maternal Grandmother     Asthma Maternal Grandmother     Heart attack Maternal Grandmother     Heart attack Maternal Grandfather          60    Varicose Veins Paternal Grandmother     Hyperthyroidism Maternal Aunt     Asthma Maternal Aunt     Bipolar disorder Maternal Aunt     Thyroid disease Maternal Aunt     Varicose Veins Paternal Aunt     Cancer Cousin     Diabetes Cousin     Diabetes Other     Asthma Sister     Fibromyalgia Family     Arthritis Family     GI problems Family     Asthma Son       Social History     Tobacco Use    Smoking status: Former     Current packs/day: 0.00     Average packs/day: 1 pack/day for 15.0 years (15.0 ttl pk-yrs)     Types: Cigarettes     Start  date: 2000     Quit date: 2015     Years since quittin.9    Smokeless tobacco: Never    Tobacco comments:     Has smoked since age:   10   Vaping Use    Vaping status: Never Used   Substance Use Topics    Alcohol use: Not Currently     Comment: stopped drinking in     Drug use: Never     Comment: per Gyn notes, Marijuana HX age 13.       E-Cigarette/Vaping    E-Cigarette Use Never User       E-Cigarette/Vaping Substances    Nicotine No     THC No     CBD No     Flavoring No     Other No     Unknown No       I have reviewed and agree with the history as documented.     This is 34 years old came for having back pain.  Patient has chronic history of back pain.  Patient is going for MRI as ordered by her PMD.  Patient has history of left sciatica pain and now she stated that she think that the right side has also sciatica pain.  Patient denies any numbness or tingling of lower extremities.  Patient denies urinary or rectal symptoms.  Patient came to the ER walking.  Patient has normal gait.  Patient stated that she took Motrin at home with no help.  Patient denies any trauma or fall.  Patient has no red flags.  Patient denies abdominal pain and denies flank pain.      History provided by:  Patient   used: No    Back Pain  Location:  Sacro-iliac joint and lumbar spine  Quality:  Aching  Radiates to:  R posterior upper leg and L posterior upper leg  Pain severity:  Moderate  Pain is:  Worse during the day  Onset quality:  Unable to specify  Timing:  Constant  Progression:  Unchanged  Chronicity:  Recurrent  Worsened by:  Nothing  Ineffective treatments:  OTC medications  Associated symptoms: no abdominal pain, no chest pain, no dysuria and no fever        Review of Systems   Constitutional:  Negative for chills and fever.   HENT:  Negative for ear pain and sore throat.    Eyes:  Negative for pain and visual disturbance.   Respiratory:  Negative for cough and shortness of breath.     Cardiovascular:  Negative for chest pain and palpitations.   Gastrointestinal:  Negative for abdominal pain and vomiting.   Genitourinary:  Negative for dysuria and hematuria.   Musculoskeletal:  Positive for back pain. Negative for arthralgias, gait problem, joint swelling, myalgias, neck pain and neck stiffness.   Skin:  Negative for color change and rash.   Neurological:  Negative for seizures and syncope.   All other systems reviewed and are negative.          Objective       ED Triage Vitals   Temperature Pulse Blood Pressure Respirations SpO2 Patient Position - Orthostatic VS   12/11/24 1014 12/11/24 1014 12/11/24 1014 12/11/24 1014 12/11/24 1014 12/11/24 1014   98.6 °F (37 °C) 81 121/55 18 96 % Sitting      Temp Source Heart Rate Source BP Location FiO2 (%) Pain Score    12/11/24 1014 12/11/24 1014 12/11/24 1014 -- 12/11/24 1043    Oral Monitor Left arm  8      Vitals      Date and Time Temp Pulse SpO2 Resp BP Pain Score FACES Pain Rating User   12/11/24 1043 -- -- -- -- -- 8 -- SD   12/11/24 1014 98.6 °F (37 °C) 81 96 % 18 121/55 -- -- AW            Physical Exam  Vitals and nursing note reviewed.   Constitutional:       General: She is not in acute distress.     Appearance: She is well-developed. She is not ill-appearing, toxic-appearing or diaphoretic.   HENT:      Head: Normocephalic and atraumatic.      Right Ear: Ear canal normal.      Left Ear: Ear canal normal.      Nose: Nose normal. No congestion or rhinorrhea.      Mouth/Throat:      Mouth: Mucous membranes are moist.      Pharynx: No oropharyngeal exudate or posterior oropharyngeal erythema.   Eyes:      Conjunctiva/sclera: Conjunctivae normal.   Neck:      Vascular: No carotid bruit.   Cardiovascular:      Rate and Rhythm: Normal rate and regular rhythm.      Heart sounds: No murmur heard.     No friction rub. No gallop.   Pulmonary:      Effort: Pulmonary effort is normal. No respiratory distress.      Breath sounds: Normal breath sounds.    Abdominal:      Palpations: Abdomen is soft.      Tenderness: There is no abdominal tenderness.   Musculoskeletal:         General: No swelling, tenderness, deformity or signs of injury.      Cervical back: Normal range of motion and neck supple. No rigidity or tenderness.      Right lower leg: No edema.      Left lower leg: No edema.      Comments: Examination of the back has pain at the lower lumbar spine.  Bilateral lower extremity sensation is intact neurovascular intact.  Gait is normal.  Patient is wearing  heeled shoes .    Lymphadenopathy:      Cervical: No cervical adenopathy.   Skin:     General: Skin is warm and dry.      Capillary Refill: Capillary refill takes less than 2 seconds.      Coloration: Skin is not jaundiced or pale.      Findings: No bruising, erythema, lesion or rash.   Neurological:      General: No focal deficit present.      Mental Status: She is alert and oriented to person, place, and time.   Psychiatric:         Mood and Affect: Mood normal.         Results Reviewed       None            XR spine lumbar 2 or 3 views injury   ED Interpretation by Kei Hull MD (12/11 7564)   XR ; lumbar spine shows no fracture, subluxation, no acute osseous abnormalities.      Final Interpretation by Sundeep Ling DO (12/11 3376)      No acute osseous abnormality.         Computerized Assisted Algorithm (CAA) may have been used to analyze all applicable images.         Workstation performed: [id number]             Procedures    ED Medication and Procedure Management   Prior to Admission Medications   Prescriptions Last Dose Informant Patient Reported? Taking?   Cholecalciferol (Vitamin D3) 50 MCG (2000 UT) capsule   No No   Sig: Take 1 capsule (2,000 Units total) by mouth every morning   Nerve Stimulator (STANDARD TENS) OMER  Self No No   Sig: by Does not apply route 4 (four) times a day   albuterol (PROVENTIL HFA,VENTOLIN HFA) 90 mcg/act inhaler  Self No No   Sig: INHALE 1 PUFF BY  MOUTH EVERY 6 HOURS AS NEEDED FOR WHEEZE   docusate sodium (COLACE) 100 mg capsule   No No   Sig: Take 1 capsule (100 mg total) by mouth daily   ferrous sulfate 324 (65 Fe) mg   No No   Sig: Take 1 tablet (324 mg total) by mouth daily before breakfast   fluticasone (FLONASE) 50 mcg/act nasal spray   No No   Sig: SPRAY 2 SPRAYS INTO EACH NOSTRIL EVERY DAY   fluticasone-salmeterol (Advair HFA) 230-21 MCG/ACT inhaler   No No   Sig: INHALE 2 PUFFS BY MOUTH 2 TIMES A DAY RINSE MOUTH AFTER USE.   hydrocortisone 1 % cream   No No   Sig: Apply topically 4 (four) times a day as needed for irritation or rash   ibuprofen (MOTRIN) 600 mg tablet   No No   Sig: Take 1 tablet (600 mg total) by mouth every 6 (six) hours as needed for mild pain   medroxyPROGESTERone (DEPO-PROVERA) 150 mg/mL injection   No No   Sig: INJECT 1 ML (150 MG TOTAL) INTO A MUSCLE EVERY 3 (THREE) MONTHS   pantoprazole (PROTONIX) 40 mg tablet   No No   Sig: Take 1 tablet (40 mg total) by mouth daily      Facility-Administered Medications: None     Discharge Medication List as of 12/11/2024 11:30 AM        START taking these medications    Details   cyclobenzaprine (FLEXERIL) 10 mg tablet Take 1 tablet (10 mg total) by mouth 2 (two) times a day as needed for muscle spasms, Starting Wed 12/11/2024, Normal           CONTINUE these medications which have NOT CHANGED    Details   albuterol (PROVENTIL HFA,VENTOLIN HFA) 90 mcg/act inhaler INHALE 1 PUFF BY MOUTH EVERY 6 HOURS AS NEEDED FOR WHEEZE, Normal      Cholecalciferol (Vitamin D3) 50 MCG (2000 UT) capsule Take 1 capsule (2,000 Units total) by mouth every morning, Starting Mon 5/20/2024, Until Wed 12/4/2024, Normal      docusate sodium (COLACE) 100 mg capsule Take 1 capsule (100 mg total) by mouth daily, Starting Mon 11/11/2024, Normal      ferrous sulfate 324 (65 Fe) mg Take 1 tablet (324 mg total) by mouth daily before breakfast, Starting Mon 5/20/2024, Until Wed 12/4/2024, Normal      fluticasone (FLONASE)  50 mcg/act nasal spray SPRAY 2 SPRAYS INTO EACH NOSTRIL EVERY DAY, Normal      fluticasone-salmeterol (Advair HFA) 230-21 MCG/ACT inhaler INHALE 2 PUFFS BY MOUTH 2 TIMES A DAY RINSE MOUTH AFTER USE., Normal      hydrocortisone 1 % cream Apply topically 4 (four) times a day as needed for irritation or rash, Starting Wed 12/4/2024, Normal      ibuprofen (MOTRIN) 600 mg tablet Take 1 tablet (600 mg total) by mouth every 6 (six) hours as needed for mild pain, Starting Mon 11/11/2024, Normal      medroxyPROGESTERone (DEPO-PROVERA) 150 mg/mL injection INJECT 1 ML (150 MG TOTAL) INTO A MUSCLE EVERY 3 (THREE) MONTHS, Starting Wed 6/19/2024, Normal      Nerve Stimulator (STANDARD TENS) OMER by Does not apply route 4 (four) times a day, Starting Wed 5/20/2020, Normal      pantoprazole (PROTONIX) 40 mg tablet Take 1 tablet (40 mg total) by mouth daily, Starting Wed 9/25/2024, Normal           No discharge procedures on file.  ED SEPSIS DOCUMENTATION   Time reflects when diagnosis was documented in both MDM as applicable and the Disposition within this note       Time User Action Codes Description Comment    12/11/2024 10:43 AM Kei Hull Add [M54.9] Back pain                  Kei Hull MD  12/12/24 0705

## 2024-12-12 ENCOUNTER — TELEPHONE (OUTPATIENT)
Dept: FAMILY MEDICINE CLINIC | Facility: OTHER | Age: 34
End: 2024-12-12

## 2024-12-12 NOTE — TELEPHONE ENCOUNTER
12/12/24 2:24 PM    Patient contacted post ED visit, [facility] phone outreaches documented. Patient called practice and scheduled a follow-up ED visit. Will f/u as needed    Thank you.  Luc Weaver MA  [facility]

## 2024-12-21 ENCOUNTER — HOSPITAL ENCOUNTER (OUTPATIENT)
Dept: MRI IMAGING | Facility: HOSPITAL | Age: 34
Discharge: HOME | End: 2024-12-21
Payer: MEDICARE

## 2024-12-21 DIAGNOSIS — R53.83 OTHER FATIGUE: ICD-10-CM

## 2024-12-21 DIAGNOSIS — R20.2 NUMBNESS AND TINGLING IN BOTH HANDS: ICD-10-CM

## 2024-12-21 DIAGNOSIS — G40.109 SPECIAL SENSORY ATTACKS (HCC): ICD-10-CM

## 2024-12-21 DIAGNOSIS — R20.0 NUMBNESS AND TINGLING IN BOTH HANDS: ICD-10-CM

## 2024-12-21 DIAGNOSIS — R20.2 TINGLING SENSATION IN FACE: ICD-10-CM

## 2024-12-21 DIAGNOSIS — R42 DIZZINESS: ICD-10-CM

## 2024-12-21 DIAGNOSIS — G24.5 EYE TWITCH: ICD-10-CM

## 2024-12-21 PROCEDURE — A9585 GADOBUTROL INJECTION: HCPCS

## 2024-12-21 PROCEDURE — 70553 MRI BRAIN STEM W/O & W/DYE: CPT

## 2024-12-21 RX ORDER — GADOBUTROL 604.72 MG/ML
6 INJECTION INTRAVENOUS
Status: COMPLETED | OUTPATIENT
Start: 2024-12-21 | End: 2024-12-21

## 2024-12-21 RX ADMIN — GADOBUTROL 6 ML: 604.72 INJECTION INTRAVENOUS at 08:40

## 2024-12-23 ENCOUNTER — RESULTS FOLLOW-UP (OUTPATIENT)
Age: 34
End: 2024-12-23

## 2024-12-23 DIAGNOSIS — R90.89 ABNORMAL FINDING ON MRI OF BRAIN: Primary | ICD-10-CM

## 2024-12-23 NOTE — TELEPHONE ENCOUNTER
"I called the patient to inform her of her recent brain MRI results; including \"few nonspecific scattered foci of T2 prolongation in the white matter. There is a 2mm focus of susceptibility with subtle enhancement in the right parafalcine region, possibly a small meningioma.\" I explained the meaning of these findings with the patient and the correlation with her symptoms. I discussed flare-precautions with the patient, and advised her to go to the hospital if she does experience a symptom flare where she will likely be treated with steroids. The patient has an upcoming Neurology appointment with Dr. Marcial in [place] on 1/14/2025; I told the patient that I would reach out to his office and try to get her an earlier appointment as she described some limitations with her work schedule at that time; I also provided the option for a work note from me for that day so she can make the appointment. Patient expresses understanding of her MRI findings and is agreeable to moving forward with recommendations per Neurology. She is otherwise well at this time. Her next appointment with me is 1/15/2025.     Sindi Sawant MD  "

## 2024-12-24 ENCOUNTER — OFFICE VISIT (OUTPATIENT)
Dept: OBGYN CLINIC | Facility: CLINIC | Age: 34
End: 2024-12-24

## 2024-12-24 VITALS
WEIGHT: 150 LBS | HEIGHT: 63 IN | DIASTOLIC BLOOD PRESSURE: 70 MMHG | BODY MASS INDEX: 26.58 KG/M2 | SYSTOLIC BLOOD PRESSURE: 104 MMHG

## 2024-12-24 DIAGNOSIS — Z90.79 STATUS POST BILATERAL SALPINGECTOMY: Primary | ICD-10-CM

## 2024-12-24 PROCEDURE — 99024 POSTOP FOLLOW-UP VISIT: CPT | Performed by: OBSTETRICS & GYNECOLOGY

## 2024-12-24 NOTE — PROGRESS NOTES
"Assessment/Plan:     Diagnoses and all orders for this visit:    Status post bilateral salpingectomy        34-year-old female  Prior 3 vaginal delivery  Currently on Depo-Provera for contraception  Patient requesting permanent sterilization  S/p v notes b/l salpingectomy  Plan  May resume sexual activity next week  Return to office for annual exam      Subjective:      Patient ID: Porsha Lindquist is a 34 y.o. female.    HPI  Patient seen evaluated present to the office today for postop visit  Denies any complaint  Denies any urgency frequency or dysuria  Denies any pelvic pain  Denies any problem with her bowel movement  The following portions of the patient's history were reviewed and updated as appropriate: allergies, current medications, past family history, past medical history, past social history, past surgical history and problem list.    Review of Systems      Objective:      /70 (BP Location: Left arm, Patient Position: Sitting, Cuff Size: Adult)   Ht 5' 3\" (1.6 m)   Wt 68 kg (150 lb)   BMI 26.57 kg/m²          Physical Exam  Constitutional:       Appearance: She is well-developed.   Abdominal:      General: There is no distension.      Palpations: Abdomen is soft.      Tenderness: There is no abdominal tenderness.   Genitourinary:     Labia:         Right: No rash, tenderness or lesion.         Left: No rash, tenderness or lesion.       Vagina: No signs of injury. No vaginal discharge, erythema or tenderness.      Cervix: No cervical motion tenderness, discharge or friability.      Adnexa:         Right: No mass, tenderness or fullness.          Left: No mass, tenderness or fullness.              Comments: Incision clean/dry/intact  Neurological:      Mental Status: She is alert and oriented to person, place, and time.   Psychiatric:         Behavior: Behavior normal.         "

## 2024-12-26 ENCOUNTER — CONSULT (OUTPATIENT)
Age: 34
End: 2024-12-26
Payer: MEDICARE

## 2024-12-26 VITALS
WEIGHT: 151 LBS | BODY MASS INDEX: 26.75 KG/M2 | OXYGEN SATURATION: 100 % | HEART RATE: 69 BPM | HEIGHT: 63 IN | SYSTOLIC BLOOD PRESSURE: 98 MMHG | DIASTOLIC BLOOD PRESSURE: 66 MMHG

## 2024-12-26 DIAGNOSIS — M54.30 SCIATICA: Primary | ICD-10-CM

## 2024-12-26 DIAGNOSIS — R20.0 NUMBNESS AND TINGLING IN BOTH HANDS: ICD-10-CM

## 2024-12-26 DIAGNOSIS — E53.8 VITAMIN B 12 DEFICIENCY: ICD-10-CM

## 2024-12-26 DIAGNOSIS — M25.569 KNEE PAIN: ICD-10-CM

## 2024-12-26 DIAGNOSIS — M54.16 RADICULOPATHY, LUMBAR REGION: ICD-10-CM

## 2024-12-26 DIAGNOSIS — G43.909 MIGRAINE HEADACHE: ICD-10-CM

## 2024-12-26 DIAGNOSIS — R53.83 OTHER FATIGUE: ICD-10-CM

## 2024-12-26 DIAGNOSIS — R20.2 NUMBNESS AND TINGLING IN BOTH HANDS: ICD-10-CM

## 2024-12-26 PROCEDURE — 99205 OFFICE O/P NEW HI 60 MIN: CPT | Performed by: PSYCHIATRY & NEUROLOGY

## 2024-12-26 RX ORDER — VENLAFAXINE HYDROCHLORIDE 37.5 MG/1
37.5 CAPSULE, EXTENDED RELEASE ORAL DAILY
Qty: 60 CAPSULE | Refills: 3 | Status: SHIPPED | OUTPATIENT
Start: 2024-12-26 | End: 2024-12-26

## 2024-12-26 RX ORDER — AMITRIPTYLINE HYDROCHLORIDE 10 MG/1
TABLET ORAL
Qty: 90 TABLET | Refills: 3 | Status: SHIPPED | OUTPATIENT
Start: 2024-12-26

## 2024-12-26 RX ORDER — CYANOCOBALAMIN 1000 UG/ML
INJECTION, SOLUTION INTRAMUSCULAR; SUBCUTANEOUS
Qty: 9 ML | Refills: 0 | Status: SHIPPED | OUTPATIENT
Start: 2024-12-26

## 2024-12-26 NOTE — PROGRESS NOTES
Neurology New Patient Ambulatory Visit Note    Name: Porsha Lindquist       : 1990       MRN: [id number]   Encounter Provider: Andrea Potter MD   Encounter Date: 2024  Encounter department: [facility] [place]    History of Present Illness       Porsha Lindquist is a 34 y.o. female presenting with Tingling in face and Eye Twitching    She has been experiencing worsening neurological symptoms this year, including twitching in both eyes, leg stiffness, and weakness in her hands, particularly when playing the [place] at [place]. Dizziness and disorientation occur, sometimes with a sensation of impending syncope, and she has had panic attacks despite no prior history of anxiety. There is a burning sensation in her knees and persistent back pain from top to bottom, despite not engaging in activities that could cause back injury. Numbness and tingling in her hands, legs, and feet started this year and occur intermittently, lasting about five minutes. She also reports memory problems and had a syncope episode in 2019, where she lost consciousness and experienced slurred speech.    Since 2019, she has been experiencing sciatica, initially affecting her right leg, but this year it has progressed to involve both legs. The pain is described as excruciating, with stiffness and weakness in both legs, making it difficult to walk her daughters to school. She was recently in the ER for severe pain and had an x-ray that showed no abnormalities in her spine or bones.    Her headaches are described as pressure-like, located at the top and front of her head, and are associated with nausea, light, and sound sensitivity. They occur when she is stressed and can last all day as she does not take migraine medication.    She has bladder issues, including stress incontinence. No loss of bowel and bladder control during episodes. There is a family history of epilepsy in a cousin.    She  "is a college student, studying online, and has three children aged 13, 7, and 4. 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 No vision loss or double vision but reports eye twitching. Experiences dizziness but no vertigo, and sometimes hears a beeping sound in her ear. Reports fatigue and a history of vitamin D and B12 deficiencies.  Objective     BP 98/66 (BP Location: Left arm, Patient Position: Sitting, Cuff Size: Large)   Pulse 69   Ht 5' 3\" (1.6 m)   Wt 68.5 kg (151 lb)   SpO2 100%   BMI 26.75 kg/m²        Neurological examination:     Mental status exam: Alert, oriented to time place and person     Cranial nerve exam:    I Not tested   II Normal visual fields to finger counting.   II, Ill,  IV, VI Pupils were symmetric, briskly reactive. No afferent pupillary defect.  Eye movements are full without nystagmus. No ptosis.   V Facial sensation were intact   VII Facial movements were symmetrical    VIII Hearing was intact   IX, X Intact   XI Shoulder shrug and head turn was normal   XII Tongue protrusion is in the mid line.          Motor exam      Arm Right  Left Leg Right  Left   Deltoid 5/5 5/5 lliopsoas 5/5 5/5   Biceps 5/5 5/5 Quads 5/5 5/5   Triceps 5/5 5/5 Hamstrings 5/5 5/5   Wrist Extension 5/5 5/5 Ankle Dorsi Flexion 5/5 5/5   Wrist Flexion 5/5 5/5 Ankle Plantar Flexion 5/5 5/5            Deep Tendon reflexes:       Brachioradialis Biceps Triceps Patellar Achilles   Right 3+ 3+ 3+ 3+ 2+   Left 2+ 2+ 2+ 2+ 2+            Sensory exam:  Sensation to dull touch Intact  Sensation to temperature Intact    Coordination:  Normal finger to nose testing  Finger tapping test was normal    Gait and Station:    normal        Pertinent diagnostic testing:  Labs:  Lab Results   Component Value Date    WBC 6.49 09/24/2024     Lab Results   Component Value Date    HGB 12.4 09/24/2024     Lab Results   Component Value Date     09/24/2024     Lab Results " "  Component Value Date    LYMPHSABS 2.27 09/24/2024     No results found for: \"LABLYMP\"  Lab Results   Component Value Date    EOSABS 0.23 09/24/2024     No components found for: \"NEUTROPHILS\"    No results found for: \"LABMONO\"         No results found for: \"LABGLUC\"  Lab Results   Component Value Date    CREATININE 0.57 (L) 09/24/2024     Lab Results   Component Value Date    AST 35 09/24/2024     Lab Results   Component Value Date    ALT 48 09/24/2024     Lab Results   Component Value Date    ALKPHOS 88 09/24/2024     Lab Results   Component Value Date    AST 35 09/24/2024        Lab Results   Component Value Date    FOLATE 15.1 04/04/2022     Lab Results   Component Value Date    IFMVZXZE88 218 09/26/2024     No results found for: \"COPPER\"  No results found for: \"METHYLMALON\"  No results found for: \"VITAMINB6\"  No components found for: \"ETSYXWKC5HD\"  No components found for: \"VITAMINE\"  No components found for: \"ANADIRECT\"     No components found for: \"HIVPCR\"     No components found for: \"GLUCOSECSF\"  No components found for: \"CSFINTERP\"  No results found for: \"RBCCSF\"  No results found for: \"WBCCSF\"  No results found for: \"IGGSYNRATE\"  No components found for: \"IGGINDEX\"  No results found for: \"PROTEINCSF\"  No components found for: \"CSFMONO\"  No components found for: \"FUNGUSCX\"  No components found for: \"CSFCS\"  No results found for: \"CRYPTOAG\"  No components found for: \"DRLCSF\"  No components found for: \"FLOWCYTEVAL\"     Lab Results   Component Value Date    HEPBSAG Non-reactive 11/09/2024    HEPCAB Non-reactive 04/04/2022            Neuroimaging:       Results for orders placed during the hospital encounter of 12/21/24    MRI brain MS wo and w contrast    Impression  1. No acute infarct, significant mass effect or midline shift.    2. Few nonspecific scattered foci of T2 prolongation in the white matter.    3. There is a 2 mm focus of susceptibility with subtle enhancement in the right parafalcine region, " possibly a small meningioma.    The study was marked in EPIC for significant notification.    Workstation performed: [id number]       Results for orders placed during the hospital encounter of 03/03/22    CT head without contrast    Impression  No acute intracranial abnormality.            Workstation performed: [id number]                    Assessment & Plan  Sciatica  Chronic sciatica since 2019, initially affecting the right leg, now involving both legs. Symptoms include excruciating pain, stiffness, and weakness, exacerbated by stress. Recent ER visit showed no abnormalities on X-ray. Differential includes possible nerve compression or other spinal issues. Neurological examination revealed brisk reflexes on the right side, suggesting possible cervical spine involvement.  - Refer to pain management specialist for evaluation and possible injections  - Order nerve conduction study to assess for nerve problems       Ambulatory referral to Spine & Pain Management; Future    Ambulatory referral to Rheumatology; Future    EMG 2 limb lower extremity; Future    Vitamin B6; Future    TSH, 3rd generation with Free T4 reflex; Future    Folate; Future    Copper Level; Future  Migraine headache  Recurrent migraines associated with stress, characterized by pressure-like headaches, nausea, and photophobia and phonophobia. Occur weekly, often followed by neurological symptoms such as stiffness and weakness. MRI showed nonspecific spots likely from migraines, not MS.  - Prescribe amitriptyline 10 mg at bedtime, increase to 20 mg if needed  - Provide lifestyle modification guidance including regular eating, increased water intake, and adequate sleep  - Limit ibuprofen to less than three tablets a week to avoid medication overuse headaches    Knee pain  Burning sensation in the knees, not radiating, and exacerbated by movement. Considered non-neurological in origin. Referral to rheumatologist recommended for further evaluation.  -  "Refer to rheumatologist for further evaluation  Orders:    Ambulatory referral to Rheumatology; Future    Vitamin B 12 deficiency  Low levels of vitamin D and B12, potentially contributing to fatigue and other symptoms. Vitamin D deficiency is chronic, and B12 levels are below optimal for neurological health.  - Prescribe vitamin B12 injections  - Advise taking 5000 IU of vitamin D daily or 50,000 IU weekly  Orders:    SYRINGE-NEEDLE, DISP, 3 ML 25G X 1\" 3 ML MISC; Use to administer B12 as directed    cyanocobalamin 1,000 mcg/mL; Inject 1000 mcg IM once a week x 4 weeks, then once a month x 5 months.          Ms. Lindquist will Return in about 3 months (around 3/26/2025).      The management plan was discussed in detail with the patient and all questions were answered.     Ms. Lindquist was encouraged to contact our office with any questions or concerns and to contact the clinic or go to the nearest emergency room if symptoms change or worsen.     Administrative Statements   I have spent a total of 61 min in reviewing and/or ordering tests, medications, or procedures, performing an examination or evaluation, reviewing pertinent history, counseling and educating the patient, referring and/or communicating with other health care professionals, documenting in the EMR and general coordination of care of Ms. Lindquist  today.             Andrea Marcial MD.   Staff Neurologist,   Neuroimmunology and Neuroinfectious disease  12/26/24     This report has been created through the use of voice recognition/text compilation software.  Typographical and content errors may occur with this process.  While efforts are made to detect and correct such errors, in some cases errors will persist.  For this reason, wording in this document should be considered in the proper context and not strictly verbatim.  If, when reviewing the document, an error is discovered, please let the office know at [phone]        "

## 2025-01-02 ENCOUNTER — APPOINTMENT (OUTPATIENT)
Dept: LAB | Facility: HOSPITAL | Age: 35
End: 2025-01-02
Attending: PSYCHIATRY & NEUROLOGY
Payer: MEDICARE

## 2025-01-02 DIAGNOSIS — M54.16 RADICULOPATHY, LUMBAR REGION: ICD-10-CM

## 2025-01-02 LAB
FOLATE SERPL-MCNC: 9.7 NG/ML
TSH SERPL DL<=0.05 MIU/L-ACNC: 1.26 UIU/ML (ref 0.45–4.5)

## 2025-01-02 PROCEDURE — 84207 ASSAY OF VITAMIN B-6: CPT

## 2025-01-02 PROCEDURE — 82746 ASSAY OF FOLIC ACID SERUM: CPT

## 2025-01-02 PROCEDURE — 82525 ASSAY OF COPPER: CPT

## 2025-01-02 PROCEDURE — 36415 COLL VENOUS BLD VENIPUNCTURE: CPT

## 2025-01-02 PROCEDURE — 84443 ASSAY THYROID STIM HORMONE: CPT

## 2025-01-06 ENCOUNTER — RESULTS FOLLOW-UP (OUTPATIENT)
Age: 35
End: 2025-01-06

## 2025-01-07 LAB
COPPER SERPL-MCNC: 125 UG/DL (ref 80–158)
VIT B6 SERPL-MCNC: 15.4 UG/L (ref 3.4–65.2)

## 2025-01-17 DIAGNOSIS — G43.909 MIGRAINE HEADACHE: ICD-10-CM

## 2025-01-17 RX ORDER — AMITRIPTYLINE HYDROCHLORIDE 10 MG/1
TABLET ORAL
Qty: 180 TABLET | Refills: 1 | Status: SHIPPED | OUTPATIENT
Start: 2025-01-17

## 2025-01-20 ENCOUNTER — TELEPHONE (OUTPATIENT)
Age: 35
End: 2025-01-20

## 2025-01-20 NOTE — TELEPHONE ENCOUNTER
Left message for patient to return office call in regards to rescheduling her appointment from 1/15/25.    Thank you

## 2025-01-22 NOTE — PROGRESS NOTES
Rheumatology Initial Outpatient Visit  Name: Porsha Lindquist      : 1990      MRN: [id number]  Encounter Provider: Neela Hughes MD  Encounter Date: 2025   Encounter department: [facility] [address]  :  Assessment & Plan  Patellofemoral pain syndrome of both knees  34-year-old female who presents for further evaluation of bilateral knee pain and chronic low back pain.  In general, pain tends to be worsened with activity and improved with rest.  While she does not have any stiffness in her knees, she does report some stiffness in her low back.  Exam significant for lateral patella tilt of bilateral knees.  Discussed with patient that I suspect her knee pain is related to patellofemoral pain syndrome.  Recommend physical therapy and referral was provided.  Although I have less suspicion for an underlying rheumatologic etiology of her back pain, given her age and the stiffness I do recommend that we obtain x-rays of the SI joints to screen for a spondyloarthropathy.  Patient is also plan to get an MRI of her low back.  We will arrange follow-up depending on the results of the sacroiliac x-ray.  Orders:    Ambulatory Referral to Physical Therapy; Future    Chronic low back pain with sciatica, sciatica laterality unspecified, unspecified back pain laterality    Orders:    XR sacroiliac joints 3+ views; Future        History of Present Illness   HPI  Porsha Lindquist is a 34 y.o. female who presents for evaluation of joint pain.  Patient reports around  she started to have a burning sensation in the bilateral knees.  Eventually she then started to get excruciating pain in her knees.  She reports that the pain was exacerbated by standing or walking and sometimes it would be even hard to walk due to the pain.  She reports that she has noticed some swelling that is noticeable at the end of the day not in the morning.  Her knees do not feel stiff.  Pain is also  "aggravated by kneeling or long.    Patient reports chronic mid and low back pain since around 2018 or 2019.  She reports radicular pain down both of her legs.  She reports that it is present all of the time and not exacerbated or alleviated by rest or activity.  She does report that she thinks there is some stiffness in her back when she is waking up although it never truly goes away.  She reports that she has been started on amitriptyline for her knees which has helped some of the pain but has not alleviated the burning sensation.  She reports that she has also started gabapentin for her back but she has not noticed any difference yet.  She also uses ibuprofen as needed which she does think helps her knee pain but does not help her back pain.    Family history significant for mother with FMS    Review of Systems  Complete ROS conducted as per HPI.   Intermittent shortness of breath related to asthma  In addition, denies:  Fever  Photosensitive rash  Sicca symptoms  Recurrent oral ulcers  Muscle weakness  Uveitis  Dactylitis  Chest pain  Pleurisy  Gross hematuria  Foamy urine  Raynaud's  Joint issues other than noted above      Objective   /74 (BP Location: Right arm, Patient Position: Sitting, Cuff Size: Adult)   Pulse 93   Temp 97.5 °F (36.4 °C) (Tympanic)   Ht 5' 3\" (1.6 m)   Wt 68.5 kg (151 lb)   SpO2 97%   BMI 26.75 kg/m²      Physical Exam  Physical Exam  Constitutional: well appearing, no acute distress  HEENT: normocephalic, sclera clear, no visible oral or nasal ulcers  Neck: supple, no palpable cervical adenopathy  CV: regular rate and rhythm, no murmur  Pulm: normal respiratory effort, lungs clear to auscultation b/l  Skin: no rashes, no skin thickening  Extremities: warm and well perfused, no edema  MSK: No synovitis or effusions, negative COLEMAN bilaterally,+ lateral patella tilt bilaterally    Labs and Imaging  I have personally reviewed pertinent labs and imaging.     HATTIE and RF negative in " 2022

## 2025-01-24 ENCOUNTER — CONSULT (OUTPATIENT)
Dept: PAIN MEDICINE | Facility: CLINIC | Age: 35
End: 2025-01-24
Payer: MEDICARE

## 2025-01-24 VITALS
SYSTOLIC BLOOD PRESSURE: 100 MMHG | HEIGHT: 63 IN | BODY MASS INDEX: 26.75 KG/M2 | DIASTOLIC BLOOD PRESSURE: 74 MMHG | WEIGHT: 151 LBS

## 2025-01-24 DIAGNOSIS — M54.42 CHRONIC BILATERAL LOW BACK PAIN WITH BILATERAL SCIATICA: Primary | ICD-10-CM

## 2025-01-24 DIAGNOSIS — G89.29 CHRONIC BILATERAL LOW BACK PAIN WITH BILATERAL SCIATICA: Primary | ICD-10-CM

## 2025-01-24 DIAGNOSIS — M54.16 RADICULOPATHY, LUMBAR REGION: ICD-10-CM

## 2025-01-24 DIAGNOSIS — M54.41 CHRONIC BILATERAL LOW BACK PAIN WITH BILATERAL SCIATICA: Primary | ICD-10-CM

## 2025-01-24 PROCEDURE — 99244 OFF/OP CNSLTJ NEW/EST MOD 40: CPT | Performed by: PAIN MEDICINE

## 2025-01-24 RX ORDER — GABAPENTIN 300 MG/1
300 CAPSULE ORAL 2 TIMES DAILY
Qty: 60 CAPSULE | Refills: 1 | Status: SHIPPED | OUTPATIENT
Start: 2025-01-24

## 2025-01-24 NOTE — PROGRESS NOTES
Assessment  1. Chronic bilateral low back pain with bilateral sciatica  2. Radiculopathy, lumbar region  -     Ambulatory referral to Spine & Pain Management  -     MRI lumbar spine wo contrast; Future; Expected date: 01/24/2025  -     gabapentin (NEURONTIN) 300 mg capsule; Take 1 capsule (300 mg total) by mouth 2 (two) times a day Take 1 tablet int the morning for 3 days, then 1 tablet morning and afternoon for 3 days and continue        Porsha is a pleasant 34-year-old female history of chronic low back pain ongoing for the past 6 years increasing in severity she has pain ongoing for several years without improvement with conservative management will recommend MRI of lumbar spine will start gabapentin 300 mg twice daily she takes amitriptyline at night.  Follow-up after MRI is completed.        My impressions and treatment recommendations were discussed in detail with the patient who verbalized understanding and had no further questions.  Discharge instructions were provided. I personally saw and examined the patient and I agree with the above discussed plan of care.    Plan      New Medications Ordered This Visit   Medications   • gabapentin (NEURONTIN) 300 mg capsule     Sig: Take 1 capsule (300 mg total) by mouth 2 (two) times a day Take 1 tablet int the morning for 3 days, then 1 tablet morning and afternoon for 3 days and continue     Dispense:  60 capsule     Refill:  1       Orders Placed This Encounter   Procedures   • MRI lumbar spine wo contrast     Standing Status:   Future     Expected Date:   1/24/2025     Expiration Date:   1/24/2029     Scheduling Instructions:      There is no preparation for this test. Please leave your jewelry and valuables at home, wedding rings are the exception. Please bring your physician order, insurance cards, a form of photo ID and a list of your medications with you. Arrive 15 minutes prior to your appointment time in order to       register. Please bring any prior  CT or MRI studies of this area that were not performed at a [facility].            To schedule this appointment, please contact Central Scheduling at [phone].     What is the patient's sedation requirement?:   No Sedation     Does the patient have metallic implants, such as a pacemaker or shunt?:   No     Is the patient pregnant?:   No       History of Present Illness    Porsha Lindquist is a 34 y.o. female with relevant PMH of chronic low back pain bilateral leg pain worse in the morning increased with activity flexion extension type activities alleviated with sitting down ongoing pain for the past 6 years increasing in severity she is done 6 weeks of physical therapy and last 6 months her pain increases with activity no prior surgeries of the spine denies bowel bladder incontinence as she has had an x-ray which are reviewed with the patient.      I have personally reviewed and/or updated the patient's past medical history, past surgical history, family history, social history, current medications, allergies, and vital signs today.     Review of Systems   Musculoskeletal:  Positive for back pain and joint swelling.       Patient Active Problem List   Diagnosis   • Special sensory attacks (HCC)   • Falling hair   • Other fatigue   • Iron deficiency anemia secondary to inadequate dietary iron intake   • Mild intermittent asthma without complication   • Gall bladder stones   • Gastroesophageal reflux disease without esophagitis   • Depression with anxiety   • Seasonal allergic rhinitis due to pollen   • Preoperative respiratory examination   • Numbness and tingling in both hands   • Eye twitch       Past Medical History:   Diagnosis Date   • Abnormal Pap smear of cervix     had cryosurgery 2015   • Anxiety    • Asthma    • Carrier of group B Streptococcus    • GERD (gastroesophageal reflux disease)    • High blood pressure     during pregnancy    • History of UTI    • Hx of absence seizures      once(per sanjiv)   • Hx of seasonal allergies    • Hypertension    • Migraine headache    • Muscle spasm of back    • Papanicolaou smear for cervical cancer screening 2017    Pap neg 2016, pap- neg/GC/CT NEG   • Postpartum depression    • Preeclampsia    • Psychiatric disorder    • Syncope 2019   • Umbilical hernia 2018   • Urinary tract infection    • Varicose veins of lower extremity        Past Surgical History:   Procedure Laterality Date   • GYNECOLOGIC CRYOSURGERY     • HERNIA REPAIR      2 years ago   • INSERTION OF INTRAUTERINE DEVICE (IUD)     • OTHER SURGICAL HISTORY  2015    cryosurgery    • PA LAPAROSCOPY W/RMVL ADNEXAL STRUCTURES Bilateral 2024    Procedure: EXAM UNDER ANESTHESIA; (VNOTE) SALPINGECTOMY, LAPAROSCOPIC;  Surgeon: Ashutosh Chandler MD;  Location:  MAIN OR;  Service: Gynecology   • SKIN BIOPSY     • WISDOM TOOTH EXTRACTION         Family History   Problem Relation Age of Onset   • Hypertension Mother    • Arthritis Mother         Viltigo   • Asthma Mother    • Diabetes Mother         Prediabetic    • Sleep apnea Mother    • Migraines Mother    • Lymphoma Maternal Grandmother    • Diabetes Maternal Grandmother    • COPD Maternal Grandmother    • Asthma Maternal Grandmother    • Heart attack Maternal Grandmother    • Heart attack Maternal Grandfather          60   • Varicose Veins Paternal Grandmother    • Hyperthyroidism Maternal Aunt    • Asthma Maternal Aunt    • Bipolar disorder Maternal Aunt    • Thyroid disease Maternal Aunt    • Varicose Veins Paternal Aunt    • Cancer Cousin    • Diabetes Cousin    • Diabetes Other    • Asthma Sister    • Fibromyalgia Family    • Arthritis Family    • GI problems Family    • Asthma Son        Social History     Occupational History   • Occupation: stay at home mom   Tobacco Use   • Smoking status: Former     Current packs/day: 0.00     Average packs/day: 1 pack/day for 15.0 years (15.0 ttl pk-yrs)     Types: Cigarettes      "Start date: 1/1/2000     Quit date: 1/1/2015     Years since quitting: 10.0   • Smokeless tobacco: Never   • Tobacco comments:     Has smoked since age:   10   Vaping Use   • Vaping status: Never Used   Substance and Sexual Activity   • Alcohol use: Not Currently     Comment: stopped drinking in 2015   • Drug use: Never     Comment: per Gyn notes, Marijuana HX age 13.    • Sexual activity: Yes     Partners: Male     Birth control/protection: Injection, Female Sterilization     Comment: depo provera       Current Outpatient Medications on File Prior to Visit   Medication Sig   • albuterol (PROVENTIL HFA,VENTOLIN HFA) 90 mcg/act inhaler INHALE 1 PUFF BY MOUTH EVERY 6 HOURS AS NEEDED FOR WHEEZE   • amitriptyline (ELAVIL) 10 mg tablet START WITH 1 TAB AT BED TIME, IF NO RELIEF AND ABLE TO TOLERATE PLEASE INCREASE THE DOSE TO 2 TABS AT BED TIME IN ABOUT 7 DAYS.   • cyanocobalamin 1,000 mcg/mL Inject 1000 mcg IM once a week x 4 weeks, then once a month x 5 months.   • cyclobenzaprine (FLEXERIL) 10 mg tablet Take 1 tablet (10 mg total) by mouth 2 (two) times a day as needed for muscle spasms   • fluticasone (FLONASE) 50 mcg/act nasal spray SPRAY 2 SPRAYS INTO EACH NOSTRIL EVERY DAY   • fluticasone-salmeterol (Advair HFA) 230-21 MCG/ACT inhaler INHALE 2 PUFFS BY MOUTH 2 TIMES A DAY RINSE MOUTH AFTER USE.   • hydrocortisone 1 % cream Apply topically 4 (four) times a day as needed for irritation or rash   • ibuprofen (MOTRIN) 600 mg tablet Take 1 tablet (600 mg total) by mouth every 6 (six) hours as needed for mild pain   • Nerve Stimulator (STANDARD TENS) OMER by Does not apply route 4 (four) times a day   • pantoprazole (PROTONIX) 40 mg tablet Take 1 tablet (40 mg total) by mouth daily   • SYRINGE-NEEDLE, DISP, 3 ML 25G X 1\" 3 ML MISC Use to administer B12 as directed   • Cholecalciferol (Vitamin D3) 50 MCG (2000 UT) capsule Take 1 capsule (2,000 Units total) by mouth every morning   • ferrous sulfate 324 (65 Fe) mg Take 1 " "tablet (324 mg total) by mouth daily before breakfast     No current facility-administered medications on file prior to visit.       Allergies   Allergen Reactions   • Phenazopyridine Anaphylaxis and Hives   • Other      Dove Deodorant           Physical Exam    /74   Ht 5' 3\" (1.6 m)   Wt 68.5 kg (151 lb)   BMI 26.75 kg/m²         MSK:    Lumbar Spine:  No masses or atrophy,    Range of motion - Decreased extension/flexion  Palpation -  Tenderness to palpation in the lumbar parapsinals   PSIS tenderness no  Ghulam's/COLEMAN no  Gaenslen's no  SLR neg       Strength Right Left   Hip flexion L1,2 5 5   Knee extension L3 5 5   Ankle dorsiflexion L4 5 5   Great toe extension L5 5 5   Ankle Plantarflexion S1 5 5       Sensory Exam:  intact to light touch bilateral LE       Reflexes:     Right Left   Biceps 2+ 2+   Triceps 2+ 2+   Brachioradialis 2+ 2+   Patellar 2+ 2+   Achilles 2+ 2+   Babinski neg neg        Gait normal                  Imaging      Xr  L spine normal    MS MRI brain  No signs of MS  "

## 2025-01-29 ENCOUNTER — CONSULT (OUTPATIENT)
Age: 35
End: 2025-01-29
Payer: MEDICARE

## 2025-01-29 VITALS
WEIGHT: 151 LBS | OXYGEN SATURATION: 97 % | HEIGHT: 63 IN | TEMPERATURE: 97.5 F | BODY MASS INDEX: 26.75 KG/M2 | SYSTOLIC BLOOD PRESSURE: 118 MMHG | DIASTOLIC BLOOD PRESSURE: 74 MMHG | HEART RATE: 93 BPM

## 2025-01-29 DIAGNOSIS — M54.40 CHRONIC LOW BACK PAIN WITH SCIATICA, SCIATICA LATERALITY UNSPECIFIED, UNSPECIFIED BACK PAIN LATERALITY: ICD-10-CM

## 2025-01-29 DIAGNOSIS — G89.29 CHRONIC LOW BACK PAIN WITH SCIATICA, SCIATICA LATERALITY UNSPECIFIED, UNSPECIFIED BACK PAIN LATERALITY: ICD-10-CM

## 2025-01-29 DIAGNOSIS — M22.2X2 PATELLOFEMORAL PAIN SYNDROME OF BOTH KNEES: Primary | ICD-10-CM

## 2025-01-29 DIAGNOSIS — M22.2X1 PATELLOFEMORAL PAIN SYNDROME OF BOTH KNEES: Primary | ICD-10-CM

## 2025-01-29 PROCEDURE — 99244 OFF/OP CNSLTJ NEW/EST MOD 40: CPT | Performed by: STUDENT IN AN ORGANIZED HEALTH CARE EDUCATION/TRAINING PROGRAM

## 2025-02-07 ENCOUNTER — APPOINTMENT (EMERGENCY)
Dept: RADIOLOGY | Facility: HOSPITAL | Age: 35
End: 2025-02-07
Payer: MEDICARE

## 2025-02-07 ENCOUNTER — HOSPITAL ENCOUNTER (EMERGENCY)
Facility: HOSPITAL | Age: 35
Discharge: HOME | End: 2025-02-08
Attending: EMERGENCY MEDICINE
Payer: MEDICARE

## 2025-02-07 VITALS
DIASTOLIC BLOOD PRESSURE: 87 MMHG | TEMPERATURE: 98.2 F | OXYGEN SATURATION: 100 % | RESPIRATION RATE: 18 BRPM | SYSTOLIC BLOOD PRESSURE: 118 MMHG | HEART RATE: 93 BPM

## 2025-02-07 DIAGNOSIS — M25.561 ACUTE PAIN OF RIGHT KNEE: Primary | ICD-10-CM

## 2025-02-07 PROCEDURE — 99284 EMERGENCY DEPT VISIT MOD MDM: CPT | Performed by: EMERGENCY MEDICINE

## 2025-02-07 PROCEDURE — 73564 X-RAY EXAM KNEE 4 OR MORE: CPT

## 2025-02-07 PROCEDURE — 99284 EMERGENCY DEPT VISIT MOD MDM: CPT

## 2025-02-07 RX ORDER — IBUPROFEN 600 MG/1
600 TABLET, FILM COATED ORAL ONCE
Status: COMPLETED | OUTPATIENT
Start: 2025-02-07 | End: 2025-02-07

## 2025-02-07 RX ADMIN — IBUPROFEN 600 MG: 600 TABLET, FILM COATED ORAL at 22:40

## 2025-02-08 NOTE — ED PROVIDER NOTES
ED Disposition       None          Assessment & Plan   {Hyperlinks  Risk Stratification - NIHSS - HEART SCORE - Fill out sepsis note and make sure you call [id number] if severe or septic shock:[id number]}    Medical Decision Making  Amount and/or Complexity of Data Reviewed  Radiology: ordered.    Risk  Prescription drug management.             Medications - No data to display    ED Risk Strat Scores                          SBIRT 22yo+      Flowsheet Row Most Recent Value   Initial Alcohol Screen: US AUDIT-C     1. How often do you have a drink containing alcohol? 0 Filed at: 02/07/2025 2204   2. How many drinks containing alcohol do you have on a typical day you are drinking?  0 Filed at: 02/07/2025 2204   3b. FEMALE Any Age, or MALE 65+: How often do you have 4 or more drinks on one occassion? 0 Filed at: 02/07/2025 2204   Audit-C Score 0 Filed at: 02/07/2025 2204   VIKTORIYA: How many times in the past year have you...    Used an illegal drug or used a prescription medication for non-medical reasons? Never Filed at: 02/07/2025 2204                            History of Present Illness   {Hyperlinks  History (Med, Surg, Fam, Social) - Current Medications - Allergies  :[id number]}    Chief Complaint   Patient presents with    Knee Pain     Patient c/o R knee pain that started this morning. Pt describes pain as burning. Pt reports increased activity. No otc meds pta.        Past Medical History:   Diagnosis Date    Abnormal Pap smear of cervix     had cryosurgery 2015    Anxiety     Asthma     Carrier of group B Streptococcus     GERD (gastroesophageal reflux disease)     High blood pressure     during pregnancy     History of UTI     Hx of absence seizures     once(per sanjiv)    Hx of seasonal allergies     Hypertension     Migraine headache     Muscle spasm of back     Papanicolaou smear for cervical cancer screening 06/2017    Pap neg 5/2016, pap- neg/GC/CT NEG    Postpartum depression     Preeclampsia     Psychiatric  disorder     Syncope 2019    Umbilical hernia 2018    Urinary tract infection     Varicose veins of lower extremity       Past Surgical History:   Procedure Laterality Date    GYNECOLOGIC CRYOSURGERY      HERNIA REPAIR      2 years ago    INSERTION OF INTRAUTERINE DEVICE (IUD)      OTHER SURGICAL HISTORY  2015    cryosurgery     DC LAPAROSCOPY W/RMVL ADNEXAL STRUCTURES Bilateral 2024    Procedure: EXAM UNDER ANESTHESIA; (VNOTE) SALPINGECTOMY, LAPAROSCOPIC;  Surgeon: Ashutosh Chandler MD;  Location:  MAIN OR;  Service: Gynecology    SKIN BIOPSY      WISDOM TOOTH EXTRACTION        Family History   Problem Relation Age of Onset    Hypertension Mother     Arthritis Mother         Viltigo    Asthma Mother     Diabetes Mother         Prediabetic     Sleep apnea Mother     Migraines Mother     Lymphoma Maternal Grandmother     Diabetes Maternal Grandmother     COPD Maternal Grandmother     Asthma Maternal Grandmother     Heart attack Maternal Grandmother     Heart attack Maternal Grandfather          60    Varicose Veins Paternal Grandmother     Hyperthyroidism Maternal Aunt     Asthma Maternal Aunt     Bipolar disorder Maternal Aunt     Thyroid disease Maternal Aunt     Varicose Veins Paternal Aunt     Cancer Cousin     Diabetes Cousin     Diabetes Other     Asthma Sister     Fibromyalgia Family     Arthritis Family     GI problems Family     Asthma Son       Social History     Tobacco Use    Smoking status: Former     Current packs/day: 0.00     Average packs/day: 1 pack/day for 15.0 years (15.0 ttl pk-yrs)     Types: Cigarettes     Start date: 2000     Quit date: 2015     Years since quitting: 10.1    Smokeless tobacco: Never    Tobacco comments:     Has smoked since age:   10   Vaping Use    Vaping status: Never Used   Substance Use Topics    Alcohol use: Not Currently     Comment: stopped drinking in     Drug use: Never     Comment: per Gyn notes, Marijuana HX age 13.        E-Cigarette/Vaping    E-Cigarette Use Never User       E-Cigarette/Vaping Substances    Nicotine No     THC No     CBD No     Flavoring No     Other No     Unknown No       I have reviewed and agree with the history as documented.     34-year-old female presents with 1 day of right knee pain, reports that she has been increasing her activity level at work lately, has had no recent trauma, including no falls or blows to the knee, no paresthesias distal to the knee, and no weakness with her ankle, she reports she thinks she has some swelling at the inferior aspect of the knee, and no hip pain, no other significant swelling, and no other complaints.  The patient reports she does not drink, smoke, use drugs.        Review of Systems   Constitutional:  Negative for fever.   Respiratory:  Negative for cough and shortness of breath.    Cardiovascular:  Negative for chest pain.   Gastrointestinal:  Negative for abdominal pain, constipation, diarrhea, nausea and vomiting.   Genitourinary:  Negative for dysuria and hematuria.   Neurological:  Negative for light-headedness and headaches.           Objective   {Hyperlinks  Historical Vitals - Historical Labs - Chart Review/Microbiology - Last Echo - Code Status  :[id number]}    ED Triage Vitals [02/07/25 2202]   Temperature Pulse Blood Pressure Respirations SpO2 Patient Position - Orthostatic VS   98.2 °F (36.8 °C) 93 118/87 18 100 % Sitting      Temp Source Heart Rate Source BP Location FiO2 (%) Pain Score    Oral Monitor Left arm -- 9      Vitals      Date and Time Temp Pulse SpO2 Resp BP Pain Score FACES Pain Rating User   02/07/25 2202 98.2 °F (36.8 °C) 93 100 % 18 118/87 9 -- DG            Physical Exam  Vitals and nursing note reviewed.   Constitutional:       General: She is not in acute distress.     Appearance: Normal appearance. She is well-developed.   HENT:      Head: Normocephalic and atraumatic.   Eyes:      Extraocular Movements: Extraocular movements intact.  "     Conjunctiva/sclera: Conjunctivae normal.   Cardiovascular:      Rate and Rhythm: Normal rate.   Pulmonary:      Effort: Pulmonary effort is normal. No respiratory distress.   Abdominal:      General: There is no distension.      Palpations: Abdomen is soft.   Musculoskeletal:         General: No swelling.      Cervical back: Normal range of motion.      Comments: Patient has no tenderness to palpation or manipulation of her right hip, ankle, foot, no tenderness in her posterior knee, no tenderness to valgus or varus straining, Lachman's test is negative, patient has no significant swelling in the peripatellar area and no patellar balloting, and patient has some mild tenderness to palpation of the inferior patellar ligament.   Skin:     General: Skin is warm and dry.      Capillary Refill: Capillary refill takes less than 2 seconds.   Neurological:      General: No focal deficit present.      Mental Status: She is alert and oriented to person, place, and time.   Psychiatric:         Mood and Affect: Mood normal.         Results Reviewed       None            No orders to display       Procedures    ED Medication and Procedure Management   Prior to Admission Medications   Prescriptions Last Dose Informant Patient Reported? Taking?   Cholecalciferol (Vitamin D3) 50 MCG (2000 UT) capsule   No No   Sig: Take 1 capsule (2,000 Units total) by mouth every morning   Nerve Stimulator (STANDARD TENS) OMER  Self No No   Sig: by Does not apply route 4 (four) times a day   SYRINGE-NEEDLE, DISP, 3 ML 25G X 1\" 3 ML MISC   No No   Sig: Use to administer B12 as directed   albuterol (PROVENTIL HFA,VENTOLIN HFA) 90 mcg/act inhaler  Self No No   Sig: INHALE 1 PUFF BY MOUTH EVERY 6 HOURS AS NEEDED FOR WHEEZE   amitriptyline (ELAVIL) 10 mg tablet   No No   Sig: START WITH 1 TAB AT BED TIME, IF NO RELIEF AND ABLE TO TOLERATE PLEASE INCREASE THE DOSE TO 2 TABS AT BED TIME IN ABOUT 7 DAYS.   cyanocobalamin 1,000 mcg/mL   No No   Sig: " Inject 1000 mcg IM once a week x 4 weeks, then once a month x 5 months.   cyclobenzaprine (FLEXERIL) 10 mg tablet   No No   Sig: Take 1 tablet (10 mg total) by mouth 2 (two) times a day as needed for muscle spasms   ferrous sulfate 324 (65 Fe) mg   No No   Sig: Take 1 tablet (324 mg total) by mouth daily before breakfast   fluticasone (FLONASE) 50 mcg/act nasal spray   No No   Sig: SPRAY 2 SPRAYS INTO EACH NOSTRIL EVERY DAY   fluticasone-salmeterol (Advair HFA) 230-21 MCG/ACT inhaler   No No   Sig: INHALE 2 PUFFS BY MOUTH 2 TIMES A DAY RINSE MOUTH AFTER USE.   gabapentin (NEURONTIN) 300 mg capsule   No No   Sig: Take 1 capsule (300 mg total) by mouth 2 (two) times a day Take 1 tablet int the morning for 3 days, then 1 tablet morning and afternoon for 3 days and continue   hydrocortisone 1 % cream   No No   Sig: Apply topically 4 (four) times a day as needed for irritation or rash   ibuprofen (MOTRIN) 600 mg tablet   No No   Sig: Take 1 tablet (600 mg total) by mouth every 6 (six) hours as needed for mild pain   pantoprazole (PROTONIX) 40 mg tablet   No No   Sig: Take 1 tablet (40 mg total) by mouth daily      Facility-Administered Medications: None     Patient's Medications   Discharge Prescriptions    No medications on file     No discharge procedures on file.  ED SEPSIS DOCUMENTATION          week x 4 weeks, then once a month x 5 months.   cyclobenzaprine (FLEXERIL) 10 mg tablet   No No   Sig: Take 1 tablet (10 mg total) by mouth 2 (two) times a day as needed for muscle spasms   ferrous sulfate 324 (65 Fe) mg   No No   Sig: Take 1 tablet (324 mg total) by mouth daily before breakfast   fluticasone (FLONASE) 50 mcg/act nasal spray   No No   Sig: SPRAY 2 SPRAYS INTO EACH NOSTRIL EVERY DAY   fluticasone-salmeterol (Advair HFA) 230-21 MCG/ACT inhaler   No No   Sig: INHALE 2 PUFFS BY MOUTH 2 TIMES A DAY RINSE MOUTH AFTER USE.   gabapentin (NEURONTIN) 300 mg capsule   No No   Sig: Take 1 capsule (300 mg total) by mouth 2 (two) times a day Take 1 tablet int the morning for 3 days, then 1 tablet morning and afternoon for 3 days and continue   hydrocortisone 1 % cream   No No   Sig: Apply topically 4 (four) times a day as needed for irritation or rash   ibuprofen (MOTRIN) 600 mg tablet   No No   Sig: Take 1 tablet (600 mg total) by mouth every 6 (six) hours as needed for mild pain   pantoprazole (PROTONIX) 40 mg tablet   No No   Sig: Take 1 tablet (40 mg total) by mouth daily      Facility-Administered Medications: None     Discharge Medication List as of 2/8/2025 12:15 AM        CONTINUE these medications which have NOT CHANGED    Details   albuterol (PROVENTIL HFA,VENTOLIN HFA) 90 mcg/act inhaler INHALE 1 PUFF BY MOUTH EVERY 6 HOURS AS NEEDED FOR WHEEZE, Normal      amitriptyline (ELAVIL) 10 mg tablet START WITH 1 TAB AT BED TIME, IF NO RELIEF AND ABLE TO TOLERATE PLEASE INCREASE THE DOSE TO 2 TABS AT BED TIME IN ABOUT 7 DAYS., Normal      Cholecalciferol (Vitamin D3) 50 MCG (2000 UT) capsule Take 1 capsule (2,000 Units total) by mouth every morning, Starting Mon 5/20/2024, Until Thu 12/26/2024, Normal      cyanocobalamin 1,000 mcg/mL Inject 1000 mcg IM once a week x 4 weeks, then once a month x 5 months., Normal      cyclobenzaprine (FLEXERIL) 10 mg tablet Take 1 tablet (10 mg total) by mouth 2 (two) times a  "day as needed for muscle spasms, Starting Wed 12/11/2024, Normal      ferrous sulfate 324 (65 Fe) mg Take 1 tablet (324 mg total) by mouth daily before breakfast, Starting Mon 5/20/2024, Until Thu 12/26/2024, Normal      fluticasone (FLONASE) 50 mcg/act nasal spray SPRAY 2 SPRAYS INTO EACH NOSTRIL EVERY DAY, Normal      fluticasone-salmeterol (Advair HFA) 230-21 MCG/ACT inhaler INHALE 2 PUFFS BY MOUTH 2 TIMES A DAY RINSE MOUTH AFTER USE., Normal      gabapentin (NEURONTIN) 300 mg capsule Take 1 capsule (300 mg total) by mouth 2 (two) times a day Take 1 tablet int the morning for 3 days, then 1 tablet morning and afternoon for 3 days and continue, Starting Fri 1/24/2025, Normal      hydrocortisone 1 % cream Apply topically 4 (four) times a day as needed for irritation or rash, Starting Wed 12/4/2024, Normal      ibuprofen (MOTRIN) 600 mg tablet Take 1 tablet (600 mg total) by mouth every 6 (six) hours as needed for mild pain, Starting Mon 11/11/2024, Normal      Nerve Stimulator (STANDARD TENS) OMER by Does not apply route 4 (four) times a day, Starting Wed 5/20/2020, Normal      pantoprazole (PROTONIX) 40 mg tablet Take 1 tablet (40 mg total) by mouth daily, Starting Wed 9/25/2024, Normal      SYRINGE-NEEDLE, DISP, 3 ML 25G X 1\" 3 ML MISC Use to administer B12 as directed, Normal           No discharge procedures on file.  ED SEPSIS DOCUMENTATION   Time reflects when diagnosis was documented in both MDM as applicable and the Disposition within this note       Time User Action Codes Description Comment    2/8/2025 12:14 AM Isrrael Martel Add [M25.561] Acute pain of right knee                  Isrrael Martel MD  02/14/25 3990    "

## 2025-02-11 ENCOUNTER — OFFICE VISIT (OUTPATIENT)
Dept: OBGYN CLINIC | Facility: CLINIC | Age: 35
End: 2025-02-11
Payer: MEDICARE

## 2025-02-11 ENCOUNTER — HOSPITAL ENCOUNTER (OUTPATIENT)
Dept: RADIOLOGY | Facility: HOSPITAL | Age: 35
Discharge: HOME | End: 2025-02-11
Attending: STUDENT IN AN ORGANIZED HEALTH CARE EDUCATION/TRAINING PROGRAM
Payer: MEDICARE

## 2025-02-11 VITALS — BODY MASS INDEX: 27.46 KG/M2 | HEIGHT: 63 IN | WEIGHT: 155 LBS

## 2025-02-11 DIAGNOSIS — M22.2X1 PATELLOFEMORAL PAIN SYNDROME OF RIGHT KNEE: Primary | ICD-10-CM

## 2025-02-11 DIAGNOSIS — M25.562 LEFT KNEE PAIN, UNSPECIFIED CHRONICITY: ICD-10-CM

## 2025-02-11 DIAGNOSIS — M95.8 OSTEOCHONDRAL DEFECT: ICD-10-CM

## 2025-02-11 DIAGNOSIS — M22.2X2 PATELLOFEMORAL SYNDROME OF LEFT KNEE: ICD-10-CM

## 2025-02-11 PROCEDURE — 99203 OFFICE O/P NEW LOW 30 MIN: CPT | Performed by: STUDENT IN AN ORGANIZED HEALTH CARE EDUCATION/TRAINING PROGRAM

## 2025-02-11 PROCEDURE — 73562 X-RAY EXAM OF KNEE 3: CPT

## 2025-02-11 RX ORDER — MELOXICAM 15 MG/1
15 TABLET ORAL DAILY
Qty: 30 TABLET | Refills: 2 | Status: SHIPPED | OUTPATIENT
Start: 2025-02-11 | End: 2025-05-12

## 2025-02-11 NOTE — PROGRESS NOTES
Initial office visit    Assessment:       Porsha Lindquist is a 34 y.o. female  with a history, physical examination and imaging consistent with bilateral knee patellofemoral syndrome with right worse than left and concern for possible patellofemoral osteochondral lesion.    Plan:  Discussed x ray and physical exam findings of the bilateral  knee at length with patient in the office today. Discussed MRI of the right knee to rule out osteochondral defect due to persistent pain and inflammation despite home exercise program. Patient agreeable and order placed today. Discussed continued conservative treatment with activity modification, RICE therapies, and oral anti-inflammatories as needed for persistent pain and inflammation. Also discussed physical therapy to work on strengthening with core and hip exercises to help support the knee. Patient expressed understanding. Referral placed to PT today. Also discussed Meloxicam for persistent pain and inflammation of the bilateral knee. Discussed side effects and risks of medication. Patient agreeable. Prescription and instruction for oral use provided to the patient today. She will follow up after MRI of the right knee. All of patients questions were answered in the office today. Patient encouraged to reach out via [facility] for further questions or concerns.           Assessment & Plan  Patellofemoral pain syndrome of right knee    Orders:    MRI knee right wo contrast; Future    meloxicam (Mobic) 15 mg tablet; Take 1 tablet (15 mg total) by mouth daily    Ambulatory Referral to Physical Therapy; Future    Patellofemoral syndrome of left knee    Orders:    meloxicam (Mobic) 15 mg tablet; Take 1 tablet (15 mg total) by mouth daily    Ambulatory Referral to Physical Therapy; Future    Osteochondral defect    Orders:    MRI knee right wo contrast; Future           CC: Anterior bilateral knee pain    History:       Porsha Lindquist is a 34 y.o. female  who presents  to the office for evaluation of her  bilateral knees. Patient notes anterior knee pain bilaterally since October with increase in the last week. 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 Patient notes she has tried home exercise and physical therapy program without relief of pain.She also notes use of oral anti-inflammatories, rest, and activity modification with mild to moderate relief of pain. She denies previous surgery, injury, or injections of the bilateral knees.       Pain Assessment:  Character:  Aching   Location: Bilateral knee  Duration: October   Associated Symptoms:  Knee swelling and mechanical grinding    PMHx:   Past Medical History:   Diagnosis Date    Abnormal Pap smear of cervix     had cryosurgery 2015    Anxiety     Asthma     Carrier of group B Streptococcus     GERD (gastroesophageal reflux disease)     High blood pressure     during pregnancy     History of UTI     Hx of absence seizures     once(per sanjiv)    Hx of seasonal allergies     Hypertension     Migraine headache     Muscle spasm of back     Papanicolaou smear for cervical cancer screening 06/2017    Pap neg 5/2016, pap- neg/GC/CT NEG    Postpartum depression     Preeclampsia     Psychiatric disorder     Syncope 02/2019    Umbilical hernia 11/2018    Urinary tract infection     Varicose veins of lower extremity      PSHx:   Past Surgical History:   Procedure Laterality Date    GYNECOLOGIC CRYOSURGERY      HERNIA REPAIR      2 years ago    INSERTION OF INTRAUTERINE DEVICE (IUD)      OTHER SURGICAL HISTORY  11/18/2015    cryosurgery     ID LAPAROSCOPY W/RMVL ADNEXAL STRUCTURES Bilateral 11/11/2024    Procedure: EXAM UNDER ANESTHESIA; (VNOTE) SALPINGECTOMY, LAPAROSCOPIC;  Surgeon: Ashutosh Chandler MD;  Location:  MAIN OR;  Service: Gynecology    SKIN BIOPSY      WISDOM TOOTH EXTRACTION    "    Medications:   Current Outpatient Medications on File Prior to Visit   Medication Sig Dispense Refill    albuterol (PROVENTIL HFA,VENTOLIN HFA) 90 mcg/act inhaler INHALE 1 PUFF BY MOUTH EVERY 6 HOURS AS NEEDED FOR WHEEZE 8.5 g 1    amitriptyline (ELAVIL) 10 mg tablet START WITH 1 TAB AT BED TIME, IF NO RELIEF AND ABLE TO TOLERATE PLEASE INCREASE THE DOSE TO 2 TABS AT BED TIME IN ABOUT 7 DAYS. 180 tablet 1    cyanocobalamin 1,000 mcg/mL Inject 1000 mcg IM once a week x 4 weeks, then once a month x 5 months. 9 mL 0    cyclobenzaprine (FLEXERIL) 10 mg tablet Take 1 tablet (10 mg total) by mouth 2 (two) times a day as needed for muscle spasms 20 tablet 0    fluticasone (FLONASE) 50 mcg/act nasal spray SPRAY 2 SPRAYS INTO EACH NOSTRIL EVERY DAY 48 mL 1    fluticasone-salmeterol (Advair HFA) 230-21 MCG/ACT inhaler INHALE 2 PUFFS BY MOUTH 2 TIMES A DAY RINSE MOUTH AFTER USE. 12 g 5    gabapentin (NEURONTIN) 300 mg capsule Take 1 capsule (300 mg total) by mouth 2 (two) times a day Take 1 tablet int the morning for 3 days, then 1 tablet morning and afternoon for 3 days and continue 60 capsule 1    hydrocortisone 1 % cream Apply topically 4 (four) times a day as needed for irritation or rash 14 g 0    ibuprofen (MOTRIN) 600 mg tablet Take 1 tablet (600 mg total) by mouth every 6 (six) hours as needed for mild pain 30 tablet 0    Nerve Stimulator (STANDARD TENS) OMER by Does not apply route 4 (four) times a day 1 Device 0    pantoprazole (PROTONIX) 40 mg tablet Take 1 tablet (40 mg total) by mouth daily 90 tablet 2    SYRINGE-NEEDLE, DISP, 3 ML 25G X 1\" 3 ML MISC Use to administer B12 as directed 9 each 0    Cholecalciferol (Vitamin D3) 50 MCG (2000 UT) capsule Take 1 capsule (2,000 Units total) by mouth every morning 90 capsule 0    ferrous sulfate 324 (65 Fe) mg Take 1 tablet (324 mg total) by mouth daily before breakfast 90 tablet 0     No current facility-administered medications on file prior to visit.     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   Family History:   Family History   Problem Relation Age of Onset    Hypertension Mother     Arthritis Mother         Viltigo    Asthma Mother     Diabetes Mother         Prediabetic     Sleep apnea Mother     Migraines Mother     Lymphoma Maternal Grandmother     Diabetes Maternal Grandmother     COPD Maternal Grandmother     Asthma Maternal Grandmother     Heart attack Maternal Grandmother     Heart attack Maternal Grandfather          60    Varicose Veins Paternal Grandmother     Hyperthyroidism Maternal Aunt     Asthma Maternal Aunt     Bipolar disorder Maternal Aunt     Thyroid disease Maternal Aunt     Varicose Veins Paternal Aunt     Cancer Cousin     Diabetes Cousin     Diabetes Other     Asthma Sister     Fibromyalgia Family     Arthritis Family     GI problems Family     Asthma Son       Review of systems: ROS is negative other than that noted in the HPI.  Constitutional: Negative for fatigue and fever.   HENT: Negative for sore throat.    Respiratory: Negative for shortness of breath.    Cardiovascular: Negative for chest pain.   Gastrointestinal: Negative for abdominal pain.   Endocrine: Negative for cold intolerance and heat intolerance.   Genitourinary: Negative for flank pain.   Musculoskeletal: Negative for back pain.   Skin: Negative for rash.   Allergic/Immunologic: Negative for immunocompromised state.   Neurological: Negative for dizziness.   Psychiatric/Behavioral: Negative for agitation.       Comprehensive Physical Examination:  There were no vitals filed for this visit.     General Appearance: The patient is a well developed, well nourished female  in no apparent distress.  Orientation:  The patient is alert and interactive.  Oriented to time, place and  person.  Mood and Affect:  The patient has normal mood and affect.  Gait and Station:  she is noted to ambulate with a normal heel toe gait pattern.  Observed standing alignment demonstrates normal physiologic valgus present.      Body Areas:          Knee Exam (focused):                RIGHT LEFT   ROM:   0-130 0-130   Crepitus  Negative Negative   Palpation: Effusion mild negative     MJL tenderness Negative Negative     LJL tenderness Negative Negative   Meniscus: Zayda Negative Negative    Apley's Compression Negative Negative   Instability: Varus at 0 + 30 stable stable     Valgus at 0 + 30 stable stable   Special Tests: Lachman Negative Negative     Posterior drawer Negative Negative     Anterior drawer Negative Negative     Pivot shift not tested not tested     Dial not tested not tested   Patella: Grind test Positive Positive      Mobility 1/4 1/4     Apprehension Negative Negative   Other: Single leg 1/4 squat not tested not tested      LE NV Exam: +2 DP/PT pulses bilaterally  Sensation intact to light touch L2-S1 bilaterally     Bilateral hip ROM demonstrates no pain actively or passively    No calf tenderness to palpation bilaterally    Radiographic Imaging:       I have personally reviewed and interpreted 4 radiographs of the bilateral knees which were reviewed in detail with the patient. The joint spaces of the medial and lateral compartments show no degenerative changes.  On the sunrise view, the patellofemoral compartment shows no degenerative changes. No acute fracture or dislocation. No other bony pathology is present.       Scribe Attestation      I,:  Daysi Gaitan PA-C am acting as a scribe while in the presence of the attending physician.:       I,:  Rome Escalante MD personally performed the services described in this documentation    as scribed in my presence.:

## 2025-02-15 DIAGNOSIS — E53.8 VITAMIN B 12 DEFICIENCY: ICD-10-CM

## 2025-02-17 RX ORDER — CYANOCOBALAMIN 1000 UG/ML
INJECTION, SOLUTION INTRAMUSCULAR; SUBCUTANEOUS
Qty: 4 ML | Refills: 2 | Status: SHIPPED | OUTPATIENT
Start: 2025-02-17

## 2025-02-22 ENCOUNTER — HOSPITAL ENCOUNTER (OUTPATIENT)
Dept: MRI IMAGING | Facility: HOSPITAL | Age: 35
Discharge: HOME | End: 2025-02-22
Attending: PAIN MEDICINE
Payer: MEDICARE

## 2025-02-22 DIAGNOSIS — M54.16 RADICULOPATHY, LUMBAR REGION: ICD-10-CM

## 2025-02-22 PROCEDURE — 72148 MRI LUMBAR SPINE W/O DYE: CPT

## 2025-02-24 ENCOUNTER — RESULTS FOLLOW-UP (OUTPATIENT)
Dept: PAIN MEDICINE | Facility: CLINIC | Age: 35
End: 2025-02-24

## 2025-03-03 DIAGNOSIS — E53.8 VITAMIN B 12 DEFICIENCY: ICD-10-CM

## 2025-03-04 RX ORDER — CYANOCOBALAMIN 1000 UG/ML
INJECTION, SOLUTION INTRAMUSCULAR; SUBCUTANEOUS
Qty: 12 ML | Refills: 1 | Status: SHIPPED | OUTPATIENT
Start: 2025-03-04

## 2025-04-30 ENCOUNTER — TELEPHONE (OUTPATIENT)
Age: 35
End: 2025-04-30

## 2025-04-30 NOTE — TELEPHONE ENCOUNTER
Called patient left voicemail to confirm appointment with  on   5/7/25      at [place] location. Left CC number to confirm appointment .

## 2025-05-07 ENCOUNTER — OFFICE VISIT (OUTPATIENT)
Age: 35
End: 2025-05-07
Payer: MEDICARE

## 2025-05-07 VITALS
BODY MASS INDEX: 26.93 KG/M2 | HEIGHT: 63 IN | SYSTOLIC BLOOD PRESSURE: 120 MMHG | TEMPERATURE: 97.6 F | WEIGHT: 152 LBS | HEART RATE: 78 BPM | DIASTOLIC BLOOD PRESSURE: 78 MMHG | OXYGEN SATURATION: 99 %

## 2025-05-07 DIAGNOSIS — Z63.0 STRESS DUE TO MARITAL PROBLEMS: ICD-10-CM

## 2025-05-07 DIAGNOSIS — F41.9 ANXIETY: Primary | ICD-10-CM

## 2025-05-07 PROCEDURE — 99213 OFFICE O/P EST LOW 20 MIN: CPT

## 2025-05-07 RX ORDER — NITROFURANTOIN 25; 75 MG/1; MG/1
CAPSULE ORAL
COMMUNITY
Start: 2025-03-05

## 2025-05-07 RX ORDER — HYDROXYZINE HYDROCHLORIDE 25 MG/1
25 TABLET, FILM COATED ORAL EVERY 6 HOURS PRN
Qty: 30 TABLET | Refills: 0 | Status: SHIPPED | OUTPATIENT
Start: 2025-05-07

## 2025-05-07 SDOH — SOCIAL STABILITY - SOCIAL INSECURITY: PROBLEMS IN RELATIONSHIP WITH SPOUSE OR PARTNER: Z63.0
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
## 2025-05-22 ENCOUNTER — TELEPHONE (OUTPATIENT)
Age: 35
End: 2025-05-22

## 2025-05-22 NOTE — TELEPHONE ENCOUNTER
Contacted patient in regards to Routine Referral in attempts to verify patient's needs of services and add patient to proper wait list. Writer left vm to call intake at [phone]    Please add patient to proper WL(s)    Attempt #2

## 2025-06-04 DIAGNOSIS — E53.8 VITAMIN B 12 DEFICIENCY: ICD-10-CM

## 2025-06-05 RX ORDER — SYRINGE WITH NEEDLE, 1 ML 25GX5/8"
SYRINGE, EMPTY DISPOSABLE MISCELLANEOUS
Qty: 4 EACH | Refills: 5 | Status: SHIPPED | OUTPATIENT
Start: 2025-06-05

## 2025-06-28 ENCOUNTER — HOSPITAL ENCOUNTER (EMERGENCY)
Facility: HOSPITAL | Age: 35
Discharge: HOME | End: 2025-06-29
Attending: EMERGENCY MEDICINE
Payer: MEDICARE

## 2025-06-28 DIAGNOSIS — R10.9 LEFT FLANK PAIN: ICD-10-CM

## 2025-06-28 DIAGNOSIS — R42 DIZZINESS: ICD-10-CM

## 2025-06-28 DIAGNOSIS — K80.20 CHOLELITHIASIS: ICD-10-CM

## 2025-06-28 PROCEDURE — 99285 EMERGENCY DEPT VISIT HI MDM: CPT

## 2025-06-28 PROCEDURE — 81025 URINE PREGNANCY TEST: CPT | Performed by: EMERGENCY MEDICINE

## 2025-06-28 PROCEDURE — 99285 EMERGENCY DEPT VISIT HI MDM: CPT | Performed by: EMERGENCY MEDICINE

## 2025-06-28 RX ORDER — ACETAMINOPHEN 325 MG/1
975 TABLET ORAL ONCE
Status: COMPLETED | OUTPATIENT
Start: 2025-06-29 | End: 2025-06-29

## 2025-06-29 ENCOUNTER — APPOINTMENT (EMERGENCY)
Dept: CT IMAGING | Facility: HOSPITAL | Age: 35
End: 2025-06-29
Payer: MEDICARE

## 2025-06-29 VITALS
DIASTOLIC BLOOD PRESSURE: 67 MMHG | OXYGEN SATURATION: 99 % | TEMPERATURE: 99.2 F | HEART RATE: 67 BPM | SYSTOLIC BLOOD PRESSURE: 118 MMHG | RESPIRATION RATE: 18 BRPM

## 2025-06-29 LAB
ALBUMIN SERPL BCG-MCNC: 4.1 G/DL (ref 3.5–5)
ALP SERPL-CCNC: 76 U/L (ref 34–104)
ALT SERPL W P-5'-P-CCNC: 17 U/L (ref 7–52)
ANION GAP SERPL CALCULATED.3IONS-SCNC: 8 MMOL/L (ref 4–13)
AST SERPL W P-5'-P-CCNC: 19 U/L (ref 13–39)
BASOPHILS # BLD AUTO: 0.02 THOUSANDS/ÂΜL (ref 0–0.1)
BASOPHILS NFR BLD AUTO: 0 % (ref 0–1)
BILIRUB SERPL-MCNC: 0.26 MG/DL (ref 0.2–1)
BILIRUB UR QL STRIP: NEGATIVE
BUN SERPL-MCNC: 15 MG/DL (ref 5–25)
CALCIUM SERPL-MCNC: 9.5 MG/DL (ref 8.4–10.2)
CHLORIDE SERPL-SCNC: 103 MMOL/L (ref 96–108)
CLARITY UR: CLEAR
CO2 SERPL-SCNC: 26 MMOL/L (ref 21–32)
COLOR UR: YELLOW
CREAT SERPL-MCNC: 0.62 MG/DL (ref 0.6–1.3)
EOSINOPHIL # BLD AUTO: 0.28 THOUSAND/ÂΜL (ref 0–0.61)
EOSINOPHIL NFR BLD AUTO: 3 % (ref 0–6)
ERYTHROCYTE [DISTWIDTH] IN BLOOD BY AUTOMATED COUNT: 15.1 % (ref 11.6–15.1)
EXT PREGNANCY TEST URINE: NEGATIVE
EXT. CONTROL: NORMAL
GFR SERPL CREATININE-BSD FRML MDRD: 116 ML/MIN/1.73SQ M
GLUCOSE SERPL-MCNC: 88 MG/DL (ref 65–140)
GLUCOSE UR STRIP-MCNC: NEGATIVE MG/DL
HCG SERPL QL: NEGATIVE
HCT VFR BLD AUTO: 37.1 % (ref 34.8–46.1)
HGB BLD-MCNC: 11.9 G/DL (ref 11.5–15.4)
HGB UR QL STRIP.AUTO: NEGATIVE
IMM GRANULOCYTES # BLD AUTO: 0.02 THOUSAND/UL (ref 0–0.2)
IMM GRANULOCYTES NFR BLD AUTO: 0 % (ref 0–2)
KETONES UR STRIP-MCNC: NEGATIVE MG/DL
LEUKOCYTE ESTERASE UR QL STRIP: NEGATIVE
LIPASE SERPL-CCNC: 17 U/L (ref 11–82)
LYMPHOCYTES # BLD AUTO: 2.32 THOUSANDS/ÂΜL (ref 0.6–4.47)
LYMPHOCYTES NFR BLD AUTO: 26 % (ref 14–44)
MAGNESIUM SERPL-MCNC: 2 MG/DL (ref 1.9–2.7)
MCH RBC QN AUTO: 27.7 PG (ref 26.8–34.3)
MCHC RBC AUTO-ENTMCNC: 32.1 G/DL (ref 31.4–37.4)
MCV RBC AUTO: 87 FL (ref 82–98)
MONOCYTES # BLD AUTO: 0.58 THOUSAND/ÂΜL (ref 0.17–1.22)
MONOCYTES NFR BLD AUTO: 7 % (ref 4–12)
NEUTROPHILS # BLD AUTO: 5.74 THOUSANDS/ÂΜL (ref 1.85–7.62)
NEUTS SEG NFR BLD AUTO: 64 % (ref 43–75)
NITRITE UR QL STRIP: NEGATIVE
NRBC BLD AUTO-RTO: 0 /100 WBCS
PH UR STRIP.AUTO: 6.5 [PH]
PLATELET # BLD AUTO: 321 THOUSANDS/UL (ref 149–390)
PMV BLD AUTO: 10.3 FL (ref 8.9–12.7)
POTASSIUM SERPL-SCNC: 4.1 MMOL/L (ref 3.5–5.3)
PROT SERPL-MCNC: 7 G/DL (ref 6.4–8.4)
PROT UR STRIP-MCNC: NEGATIVE MG/DL
RBC # BLD AUTO: 4.29 MILLION/UL (ref 3.81–5.12)
SODIUM SERPL-SCNC: 137 MMOL/L (ref 135–147)
SP GR UR STRIP.AUTO: 1.02 (ref 1–1.03)
UROBILINOGEN UR QL STRIP.AUTO: 0.2 E.U./DL
WBC # BLD AUTO: 8.96 THOUSAND/UL (ref 4.31–10.16)

## 2025-06-29 PROCEDURE — 96374 THER/PROPH/DIAG INJ IV PUSH: CPT

## 2025-06-29 PROCEDURE — 81003 URINALYSIS AUTO W/O SCOPE: CPT | Performed by: EMERGENCY MEDICINE

## 2025-06-29 PROCEDURE — 87086 URINE CULTURE/COLONY COUNT: CPT | Performed by: EMERGENCY MEDICINE

## 2025-06-29 PROCEDURE — 84703 CHORIONIC GONADOTROPIN ASSAY: CPT | Performed by: EMERGENCY MEDICINE

## 2025-06-29 PROCEDURE — 80053 COMPREHEN METABOLIC PANEL: CPT | Performed by: EMERGENCY MEDICINE

## 2025-06-29 PROCEDURE — 85025 COMPLETE CBC W/AUTO DIFF WBC: CPT | Performed by: EMERGENCY MEDICINE

## 2025-06-29 PROCEDURE — 83735 ASSAY OF MAGNESIUM: CPT | Performed by: EMERGENCY MEDICINE

## 2025-06-29 PROCEDURE — 74176 CT ABD & PELVIS W/O CONTRAST: CPT

## 2025-06-29 PROCEDURE — 93005 ELECTROCARDIOGRAM TRACING: CPT

## 2025-06-29 PROCEDURE — 96361 HYDRATE IV INFUSION ADD-ON: CPT

## 2025-06-29 PROCEDURE — 36415 COLL VENOUS BLD VENIPUNCTURE: CPT | Performed by: EMERGENCY MEDICINE

## 2025-06-29 PROCEDURE — 83690 ASSAY OF LIPASE: CPT | Performed by: EMERGENCY MEDICINE

## 2025-06-29 RX ORDER — KETOROLAC TROMETHAMINE 30 MG/ML
15 INJECTION, SOLUTION INTRAMUSCULAR; INTRAVENOUS ONCE
Status: COMPLETED | OUTPATIENT
Start: 2025-06-29 | End: 2025-06-29

## 2025-06-29 RX ADMIN — KETOROLAC TROMETHAMINE 15 MG: 30 INJECTION, SOLUTION INTRAMUSCULAR at 01:18

## 2025-06-29 RX ADMIN — ACETAMINOPHEN 975 MG: 325 TABLET, FILM COATED ORAL at 00:15

## 2025-06-29 RX ADMIN — SODIUM CHLORIDE 1000 ML: 0.9 INJECTION, SOLUTION INTRAVENOUS at 00:24

## 2025-06-29 NOTE — ED PROVIDER NOTES
Time reflects when diagnosis was documented in both MDM as applicable and the Disposition within this note       Time User Action Codes Description Comment    6/29/2025  1:08 AM JackiemaribethAlfred [R42] Dizziness     6/29/2025  1:09 AM Alfred Morgan [R10.9] Left flank pain     6/29/2025  1:09 AM Alfred Morgan Modify [R42] Dizziness     6/29/2025  2:07 AM Alfred Morgan [K80.20] Cholelithiasis     6/29/2025  2:07 AM Alfred Morgan Modify [K80.20] Cholelithiasis           ED Disposition       ED Disposition   Discharge    Condition   Stable    Date/Time   Sun Jun 29, 2025  2:07 AM    Comment   Porsha Lindquist discharge to home/self care.                   Assessment & Plan       Medical Decision Making  We patient is a 34-year-old female seen in the emergency department with concern for left flank pain, urinary frequency, lightheadedness/dizziness.  EKG was obtained and noted, which showed no definite evidence of arrhythmia or ischemia. Urine pregnancy test was negative. Patient was treated with medication for symptom control, with good effect.  Laboratory evaluation unremarkable. CT abdomen/pelvis was obtained to evaluate for ureteral stone or other obstructive uropathy.  CT imaging showed no obstructive uropathy, but showed cholelithiasis, without evidence of cholecystitis.  No definite cause of the patient's symptoms was discovered.  Evaluation is not consistent with urinary tract infection, pyelonephritis, or active renal colic. Patient's symptoms might be due to mild dehydration or other musculoskeletal pain. Plan to have patient follow up with PCP/outpatient providers.  Patient stable for discharge home. Discharge instructions were reviewed with patient.    Problems Addressed:  Dizziness: acute illness or injury  Left flank pain: acute illness or injury    Amount and/or Complexity of Data Reviewed  Labs: ordered. Decision-making details documented in ED Course.  Radiology: ordered.  "Decision-making details documented in ED Course.  ECG/medicine tests: ordered. Decision-making details documented in ED Course.    Risk  OTC drugs.  Prescription drug management.        ED Course as of 06/29/25 0215   Sun Jun 29, 2025   0206 CT abdomen/pelvis without contrast-    IMPRESSION:     No obstructive uropathy.     Cholelithiasis.            Medications   sodium chloride 0.9 % bolus 1,000 mL (1,000 mL Intravenous New Bag 6/29/25 0024)   acetaminophen (TYLENOL) tablet 975 mg (975 mg Oral Given 6/29/25 0015)   ketorolac (TORADOL) injection 15 mg (15 mg Intravenous Given 6/29/25 0118)       ED Risk Strat Scores                    No data recorded        SBIRT 20yo+      Flowsheet Row Most Recent Value   Initial Alcohol Screen: US AUDIT-C     1. How often do you have a drink containing alcohol? 0 Filed at: 06/28/2025 2343   2. How many drinks containing alcohol do you have on a typical day you are drinking?  0 Filed at: 06/28/2025 2343   3b. FEMALE Any Age, or MALE 65+: How often do you have 4 or more drinks on one occassion? 0 Filed at: 06/28/2025 2343   Audit-C Score 0 Filed at: 06/28/2025 2343   VIKTORIYA: How many times in the past year have you...    Used an illegal drug or used a prescription medication for non-medical reasons? Never Filed at: 06/28/2025 2343                            History of Present Illness       Chief Complaint   Patient presents with    Dizziness     Dizziness and \"feeling out of it\" since 10pm. Also c/o L lower back pain. Denies urinary symptoms/CP/SOB.        Past Medical History[1]   Past Surgical History[2]   Family History[3]   Social History[4]   E-Cigarette/Vaping    E-Cigarette Use Never User       E-Cigarette/Vaping Substances    Nicotine No     THC No     CBD No     Flavoring No     Other No     Unknown No       I have reviewed and agree with the history as documented.     Patient is a 34-year-old female seen in the emergency department with concern for left flank pain which " began today prior to evaluation in the emergency department.  Patient notes strong pain, which does not radiate, associated with urinary frequency.  Patient notes no fever, chest pain, shortness of breath, abdominal pain, nausea, vomiting.  Patient notes some associated lightheadedness/dizziness patient notes some improvement with ibuprofen at home. .  Patient notes no other definite clear exacerbating or alleviating factors for her symptoms.        Review of Systems   Constitutional:  Negative for chills and fever.   HENT:  Negative for ear pain and sore throat.    Eyes:  Negative for pain and visual disturbance.   Respiratory:  Negative for cough and shortness of breath.    Cardiovascular:  Negative for chest pain and palpitations.   Gastrointestinal:  Negative for abdominal pain, nausea and vomiting.   Genitourinary:  Positive for flank pain and frequency.   Musculoskeletal:  Negative for arthralgias and neck stiffness.   Skin:  Negative for color change and rash.   Neurological:  Positive for dizziness and light-headedness. Negative for weakness and numbness.   Psychiatric/Behavioral:  Negative for agitation and confusion.    All other systems reviewed and are negative.          Objective       ED Triage Vitals   Temperature Pulse Blood Pressure Respirations SpO2 Patient Position - Orthostatic VS   06/28/25 2344 06/28/25 2344 06/28/25 2344 06/28/25 2344 06/28/25 2344 --   99.2 °F (37.3 °C) 83 122/77 20 98 %       Temp Source Heart Rate Source BP Location FiO2 (%) Pain Score    06/28/25 2344 06/28/25 2344 -- -- 06/29/25 0015    Oral Monitor   No Pain      Vitals      Date and Time Temp Pulse SpO2 Resp BP Pain Score FACES Pain Rating User   06/29/25 0118 -- -- -- -- -- 4 -- CW   06/29/25 0015 -- -- -- -- -- No Pain -- KH   06/28/25 2344 99.2 °F (37.3 °C) 83 98 % 20 122/77 -- --             Physical Exam  Vitals and nursing note reviewed.   Constitutional:       General: She is not in acute distress.      Appearance: She is well-developed.   HENT:      Head: Normocephalic and atraumatic.      Right Ear: External ear normal.      Left Ear: External ear normal.      Nose: Nose normal.      Mouth/Throat:      Pharynx: Oropharynx is clear.     Eyes:      General: No scleral icterus.     Conjunctiva/sclera: Conjunctivae normal.       Cardiovascular:      Rate and Rhythm: Normal rate and regular rhythm.      Heart sounds: No murmur heard.  Pulmonary:      Effort: Pulmonary effort is normal. No respiratory distress.      Breath sounds: Normal breath sounds.   Abdominal:      General: There is no distension.      Palpations: Abdomen is soft.      Tenderness: There is no abdominal tenderness. There is left CVA tenderness. There is no right CVA tenderness.     Musculoskeletal:         General: No deformity or signs of injury.      Cervical back: Normal range of motion and neck supple.     Skin:     General: Skin is warm and dry.     Neurological:      General: No focal deficit present.      Mental Status: She is alert.      Cranial Nerves: No cranial nerve deficit.      Sensory: No sensory deficit.     Psychiatric:         Mood and Affect: Mood normal.         Thought Content: Thought content normal.         Results Reviewed       Procedure Component Value Units Date/Time    hCG, qualitative pregnancy [[id number]]  (Normal) Collected: 06/29/25 0020    Lab Status: Final result Specimen: Blood from Arm, Left Updated: 06/29/25 0059     Preg, Serum Negative    Comprehensive metabolic panel [[id number]] Collected: 06/29/25 0020    Lab Status: Final result Specimen: Blood from Arm, Left Updated: 06/29/25 0052     Sodium 137 mmol/L      Potassium 4.1 mmol/L      Chloride 103 mmol/L      CO2 26 mmol/L      ANION GAP 8 mmol/L      BUN 15 mg/dL      Creatinine 0.62 mg/dL      Glucose 88 mg/dL      Calcium 9.5 mg/dL      AST 19 U/L      ALT 17 U/L      Alkaline Phosphatase 76 U/L      Total Protein 7.0 g/dL      Albumin 4.1 g/dL      Total  Bilirubin 0.26 mg/dL      eGFR 116 ml/min/1.73sq m     Narrative:      National Kid[employer] Disease Foundation guidelines for Chronic Kidney Disease (CKD):     Stage 1 with normal or high GFR (GFR > 90 mL/min/1.73 square meters)    Stage 2 Mild CKD (GFR = 60-89 mL/min/1.73 square meters)    Stage 3A Moderate CKD (GFR = 45-59 mL/min/1.73 square meters)    Stage 3B Moderate CKD (GFR = 30-44 mL/min/1.73 square meters)    Stage 4 Severe CKD (GFR = 15-29 mL/min/1.73 square meters)    Stage 5 End Stage CKD (GFR <15 mL/min/1.73 square meters)  Note: GFR calculation is accurate only with a steady state creatinine    Magnesium [[id number]]  (Normal) Collected: 06/29/25 0020    Lab Status: Final result Specimen: Blood from Arm, Left Updated: 06/29/25 0052     Magnesium 2.0 mg/dL     Lipase [[id number]]  (Normal) Collected: 06/29/25 0020    Lab Status: Final result Specimen: Blood from Arm, Left Updated: 06/29/25 0052     Lipase 17 u/L     UA (URINE) with reflex to Scope [[id number]]  (Normal) Collected: 06/29/25 0020    Lab Status: Final result Specimen: Urine, Clean Catch Updated: 06/29/25 0038     Color, UA Yellow     Clarity, UA Clear     Specific Gravity, UA 1.020     pH, UA 6.5     Leukocytes, UA Negative     Nitrite, UA Negative     Protein, UA Negative mg/dl      Glucose, UA Negative mg/dl      Ketones, UA Negative mg/dl      Urobilinogen, UA 0.2 E.U./dl      Bilirubin, UA Negative     Occult Blood, UA Negative    CBC and differential [[id number]] Collected: 06/29/25 0020    Lab Status: Final result Specimen: Blood from Arm, Left Updated: 06/29/25 0034     WBC 8.96 Thousand/uL      RBC 4.29 Million/uL      Hemoglobin 11.9 g/dL      Hematocrit 37.1 %      MCV 87 fL      MCH 27.7 pg      MCHC 32.1 g/dL      RDW 15.1 %      MPV 10.3 fL      Platelets 321 Thousands/uL      nRBC 0 /100 WBCs      Segmented % 64 %      Immature Grans % 0 %      Lymphocytes % 26 %      Monocytes % 7 %      Eosinophils Relative 3 %      Basophils  "Relative 0 %      Absolute Neutrophils 5.74 Thousands/µL      Absolute Immature Grans 0.02 Thousand/uL      Absolute Lymphocytes 2.32 Thousands/µL      Absolute Monocytes 0.58 Thousand/µL      Eosinophils Absolute 0.28 Thousand/µL      Basophils Absolute 0.02 Thousands/µL     Urine culture [[id number]] Collected: 06/29/25 0020    Lab Status: In process Specimen: Urine, Clean Catch Updated: 06/29/25 0032    POCT pregnancy, urine [[id number]]  (Normal) Collected: 06/29/25 0021    Lab Status: Final result Updated: 06/29/25 0021     EXT Preg Test, Ur Negative     Control Valid            CT renal stone study abdomen pelvis wo contrast   Final Interpretation by Latisha Toledo MD (06/29 0201)      No obstructive uropathy.      Cholelithiasis.      Workstation performed: [id number]             ECG 12 Lead Documentation Only    Date/Time: 6/29/2025 12:25 AM    Performed by: Alfred Morgan MD  Authorized by: Alfred Morgan MD    Indications / Diagnosis:  Dizziness  ECG reviewed by me, the ED Provider: yes    Patient location:  ED  Rate:     ECG rate:  64    ECG rate assessment: normal    Rhythm:     Rhythm: sinus rhythm    QRS:     QRS axis:  Normal  T waves:     T waves: non-specific    Comments:      Sinus rhythm at 64, normal axis, , QRS 80, QTc 439, nonspecific T-wave abnormality, no definite evidence of acute ischemia      ED Medication and Procedure Management   Prior to Admission Medications   Prescriptions Last Dose Informant Patient Reported? Taking?   Cholecalciferol (Vitamin D3) 50 MCG (2000 UT) capsule   No No   Sig: Take 1 capsule (2,000 Units total) by mouth every morning   Nerve Stimulator (STANDARD TENS) OMER Not Taking Self No No   Sig: by Does not apply route 4 (four) times a day   Patient not taking: Reported on 6/28/2025   SYRINGE-NEEDLE, DISP, 3 ML (B-D 3CC LUER-JOMAR SYR 25GX1\") 25G X 1\" 3 ML MISC Not Taking  No No   Sig: USE TO ADMINISTER B12 AS DIRECTED   Patient not taking: Reported on " 6/28/2025   albuterol (PROVENTIL HFA,VENTOLIN HFA) 90 mcg/act inhaler Not Taking Self No No   Sig: INHALE 1 PUFF BY MOUTH EVERY 6 HOURS AS NEEDED FOR WHEEZE   Patient not taking: Reported on 6/28/2025   amitriptyline (ELAVIL) 10 mg tablet Not Taking  No No   Sig: START WITH 1 TAB AT BED TIME, IF NO RELIEF AND ABLE TO TOLERATE PLEASE INCREASE THE DOSE TO 2 TABS AT BED TIME IN ABOUT 7 DAYS.   Patient not taking: Reported on 6/28/2025   cyanocobalamin 1,000 mcg/mL Not Taking  No No   Sig: INJECT 1000 MCG INTRAMUSCULARLY ONCE A WEEK X 4 WEEKS, THEN ONCE A MONTH X 5 MONTHS.   Patient not taking: Reported on 6/28/2025   cyclobenzaprine (FLEXERIL) 10 mg tablet Not Taking  No No   Sig: Take 1 tablet (10 mg total) by mouth 2 (two) times a day as needed for muscle spasms   Patient not taking: Reported on 6/28/2025   ferrous sulfate 324 (65 Fe) mg   No No   Sig: Take 1 tablet (324 mg total) by mouth daily before breakfast   fluticasone (FLONASE) 50 mcg/act nasal spray Not Taking  No No   Sig: SPRAY 2 SPRAYS INTO EACH NOSTRIL EVERY DAY   Patient not taking: Reported on 6/28/2025   fluticasone-salmeterol (Advair HFA) 230-21 MCG/ACT inhaler Not Taking  No No   Sig: INHALE 2 PUFFS BY MOUTH 2 TIMES A DAY RINSE MOUTH AFTER USE.   Patient not taking: Reported on 6/28/2025   gabapentin (NEURONTIN) 300 mg capsule Not Taking  No No   Sig: Take 1 capsule (300 mg total) by mouth 2 (two) times a day Take 1 tablet int the morning for 3 days, then 1 tablet morning and afternoon for 3 days and continue   Patient not taking: Reported on 6/28/2025   hydrOXYzine HCL (ATARAX) 25 mg tablet Not Taking  No No   Sig: Take 1 tablet (25 mg total) by mouth every 6 (six) hours as needed for itching   Patient not taking: Reported on 6/28/2025   hydrocortisone 1 % cream Not Taking  No No   Sig: Apply topically 4 (four) times a day as needed for irritation or rash   Patient not taking: Reported on 6/28/2025   ibuprofen (MOTRIN) 600 mg tablet Not Taking  No  No   Sig: Take 1 tablet (600 mg total) by mouth every 6 (six) hours as needed for mild pain   Patient not taking: Reported on 6/28/2025   meloxicam (Mobic) 15 mg tablet   No No   Sig: Take 1 tablet (15 mg total) by mouth daily   nitrofurantoin (MACROBID) 100 mg capsule Not Taking  Yes No   Sig: TAKE 1 CAPSULE BY MOUTH WITH FOOD TWICE A DAY FOR 7 DAYS   Patient not taking: Reported on 6/28/2025   pantoprazole (PROTONIX) 40 mg tablet Not Taking  No No   Sig: Take 1 tablet (40 mg total) by mouth daily   Patient not taking: Reported on 6/28/2025      Facility-Administered Medications: None     Patient's Medications   Discharge Prescriptions    No medications on file     No discharge procedures on file.  ED SEPSIS DOCUMENTATION   Time reflects when diagnosis was documented in both MDM as applicable and the Disposition within this note       Time User Action Codes Description Comment    6/29/2025  1:08 AM Alfred Morgan [R42] Dizziness     6/29/2025  1:09 AM Alfred Morgan [R10.9] Left flank pain     6/29/2025  1:09 AM Alfred Morgan [R42] Dizziness     6/29/2025  2:07 AM Alfred Morgan [K80.20] Cholelithiasis     6/29/2025  2:07 AM Alfred Morgan [K80.20] Cholelithiasis                      [1]   Past Medical History:  Diagnosis Date    Abnormal Pap smear of cervix     had cryosurgery 2015    Anxiety     Asthma     Carrier of group B Streptococcus     GERD (gastroesophageal reflux disease)     High blood pressure     during pregnancy     History of UTI     Hx of absence seizures     once(per sanjiv)    Hx of seasonal allergies     Hypertension     Migraine headache     Muscle spasm of back     Papanicolaou smear for cervical cancer screening 06/2017    Pap neg 5/2016, pap- neg/GC/CT NEG    Postpartum depression     Preeclampsia     Psychiatric disorder     Syncope 02/2019    Umbilical hernia 11/2018    Urinary tract infection     Varicose veins of lower extremity    [2]   Past Surgical  History:  Procedure Laterality Date    GYNECOLOGIC CRYOSURGERY      HERNIA REPAIR      2 years ago    INSERTION OF INTRAUTERINE DEVICE (IUD)      OTHER SURGICAL HISTORY  2015    cryosurgery     RI LAPAROSCOPY W/RMVL ADNEXAL STRUCTURES Bilateral 2024    Procedure: EXAM UNDER ANESTHESIA; (VNOTE) SALPINGECTOMY, LAPAROSCOPIC;  Surgeon: Ashutosh Chandler MD;  Location:  MAIN OR;  Service: Gynecology    SKIN BIOPSY      WISDOM TOOTH EXTRACTION     [3]   Family History  Problem Relation Name Age of Onset    Hypertension Mother Tea Hendricks     Arthritis Mother Tea Hendricks         Viltigo    Asthma Mother Tea Hendricks     Diabetes Mother Tea Hendricks         Prediabetic     Sleep apnea Mother Tea Hendricks     Migraines Mother Tea Hendricks     Lymphoma Maternal Grandmother Farzana Rizo     Diabetes Maternal Grandmother Farzana Rizo     COPD Maternal Grandmother Farzana Rizo     Asthma Maternal Grandmother Farzana Rizo     Heart attack Maternal Grandmother Farzana Rizo     Heart attack Maternal Grandfather N/A          60    Varicose Veins Paternal Grandmother      Hyperthyroidism Maternal Aunt Farzana     Asthma Maternal Aunt Farzana     Bipolar disorder Maternal Aunt Farzana     Thyroid disease Maternal Aunt Farzana     Varicose Veins Paternal Aunt      Cancer Cousin Pat.     Diabetes Cousin Pat.     Diabetes Other MGGM     Asthma Sister N a     Fibromyalgia Family Paternal Females     Arthritis Family Paternal Females     GI problems Family Paternal Females     Asthma Son N/AN/A    [4]   Social History  Tobacco Use    Smoking status: Every Day     Current packs/day: 0.00     Average packs/day: 1 pack/day for 15.0 years (15.0 ttl pk-yrs)     Types: Cigarettes     Start date: 2000     Last attempt to quit: 2015     Years since quitting: 10.4    Smokeless tobacco: Never    Tobacco comments:     Has smoked since age:   10   Vaping Use    Vaping status: Never Used   Substance Use  Topics    Alcohol use: Not Currently     Comment: stopped drinking in 2015    Drug use: Never     Comment: per Gyn notes, Marijuana HX age 13.         Alfred Morgan MD  06/29/25 8202

## 2025-06-29 NOTE — DISCHARGE INSTRUCTIONS
Follow up with your primary doctor/outpatient providers, and return to the emergency department for new or worsening symptoms.      IMPRESSION:     No obstructive uropathy.     Cholelithiasis.

## 2025-06-30 LAB
ATRIAL RATE: 64 BPM
BACTERIA UR CULT: NORMAL
P AXIS: -37 DEGREES
PR INTERVAL: 136 MS
QRS AXIS: 83 DEGREES
QRSD INTERVAL: 80 MS
QT INTERVAL: 426 MS
QTC INTERVAL: 439 MS
T WAVE AXIS: 66 DEGREES
VENTRICULAR RATE: 64 BPM

## 2025-06-30 PROCEDURE — 93010 ELECTROCARDIOGRAM REPORT: CPT | Performed by: INTERNAL MEDICINE

## 2025-07-08 ENCOUNTER — TELEPHONE (OUTPATIENT)
Age: 35
End: 2025-07-08

## 2025-07-08 NOTE — TELEPHONE ENCOUNTER
Left message on voicemail to confirm patients appointment tomorrow.  Informed patient of the date, time and location. Advised to call office at [phone] if they need to reschedule their visit.

## (undated) DEVICE — GLOVE INDICATOR PI UNDERGLOVE SZ 6.5 BLUE

## (undated) DEVICE — TOWEL SET X-RAY

## (undated) DEVICE — 40595 XL TRENDELENBURG POSITIONING KIT: Brand: 40595 XL TRENDELENBURG POSITIONING KIT

## (undated) DEVICE — ADHESIVE SKIN HIGH VISCOSITY EXOFIN 1ML

## (undated) DEVICE — ELECTROSURGICAL SEALER AND DIVIDER HANDPIECE: Brand: VOYANT® MARYLAND FUSION DEVICE

## (undated) DEVICE — PAD GROUNDING DUAL ADULT

## (undated) DEVICE — INTENDED FOR TISSUE SEPARATION, AND OTHER PROCEDURES THAT REQUIRE A SHARP SURGICAL BLADE TO PUNCTURE OR CUT.: Brand: BARD-PARKER SAFETY BLADES SIZE 11, STERILE

## (undated) DEVICE — SUT VICRYL 0 CT-1 27 IN J340H

## (undated) DEVICE — Device

## (undated) DEVICE — SUT VICRYL 2-0 CT-1 27 IN J259H

## (undated) DEVICE — VISUALIZATION SYSTEM: Brand: CLEARIFY

## (undated) DEVICE — SUT MONOCRYL 4-0 PS-2 18 IN Y496G

## (undated) DEVICE — UTERINE MANIP 4.5MM

## (undated) DEVICE — GLOVE PI ULTRA TOUCH SZ.6.5

## (undated) DEVICE — CHLORAPREP HI-LITE 26ML ORANGE

## (undated) DEVICE — TRANSVAGINAL ACCESS PLATFORM: Brand: GELPOINT V-PATH

## (undated) DEVICE — INSUFFLATION TUBING PRIMFLO